# Patient Record
Sex: FEMALE | Race: WHITE | NOT HISPANIC OR LATINO | URBAN - METROPOLITAN AREA
[De-identification: names, ages, dates, MRNs, and addresses within clinical notes are randomized per-mention and may not be internally consistent; named-entity substitution may affect disease eponyms.]

---

## 2017-02-14 ENCOUNTER — EMERGENCY (EMERGENCY)
Facility: HOSPITAL | Age: 77
LOS: 1 days | Discharge: HOME | End: 2017-02-14
Attending: EMERGENCY MEDICINE
Payer: COMMERCIAL

## 2017-02-14 VITALS
HEIGHT: 66 IN | OXYGEN SATURATION: 100 % | RESPIRATION RATE: 20 BRPM | TEMPERATURE: 98 F | WEIGHT: 119.93 LBS | DIASTOLIC BLOOD PRESSURE: 89 MMHG | SYSTOLIC BLOOD PRESSURE: 139 MMHG | HEART RATE: 96 BPM

## 2017-02-14 DIAGNOSIS — F41.9 ANXIETY DISORDER, UNSPECIFIED: ICD-10-CM

## 2017-02-14 DIAGNOSIS — I10 ESSENTIAL (PRIMARY) HYPERTENSION: ICD-10-CM

## 2017-02-14 DIAGNOSIS — R10.84 GENERALIZED ABDOMINAL PAIN: ICD-10-CM

## 2017-02-14 DIAGNOSIS — Z90.49 ACQUIRED ABSENCE OF OTHER SPECIFIED PARTS OF DIGESTIVE TRACT: ICD-10-CM

## 2017-02-14 DIAGNOSIS — N83.9 NONINFLAMMATORY DISORDER OF OVARY, FALLOPIAN TUBE AND BROAD LIGAMENT, UNSPECIFIED: ICD-10-CM

## 2017-02-14 LAB
ALBUMIN SERPL ELPH-MCNC: 4.4 G/DL — SIGNIFICANT CHANGE UP (ref 3.3–5.2)
ALP SERPL-CCNC: 107 U/L — SIGNIFICANT CHANGE UP (ref 40–120)
ALT FLD-CCNC: 19 U/L — SIGNIFICANT CHANGE UP
ANION GAP SERPL CALC-SCNC: 13 MMOL/L — SIGNIFICANT CHANGE UP (ref 5–17)
APPEARANCE UR: CLEAR — SIGNIFICANT CHANGE UP
APTT BLD: 31 SEC — SIGNIFICANT CHANGE UP (ref 27.5–37.4)
AST SERPL-CCNC: 24 U/L — SIGNIFICANT CHANGE UP
BACTERIA # UR AUTO: ABNORMAL
BASOPHILS # BLD AUTO: 0 K/UL — SIGNIFICANT CHANGE UP (ref 0–0.2)
BASOPHILS NFR BLD AUTO: 0.2 % — SIGNIFICANT CHANGE UP (ref 0–2)
BILIRUB SERPL-MCNC: 0.5 MG/DL — SIGNIFICANT CHANGE UP (ref 0.4–2)
BILIRUB UR-MCNC: NEGATIVE — SIGNIFICANT CHANGE UP
BLD GP AB SCN SERPL QL: SIGNIFICANT CHANGE UP
BUN SERPL-MCNC: 18 MG/DL — SIGNIFICANT CHANGE UP (ref 8–20)
CALCIUM SERPL-MCNC: 10 MG/DL — SIGNIFICANT CHANGE UP (ref 8.6–10.2)
CHLORIDE SERPL-SCNC: 86 MMOL/L — LOW (ref 98–107)
CO2 SERPL-SCNC: 29 MMOL/L — SIGNIFICANT CHANGE UP (ref 22–29)
COLOR SPEC: YELLOW — SIGNIFICANT CHANGE UP
CREAT SERPL-MCNC: 0.88 MG/DL — SIGNIFICANT CHANGE UP (ref 0.5–1.3)
DIFF PNL FLD: ABNORMAL
EOSINOPHIL # BLD AUTO: 0.1 K/UL — SIGNIFICANT CHANGE UP (ref 0–0.5)
EOSINOPHIL NFR BLD AUTO: 0.9 % — SIGNIFICANT CHANGE UP (ref 0–6)
GLUCOSE SERPL-MCNC: 109 MG/DL — SIGNIFICANT CHANGE UP (ref 70–115)
GLUCOSE UR QL: NEGATIVE MG/DL — SIGNIFICANT CHANGE UP
HCT VFR BLD CALC: 33.2 % — LOW (ref 37–47)
HGB BLD-MCNC: 11.3 G/DL — LOW (ref 12–16)
INR BLD: 0.99 RATIO — SIGNIFICANT CHANGE UP (ref 0.88–1.16)
KETONES UR-MCNC: NEGATIVE — SIGNIFICANT CHANGE UP
LEUKOCYTE ESTERASE UR-ACNC: NEGATIVE — SIGNIFICANT CHANGE UP
LIDOCAIN IGE QN: 25 U/L — SIGNIFICANT CHANGE UP (ref 22–51)
LYMPHOCYTES # BLD AUTO: 0.9 K/UL — LOW (ref 1–4.8)
LYMPHOCYTES # BLD AUTO: 9.5 % — LOW (ref 20–55)
MAGNESIUM SERPL-MCNC: 2.3 MG/DL — SIGNIFICANT CHANGE UP (ref 1.8–2.5)
MCHC RBC-ENTMCNC: 30.1 PG — SIGNIFICANT CHANGE UP (ref 27–31)
MCHC RBC-ENTMCNC: 34 G/DL — SIGNIFICANT CHANGE UP (ref 32–36)
MCV RBC AUTO: 88.3 FL — SIGNIFICANT CHANGE UP (ref 81–99)
MONOCYTES # BLD AUTO: 0.8 K/UL — SIGNIFICANT CHANGE UP (ref 0–0.8)
MONOCYTES NFR BLD AUTO: 9.2 % — SIGNIFICANT CHANGE UP (ref 3–10)
NEUTROPHILS # BLD AUTO: 7.3 K/UL — SIGNIFICANT CHANGE UP (ref 1.8–8)
NEUTROPHILS NFR BLD AUTO: 80 % — HIGH (ref 37–73)
NITRITE UR-MCNC: NEGATIVE — SIGNIFICANT CHANGE UP
PH UR: 7 — SIGNIFICANT CHANGE UP (ref 4.8–8)
PHOSPHATE SERPL-MCNC: 3.2 MG/DL — SIGNIFICANT CHANGE UP (ref 2.4–4.7)
PLATELET # BLD AUTO: 294 K/UL — SIGNIFICANT CHANGE UP (ref 150–400)
POTASSIUM SERPL-MCNC: 3.3 MMOL/L — LOW (ref 3.5–5.3)
POTASSIUM SERPL-SCNC: 3.3 MMOL/L — LOW (ref 3.5–5.3)
PROT SERPL-MCNC: 7.9 G/DL — SIGNIFICANT CHANGE UP (ref 6.6–8.7)
PROT UR-MCNC: NEGATIVE MG/DL — SIGNIFICANT CHANGE UP
PROTHROM AB SERPL-ACNC: 10.9 SEC — SIGNIFICANT CHANGE UP (ref 10–13.1)
RBC # BLD: 3.76 M/UL — LOW (ref 4.4–5.2)
RBC # FLD: 12.4 % — SIGNIFICANT CHANGE UP (ref 11–15.6)
SODIUM SERPL-SCNC: 128 MMOL/L — LOW (ref 135–145)
SP GR SPEC: 1.01 — SIGNIFICANT CHANGE UP (ref 1.01–1.02)
TYPE + AB SCN PNL BLD: SIGNIFICANT CHANGE UP
UROBILINOGEN FLD QL: NEGATIVE MG/DL — SIGNIFICANT CHANGE UP
WBC # BLD: 9.1 K/UL — SIGNIFICANT CHANGE UP (ref 4.8–10.8)
WBC # FLD AUTO: 9.1 K/UL — SIGNIFICANT CHANGE UP (ref 4.8–10.8)
WBC UR QL: SIGNIFICANT CHANGE UP

## 2017-02-14 PROCEDURE — 85610 PROTHROMBIN TIME: CPT

## 2017-02-14 PROCEDURE — 81001 URINALYSIS AUTO W/SCOPE: CPT

## 2017-02-14 PROCEDURE — 86901 BLOOD TYPING SEROLOGIC RH(D): CPT

## 2017-02-14 PROCEDURE — 99284 EMERGENCY DEPT VISIT MOD MDM: CPT | Mod: 25

## 2017-02-14 PROCEDURE — 86900 BLOOD TYPING SEROLOGIC ABO: CPT

## 2017-02-14 PROCEDURE — 96374 THER/PROPH/DIAG INJ IV PUSH: CPT | Mod: XU

## 2017-02-14 PROCEDURE — 83690 ASSAY OF LIPASE: CPT

## 2017-02-14 PROCEDURE — 93010 ELECTROCARDIOGRAM REPORT: CPT

## 2017-02-14 PROCEDURE — 93005 ELECTROCARDIOGRAM TRACING: CPT

## 2017-02-14 PROCEDURE — 86850 RBC ANTIBODY SCREEN: CPT

## 2017-02-14 PROCEDURE — 85027 COMPLETE CBC AUTOMATED: CPT

## 2017-02-14 PROCEDURE — 74177 CT ABD & PELVIS W/CONTRAST: CPT | Mod: 26

## 2017-02-14 PROCEDURE — 74177 CT ABD & PELVIS W/CONTRAST: CPT

## 2017-02-14 PROCEDURE — 84100 ASSAY OF PHOSPHORUS: CPT

## 2017-02-14 PROCEDURE — 99284 EMERGENCY DEPT VISIT MOD MDM: CPT

## 2017-02-14 PROCEDURE — 83735 ASSAY OF MAGNESIUM: CPT

## 2017-02-14 PROCEDURE — 85730 THROMBOPLASTIN TIME PARTIAL: CPT

## 2017-02-14 PROCEDURE — 80053 COMPREHEN METABOLIC PANEL: CPT

## 2017-02-14 RX ORDER — SODIUM CHLORIDE 9 MG/ML
1000 INJECTION INTRAMUSCULAR; INTRAVENOUS; SUBCUTANEOUS ONCE
Qty: 0 | Refills: 0 | Status: COMPLETED | OUTPATIENT
Start: 2017-02-14 | End: 2017-02-14

## 2017-02-14 RX ORDER — OMEPRAZOLE 10 MG/1
1 CAPSULE, DELAYED RELEASE ORAL
Qty: 15 | Refills: 0 | OUTPATIENT
Start: 2017-02-14 | End: 2017-03-01

## 2017-02-14 RX ORDER — KETOROLAC TROMETHAMINE 30 MG/ML
15 SYRINGE (ML) INJECTION ONCE
Qty: 0 | Refills: 0 | Status: DISCONTINUED | OUTPATIENT
Start: 2017-02-14 | End: 2017-02-14

## 2017-02-14 RX ADMIN — Medication 15 MILLIGRAM(S): at 17:50

## 2017-02-14 RX ADMIN — Medication 15 MILLIGRAM(S): at 18:15

## 2017-02-14 RX ADMIN — SODIUM CHLORIDE 1000 MILLILITER(S): 9 INJECTION INTRAMUSCULAR; INTRAVENOUS; SUBCUTANEOUS at 17:50

## 2017-02-14 NOTE — ED ADULT TRIAGE NOTE - CHIEF COMPLAINT QUOTE
pt reports intermittent pain to her lower abdomen accompanied by nausea x 2 weeks. pt directed to ED by dr Lan

## 2017-02-14 NOTE — ED ADULT NURSE NOTE - OBJECTIVE STATEMENT
77 Y/O a&ox3 F BIBT c/o of lower abdominal pain x2weeks with decreased eating. pt denies n/v/d/ no difficulty urinating. pt. denies cp, sob, palpitations will continue to monitor. pending dispo.

## 2017-02-14 NOTE — ED STATDOCS - NS ED MD SCRIBE ATTENDING SCRIBE SECTIONS
REVIEW OF SYSTEMS/PAST MEDICAL/SURGICAL/SOCIAL HISTORY/HISTORY OF PRESENT ILLNESS/INTAKE ASSESSMENT/SCREENINGS/VITAL SIGNS( Pullset)/PHYSICAL EXAM/HIV/DISPOSITION

## 2017-02-14 NOTE — ED STATDOCS - INTERPRETATION
normal sinus rhythm, Normal axis, Normal AK interval and QRS complex. There are no acute ischemic ST or T-wave changes.

## 2017-02-14 NOTE — ED STATDOCS - CARE PLAN
Principal Discharge DX:	Abdominal pain  Secondary Diagnosis:	Ovarian mass, left  Secondary Diagnosis:	Gastric wall thickening

## 2017-02-14 NOTE — ED STATDOCS - MEDICAL DECISION MAKING DETAILS
gi f.u without fail pt non toxic need egd this week evaluate cancerous lesion she understand urgency

## 2017-02-14 NOTE — ED STATDOCS - ATTENDING CONTRIBUTION TO CARE
I, Cole Giron, performed the initial face to face bedside interview with this patient regarding history of present illness, review of symptoms and relevant past medical, social and family history.  I completed an independent physical examination.  I was the initial provider who evaluated this patient.  The history, relevant review of systems, past medical and surgical history, medical decision making, and physical examination was documented by the scribe in my presence and I attest to the accuracy of the documentation.  I have signed out the follow up of any pending tests (i.e. labs, radiological studies) to the ACP.  I have communicated the patient’s plan of care and disposition with the ACP.

## 2017-02-14 NOTE — ED STATDOCS - OBJECTIVE STATEMENT
77 y/o F pt with hx of diverticulitis, appendectomy presents to ED c/o abdominal pain for 1 week. decrease appetite. no vaginal bleeding or discharge. no blood in stool.  denies fever. denies HA or neck pain. no chest pain or sob. no n/v/d. no urinary f/u/d. no back pain. no motor or sensory deficits. denies drug use. no recent travel. no rash. no other acute issues symptoms or concerns

## 2017-02-14 NOTE — ED STATDOCS - PROGRESS NOTE DETAILS
PA NOTE: Pt seen by intake physician and orders/plan reviewed. PT is a 77yo female with PMHx of diverticulosis, HTN, Anxiety and depression, appendectomy presenting to ED with complaints of abd pain and decreased appetite x 1 week. Pt reports generalized abd pain, cramping, burning. Pt reports decreased PO intake for a while due to tooth surgery and generalized anxiety. Pt reports taking tylenol for symptoms. Pt reports having a colonoscopy a few weeks ago without any acute findings. pt reports her GI wanted to do a endoscopy but has not had it yet. pt denies fever, chills, diarrhea, CP, SOB, vomiting. NKDA   PE: GEN: Awake, alert,  NAD,  EYES:anicteric  CARDIAC: Reg rate and rhythm, S1,S2, RRR  RESP: No distress noted. Lungs CTA bilaterally no wheeze, ronchi, rales. ABD: soft, supple, tenderness diffusely, most distinctly suprapubic. no guarding. + R suprapubic mass felt. no CVAT . NEURO: AOx3, no focal deficits   PLAN: pt is a 77yo female with abd pain and nausea. will do labs, ct and re-evaluate. Pt stable. CT and labs reviewed with attending and pt. pt given copy of results and discussed acute findings with the possibility of Ca. Pt with edema and thickening of stomach and L ovarian lesion. Pt advised to follow up with PMD, OBGYN AND GI for findings. Pt reports GI wanted to do endoscopy. Pt verbalized understanding and agreement with plan and dx. will rx PPI and d.c, PA NOTE: Pt seen by intake physician and orders/plan reviewed. PT is a 75yo female with PMHx of diverticulosis, HTN, Anxiety and depression, appendectomy presenting to ED with complaints of abd pain and decreased appetite x 1 week. Pt reports generalized abd pain, cramping, burning. Pt reports decreased PO intake for a while due to tooth surgery and generalized anxiety. Pt reports taking tylenol for symptoms. Pt reports having a colonoscopy a few weeks ago without any acute findings. pt reports her GI wanted to do a endoscopy but has not had it yet. pt denies fever, chills, diarrhea, CP, SOB, vomiting. NKDA   PE: GEN: Awake, alert,  NAD,  EYES:anicteric  CARDIAC: Reg rate and rhythm, S1,S2, RRR  RESP: No distress noted. Lungs CTA bilaterally no wheeze, ronchi, rales. ABD: soft, supple, tenderness diffusely, most distinctly suprapubic. no guarding. + L suprapubic mass felt. no CVAT . NEURO: AOx3, no focal deficits   PLAN: pt is a 75yo female with abd pain and nausea. will do labs, ct and re-evaluate.

## 2017-05-17 NOTE — ED ADULT TRIAGE NOTE - NSWEIGHTCALCTOOLDRUG_GEN_A_CORE
Medication: Lyrica    Date of last refill: 04/13/17  [place] 45/55/[birth year]    Last office visit: 10/05/16  Due back to clinic per last office note:  6 months  Date next office visit scheduled:  No appointment scheduled at [facility] skin every third day.         TiZANidine HCl 4 MG Oral Tab  270 tablet  0       Sig: Take 1 tablet (4 mg total) by mouth 3 (three) times daily.         lidocaine 5 % External Patch  90 patch  0       Sig: Place 3 patches onto the skin daily.  Wear for 12 ho  used

## 2017-06-24 ENCOUNTER — EMERGENCY (EMERGENCY)
Facility: HOSPITAL | Age: 77
LOS: 1 days | Discharge: HOME | End: 2017-06-24
Attending: EMERGENCY MEDICINE
Payer: COMMERCIAL

## 2017-06-24 VITALS
TEMPERATURE: 98 F | SYSTOLIC BLOOD PRESSURE: 119 MMHG | RESPIRATION RATE: 18 BRPM | WEIGHT: 128.97 LBS | OXYGEN SATURATION: 95 % | HEART RATE: 80 BPM | HEIGHT: 66 IN | DIASTOLIC BLOOD PRESSURE: 76 MMHG

## 2017-06-24 VITALS
SYSTOLIC BLOOD PRESSURE: 112 MMHG | RESPIRATION RATE: 20 BRPM | DIASTOLIC BLOOD PRESSURE: 68 MMHG | HEART RATE: 72 BPM | OXYGEN SATURATION: 97 %

## 2017-06-24 LAB
ALBUMIN SERPL ELPH-MCNC: 4.2 G/DL — SIGNIFICANT CHANGE UP (ref 3.3–5.2)
ALP SERPL-CCNC: 102 U/L — SIGNIFICANT CHANGE UP (ref 40–120)
ALT FLD-CCNC: 19 U/L — SIGNIFICANT CHANGE UP
ANION GAP SERPL CALC-SCNC: 13 MMOL/L — SIGNIFICANT CHANGE UP (ref 5–17)
AST SERPL-CCNC: 19 U/L — SIGNIFICANT CHANGE UP
BASOPHILS # BLD AUTO: 0 K/UL — SIGNIFICANT CHANGE UP (ref 0–0.2)
BASOPHILS NFR BLD AUTO: 0.5 % — SIGNIFICANT CHANGE UP (ref 0–2)
BILIRUB SERPL-MCNC: 0.3 MG/DL — LOW (ref 0.4–2)
BUN SERPL-MCNC: 17 MG/DL — SIGNIFICANT CHANGE UP (ref 8–20)
CALCIUM SERPL-MCNC: 9.7 MG/DL — SIGNIFICANT CHANGE UP (ref 8.6–10.2)
CHLORIDE SERPL-SCNC: 97 MMOL/L — LOW (ref 98–107)
CK SERPL-CCNC: 37 U/L — SIGNIFICANT CHANGE UP (ref 25–170)
CO2 SERPL-SCNC: 29 MMOL/L — SIGNIFICANT CHANGE UP (ref 22–29)
CREAT SERPL-MCNC: 0.9 MG/DL — SIGNIFICANT CHANGE UP (ref 0.5–1.3)
D DIMER BLD IA.RAPID-MCNC: 171 NG/ML DDU — SIGNIFICANT CHANGE UP
EOSINOPHIL # BLD AUTO: 0 K/UL — SIGNIFICANT CHANGE UP (ref 0–0.5)
EOSINOPHIL NFR BLD AUTO: 0.5 % — SIGNIFICANT CHANGE UP (ref 0–6)
GLUCOSE SERPL-MCNC: 108 MG/DL — SIGNIFICANT CHANGE UP (ref 70–115)
HCT VFR BLD CALC: 37.4 % — SIGNIFICANT CHANGE UP (ref 37–47)
HGB BLD-MCNC: 12.3 G/DL — SIGNIFICANT CHANGE UP (ref 12–16)
LIDOCAIN IGE QN: 56 U/L — HIGH (ref 22–51)
LYMPHOCYTES # BLD AUTO: 0.9 K/UL — LOW (ref 1–4.8)
LYMPHOCYTES # BLD AUTO: 13.9 % — LOW (ref 20–55)
MCHC RBC-ENTMCNC: 30.1 PG — SIGNIFICANT CHANGE UP (ref 27–31)
MCHC RBC-ENTMCNC: 32.9 G/DL — SIGNIFICANT CHANGE UP (ref 32–36)
MCV RBC AUTO: 91.4 FL — SIGNIFICANT CHANGE UP (ref 81–99)
MONOCYTES # BLD AUTO: 0.5 K/UL — SIGNIFICANT CHANGE UP (ref 0–0.8)
MONOCYTES NFR BLD AUTO: 8.5 % — SIGNIFICANT CHANGE UP (ref 3–10)
NEUTROPHILS # BLD AUTO: 4.8 K/UL — SIGNIFICANT CHANGE UP (ref 1.8–8)
NEUTROPHILS NFR BLD AUTO: 76.4 % — HIGH (ref 37–73)
PLATELET # BLD AUTO: 217 K/UL — SIGNIFICANT CHANGE UP (ref 150–400)
POTASSIUM SERPL-MCNC: 4.8 MMOL/L — SIGNIFICANT CHANGE UP (ref 3.5–5.3)
POTASSIUM SERPL-SCNC: 4.8 MMOL/L — SIGNIFICANT CHANGE UP (ref 3.5–5.3)
PROT SERPL-MCNC: 7.3 G/DL — SIGNIFICANT CHANGE UP (ref 6.6–8.7)
RBC # BLD: 4.09 M/UL — LOW (ref 4.4–5.2)
RBC # FLD: 12.5 % — SIGNIFICANT CHANGE UP (ref 11–15.6)
SODIUM SERPL-SCNC: 139 MMOL/L — SIGNIFICANT CHANGE UP (ref 135–145)
WBC # BLD: 6.3 K/UL — SIGNIFICANT CHANGE UP (ref 4.8–10.8)
WBC # FLD AUTO: 6.3 K/UL — SIGNIFICANT CHANGE UP (ref 4.8–10.8)

## 2017-06-24 PROCEDURE — 99285 EMERGENCY DEPT VISIT HI MDM: CPT

## 2017-06-24 PROCEDURE — 93005 ELECTROCARDIOGRAM TRACING: CPT

## 2017-06-24 PROCEDURE — 36415 COLL VENOUS BLD VENIPUNCTURE: CPT

## 2017-06-24 PROCEDURE — 96375 TX/PRO/DX INJ NEW DRUG ADDON: CPT

## 2017-06-24 PROCEDURE — 74177 CT ABD & PELVIS W/CONTRAST: CPT

## 2017-06-24 PROCEDURE — 96374 THER/PROPH/DIAG INJ IV PUSH: CPT | Mod: XU

## 2017-06-24 PROCEDURE — 93010 ELECTROCARDIOGRAM REPORT: CPT

## 2017-06-24 PROCEDURE — 85379 FIBRIN DEGRADATION QUANT: CPT

## 2017-06-24 PROCEDURE — 85027 COMPLETE CBC AUTOMATED: CPT

## 2017-06-24 PROCEDURE — 83690 ASSAY OF LIPASE: CPT

## 2017-06-24 PROCEDURE — 80053 COMPREHEN METABOLIC PANEL: CPT

## 2017-06-24 PROCEDURE — 82550 ASSAY OF CK (CPK): CPT

## 2017-06-24 PROCEDURE — 99284 EMERGENCY DEPT VISIT MOD MDM: CPT | Mod: 25

## 2017-06-24 PROCEDURE — 74177 CT ABD & PELVIS W/CONTRAST: CPT | Mod: 26

## 2017-06-24 RX ORDER — LOSARTAN/HYDROCHLOROTHIAZIDE 100MG-25MG
0 TABLET ORAL
Qty: 0 | Refills: 0 | COMMUNITY

## 2017-06-24 RX ORDER — METOCLOPRAMIDE HCL 10 MG
10 TABLET ORAL ONCE
Qty: 0 | Refills: 0 | Status: COMPLETED | OUTPATIENT
Start: 2017-06-24 | End: 2017-06-24

## 2017-06-24 RX ORDER — OLANZAPINE 15 MG/1
0 TABLET, FILM COATED ORAL
Qty: 0 | Refills: 0 | COMMUNITY

## 2017-06-24 RX ORDER — SODIUM CHLORIDE 9 MG/ML
3 INJECTION INTRAMUSCULAR; INTRAVENOUS; SUBCUTANEOUS ONCE
Qty: 0 | Refills: 0 | Status: COMPLETED | OUTPATIENT
Start: 2017-06-24 | End: 2017-06-24

## 2017-06-24 RX ORDER — PANTOPRAZOLE SODIUM 20 MG/1
40 TABLET, DELAYED RELEASE ORAL ONCE
Qty: 0 | Refills: 0 | Status: COMPLETED | OUTPATIENT
Start: 2017-06-24 | End: 2017-06-24

## 2017-06-24 RX ORDER — METOPROLOL TARTRATE 50 MG
0 TABLET ORAL
Qty: 0 | Refills: 0 | COMMUNITY

## 2017-06-24 RX ORDER — PANTOPRAZOLE SODIUM 20 MG/1
1 TABLET, DELAYED RELEASE ORAL
Qty: 14 | Refills: 0 | OUTPATIENT
Start: 2017-06-24 | End: 2017-07-08

## 2017-06-24 RX ADMIN — SODIUM CHLORIDE 3 MILLILITER(S): 9 INJECTION INTRAMUSCULAR; INTRAVENOUS; SUBCUTANEOUS at 13:41

## 2017-06-24 RX ADMIN — Medication 10 MILLIGRAM(S): at 13:40

## 2017-06-24 RX ADMIN — PANTOPRAZOLE SODIUM 40 MILLIGRAM(S): 20 TABLET, DELAYED RELEASE ORAL at 13:41

## 2017-06-24 NOTE — ED PROVIDER NOTE - CARE PLAN
Principal Discharge DX:	Nausea  Secondary Diagnosis:	Gastroesophageal reflux disease, esophagitis presence not specified

## 2017-06-24 NOTE — ED STATDOCS - PROGRESS NOTE DETAILS
76 y/o F pt with PMHx of HTN and PSHx of ovarian mass (2 months ago, found to be benign) presents to ED c/o epigastric abdominal pain with radiation into her chest, decreased PO intake, and nausea x4 days. She was seen by her PMD yesterday, who gave her Pepcid and Zofran with no relief. She was advised to go to the ED if sx worsened. Pt denies fever, chills, CP, SOB, vomiting, diarrhea, back pain, dysuria, and hematuria. No further complaints at this time. NKDA. Pt to be moved to main ED for complete evaluation by another provider.   PMD: Dr. Lindsey

## 2017-06-24 NOTE — ED ADULT NURSE NOTE - OBJECTIVE STATEMENT
PT awake alert oriented x4 respirations even and unlabored unguarded in stretcher pt c/o of epigastric pain with nausea x 2 days visited PMD yesterday prescribed Zofran and pepcid without relief, abdomen soft non tender non distended +BSx4

## 2017-06-24 NOTE — ED PROVIDER NOTE - OBJECTIVE STATEMENT
76 yo female pmh gerd comes to ed with nausea , epigastric pain with radiation to chest; pt txed with zofran without relief of symptom

## 2017-06-25 RX ORDER — PANTOPRAZOLE SODIUM 20 MG/1
1 TABLET, DELAYED RELEASE ORAL
Qty: 14 | Refills: 0 | OUTPATIENT
Start: 2017-06-25 | End: 2017-07-09

## 2017-06-25 RX ORDER — ONDANSETRON 8 MG/1
1 TABLET, FILM COATED ORAL
Qty: 10 | Refills: 0 | OUTPATIENT
Start: 2017-06-25

## 2018-11-17 ENCOUNTER — INPATIENT (INPATIENT)
Facility: HOSPITAL | Age: 78
LOS: 2 days | Discharge: HOME | DRG: 885 | End: 2018-11-20
Attending: HOSPITALIST | Admitting: STUDENT IN AN ORGANIZED HEALTH CARE EDUCATION/TRAINING PROGRAM
Payer: MEDICARE

## 2018-11-17 VITALS
WEIGHT: 130.07 LBS | HEART RATE: 100 BPM | TEMPERATURE: 98 F | OXYGEN SATURATION: 98 % | SYSTOLIC BLOOD PRESSURE: 130 MMHG | RESPIRATION RATE: 18 BRPM | DIASTOLIC BLOOD PRESSURE: 84 MMHG | HEIGHT: 66 IN

## 2018-11-17 PROBLEM — F32.9 MAJOR DEPRESSIVE DISORDER, SINGLE EPISODE, UNSPECIFIED: Chronic | Status: ACTIVE | Noted: 2017-02-14

## 2018-11-17 LAB
ALBUMIN SERPL ELPH-MCNC: 4.2 G/DL — SIGNIFICANT CHANGE UP (ref 3.3–5.2)
ALP SERPL-CCNC: 85 U/L — SIGNIFICANT CHANGE UP (ref 40–120)
ALT FLD-CCNC: 9 U/L — SIGNIFICANT CHANGE UP
ANION GAP SERPL CALC-SCNC: 12 MMOL/L — SIGNIFICANT CHANGE UP (ref 5–17)
APPEARANCE UR: CLEAR — SIGNIFICANT CHANGE UP
AST SERPL-CCNC: 13 U/L — SIGNIFICANT CHANGE UP
BASOPHILS # BLD AUTO: 0 K/UL — SIGNIFICANT CHANGE UP (ref 0–0.2)
BASOPHILS NFR BLD AUTO: 0.4 % — SIGNIFICANT CHANGE UP (ref 0–2)
BILIRUB SERPL-MCNC: 0.3 MG/DL — LOW (ref 0.4–2)
BILIRUB UR-MCNC: NEGATIVE — SIGNIFICANT CHANGE UP
BUN SERPL-MCNC: 10 MG/DL — SIGNIFICANT CHANGE UP (ref 8–20)
CALCIUM SERPL-MCNC: 9.8 MG/DL — SIGNIFICANT CHANGE UP (ref 8.6–10.2)
CHLORIDE SERPL-SCNC: 97 MMOL/L — LOW (ref 98–107)
CO2 SERPL-SCNC: 27 MMOL/L — SIGNIFICANT CHANGE UP (ref 22–29)
COLOR SPEC: YELLOW — SIGNIFICANT CHANGE UP
CREAT SERPL-MCNC: 1.02 MG/DL — SIGNIFICANT CHANGE UP (ref 0.5–1.3)
DIFF PNL FLD: ABNORMAL
EOSINOPHIL # BLD AUTO: 0 K/UL — SIGNIFICANT CHANGE UP (ref 0–0.5)
EOSINOPHIL NFR BLD AUTO: 0.4 % — SIGNIFICANT CHANGE UP (ref 0–6)
GLUCOSE SERPL-MCNC: 102 MG/DL — SIGNIFICANT CHANGE UP (ref 70–115)
GLUCOSE UR QL: NEGATIVE MG/DL — SIGNIFICANT CHANGE UP
HCT VFR BLD CALC: 38.1 % — SIGNIFICANT CHANGE UP (ref 37–47)
HGB BLD-MCNC: 12.3 G/DL — SIGNIFICANT CHANGE UP (ref 12–16)
KETONES UR-MCNC: ABNORMAL
LEUKOCYTE ESTERASE UR-ACNC: NEGATIVE — SIGNIFICANT CHANGE UP
LIDOCAIN IGE QN: 40 U/L — SIGNIFICANT CHANGE UP (ref 22–51)
LYMPHOCYTES # BLD AUTO: 0.9 K/UL — LOW (ref 1–4.8)
LYMPHOCYTES # BLD AUTO: 19.5 % — LOW (ref 20–55)
MCHC RBC-ENTMCNC: 29.9 PG — SIGNIFICANT CHANGE UP (ref 27–31)
MCHC RBC-ENTMCNC: 32.3 G/DL — SIGNIFICANT CHANGE UP (ref 32–36)
MCV RBC AUTO: 92.5 FL — SIGNIFICANT CHANGE UP (ref 81–99)
MONOCYTES # BLD AUTO: 0.6 K/UL — SIGNIFICANT CHANGE UP (ref 0–0.8)
MONOCYTES NFR BLD AUTO: 11.4 % — HIGH (ref 3–10)
NEUTROPHILS # BLD AUTO: 3.3 K/UL — SIGNIFICANT CHANGE UP (ref 1.8–8)
NEUTROPHILS NFR BLD AUTO: 68.3 % — SIGNIFICANT CHANGE UP (ref 37–73)
NITRITE UR-MCNC: NEGATIVE — SIGNIFICANT CHANGE UP
PH UR: 7 — SIGNIFICANT CHANGE UP (ref 5–8)
PLATELET # BLD AUTO: 245 K/UL — SIGNIFICANT CHANGE UP (ref 150–400)
POTASSIUM SERPL-MCNC: 4 MMOL/L — SIGNIFICANT CHANGE UP (ref 3.5–5.3)
POTASSIUM SERPL-SCNC: 4 MMOL/L — SIGNIFICANT CHANGE UP (ref 3.5–5.3)
PROT SERPL-MCNC: 6.8 G/DL — SIGNIFICANT CHANGE UP (ref 6.6–8.7)
PROT UR-MCNC: NEGATIVE MG/DL — SIGNIFICANT CHANGE UP
RBC # BLD: 4.12 M/UL — LOW (ref 4.4–5.2)
RBC # FLD: 12.6 % — SIGNIFICANT CHANGE UP (ref 11–15.6)
SODIUM SERPL-SCNC: 136 MMOL/L — SIGNIFICANT CHANGE UP (ref 135–145)
SP GR SPEC: 1 — LOW (ref 1.01–1.02)
TROPONIN T SERPL-MCNC: <0.01 NG/ML — SIGNIFICANT CHANGE UP (ref 0–0.06)
UROBILINOGEN FLD QL: NEGATIVE MG/DL — SIGNIFICANT CHANGE UP
WBC # BLD: 4.8 K/UL — SIGNIFICANT CHANGE UP (ref 4.8–10.8)
WBC # FLD AUTO: 4.8 K/UL — SIGNIFICANT CHANGE UP (ref 4.8–10.8)

## 2018-11-17 PROCEDURE — 76705 ECHO EXAM OF ABDOMEN: CPT | Mod: 26

## 2018-11-17 PROCEDURE — 93010 ELECTROCARDIOGRAM REPORT: CPT

## 2018-11-17 PROCEDURE — 99285 EMERGENCY DEPT VISIT HI MDM: CPT

## 2018-11-17 PROCEDURE — 71045 X-RAY EXAM CHEST 1 VIEW: CPT | Mod: 26

## 2018-11-17 RX ORDER — SODIUM CHLORIDE 9 MG/ML
1000 INJECTION INTRAMUSCULAR; INTRAVENOUS; SUBCUTANEOUS ONCE
Qty: 0 | Refills: 0 | Status: COMPLETED | OUTPATIENT
Start: 2018-11-17 | End: 2018-11-17

## 2018-11-17 RX ORDER — METOCLOPRAMIDE HCL 10 MG
10 TABLET ORAL ONCE
Qty: 0 | Refills: 0 | Status: COMPLETED | OUTPATIENT
Start: 2018-11-17 | End: 2018-11-17

## 2018-11-17 RX ORDER — FAMOTIDINE 10 MG/ML
20 INJECTION INTRAVENOUS ONCE
Qty: 0 | Refills: 0 | Status: COMPLETED | OUTPATIENT
Start: 2018-11-17 | End: 2018-11-17

## 2018-11-17 RX ORDER — ONDANSETRON 8 MG/1
4 TABLET, FILM COATED ORAL ONCE
Qty: 0 | Refills: 0 | Status: COMPLETED | OUTPATIENT
Start: 2018-11-17 | End: 2018-11-17

## 2018-11-17 RX ADMIN — Medication 10 MILLIGRAM(S): at 22:41

## 2018-11-17 RX ADMIN — ONDANSETRON 4 MILLIGRAM(S): 8 TABLET, FILM COATED ORAL at 19:31

## 2018-11-17 RX ADMIN — FAMOTIDINE 20 MILLIGRAM(S): 10 INJECTION INTRAVENOUS at 19:31

## 2018-11-17 RX ADMIN — SODIUM CHLORIDE 1000 MILLILITER(S): 9 INJECTION INTRAMUSCULAR; INTRAVENOUS; SUBCUTANEOUS at 20:30

## 2018-11-17 RX ADMIN — Medication 104 MILLIGRAM(S): at 22:11

## 2018-11-17 RX ADMIN — SODIUM CHLORIDE 1000 MILLILITER(S): 9 INJECTION INTRAMUSCULAR; INTRAVENOUS; SUBCUTANEOUS at 19:31

## 2018-11-17 NOTE — ED PROVIDER NOTE - OBJECTIVE STATEMENT
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 As per family at bedside, the patient has had a weight loss of about 6 pounds over the course of the past few weeks. Prior to 3-4 days ago, has been feeling generally weak and tremulous. She has never had nausea this severe in the past. Had an endoscopy performed earlier this year and has had a colonoscopy, which came back normal. Denies recent travel or abx use, fevers or chills, abdominal pain, vomiting, diarrhea, constipation, chest pain, difficulty breathing, dizziness, headaches, numbness or tingling in extremities, throat pain, difficulty swallowing, hematuria or dysuria. She recently started Lamictal about 4 weeks ago, due to preoccupation and anxiety mostly surrounding new basal cell carcinoma diagnosis. Was also evaluated by a neurologist, who recommend CT of the head which she did not have performed yet.     PCP: Dr. Rosen  Gastro: Dr. 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 As per family at bedside, the patient has had a weight loss of about 6 pounds over the course of the past few weeks. Prior to 3-4 days ago, has been feeling generally weak and tremulous. She has never had nausea this severe in the past. Had an endoscopy performed earlier this year and has had a colonoscopy, which came back normal. Denies recent travel or abx use, fevers or chills, abdominal pain, vomiting, diarrhea, constipation, chest pain, difficulty breathing, dizziness, headaches, numbness or tingling in extremities, throat pain, difficulty swallowing, hematuria or dysuria.     As per , the patient recently started Lamictal about 4 weeks ago, due to preoccupation and anxiety mostly surrounding new basal cell carcinoma diagnosis. Was also evaluated by a neurologist, who recommend CT of the head which she did not have performed yet.     PCP: Dr. Rosen  Gastro: Dr. Marsh

## 2018-11-17 NOTE — ED ADULT NURSE NOTE - NSIMPLEMENTINTERV_GEN_ALL_ED
Implemented All Universal Safety Interventions:  [place] to call system. Call bell, personal items and telephone within reach. Instruct patient to call for assistance. Room bathroom lighting operational. Non-slip footwear when patient is off stretcher. Physically safe environment: no spills, clutter or unnecessary equipment. Stretcher in lowest position, wheels locked, appropriate side rails in place.

## 2018-11-17 NOTE — ED PROVIDER NOTE - CHPI ED SYMPTOMS NEG
no fever/no chills/no vomiting/abdominal pain, constipation, chest pain, difficulty breathing, dizziness, headaches or numbness and tingling in extremities, throat pain, difficulty swallowing, hematuria or dysuria

## 2018-11-17 NOTE — ED PROVIDER NOTE - NS ED ROS FT
no weight change, no fever or chills  no recent travel, no recent abox, no sick contacts  no recent change in medications  no rash, no bruises  no visual changes no eye discharge  no cough cold or congestion,   no sob, no chest pain  no orthopnea, no pnd  no abd pain, no vomiting or diarrhea, + nausea and decreased PO intake   no hematuria, no change in urinary habits  no joint pain, no deformity  no headache, no paresthesia +weight change (6 pound weight loss), no fever or chills  no recent travel, no recent abox, no sick contacts  no recent change in medications  no rash, no bruises  no visual changes no eye discharge  no cough cold or congestion,   no sob, no chest pain  no orthopnea, no pnd  no abd pain, no vomiting or diarrhea, + nausea and decreased PO intake   no hematuria, no change in urinary habits  no joint pain, no deformity  no headache, no paresthesia

## 2018-11-17 NOTE — ED STATDOCS - OBJECTIVE STATEMENT
Telemedicine assessment was conducted (using real time 2 way audio-video technology) by Dr. Donavon Gar located at [address] [place] [id number]  ++++++++++++++++++++++++  Pertinent patient history and initial plan: 79 y/o F pt with PMHx of HTN, anxiety, and basal cell carcinoma (on her chin) presents to the ED c/o constant nausea onset 3-4 days ago. Describes it as the worst nausea she has ever had. Reports upper abdominal pain, and decreased PO intake. Took Zofran with minimal to no relief. Denies vomiting, constipation, and any urinary changes. SHx hysterectomy. No further complaints at this time.  PCP- Dr. Lindsey Telemedicine assessment was conducted (using real time 2 way audio-video technology) by Dr. Donavon Gar located at [address] [place] [id number]  ++++++++++++++++++++++++  Pertinent patient history and initial plan: 79 y/o F pt with PMHx of HTN, anxiety, and basal cell carcinoma (on her chin) presents to the ED c/o constant nausea onset 3-4 days ago. Describes it as the worst nausea she has ever had. Reports upper abdominal pain, and decreased PO intake. Took Zofran with minimal to no relief. Denies vomiting, constipation, and any urinary changes. SHx hysterectomy. No further complaints at this time. denies any chest pain or shortness of breath  PCP- Dr. Lindsey    labs, ekg, ultrasound, zofran/pepcid    Patient seen by me in intake for initial assessment and ordering. Physician on site to follow results and further evaluate and treat patient.

## 2018-11-17 NOTE — ED PROVIDER NOTE - PHYSICAL EXAMINATION
Constitutional : Appears comfortably, talking in full sentences  Head :NC AT , no swelling  Eyes :eomi, no swelling  Mouth :mm moist,  Neck : supple, trachea in midline  Chest :Ha air entry, symm chest expansion, no distress  Heart :S1 S2 distant  Abdomen :abd soft, non tender  Musc/Skel :ext no swelling, no deformity, no spine tenderness, distal pulses present  Neuro  :AAO 3 no focal deficits Constitutional : Appears comfortably, speaking in 1-2 words, has blank stare and flat affect  Head :NC AT , no swelling  Eyes :eomi, no swelling, anicteric   Mouth :mm moist,  Neck : supple, trachea in midline  Chest :Ha air entry, symm chest expansion, no distress  Heart :S1 S2 distant  Abdomen :abd soft, scaphoid, non tender   Musc/Skel :ext no swelling, no deformity, no spine tenderness, distal pulses present, has a resting tremor   AAO*3, follows commands  motor ha upper and lower ext 5/5  sensory symm and intact  CN 2-12 grossly intact  patient needs assistance getting out of bed and sitting, takes a few seconds to initiate gait and has shuffling gait, unable to maintain gait, no nystagmus  finger to nose, symm ha, no pronator drift

## 2018-11-17 NOTE — ED PROVIDER NOTE - MEDICAL DECISION MAKING DETAILS
77 y/o F 79 y/o F presenting with persistent nausea, will check CT of head and abdomen, labs and re-evaluate.

## 2018-11-17 NOTE — ED ADULT NURSE NOTE - OBJECTIVE STATEMENT
Patient came back from US. Received lying in bed, a&ox3, able to make needs known. Patient reports of episode of chest pain but denies any pain at this time. Patient reports her nausea comes back, no active vomiting noted at this time. Patient reports she took zofran prior to coming to hospital. Patient not in distress. PMH: Basal cell carcinoma (chin), anxiety, HTN.

## 2018-11-18 DIAGNOSIS — R11.0 NAUSEA: ICD-10-CM

## 2018-11-18 PROCEDURE — 74177 CT ABD & PELVIS W/CONTRAST: CPT | Mod: 26

## 2018-11-18 PROCEDURE — 70450 CT HEAD/BRAIN W/O DYE: CPT | Mod: 26

## 2018-11-18 PROCEDURE — 99233 SBSQ HOSP IP/OBS HIGH 50: CPT

## 2018-11-18 PROCEDURE — 99223 1ST HOSP IP/OBS HIGH 75: CPT

## 2018-11-18 RX ORDER — LAMOTRIGINE 25 MG/1
25 TABLET, ORALLY DISINTEGRATING ORAL
Qty: 0 | Refills: 0 | Status: DISCONTINUED | OUTPATIENT
Start: 2018-11-18 | End: 2018-11-19

## 2018-11-18 RX ORDER — PANTOPRAZOLE SODIUM 20 MG/1
40 TABLET, DELAYED RELEASE ORAL EVERY 12 HOURS
Qty: 0 | Refills: 0 | Status: DISCONTINUED | OUTPATIENT
Start: 2018-11-18 | End: 2018-11-20

## 2018-11-18 RX ORDER — ONDANSETRON 8 MG/1
4 TABLET, FILM COATED ORAL EVERY 6 HOURS
Qty: 0 | Refills: 0 | Status: DISCONTINUED | OUTPATIENT
Start: 2018-11-18 | End: 2018-11-19

## 2018-11-18 RX ORDER — ACETAMINOPHEN 500 MG
650 TABLET ORAL EVERY 6 HOURS
Qty: 0 | Refills: 0 | Status: DISCONTINUED | OUTPATIENT
Start: 2018-11-18 | End: 2018-11-20

## 2018-11-18 RX ORDER — ENOXAPARIN SODIUM 100 MG/ML
40 INJECTION SUBCUTANEOUS DAILY
Qty: 0 | Refills: 0 | Status: DISCONTINUED | OUTPATIENT
Start: 2018-11-18 | End: 2018-11-20

## 2018-11-18 RX ORDER — FAMOTIDINE 10 MG/ML
20 INJECTION INTRAVENOUS
Qty: 0 | Refills: 0 | Status: DISCONTINUED | OUTPATIENT
Start: 2018-11-18 | End: 2018-11-20

## 2018-11-18 RX ORDER — ONDANSETRON 8 MG/1
4 TABLET, FILM COATED ORAL ONCE
Qty: 0 | Refills: 0 | Status: COMPLETED | OUTPATIENT
Start: 2018-11-18 | End: 2018-11-18

## 2018-11-18 RX ORDER — LOSARTAN POTASSIUM 100 MG/1
50 TABLET, FILM COATED ORAL DAILY
Qty: 0 | Refills: 0 | Status: DISCONTINUED | OUTPATIENT
Start: 2018-11-18 | End: 2018-11-20

## 2018-11-18 RX ORDER — SERTRALINE 25 MG/1
1 TABLET, FILM COATED ORAL
Qty: 0 | Refills: 0 | COMMUNITY

## 2018-11-18 RX ORDER — ESCITALOPRAM OXALATE 10 MG/1
10 TABLET, FILM COATED ORAL DAILY
Qty: 0 | Refills: 0 | Status: DISCONTINUED | OUTPATIENT
Start: 2018-11-18 | End: 2018-11-20

## 2018-11-18 RX ORDER — GABAPENTIN 400 MG/1
1 CAPSULE ORAL
Qty: 0 | Refills: 0 | COMMUNITY

## 2018-11-18 RX ORDER — ALPRAZOLAM 0.25 MG
0.5 TABLET ORAL THREE TIMES A DAY
Qty: 0 | Refills: 0 | Status: DISCONTINUED | OUTPATIENT
Start: 2018-11-18 | End: 2018-11-19

## 2018-11-18 RX ADMIN — Medication 650 MILLIGRAM(S): at 17:25

## 2018-11-18 RX ADMIN — ESCITALOPRAM OXALATE 10 MILLIGRAM(S): 10 TABLET, FILM COATED ORAL at 17:25

## 2018-11-18 RX ADMIN — Medication 650 MILLIGRAM(S): at 23:00

## 2018-11-18 RX ADMIN — ONDANSETRON 4 MILLIGRAM(S): 8 TABLET, FILM COATED ORAL at 23:00

## 2018-11-18 RX ADMIN — FAMOTIDINE 20 MILLIGRAM(S): 10 INJECTION INTRAVENOUS at 17:26

## 2018-11-18 RX ADMIN — Medication 650 MILLIGRAM(S): at 14:03

## 2018-11-18 RX ADMIN — ENOXAPARIN SODIUM 40 MILLIGRAM(S): 100 INJECTION SUBCUTANEOUS at 10:50

## 2018-11-18 RX ADMIN — LAMOTRIGINE 25 MILLIGRAM(S): 25 TABLET, ORALLY DISINTEGRATING ORAL at 17:26

## 2018-11-18 RX ADMIN — Medication 0.5 MILLIGRAM(S): at 21:19

## 2018-11-18 RX ADMIN — ONDANSETRON 4 MILLIGRAM(S): 8 TABLET, FILM COATED ORAL at 10:50

## 2018-11-18 RX ADMIN — ONDANSETRON 4 MILLIGRAM(S): 8 TABLET, FILM COATED ORAL at 17:26

## 2018-11-18 RX ADMIN — Medication 650 MILLIGRAM(S): at 10:50

## 2018-11-18 RX ADMIN — PANTOPRAZOLE SODIUM 40 MILLIGRAM(S): 20 TABLET, DELAYED RELEASE ORAL at 17:26

## 2018-11-18 RX ADMIN — ONDANSETRON 4 MILLIGRAM(S): 8 TABLET, FILM COATED ORAL at 02:06

## 2018-11-18 RX ADMIN — LOSARTAN POTASSIUM 50 MILLIGRAM(S): 100 TABLET, FILM COATED ORAL at 17:26

## 2018-11-18 NOTE — CONSULT NOTE ADULT - PROBLEM SELECTOR RECOMMENDATION 9
These symptoms are chronic. Please continue PPI and zofran  - please get the egd and colon done by Dr Ledesma - no need to repeat them as they were done recently.   - mildly dilated left hepatic duct. MRCP pending, but given the chronicity and normal LFT, this is most likely non specific These symptoms are chronic. Please continue PPI and zofran  - please get the egd and colon done by Dr Ledesma - no need to repeat them as they were done recently.   - pt following with neurology for MRI ?punctate hyperdensity in the  right posterior cerbral artery- possible thrombus  - mildly dilated left hepatic duct. MRCP pending, but given the chronicity and normal LFT, this is most likely non specific

## 2018-11-18 NOTE — H&P ADULT - HISTORY OF PRESENT ILLNESS
77 y/o F with PMHx of depression, recently diagnosed basal cell carcinoma not yet treated, HTN and PSHx of hysterectomy s/p ovarian benign neoplasm presents to ED c/o persistent nausea and decreased PO intake that started about 1 month ago. As per patient she has had a weight loss of about 13 pounds over the course of 1 month. She has never had nausea this severe in the past. Had an endoscopy performed earlier this year and has had a colonoscopy by Dr. Escobar, which came back normal. Her last BM was 2 days ago and was non bloody & formed as per pt. She does suffer from constipation on & off for which she takes miralax daily. Denies any chest pain, palpitations, sob, light headedness/dizziness, difficulty breathing/cough, fevers/chills, vomiting, diarrhea, dysuria or increased urinary frequency, headaches, diplopia, numbness or tingling in extremities, throat pain, difficulty swallowing, hematuria.  Denies recent travel or abx use/ recent travel.   As per , the patient recently started Lamictal about 4 weeks ago, due to preoccupation and anxiety mostly surrounding new basal cell carcinoma diagnosis. Was also evaluated by a neurologist, who recommend CT of the head which she did not have performed yet. Ct head performed in the ED: Stable chronic microvessel change. No acute intracranial hemorrhage or mass effect from vasogenic edema. Punctate hyperdensity P1 segment of right posterior cerebral artery for which acute thrombus cannot be excluded. Consider further evaluation with CTA of the head.  CT A/P: No bowel obstruction. Diffuse sigmoid colon wall thickening without inflammatory change, reflecting muscular hypertrophy related to chronic diverticulosis. Colon diverticulosis.  Mild left intrahepatic biliary dilatation, which has mildly increased since 6/24/2017. Underlying biliary etiology/neoplasm cannot be excluded. Recommend clinical correlation and follow-up contrast-enhanced MRI/MRCP for further evaluation.

## 2018-11-18 NOTE — CONSULT NOTE ADULT - SUBJECTIVE AND OBJECTIVE BOX
CHIEF COMPLAINT: dizzy    HPI: 78yFemale  77 y/o F with PMHx of depression, recently diagnosed basal cell carcinoma not yet treated, HTN and PSHx of hysterectomy s/p ovarian benign neoplasm presents to ED c/o persistent nausea and decreased PO intake that started about 1 month ago. As per patient she has had a weight loss of about 13 pounds over the course of 1 month. She has never had nausea this severe in the past. Had an endoscopy performed earlier this year and has had a colonoscopy by Dr. Escobar, which came back normal. Her last BM was 2 days ago and was non bloody & formed as per pt. She does suffer from constipation on & off for which she takes miralax daily. Denies any chest pain, palpitations, sob, light headedness/dizziness, difficulty breathing/cough, fevers/chills, vomiting, diarrhea, dysuria or increased urinary frequency, headaches, diplopia, numbness or tingling in extremities, throat pain, difficulty swallowing, hematuria.  Denies recent travel or abx use/ recent travel.   As per , the patient recently started Lamictal about 4 weeks ago, due to preoccupation and anxiety mostly surrounding new basal cell carcinoma diagnosis. Was also evaluated by a neurologist, who recommend CT of the head which she did not have performed yet. Ct head performed in the ED: Stable chronic microvessel change. No acute intracranial hemorrhage or mass effect from vasogenic edema. Punctate hyperdensity P1 segment of right posterior cerebral artery for which acute thrombus cannot be excluded. Consider further evaluation with CTA of the head.    PAST MEDICAL & SURGICAL HISTORY:  Ovarian benign neoplasm: with S/p Total  Hysterectomy  Depression  HTN (hypertension)  No significant past surgical history    MEDICATIONS  (STANDING):  acetaminophen   Tablet .. 650 milliGRAM(s) Oral every 6 hours  enoxaparin Injectable 40 milliGRAM(s) SubCutaneous daily  ondansetron Injectable 4 milliGRAM(s) IV Push every 6 hours  pantoprazole  Injectable 40 milliGRAM(s) IV Push every 12 hours    MEDICATIONS  (PRN):    Allergies    No Known Allergies    Intolerances        FAMILY HISTORY:  Family history of cancer in father (Father)          SOCIAL HISTORY:    Tobacco:    Alcohol:    Drugs:          REVIEW OF SYSTEMS:    Relevant systems are negative except as noted in the chart, HPI, and PMH      VITAL SIGNS:  Vital Signs Last 24 Hrs  T(C): 36.8 (18 Nov 2018 07:12), Max: 36.8 (18 Nov 2018 04:11)  T(F): 98.3 (18 Nov 2018 07:12), Max: 98.3 (18 Nov 2018 07:12)  HR: 73 (18 Nov 2018 07:12) (73 - 100)  BP: 141/76 (18 Nov 2018 07:12) (130/84 - 148/82)  BP(mean): --  RR: 18 (18 Nov 2018 07:12) (18 - 19)  SpO2: 100% (18 Nov 2018 07:12) (96% - 100%)    PHYSICAL EXAMINATION:    General: Well-developed, well nourished, in no acute distress.  Cardiac:  Regular rate and rhythm. No carotid bruits appreciated.  Eyes: Fundoscopic examination was deferred.  Neurologic:  - Mental Status:  Alert, awake, oriented to person, place, and time; Speech is fluent. Language is normal. Follows commands well.  Insight and knowledge appear appropriate.  Cranial Nerves II-XII:    II:  Visual acuity is normal for age ; Visual fields are full to confrontation; Pupils are equal, round, and reactive to light.  III, IV, VI:  Extraocular movements are intact without nystagmus.  V:  Facial sensation is intact in the V1-V3 distribution bilaterally.  VII:  Face is symmetric with normal eye closure and smile  VIII:  Hearing is grossly intact  IX, X, XII:  speech is clear  XI:  Head turning and shoulder shrug are intact.  - Motor:  Strength is 5/5 x 4.   There is no pronator drift. .  - Reflexes:  2+ and symmetric at the knees.  Plantar responses flexor.  - Sensory:  Symmetric to light touch  - Coordination:  Finger-nose-finger is normal. Rapid alternating hand and foot  movements are intact. Dexterity appears normal      LABS:                          12.3   4.8   )-----------( 245      ( 17 Nov 2018 19:41 )             38.1     17 Nov 2018 19:41    136    |  97     |  10.0   ----------------------------<  102    4.0     |  27.0   |  1.02     Ca    9.8        17 Nov 2018 19:41    TPro  6.8    /  Alb  4.2    /  TBili  0.3    /  DBili  x      /  AST  13     /  ALT  9      /  AlkPhos  85     17 Nov 2018 19:41    LIVER FUNCTIONS - ( 17 Nov 2018 19:41 )  Alb: 4.2 g/dL / Pro: 6.8 g/dL / ALK PHOS: 85 U/L / ALT: 9 U/L / AST: 13 U/L / GGT: x                 RADIOLOGY & ADDITIONAL STUDIES:      IMPRESSION:      PLAN:  1.   2.   3.   4.  5. CHIEF COMPLAINT: dizzy    HPI: 78yFemale  77 y/o F with PMHx of depression, recently diagnosed basal cell carcinoma not yet treated, HTN and PSHx of hysterectomy s/p ovarian benign neoplasm presents to ED c/o persistent nausea and decreased PO intake that started about 1 month ago. As per patient she has had a weight loss of about 13 pounds over the course of 1 month. She has never had nausea this severe in the past. Had an endoscopy performed earlier this year and has had a colonoscopy by Dr. Escobar, which came back normal. Her last BM was 2 days ago and was non bloody & formed as per pt. She does suffer from constipation on & off for which she takes miralax daily. Denies any chest pain, palpitations, sob, light headedness/dizziness, difficulty breathing/cough, fevers/chills, vomiting, diarrhea, dysuria or increased urinary frequency, headaches, diplopia, numbness or tingling in extremities, throat pain, difficulty swallowing, hematuria.  Denies recent travel or abx use/ recent travel.   As per , the patient recently started Lamictal about 4 weeks ago, due to preoccupation and anxiety mostly surrounding new basal cell carcinoma diagnosis. Was also evaluated by a neurologist, who recommend CT of the head which she did not have performed yet. Ct head performed in the ED: Stable chronic microvessel change. No acute intracranial hemorrhage or mass effect from vasogenic edema. Punctate hyperdensity P1 segment of right posterior cerebral artery for which acute thrombus cannot be excluded. Consider further evaluation with CTA of the head.    Patient is followed by Dr. Carvalho in our office.  He is working her up for balance and tremor and possible Parkinsons  The insurance company has refused RUBY scan.  Has not had MR yet  She denies tremor or loss of power. Rather she fees asthenic with lack of energy.  She admits to depression and has had significant weight loss    PAST MEDICAL & SURGICAL HISTORY:  Ovarian benign neoplasm: with S/p Total  Hysterectomy  Depression  HTN (hypertension)  No significant past surgical history    MEDICATIONS  (STANDING):  acetaminophen   Tablet .. 650 milliGRAM(s) Oral every 6 hours  enoxaparin Injectable 40 milliGRAM(s) SubCutaneous daily  ondansetron Injectable 4 milliGRAM(s) IV Push every 6 hours  pantoprazole  Injectable 40 milliGRAM(s) IV Push every 12 hours    MEDICATIONS  (PRN):    Allergies    No Known Allergies    Intolerances        FAMILY HISTORY:  Family history of cancer in father (Father)          SOCIAL HISTORY:    Tobacco:  no  Alcohol:  no  Drugs:  no        REVIEW OF SYSTEMS:    Relevant systems are negative except as noted in the chart, HPI, and PMH      VITAL SIGNS:  Vital Signs Last 24 Hrs  T(C): 36.8 (18 Nov 2018 07:12), Max: 36.8 (18 Nov 2018 04:11)  T(F): 98.3 (18 Nov 2018 07:12), Max: 98.3 (18 Nov 2018 07:12)  HR: 73 (18 Nov 2018 07:12) (73 - 100)  BP: 141/76 (18 Nov 2018 07:12) (130/84 - 148/82)  BP(mean): --  RR: 18 (18 Nov 2018 07:12) (18 - 19)  SpO2: 100% (18 Nov 2018 07:12) (96% - 100%)    PHYSICAL EXAMINATION:    General: Well-developed, well nourished, in no acute distress.  Cardiac:  Regular rate and rhythm. No carotid bruits appreciated.  Eyes: Fundoscopic examination was deferred.  Neurologic:  - Mental Status:  Alert, awake, oriented to person, place, and time; Speech is fluent. Language is normal. Follows commands well.  Insight and knowledge appear appropriate.  Affect flat.    Cranial Nerves II-XII:    II:  Visual acuity is normal for age ; Visual fields are full to confrontation; Pupils are equal, round, and reactive to light.  III, IV, VI:  Extraocular movements are intact without nystagmus.  V:  Facial sensation is intact in the V1-V3 distribution bilaterally.  VII:  Face is symmetric with normal eye closure and smile  VIII:  Hearing is grossly intact  IX, X, XII:  speech is clear  XI:  Head turning and shoulder shrug are intact.  - Motor:  Strength is 5/5 x 4.   There is no pronator drift. . Minimal tremor. No cogwheel. No bradykinesia  - Reflexes:  2+ and symmetric at the knees.  Plantar responses flexor.  - Sensory:  Symmetric to light touch  - Coordination:  Finger-nose-finger is normal. Rapid alternating hand and foot  movements are intact. Dexterity appears normal      LABS:                          12.3   4.8   )-----------( 245      ( 17 Nov 2018 19:41 )             38.1     17 Nov 2018 19:41    136    |  97     |  10.0   ----------------------------<  102    4.0     |  27.0   |  1.02     Ca    9.8        17 Nov 2018 19:41    TPro  6.8    /  Alb  4.2    /  TBili  0.3    /  DBili  x      /  AST  13     /  ALT  9      /  AlkPhos  85     17 Nov 2018 19:41    LIVER FUNCTIONS - ( 17 Nov 2018 19:41 )  Alb: 4.2 g/dL / Pro: 6.8 g/dL / ALK PHOS: 85 U/L / ALT: 9 U/L / AST: 13 U/L / GGT: x                 RADIOLOGY & ADDITIONAL STUDIES:       < from: CT Head No Cont (11.18.18 @ 01:32) >  IMPRESSION:  Stable chronic microvessel change. No acute intracranial hemorrhage or   mass effect from vasogenic edema. Punctate hyperdensity P1 segment of   right posterior cerebral artery for which acute thrombus cannot be   excluded. Consider further evaluation with CTA of the head.      < end of copied text >        Impression:  Normal neurologic exam in this woman with depression and significant weight loss  Complaints of weakness more likely represent asthenia.  Low suspicion for stroke      PLAN:  1. MRI, MRA   2. Suggest psych eval for dpepression  3. Nutrition eval  4. Medical eval for weight loss of unknown etiology  5. Follow up with Dr. Carvalho as planned

## 2018-11-18 NOTE — H&P ADULT - ASSESSMENT
77 y/o F with PMHx of depression, recently diagnosed basal cell carcinoma not yet treated, HTN and PSHx of hysterectomy s/p ovarian benign neoplasm presents to ED c/o persistent nausea and decreased PO intake that started about 1 month ago. As per patient she has had a weight loss of about 13 pounds over the course of 1 month. She has never had nausea this severe in the past. Had an endoscopy performed earlier this year and has had a colonoscopy by Dr. Escobar, which came back normal. Her last BM was 2 days ago and was non bloody & formed as per pt. She does suffer from constipation on & off for which she takes miralax daily. Denies any chest pain, palpitations, sob, light headedness/dizziness, difficulty breathing/cough, fevers/chills, vomiting, diarrhea, dysuria or increased urinary frequency, headaches, diplopia, numbness or tingling in extremities, throat pain, difficulty swallowing, hematuria.  Denies recent travel or abx use/ recent travel.   As per , the patient recently started Lamictal about 4 weeks ago, due to preoccupation and anxiety mostly surrounding new basal cell carcinoma diagnosis. Was also evaluated by a neurologist, who recommend CT of the head which she did not have performed yet. Ct head performed in the ED: Stable chronic microvessel change. No acute intracranial hemorrhage or mass effect from vasogenic edema. Punctate hyperdensity P1 segment of right posterior cerebral artery for which acute thrombus cannot be excluded. Consider further evaluation with CTA of the head.  CT A/P: No bowel obstruction. Diffuse sigmoid colon wall thickening without inflammatory change, reflecting muscular hypertrophy related to chronic diverticulosis. Colon diverticulosis.  Mild left intrahepatic biliary dilatation, which has mildly increased since 6/24/2017. Underlying biliary etiology/neoplasm cannot be excluded. Recommend clinical correlation and follow-up contrast-enhanced MRI/MRCP for further evaluation.      >Intractable nausea, 13 lb weight loss x 1 month, Ataxia-  Admit  Zofran for nausea  CL diet, advance diet as tolerated  MRA of head  Neuro consult called  LFTs wnl, Gallbladder polyps noted on US,  left intrahepatic biliary dilatation, which has mildly increased since 6/24/2017; f/u MRCP to r/o etio of biliary dilation  GI consult called    >Depression- unsure of home meds, await  to bring in list    >Basal cell carcinoma not yet treated- Recently diagnosed, f/u with dermatology, pt has an appointment with derm in 10 days  >HTN- unsure of home meds, await  to bring in list        DVT ppx 79 y/o F with PMHx of depression, recently diagnosed basal cell carcinoma not yet treated, HTN and PSHx of hysterectomy s/p ovarian benign neoplasm presents to ED c/o persistent nausea and decreased PO intake that started about 1 month ago. As per patient she has had a weight loss of about 13 pounds over the course of 1 month. She has never had nausea this severe in the past. Had an endoscopy performed earlier this year and has had a colonoscopy by Dr. Escobar, which came back normal. Her last BM was 2 days ago and was non bloody & formed as per pt. She does suffer from constipation on & off for which she takes miralax daily. Denies any chest pain, palpitations, sob, light headedness/dizziness, difficulty breathing/cough, fevers/chills, vomiting, diarrhea, dysuria or increased urinary frequency, headaches, diplopia, numbness or tingling in extremities, throat pain, difficulty swallowing, hematuria.  Denies recent travel or abx use/ recent travel.   As per , the patient recently started Lamictal about 4 weeks ago, due to preoccupation and anxiety mostly surrounding new basal cell carcinoma diagnosis. Was also evaluated by a neurologist, who recommend CT of the head which she did not have performed yet. Ct head performed in the ED: Stable chronic microvessel change. No acute intracranial hemorrhage or mass effect from vasogenic edema. Punctate hyperdensity P1 segment of right posterior cerebral artery for which acute thrombus cannot be excluded. Consider further evaluation with CTA of the head.  CT A/P: No bowel obstruction. Diffuse sigmoid colon wall thickening without inflammatory change, reflecting muscular hypertrophy related to chronic diverticulosis. Colon diverticulosis.  Mild left intrahepatic biliary dilatation, which has mildly increased since 6/24/2017. Underlying biliary etiology/neoplasm cannot be excluded. Recommend clinical correlation and follow-up contrast-enhanced MRI/MRCP for further evaluation.      >Intractable nausea, 13 lb weight loss x 1 month, Ataxia, hx of BCC-  Admit  Zofran for nausea  PPI bid  Tylenol for pain  CL diet, advance diet as tolerated  MRA of head  Neuro consult called  LFTs wnl, Gallbladder polyps noted on US,  left intrahepatic biliary dilatation, which has mildly increased since 6/24/2017; f/u MRCP to r/o etio of biliary dilation  GI consult called    >Depression- unsure of home meds, await  to bring in list    >Basal cell carcinoma not yet treated- Recently diagnosed, f/u with dermatology, pt has an appointment with derm in 10 days  >HTN- unsure of home meds, await  to bring in list        DVT ppx

## 2018-11-18 NOTE — CONSULT NOTE ADULT - SUBJECTIVE AND OBJECTIVE BOX
Patient is a 78y old  Female who presents with a chief complaint of x (18 Nov 2018 10:18)      HPI:  77 y/o F depression, HTN  recent diagnosis of basal cell cancer - untreated as of yet. and PSHx of hysterectomy s/p ovarian benign neoplasm presents to ED c/o persistent nausea and decreased PO intake that started about 1 month ago as per hospitalist note .  Family is at bedside. It turns out that nausea has been going on for months and she has lost 13 lbs over about 6 months. Lost about 4 lbs in 1 month. Nausea is moderate to severe. Constant.  Decreased po intake. No abdominal  pain. No heartburn no regurigation, no dysphagia.   Has  mild constipation. No rectal bleeding. She follows with Dr Ledesma for these symptoms and is on zofran, protonix and pepcid.  . She admits to depression and is very anxious regarding her basal cell diagnosis. She , herself  admits that the nausea may be due to stress and anxiety. She follows with psych and is on lamictal , xanax and lexapro.  Had EGD and colon this year for these symptoms from Dr Martins. CT showed diverticulosis. Mildly dialated left heaptic duct, slightly more dilated than 6/2017. LFT normal         REVIEW OF SYSTEMS:  Constitutional: No fever, weight loss or fatigue  ENMT:  No difficulty hearing, tinnitus, vertigo; No sinus or throat pain  Respiratory: No cough, wheezing, chills or hemoptysis  Cardiovascular: No chest pain, palpitations, dizziness or leg swelling  Gastrointestinal: No abdominal or epigastric pain. + nausea, ;+constipation. No melena or hematochezia.  Skin: No itching, burning, rashes or lesions   Musculoskeletal: No joint pain or swelling; No muscle, back or extremity pain    PAST MEDICAL & SURGICAL HISTORY:  Ovarian benign neoplasm: with S/p Total  Hysterectomy  Depression  HTN (hypertension)  No significant past surgical history      FAMILY HISTORY:  Family history of cancer in father (Father)      SOCIAL HISTORY:  Smoking Status: [ ] Current, [ ] Former, [ ] Never  Pack Years:  [  ] EtOH-no  [  ] IVDA-no    MEDICATIONS:  MEDICATIONS  (STANDING):  acetaminophen   Tablet .. 650 milliGRAM(s) Oral every 6 hours  enoxaparin Injectable 40 milliGRAM(s) SubCutaneous daily  escitalopram 10 milliGRAM(s) Oral daily  famotidine    Tablet 20 milliGRAM(s) Oral two times a day  lamoTRIgine 25 milliGRAM(s) Oral two times a day  losartan 50 milliGRAM(s) Oral daily  ondansetron Injectable 4 milliGRAM(s) IV Push every 6 hours  pantoprazole  Injectable 40 milliGRAM(s) IV Push every 12 hours    MEDICATIONS  (PRN):  ALPRAZolam 0.5 milliGRAM(s) Oral three times a day PRN anxiety      Allergies    No Known Allergies    Intolerances        Vital Signs Last 24 Hrs  T(C): 36.8 (18 Nov 2018 15:28), Max: 36.8 (18 Nov 2018 04:11)  T(F): 98.2 (18 Nov 2018 15:28), Max: 98.3 (18 Nov 2018 07:12)  HR: 67 (18 Nov 2018 12:25) (67 - 100)  BP: 157/77 (18 Nov 2018 15:28) (130/76 - 157/77)  BP(mean): --  RR: 18 (18 Nov 2018 15:28) (18 - 19)  SpO2: 96% (18 Nov 2018 15:28) (96% - 100%)        PHYSICAL EXAM:    General: Well developed; well nourished; in no acute distress- flat affect  HEENT: MMM, conjunctiva and sclera clear  H- RRR  Gastrointestinal: Soft, non-tender non-distended; Normal bowel sounds; No rebound or guarding  Extremities: Normal range of motion, No clubbing, cyanosis or edema  Neurological: Alert and oriented x3  Skin: Warm and dry. No obvious rash      LABS:                        12.3   4.8   )-----------( 245      ( 17 Nov 2018 19:41 )             38.1     17 Nov 2018 19:41    136    |  97     |  10.0   ----------------------------<  102    4.0     |  27.0   |  1.02     Ca    9.8        17 Nov 2018 19:41    TPro  6.8    /  Alb  4.2    /  TBili  0.3    /  DBili  x      /  AST  13     /  ALT  9      /  AlkPhos  85     / Amylase x      /Lipase 40     17 Nov 2018 19:41              RADIOLOGY & ADDITIONAL STUDIES:     < from: CT Abdomen and Pelvis w/ Oral Cont and w/ IV Cont (11.18.18 @ 01:40) >  IMPRESSION:     No bowel obstruction.    Mild left intrahepatic biliary dilatation, which has mildly increased   since 6/24/2017. Underlying biliary etiology/neoplasm cannot be excluded.   Recommend clinical correlation and follow-up contrast-enhanced MRI/MRCP   for further evaluation.      < end of copied text >

## 2018-11-18 NOTE — H&P ADULT - NSHPPHYSICALEXAM_GEN_ALL_CORE
GENERAL: NAD, frail appearing  HEAD:  Atraumatic, Normocephalic  EYES: EOMI, PERRLA, conjunctiva and sclera clear  NECK: Supple, No JVD  NERVOUS SYSTEM:  Alert & Oriented X3, Motor Strength 4/5 B/L upper and lower extremities; sensation intact, finger to nose intact, no pronator drift, no aphasia, no facial asymmetry, patient needs assistance getting out of bed and sitting, takes a few seconds to initiate gait and has shuffling gait, unable to maintain gait  CHEST/LUNG: Clear to percussion bilaterally; No rales, rhonchi, wheezing, or rubs  HEART: Regular rate and rhythm; No murmurs, rubs, or gallops  ABDOMEN: Soft, RUQ & epigastric tenderness to palp, no rebound/ guarding, Nondistended; Bowel sounds present  EXTREMITIES:  2+ Peripheral Pulses, No clubbing, cyanosis, or edema

## 2018-11-19 DIAGNOSIS — F31.89 OTHER BIPOLAR DISORDER: ICD-10-CM

## 2018-11-19 LAB
ALBUMIN SERPL ELPH-MCNC: 4.3 G/DL — SIGNIFICANT CHANGE UP (ref 3.3–5.2)
ALP SERPL-CCNC: 87 U/L — SIGNIFICANT CHANGE UP (ref 40–120)
ALT FLD-CCNC: 8 U/L — SIGNIFICANT CHANGE UP
ANION GAP SERPL CALC-SCNC: 13 MMOL/L — SIGNIFICANT CHANGE UP (ref 5–17)
AST SERPL-CCNC: 16 U/L — SIGNIFICANT CHANGE UP
BASOPHILS # BLD AUTO: 0 K/UL — SIGNIFICANT CHANGE UP (ref 0–0.2)
BASOPHILS NFR BLD AUTO: 0.3 % — SIGNIFICANT CHANGE UP (ref 0–2)
BILIRUB DIRECT SERPL-MCNC: 0.2 MG/DL — SIGNIFICANT CHANGE UP (ref 0–0.3)
BILIRUB INDIRECT FLD-MCNC: 0.4 MG/DL — SIGNIFICANT CHANGE UP (ref 0.2–1)
BILIRUB SERPL-MCNC: 0.6 MG/DL — SIGNIFICANT CHANGE UP (ref 0.4–2)
BUN SERPL-MCNC: 8 MG/DL — SIGNIFICANT CHANGE UP (ref 8–20)
CALCIUM SERPL-MCNC: 9.7 MG/DL — SIGNIFICANT CHANGE UP (ref 8.6–10.2)
CHLORIDE SERPL-SCNC: 95 MMOL/L — LOW (ref 98–107)
CO2 SERPL-SCNC: 25 MMOL/L — SIGNIFICANT CHANGE UP (ref 22–29)
CREAT SERPL-MCNC: 0.79 MG/DL — SIGNIFICANT CHANGE UP (ref 0.5–1.3)
EOSINOPHIL # BLD AUTO: 0 K/UL — SIGNIFICANT CHANGE UP (ref 0–0.5)
EOSINOPHIL NFR BLD AUTO: 0.3 % — SIGNIFICANT CHANGE UP (ref 0–6)
GLUCOSE SERPL-MCNC: 104 MG/DL — SIGNIFICANT CHANGE UP (ref 70–115)
HCT VFR BLD CALC: 39.4 % — SIGNIFICANT CHANGE UP (ref 37–47)
HGB BLD-MCNC: 12.9 G/DL — SIGNIFICANT CHANGE UP (ref 12–16)
LYMPHOCYTES # BLD AUTO: 0.6 K/UL — LOW (ref 1–4.8)
LYMPHOCYTES # BLD AUTO: 10.7 % — LOW (ref 20–55)
MAGNESIUM SERPL-MCNC: 2 MG/DL — SIGNIFICANT CHANGE UP (ref 1.6–2.6)
MCHC RBC-ENTMCNC: 29.6 PG — SIGNIFICANT CHANGE UP (ref 27–31)
MCHC RBC-ENTMCNC: 32.7 G/DL — SIGNIFICANT CHANGE UP (ref 32–36)
MCV RBC AUTO: 90.4 FL — SIGNIFICANT CHANGE UP (ref 81–99)
MONOCYTES # BLD AUTO: 0.6 K/UL — SIGNIFICANT CHANGE UP (ref 0–0.8)
MONOCYTES NFR BLD AUTO: 10 % — SIGNIFICANT CHANGE UP (ref 3–10)
NEUTROPHILS # BLD AUTO: 4.7 K/UL — SIGNIFICANT CHANGE UP (ref 1.8–8)
NEUTROPHILS NFR BLD AUTO: 78.5 % — HIGH (ref 37–73)
PHOSPHATE SERPL-MCNC: 3.2 MG/DL — SIGNIFICANT CHANGE UP (ref 2.4–4.7)
PLATELET # BLD AUTO: 235 K/UL — SIGNIFICANT CHANGE UP (ref 150–400)
POTASSIUM SERPL-MCNC: 4.2 MMOL/L — SIGNIFICANT CHANGE UP (ref 3.5–5.3)
POTASSIUM SERPL-SCNC: 4.2 MMOL/L — SIGNIFICANT CHANGE UP (ref 3.5–5.3)
PROT SERPL-MCNC: 7 G/DL — SIGNIFICANT CHANGE UP (ref 6.6–8.7)
RBC # BLD: 4.36 M/UL — LOW (ref 4.4–5.2)
RBC # FLD: 12.3 % — SIGNIFICANT CHANGE UP (ref 11–15.6)
SODIUM SERPL-SCNC: 133 MMOL/L — LOW (ref 135–145)
WBC # BLD: 6 K/UL — SIGNIFICANT CHANGE UP (ref 4.8–10.8)
WBC # FLD AUTO: 6 K/UL — SIGNIFICANT CHANGE UP (ref 4.8–10.8)

## 2018-11-19 PROCEDURE — 70544 MR ANGIOGRAPHY HEAD W/O DYE: CPT | Mod: 26,59

## 2018-11-19 PROCEDURE — 90792 PSYCH DIAG EVAL W/MED SRVCS: CPT

## 2018-11-19 PROCEDURE — 93880 EXTRACRANIAL BILAT STUDY: CPT | Mod: 26

## 2018-11-19 PROCEDURE — 99233 SBSQ HOSP IP/OBS HIGH 50: CPT

## 2018-11-19 PROCEDURE — 70551 MRI BRAIN STEM W/O DYE: CPT | Mod: 26

## 2018-11-19 PROCEDURE — 74182 MRI ABDOMEN W/CONTRAST: CPT | Mod: 26

## 2018-11-19 PROCEDURE — 99232 SBSQ HOSP IP/OBS MODERATE 35: CPT | Mod: GC

## 2018-11-19 RX ORDER — LAMOTRIGINE 25 MG/1
50 TABLET, ORALLY DISINTEGRATING ORAL AT BEDTIME
Qty: 0 | Refills: 0 | Status: COMPLETED | OUTPATIENT
Start: 2018-11-19 | End: 2018-11-19

## 2018-11-19 RX ORDER — ACETAMINOPHEN 500 MG
650 TABLET ORAL ONCE
Qty: 0 | Refills: 0 | Status: COMPLETED | OUTPATIENT
Start: 2018-11-19 | End: 2018-11-19

## 2018-11-19 RX ORDER — METOCLOPRAMIDE HCL 10 MG
10 TABLET ORAL EVERY 8 HOURS
Qty: 0 | Refills: 0 | Status: DISCONTINUED | OUTPATIENT
Start: 2018-11-19 | End: 2018-11-19

## 2018-11-19 RX ORDER — METOCLOPRAMIDE HCL 10 MG
10 TABLET ORAL EVERY 6 HOURS
Qty: 0 | Refills: 0 | Status: DISCONTINUED | OUTPATIENT
Start: 2018-11-19 | End: 2018-11-20

## 2018-11-19 RX ORDER — LAMOTRIGINE 25 MG/1
50 TABLET, ORALLY DISINTEGRATING ORAL
Qty: 0 | Refills: 0 | Status: DISCONTINUED | OUTPATIENT
Start: 2018-11-20 | End: 2018-11-20

## 2018-11-19 RX ORDER — ONDANSETRON 8 MG/1
8 TABLET, FILM COATED ORAL EVERY 6 HOURS
Qty: 0 | Refills: 0 | Status: DISCONTINUED | OUTPATIENT
Start: 2018-11-19 | End: 2018-11-20

## 2018-11-19 RX ORDER — ALPRAZOLAM 0.25 MG
1 TABLET ORAL THREE TIMES A DAY
Qty: 0 | Refills: 0 | Status: DISCONTINUED | OUTPATIENT
Start: 2018-11-19 | End: 2018-11-20

## 2018-11-19 RX ORDER — LAMOTRIGINE 25 MG/1
25 TABLET, ORALLY DISINTEGRATING ORAL ONCE
Qty: 0 | Refills: 0 | Status: COMPLETED | OUTPATIENT
Start: 2018-11-19 | End: 2018-11-19

## 2018-11-19 RX ORDER — ZOLPIDEM TARTRATE 10 MG/1
5 TABLET ORAL AT BEDTIME
Qty: 0 | Refills: 0 | Status: DISCONTINUED | OUTPATIENT
Start: 2018-11-19 | End: 2018-11-20

## 2018-11-19 RX ORDER — METOCLOPRAMIDE HCL 10 MG
10 TABLET ORAL EVERY 6 HOURS
Qty: 0 | Refills: 0 | Status: DISCONTINUED | OUTPATIENT
Start: 2018-11-19 | End: 2018-11-19

## 2018-11-19 RX ADMIN — PANTOPRAZOLE SODIUM 40 MILLIGRAM(S): 20 TABLET, DELAYED RELEASE ORAL at 05:26

## 2018-11-19 RX ADMIN — Medication 650 MILLIGRAM(S): at 17:18

## 2018-11-19 RX ADMIN — Medication 650 MILLIGRAM(S): at 00:00

## 2018-11-19 RX ADMIN — Medication 650 MILLIGRAM(S): at 14:21

## 2018-11-19 RX ADMIN — ONDANSETRON 8 MILLIGRAM(S): 8 TABLET, FILM COATED ORAL at 17:18

## 2018-11-19 RX ADMIN — Medication 650 MILLIGRAM(S): at 04:10

## 2018-11-19 RX ADMIN — PANTOPRAZOLE SODIUM 40 MILLIGRAM(S): 20 TABLET, DELAYED RELEASE ORAL at 17:18

## 2018-11-19 RX ADMIN — ZOLPIDEM TARTRATE 5 MILLIGRAM(S): 10 TABLET ORAL at 23:12

## 2018-11-19 RX ADMIN — Medication 650 MILLIGRAM(S): at 17:48

## 2018-11-19 RX ADMIN — Medication 10 MILLIGRAM(S): at 08:56

## 2018-11-19 RX ADMIN — LAMOTRIGINE 25 MILLIGRAM(S): 25 TABLET, ORALLY DISINTEGRATING ORAL at 05:26

## 2018-11-19 RX ADMIN — ONDANSETRON 8 MILLIGRAM(S): 8 TABLET, FILM COATED ORAL at 12:50

## 2018-11-19 RX ADMIN — LAMOTRIGINE 50 MILLIGRAM(S): 25 TABLET, ORALLY DISINTEGRATING ORAL at 23:12

## 2018-11-19 RX ADMIN — Medication 650 MILLIGRAM(S): at 04:54

## 2018-11-19 RX ADMIN — ESCITALOPRAM OXALATE 10 MILLIGRAM(S): 10 TABLET, FILM COATED ORAL at 17:18

## 2018-11-19 RX ADMIN — Medication 650 MILLIGRAM(S): at 12:50

## 2018-11-19 RX ADMIN — FAMOTIDINE 20 MILLIGRAM(S): 10 INJECTION INTRAVENOUS at 05:26

## 2018-11-19 RX ADMIN — Medication 650 MILLIGRAM(S): at 07:19

## 2018-11-19 RX ADMIN — ONDANSETRON 4 MILLIGRAM(S): 8 TABLET, FILM COATED ORAL at 05:26

## 2018-11-19 RX ADMIN — LOSARTAN POTASSIUM 50 MILLIGRAM(S): 100 TABLET, FILM COATED ORAL at 05:26

## 2018-11-19 RX ADMIN — LAMOTRIGINE 25 MILLIGRAM(S): 25 TABLET, ORALLY DISINTEGRATING ORAL at 17:18

## 2018-11-19 RX ADMIN — ONDANSETRON 8 MILLIGRAM(S): 8 TABLET, FILM COATED ORAL at 23:12

## 2018-11-19 RX ADMIN — ENOXAPARIN SODIUM 40 MILLIGRAM(S): 100 INJECTION SUBCUTANEOUS at 12:49

## 2018-11-19 RX ADMIN — FAMOTIDINE 20 MILLIGRAM(S): 10 INJECTION INTRAVENOUS at 17:18

## 2018-11-19 NOTE — BEHAVIORAL HEALTH ASSESSMENT NOTE - RISK ASSESSMENT
Chronic risk due to hx of bipolar   acute risk due to recent medical problems  however denies current or past suicidal ideation or attempts, has supportive  positive therapeutic alliance not abusing substances no guns in home, not hopeless, assessed to not be at imminent risk of harm to self or others. Patient denies homicidal ideation.

## 2018-11-19 NOTE — BEHAVIORAL HEALTH ASSESSMENT NOTE - NSBHCHARTREVIEWVS_PSY_A_CORE FT
Vital Signs Last 24 Hrs  T(C): 36.8 (19 Nov 2018 08:03), Max: 36.9 (18 Nov 2018 23:28)  T(F): 98.3 (19 Nov 2018 08:03), Max: 98.5 (18 Nov 2018 23:28)  HR: 78 (19 Nov 2018 08:03) (70 - 78)  BP: 136/80 (19 Nov 2018 08:03) (136/80 - 157/77)  BP(mean): --  RR: 18 (19 Nov 2018 08:03) (18 - 18)  SpO2: 97% (19 Nov 2018 08:03) (96% - 99%)

## 2018-11-19 NOTE — PHYSICAL THERAPY INITIAL EVALUATION ADULT - IMPAIRMENTS CONTRIBUTING TO GAIT DEVIATIONS, PT EVAL
impaired balance/decreased activity tolerance, b/l UE and LE tremors (pt. reports hx of parkinsons and this is baseline)

## 2018-11-19 NOTE — PROGRESS NOTE ADULT - SUBJECTIVE AND OBJECTIVE BOX
CATHLEEN CRUZ    [id number]    78y      Female    CC: Nausea    INTERVAL HPI/OVERNIGHT EVENTS:  was seen and examined at bedside. was having nausea with clear liquid diet. Reglan added. discussed with pharmacy will change zoffran standing and     HPI:  79 y/o F with PMHx of depression, recently diagnosed basal cell carcinoma not yet treated, HTN and PSHx of hysterectomy s/p ovarian benign neoplasm presents to ED c/o persistent nausea and decreased PO intake that started about 1 month ago. As per patient she has had a weight loss of about 13 pounds over the course of 1 month. She has never had nausea this severe in the past. Had an endoscopy performed earlier this year and has had a colonoscopy by Dr. Escobar, which came back normal. Her last BM was 2 days ago and was non bloody & formed as per pt. She does suffer from constipation on & off for which she takes miralax daily. Denies any chest pain, palpitations, sob, light headedness/dizziness, difficulty breathing/cough, fevers/chills, vomiting, diarrhea, dysuria or increased urinary frequency, headaches, diplopia, numbness or tingling in extremities, throat pain, difficulty swallowing, hematuria.  Denies recent travel or abx use/ recent travel.   As per , the patient recently started Lamictal about 4 weeks ago, due to preoccupation and anxiety mostly surrounding new basal cell carcinoma diagnosis. Was also evaluated by a neurologist, who recommend CT of the head which she did not have performed yet. Ct head performed in the ED: Stable chronic microvessel change. No acute intracranial hemorrhage or mass effect from vasogenic edema. Punctate hyperdensity P1 segment of right posterior cerebral artery for which acute thrombus cannot be excluded. Consider further evaluation with CTA of the head.  CT A/P: No bowel obstruction. Diffuse sigmoid colon wall thickening without inflammatory change, reflecting muscular hypertrophy related to chronic diverticulosis. Colon diverticulosis.  Mild left intrahepatic biliary dilatation, which has mildly increased since 2017. Underlying biliary etiology/neoplasm cannot be excluded. Recommend clinical correlation and follow-up contrast-enhanced MRI/MRCP for further evaluation.    	      REVIEW OF SYSTEMS:    CONSTITUTIONAL: No fever, weight loss, or fatigue  RESPIRATORY: No cough, wheezing, hemoptysis; No shortness of breath  CARDIOVASCULAR: No chest pain, palpitations  GASTROINTESTINAL: No abdominal or epigastric pain. No nausea, vomiting  NEUROLOGICAL: No headaches, memory loss, loss of strength.  MISCELLANEOUS:      Vital Signs Last 24 Hrs  T(C): 36.8 (2018 08:03), Max: 36.9 (2018 23:28)  T(F): 98.3 (2018 08:03), Max: 98.5 (2018 23:28)  HR: 78 (2018 08:03) (67 - 78)  BP: 136/80 (2018 08:03) (130/76 - 157/77)  BP(mean): --  RR: 18 (2018 08:03) (18 - 18)  SpO2: 97% (2018 08:03) (96% - 99%)    PHYSICAL EXAM:    GENERAL: NAD, well-groomed  HEENT: PERRL, +EOMI  NECK: soft, Supple, No JVD,   CHEST/LUNG: Clear to auscultation bilaterally; No wheezing  HEART: S1S2+, Regular rate and rhythm; No murmurs, rubs, or gallops  ABDOMEN: Soft, Nontender, Nondistended; Bowel sounds present  EXTREMITIES:  2+ Peripheral Pulses, No clubbing, cyanosis, or edema  SKIN: No rashes or lesions  NEURO: AAOX3, no focal deficits, no motor r sensory loss  PSYCH: normal mood      LABS:                        12.9   6.0   )-----------( 235      ( 2018 10:19 )             39.4         133<L>  |  95<L>  |  8.0  ----------------------------<  104  4.2   |  25.0  |  0.79    Ca    9.7      2018 10:19  Phos  3.2       Mg     2.0         TPro  7.0  /  Alb  4.3  /  TBili  0.6  /  DBili  0.2  /  AST  16  /  ALT  8   /  AlkPhos  87        Urinalysis Basic - ( 2018 23:06 )    Color: Yellow / Appearance: Clear / S.005 / pH: x  Gluc: x / Ketone: Trace  / Bili: Negative / Urobili: Negative mg/dL   Blood: x / Protein: Negative mg/dL / Nitrite: Negative   Leuk Esterase: Negative / RBC: 0-2 /HPF / WBC 0-2   Sq Epi: x / Non Sq Epi: Occasional / Bacteria: Occasional          MEDICATIONS  (STANDING):  acetaminophen   Tablet .. 650 milliGRAM(s) Oral every 6 hours  enoxaparin Injectable 40 milliGRAM(s) SubCutaneous daily  escitalopram 10 milliGRAM(s) Oral daily  famotidine    Tablet 20 milliGRAM(s) Oral two times a day  lamoTRIgine 25 milliGRAM(s) Oral two times a day  losartan 50 milliGRAM(s) Oral daily  ondansetron Injectable 8 milliGRAM(s) IV Push every 6 hours  pantoprazole  Injectable 40 milliGRAM(s) IV Push every 12 hours    MEDICATIONS  (PRN):  ALPRAZolam 0.5 milliGRAM(s) Oral three times a day PRN anxiety  metoclopramide Injectable 10 milliGRAM(s) IV Push every 6 hours PRN nausea  zolpidem 5 milliGRAM(s) Oral at bedtime PRN Insomnia      RADIOLOGY & ADDITIONAL TESTS: CATHLEEN CRUZ    [id number]    78y      Female    CC: Nausea    INTERVAL HPI/OVERNIGHT EVENTS:  was seen and examined at bedside. was having nausea with clear liquid diet. Reglan added. discussed with pharmacy will change zoffran standing and reglan prn. denied any abd pain and on and off constipation. takes Miralax at home but does'nt want to try today. discussed with patient and her  about the results of MRI abdomen and MRI head and updated about the plan. She also believed that anxiety is the part of her nausea. She follows up with Dr Blackmon [phone], attempted calling but couldn't reach him.      HPI:  77 y/o F with PMHx of depression, recently diagnosed basal cell carcinoma not yet treated, HTN and PSHx of hysterectomy s/p ovarian benign neoplasm presents to ED c/o persistent nausea and decreased PO intake that started about 1 month ago. As per patient she has had a weight loss of about 13 pounds over the course of 1 month. She has never had nausea this severe in the past. Had an endoscopy performed earlier this year and has had a colonoscopy by Dr. Escobar, which came back normal. Her last BM was 2 days ago and was non bloody & formed as per pt. She does suffer from constipation on & off for which she takes miralax daily. Denies any chest pain, palpitations, sob, light headedness/dizziness, difficulty breathing/cough, fevers/chills, vomiting, diarrhea, dysuria or increased urinary frequency, headaches, diplopia, numbness or tingling in extremities, throat pain, difficulty swallowing, hematuria.  Denies recent travel or abx use/ recent travel.   As per , the patient recently started Lamictal about 4 weeks ago, due to preoccupation and anxiety mostly surrounding new basal cell carcinoma diagnosis. Was also evaluated by a neurologist, who recommend CT of the head which she did not have performed yet. Ct head performed in the ED: Stable chronic microvessel change. No acute intracranial hemorrhage or mass effect from vasogenic edema. Punctate hyperdensity P1 segment of right posterior cerebral artery for which acute thrombus cannot be excluded. Consider further evaluation with CTA of the head.  CT A/P: No bowel obstruction. Diffuse sigmoid colon wall thickening without inflammatory change, reflecting muscular hypertrophy related to chronic diverticulosis. Colon diverticulosis.  Mild left intrahepatic biliary dilatation, which has mildly increased since 2017. Underlying biliary etiology/neoplasm cannot be excluded. Recommend clinical correlation and follow-up contrast-enhanced MRI/MRCP for further evaluation. ()    	      REVIEW OF SYSTEMS:    CONSTITUTIONAL: No fever but weight loss, or fatigue  RESPIRATORY: No cough, wheezing, hemoptysis; No shortness of breath  CARDIOVASCULAR: No chest pain, palpitations  GASTROINTESTINAL: No abdominal or epigastric pain. +ve nausea, vomiting  NEUROLOGICAL: No headaches, memory loss, loss of strength.  MISCELLANEOUS:      Vital Signs Last 24 Hrs  T(C): 36.8 (2018 08:03), Max: 36.9 (2018 23:28)  T(F): 98.3 (2018 08:03), Max: 98.5 (2018 23:28)  HR: 78 (2018 08:03) (67 - 78)  BP: 136/80 (2018 08:03) (130/76 - 157/77)  BP(mean): --  RR: 18 (2018 08:03) (18 - 18)  SpO2: 97% (2018 08:03) (96% - 99%)    PHYSICAL EXAM:    GENERAL: NAD, thin fragile  HEENT: PERRL, +EOMI  NECK: soft, Supple, No JVD,   CHEST/LUNG: Clear to auscultation bilaterally; No wheezing  HEART: S1S2+, Regular rate and rhythm; No murmurs, rubs, or gallops  ABDOMEN: Soft, Nontender, Nondistended; Bowel sounds present  EXTREMITIES:  2+ Peripheral Pulses, No clubbing, cyanosis, or edema  SKIN: No rashes or lesions  NEURO: AAOX3, no focal deficits, no motor r sensory loss  PSYCH: normal mood      LABS:                        12.9   6.0   )-----------( 235      ( 2018 10:19 )             39.4         133<L>  |  95<L>  |  8.0  ----------------------------<  104  4.2   |  25.0  |  0.79    Ca    9.7      2018 10:19  Phos  3.2       Mg     2.0         TPro  7.0  /  Alb  4.3  /  TBili  0.6  /  DBili  0.2  /  AST  16  /  ALT  8   /  AlkPhos  87  11-19      Urinalysis Basic - ( 2018 23:06 )    Color: Yellow / Appearance: Clear / S.005 / pH: x  Gluc: x / Ketone: Trace  / Bili: Negative / Urobili: Negative mg/dL   Blood: x / Protein: Negative mg/dL / Nitrite: Negative   Leuk Esterase: Negative / RBC: 0-2 /HPF / WBC 0-2   Sq Epi: x / Non Sq Epi: Occasional / Bacteria: Occasional          MEDICATIONS  (STANDING):  acetaminophen   Tablet .. 650 milliGRAM(s) Oral every 6 hours  enoxaparin Injectable 40 milliGRAM(s) SubCutaneous daily  escitalopram 10 milliGRAM(s) Oral daily  famotidine    Tablet 20 milliGRAM(s) Oral two times a day  lamoTRIgine 25 milliGRAM(s) Oral two times a day  losartan 50 milliGRAM(s) Oral daily  ondansetron Injectable 8 milliGRAM(s) IV Push every 6 hours  pantoprazole  Injectable 40 milliGRAM(s) IV Push every 12 hours    MEDICATIONS  (PRN):  ALPRAZolam 0.5 milliGRAM(s) Oral three times a day PRN anxiety  metoclopramide Injectable 10 milliGRAM(s) IV Push every 6 hours PRN nausea  zolpidem 5 milliGRAM(s) Oral at bedtime PRN Insomnia      RADIOLOGY & ADDITIONAL TESTS: CATHLEEN CRUZ    [id number]    78y      Female    CC: Nausea    INTERVAL HPI/OVERNIGHT EVENTS:  was seen and examined at bedside. was having nausea with clear liquid diet. Reglan added. discussed with pharmacy will change zoffran standing and reglan prn. denied any abd pain and on and off constipation. takes Miralax at home but does'nt want to try today. discussed with patient and her  about the results of MRI abdomen and MRI head and updated about the plan. She also believed that anxiety is the part of her nausea. She follows up with Dr Blackmon [phone], attempted calling but couldn't reach him.      HPI:  79 y/o F with PMHx of depression, recently diagnosed basal cell carcinoma not yet treated, HTN and PSHx of hysterectomy s/p ovarian benign neoplasm presents to ED c/o persistent nausea and decreased PO intake that started about 1 month ago. As per patient she has had a weight loss of about 13 pounds over the course of 1 month. She has never had nausea this severe in the past. Had an endoscopy performed earlier this year and has had a colonoscopy by Dr. Escobar, which came back normal. Her last BM was 2 days ago and was non bloody & formed as per pt. She does suffer from constipation on & off for which she takes miralax daily. Denies any chest pain, palpitations, sob, light headedness/dizziness, difficulty breathing/cough, fevers/chills, vomiting, diarrhea, dysuria or increased urinary frequency, headaches, diplopia, numbness or tingling in extremities, throat pain, difficulty swallowing, hematuria.  Denies recent travel or abx use/ recent travel.   As per , the patient recently started Lamictal about 4 weeks ago, due to preoccupation and anxiety mostly surrounding new basal cell carcinoma diagnosis. Was also evaluated by a neurologist, who recommend CT of the head which she did not have performed yet. Ct head performed in the ED: Stable chronic microvessel change. No acute intracranial hemorrhage or mass effect from vasogenic edema. Punctate hyperdensity P1 segment of right posterior cerebral artery for which acute thrombus cannot be excluded. Consider further evaluation with CTA of the head.  CT A/P: No bowel obstruction. Diffuse sigmoid colon wall thickening without inflammatory change, reflecting muscular hypertrophy related to chronic diverticulosis. Colon diverticulosis.  Mild left intrahepatic biliary dilatation, which has mildly increased since 2017. Underlying biliary etiology/neoplasm cannot be excluded. Recommend clinical correlation and follow-up contrast-enhanced MRI/MRCP for further evaluation. ()    	      REVIEW OF SYSTEMS:    CONSTITUTIONAL: No fever but weight loss, or fatigue  RESPIRATORY: No cough, wheezing, hemoptysis; No shortness of breath  CARDIOVASCULAR: No chest pain, palpitations  GASTROINTESTINAL: No abdominal or epigastric pain. +ve nausea, vomiting  NEUROLOGICAL: No headaches, memory loss, loss of strength.  MISCELLANEOUS:      Vital Signs Last 24 Hrs  T(C): 36.8 (2018 08:03), Max: 36.9 (2018 23:28)  T(F): 98.3 (2018 08:03), Max: 98.5 (2018 23:28)  HR: 78 (2018 08:03) (67 - 78)  BP: 136/80 (2018 08:03) (130/76 - 157/77)  BP(mean): --  RR: 18 (2018 08:03) (18 - 18)  SpO2: 97% (2018 08:03) (96% - 99%)    PHYSICAL EXAM:    GENERAL: NAD, thin fragile, baseline tremors  HEENT: PERRL, +EOMI  NECK: soft, Supple, No JVD,   CHEST/LUNG: Clear to auscultation bilaterally; No wheezing  HEART: S1S2+, Regular rate and rhythm; No murmurs, rubs, or gallops  ABDOMEN: Soft, Nontender, Nondistended; Bowel sounds present  EXTREMITIES:  2+ Peripheral Pulses, No clubbing, cyanosis, or edema  SKIN: No rashes or lesions  NEURO: AAOX3, no focal deficits, no motor r sensory loss  PSYCH: normal mood      LABS:                        12.9   6.0   )-----------( 235      ( 2018 10:19 )             39.4         133<L>  |  95<L>  |  8.0  ----------------------------<  104  4.2   |  25.0  |  0.79    Ca    9.7      2018 10:19  Phos  3.2       Mg     2.0     11-19    TPro  7.0  /  Alb  4.3  /  TBili  0.6  /  DBili  0.2  /  AST  16  /  ALT  8   /  AlkPhos  87  11-19      Urinalysis Basic - ( 2018 23:06 )    Color: Yellow / Appearance: Clear / S.005 / pH: x  Gluc: x / Ketone: Trace  / Bili: Negative / Urobili: Negative mg/dL   Blood: x / Protein: Negative mg/dL / Nitrite: Negative   Leuk Esterase: Negative / RBC: 0-2 /HPF / WBC 0-2   Sq Epi: x / Non Sq Epi: Occasional / Bacteria: Occasional          MEDICATIONS  (STANDING):  acetaminophen   Tablet .. 650 milliGRAM(s) Oral every 6 hours  enoxaparin Injectable 40 milliGRAM(s) SubCutaneous daily  escitalopram 10 milliGRAM(s) Oral daily  famotidine    Tablet 20 milliGRAM(s) Oral two times a day  lamoTRIgine 25 milliGRAM(s) Oral two times a day  losartan 50 milliGRAM(s) Oral daily  ondansetron Injectable 8 milliGRAM(s) IV Push every 6 hours  pantoprazole  Injectable 40 milliGRAM(s) IV Push every 12 hours    MEDICATIONS  (PRN):  ALPRAZolam 0.5 milliGRAM(s) Oral three times a day PRN anxiety  metoclopramide Injectable 10 milliGRAM(s) IV Push every 6 hours PRN nausea  zolpidem 5 milliGRAM(s) Oral at bedtime PRN Insomnia      RADIOLOGY & ADDITIONAL TESTS:

## 2018-11-19 NOTE — PROGRESS NOTE ADULT - ASSESSMENT
Assessment and Plan:    Assessment:  · Assessment		  79 y/o F with PMHx of depression, recently diagnosed basal cell carcinoma not yet treated, HTN and PSHx of hysterectomy s/p ovarian benign neoplasm presents to ED c/o persistent nausea and decreased PO intake that started about 1 month ago. As per patient she has had a weight loss of about 13 pounds over the course of 1 month. She has never had nausea this severe in the past. Had an endoscopy performed earlier this year and has had a colonoscopy by Dr. Escobar, which came back normal. Her last BM was 2 days ago and was non bloody & formed as per pt. She does suffer from constipation on & off for which she takes miralax daily. Denies any chest pain, palpitations, sob, light headedness/dizziness, difficulty breathing/cough, fevers/chills, vomiting, diarrhea, dysuria or increased urinary frequency, headaches, diplopia, numbness or tingling in extremities, throat pain, difficulty swallowing, hematuria.  Denies recent travel or abx use/ recent travel.   As per , the patient recently started Lamictal about 4 weeks ago, due to preoccupation and anxiety mostly surrounding new basal cell carcinoma diagnosis. Was also evaluated by a neurologist, who recommend CT of the head which she did not have performed yet. Ct head performed in the ED: Stable chronic microvessel change. No acute intracranial hemorrhage or mass effect from vasogenic edema. Punctate hyperdensity P1 segment of right posterior cerebral artery for which acute thrombus cannot be excluded. Consider further evaluation with CTA of the head.  CT A/P: No bowel obstruction. Diffuse sigmoid colon wall thickening without inflammatory change, reflecting muscular hypertrophy related to chronic diverticulosis. Colon diverticulosis.  Mild left intrahepatic biliary dilatation, which has mildly increased since 6/24/2017. Underlying biliary etiology/neoplasm cannot be excluded. Recommend clinical correlation and follow-up contrast-enhanced MRI/MRCP for further evaluation.      >Intractable nausea, 13 lb weight loss x 1 month, Ataxia, hx of BCC-  Admit  Zofran for nausea  PPI bid  Tylenol for pain  CL diet, advance diet as tolerated  MRA of head  Neuro consult called  LFTs wnl, Gallbladder polyps noted on US,  left intrahepatic biliary dilatation, which has mildly increased since 6/24/2017; f/u MRCP to r/o etio of biliary dilation  GI consult called    >Depression- unsure of home meds, await  to bring in list    >Basal cell carcinoma not yet treated- Recently diagnosed, f/u with dermatology, pt has an appointment with derm in 10 days  >HTN- unsure of home meds, await  to bring in list        DVT ppx Assessment and Plan:    77 y/o F with PMHx of depression, recently diagnosed basal cell carcinoma not yet treated, HTN and PSHx of hysterectomy s/p ovarian benign neoplasm presents to ED c/o persistent nausea and decreased PO intake that started about 1 month ago.  Was found to have mild dilated left hepatic duct with normal LFT, MRCP showed stable liver cyst from 2017. GI and neuro on board. CT head showed Punctate hyperdensity P1 segment of right posterior cerebral artery for which acute thrombus cannot be excluded. MRA head was neg. Needs carotid doppler, PT and Psychiatry evaluation. Dc planing tomorrow once able to tolerate regular diet.      Nausea and weight loss:  likely a component of depression and anxiety contributing to weight loss  Attempted calling patient psychiatrist dr Blackmon but could nt reach.  Psychiatry consulted  MRI abdomen results noted and discussed with patient,  and dr sharp  once able to upgrade the diet and nausea better controlled, will dc plan  No intervention from GI perspective  Has had GI work up with Dr Ledesma within the year. egd showed gastritis and colonoscopy showed Haemorrhoids.  Nutritionist evaluation  PT  Zoffran 8 mg q 6 h standing and reglan 10 mg q 6 h prn  Changed to regular diet for dinner  CT head, MRI and MRa head results noticed  carotid doppler today. Neuro note appreciated      >Depression-   continue home meds including Escitalopram 10 mg , lamotrigine and xana x prn  Psych evaluation today  Zolpidem for sleep tonight    >Basal cell carcinoma not yet treated- Recently diagnosed, f/u with dermatology, pt has an appointment with derm in 10 days    >HTN-   controlled  continue losartan 50 mg      Dispo: tomorrow oncle able to tolerate regular diet.    DVT ppx

## 2018-11-19 NOTE — PROGRESS NOTE ADULT - PROBLEM SELECTOR PLAN 1
- pt with chronic nausea, weight loss X 6 months. Has had GI work up with Dr Ledesma within the year. Please get egd and colon results. MRI abdomen pending. LFT normal. If MRI shows no masses, then pt may be D/Dane from GI standpoint and follow up with Dr Ledesma for these chronic symptoms. I suspect nausea and weight loss are due to depression and anxiety. - pt with chronic nausea, weight loss X 6 months. Has had GI work up with Dr Ledesma within the year. Please get egd and colon results. MRI abdomen pending. LFT normal. If MRI shows no masses, then pt may be D/Dane from GI standpoint and follow up with Dr Ledesma for these chronic symptoms. I suspect nausea and weight loss are due to depression and anxiety.     Addendum- I spoke with attending Dr damon. Pt with liver cyst on MRI causing extrinsic compression. NO further workup needed.     EGD reviewed from 5/2018 which showed gastritis and esophagitis.  Colonoscopy showed poor prep. Dr Ledesma suggested repeat colonoscopy.      Pt maybe D/C ed home and F/U with her GI as outpatient  to these chronic issues

## 2018-11-19 NOTE — BEHAVIORAL HEALTH ASSESSMENT NOTE - ORIENTATION OTHER
[facility] town is [place], able to name hospital county, state and floor. does not know year guesses date is the 20th, knows month.

## 2018-11-19 NOTE — PHYSICAL THERAPY INITIAL EVALUATION ADULT - PERTINENT HX OF CURRENT PROBLEM, REHAB EVAL
77 y/o F with PMHx of depression, recently diagnosed basal cell carcinoma not yet treated, HTN and PSHx of hysterectomy s/p ovarian benign neoplasm presents to ED c/o persistent nausea and decreased PO intake that started about 1 month ago.  Was found to have mild dilated left hepatic duct with normal LFT, MRCP showed stable liver cyst from 2017. GI and neuro on board. CT head showed Punctate hyperdensity P1 segment of right posterior cerebral artery for which acute thrombus cannot be excluded.

## 2018-11-19 NOTE — PROGRESS NOTE ADULT - SUBJECTIVE AND OBJECTIVE BOX
Patient is a 78y old  Female who presents with a chief complaint of nausea, weight loss (18 Nov 2018 16:17)      HPI:  77 y/o F with PMHx of depression, recently diagnosed basal cell carcinoma not yet treated, HTN and PSHx of hysterectomy s/p ovarian benign neoplasm presents to ED c/o persistent nausea and decreased PO intake . Symptoms  are chronic . No abdominal pain. Ate yesterday. No vomiting. MRI of head and abdomen done this am. No results yet            REVIEW OF SYSTEMS:  Constitutional: No fever, weight loss or fatigue  ENMT:  No difficulty hearing, tinnitus, vertigo; No sinus or throat pain  Respiratory: No cough, wheezing, chills or hemoptysis  Cardiovascular: No chest pain, palpitations, dizziness or leg swelling  Gastrointestinal: No abdominal or epigastric pain. + nausea, vomiting or hematemesis; No diarrhea or constipation. No melena or hematochezia.  Skin: No itching, burning, rashes or lesions   Musculoskeletal: No joint pain or swelling; No muscle, back or extremity pain    PAST MEDICAL & SURGICAL HISTORY:  Ovarian benign neoplasm: with S/p Total  Hysterectomy  Depression  HTN (hypertension)  No significant past surgical history      FAMILY HISTORY:  Family history of cancer in father (Father)      SOCIAL HISTORY:  Smoking Status: [ ] Current, [ ] Former, [ ] Never  Pack Years:  [  ] EtOH  [  ] IVDA    MEDICATIONS:  MEDICATIONS  (STANDING):  acetaminophen   Tablet .. 650 milliGRAM(s) Oral every 6 hours  enoxaparin Injectable 40 milliGRAM(s) SubCutaneous daily  escitalopram 10 milliGRAM(s) Oral daily  famotidine    Tablet 20 milliGRAM(s) Oral two times a day  lamoTRIgine 25 milliGRAM(s) Oral two times a day  losartan 50 milliGRAM(s) Oral daily  ondansetron Injectable 4 milliGRAM(s) IV Push every 6 hours  pantoprazole  Injectable 40 milliGRAM(s) IV Push every 12 hours    MEDICATIONS  (PRN):  ALPRAZolam 0.5 milliGRAM(s) Oral three times a day PRN anxiety      Allergies    No Known Allergies    Intolerances        Vital Signs Last 24 Hrs  T(C): 36.8 (19 Nov 2018 08:03), Max: 36.9 (18 Nov 2018 23:28)  T(F): 98.3 (19 Nov 2018 08:03), Max: 98.5 (18 Nov 2018 23:28)  HR: 78 (19 Nov 2018 08:03) (67 - 78)  BP: 136/80 (19 Nov 2018 08:03) (130/76 - 157/77)  BP(mean): --  RR: 18 (19 Nov 2018 08:03) (18 - 18)  SpO2: 97% (19 Nov 2018 08:03) (96% - 99%)        PHYSICAL EXAM:    General: Well developed; well nourished; in no acute distress  HEENT: MMM, conjunctiva and sclera clear  H- RRR  L- CTA  Gastrointestinal: Soft, non-tender non-distended; Normal bowel sounds; No rebound or guarding  Extremities: Normal range of motion, No clubbing, cyanosis or edema  Neurological: Alert and oriented x3  Skin: Warm and dry. No obvious rash      LABS:                        12.3   4.8   )-----------( 245      ( 17 Nov 2018 19:41 )             38.1     17 Nov 2018 19:41    136    |  97     |  10.0   ----------------------------<  102    4.0     |  27.0   |  1.02     Ca    9.8        17 Nov 2018 19:41    TPro  6.8    /  Alb  4.2    /  TBili  0.3    /  DBili  x      /  AST  13     /  ALT  9      /  AlkPhos  85     / Amylase x      /Lipase 40     17 Nov 2018 19:41              RADIOLOGY & ADDITIONAL STUDIES:     < from: CT Abdomen and Pelvis w/ Oral Cont and w/ IV Cont (11.18.18 @ 01:40) >  IMPRESSION:     No bowel obstruction.    Mild left intrahepatic biliary dilatation, which has mildly increased   since 6/24/2017. Underlying biliary etiology/neoplasm cannot be excluded.   Recommend clinical correlation and follow-up contrast-enhanced MRI/MRCP   for further evaluation.    < end of copied text >

## 2018-11-19 NOTE — BEHAVIORAL HEALTH ASSESSMENT NOTE - HPI (INCLUDE ILLNESS QUALITY, SEVERITY, DURATION, TIMING, CONTEXT, MODIFYING FACTORS, ASSOCIATED SIGNS AND SYMPTOMS)
78 year old woman, diagnosis of bipolar disorder by psychiatrist no prior psychiatric hospitalizations suicide attempts or substance abuse, admitted with intractible nasuea, has recent weight loss.    Patient endorsing long lifetime hx of depressive episodes with anhedonia guilt worthlessness, sleep difficulty, endorses currently being in depressive episode for the past month. She reports diagnosis by psychiatrist of bipolar disorder , endorsing periods lasting up to several months of elevated mood increased self esteem decreased need for sleep. Inquired  if he has noticed such episodes, he has not. pateint reports psychiatrist also believes bipolar disorder because of patient's family hx of such.  Elvira denies hx of paranoia AH, VH and homicidal ideatiion.    she endorses nausea had resolved for the past day until she was injected with contrast today. she endorses still having appetite however afraid to eat as it has precipitated her nausea.    prior trials of symbyax,(not helpful) seroquel,(not helpful), latuda (per  did not respond well) . Lamictal started recently and titrated to 50 mg bID per .   patient/ endorse patient has been on xanax xr 3 mg qhs for months.     reports patient has memory issues which he describes as starting to forget her own phone number which started months ago.   has always managed the bills and bank accounts, cannot say if patient able to shop for herself because "we are always together". patient continues her ADLS, is still able to cook.

## 2018-11-19 NOTE — PROGRESS NOTE ADULT - SUBJECTIVE AND OBJECTIVE BOX
INTERVAL HISTORY:  no interval changes      VITAL SIGNS:  Vital Signs Last 24 Hrs  T(C): 36.8 (19 Nov 2018 08:03), Max: 36.9 (18 Nov 2018 23:28)  T(F): 98.3 (19 Nov 2018 08:03), Max: 98.5 (18 Nov 2018 23:28)  HR: 78 (19 Nov 2018 08:03) (67 - 78)  BP: 136/80 (19 Nov 2018 08:03) (130/76 - 157/77)  BP(mean): --  RR: 18 (19 Nov 2018 08:03) (18 - 18)  SpO2: 97% (19 Nov 2018 08:03) (96% - 99%)    PHYSICAL EXAMINATION:    Mentation:  nl  Language/Speech: nl  CN: nl  Visual Fields: full   Motor:nl  Sensory:  DTR:  Babinski:      MEDS:  MEDICATIONS  (STANDING):  acetaminophen   Tablet .. 650 milliGRAM(s) Oral every 6 hours  enoxaparin Injectable 40 milliGRAM(s) SubCutaneous daily  escitalopram 10 milliGRAM(s) Oral daily  famotidine    Tablet 20 milliGRAM(s) Oral two times a day  lamoTRIgine 25 milliGRAM(s) Oral two times a day  losartan 50 milliGRAM(s) Oral daily  ondansetron Injectable 4 milliGRAM(s) IV Push every 6 hours  pantoprazole  Injectable 40 milliGRAM(s) IV Push every 12 hours    MEDICATIONS  (PRN):  ALPRAZolam 0.5 milliGRAM(s) Oral three times a day PRN anxiety  metoclopramide Injectable 10 milliGRAM(s) IV Push every 8 hours PRN nausea      LABS:                          12.3   4.8   )-----------( 245      ( 17 Nov 2018 19:41 )             38.1     11-17    136  |  97<L>  |  10.0  ----------------------------<  102  4.0   |  27.0  |  1.02    Ca    9.8      17 Nov 2018 19:41    TPro  6.8  /  Alb  4.2  /  TBili  0.3<L>  /  DBili  x   /  AST  13  /  ALT  9   /  AlkPhos  85  11-17    LIVER FUNCTIONS - ( 17 Nov 2018 19:41 )  Alb: 4.2 g/dL / Pro: 6.8 g/dL / ALK PHOS: 85 U/L / ALT: 9 U/L / AST: 13 U/L / GGT: x               RADIOLOGY & ADDITIONAL STUDIES:      MRI-neg  MRA head - neg  MRA neck -not done    IMPRESSION & PLAN:    No evidence of stroke  Depression  Asthenia, weight loss    REC;  Medical ,GI and Psych management  Carotid duplex ordered  Otherwise cleared for discharge from neuro perspective

## 2018-11-19 NOTE — PHYSICAL THERAPY INITIAL EVALUATION ADULT - ADDITIONAL COMMENTS
Pt. lives in a house with her  with 4 RICARDO and 12 inside; both with one rail. pt. was independent PTA without device. pt. does not own DME. pt. reports her  is at home during the day and is able to assist her with all needs if necessary.

## 2018-11-19 NOTE — BEHAVIORAL HEALTH ASSESSMENT NOTE - NSBHCHARTREVIEWLAB_PSY_A_CORE FT
12.9   6.0   )-----------( 235      ( 2018 10:19 )             39.4         133<L>  |  95<L>  |  8.0  ----------------------------<  104  4.2   |  25.0  |  0.79    Ca    9.7      2018 10:19  Phos  3.2       Mg     2.0         TPro  7.0  /  Alb  4.3  /  TBili  0.6  /  DBili  0.2  /  AST  16  /  ALT  8   /  AlkPhos  87      LIVER FUNCTIONS - ( 2018 10:19 )  Alb: 4.3 g/dL / Pro: 7.0 g/dL / ALK PHOS: 87 U/L / ALT: 8 U/L / AST: 16 U/L / GGT: x             Urinalysis Basic - ( 2018 23:06 )    Color: Yellow / Appearance: Clear / S.005 / pH: x  Gluc: x / Ketone: Trace  / Bili: Negative / Urobili: Negative mg/dL   Blood: x / Protein: Negative mg/dL / Nitrite: Negative   Leuk Esterase: Negative / RBC: 0-2 /HPF / WBC 0-2   Sq Epi: x / Non Sq Epi: Occasional / Bacteria: Occasional

## 2018-11-19 NOTE — BEHAVIORAL HEALTH ASSESSMENT NOTE - ADDITIONAL DETAILS / COMMENTS
able to spell WORLD forwards, states she cannot spell it backwards.  lost 2 points for short term recall   lost total of 10 points on partial MMSE items completed so far

## 2018-11-19 NOTE — BEHAVIORAL HEALTH ASSESSMENT NOTE - SUMMARY
78 year old woman, diagnosis of bipolar disorder by psychiatrist no prior psychiatric hospitalizations suicide attempts or substance abuse, admitted with intractable nasuea, has recent weight loss.    partial MMSE completed suggestive of some cognitive issues vs pseudodementia.  patient suspected to have parkinson's disease    patient recently started on lamictal by psychiatrist ;per hsuband dose is 50 mg BID- confirmed this with pharmacy,   patient currently being given 25 mg BID; as patient still within acceptable time frame of half lives and has not completely discontinued ; safe to increase immediately back to 50 mg BID  -would give additional dose of 25 mg lamictal stat, start 50 mg for tonights dose and continue 50 mg BID thereafter  -given appearance of bipolarity, would continue with lamictal,  which can continue to be titrated as outpatient patient has failed other bipolar depression treatments of symbyax, latuda, seroquel    -patient receives xanax XR 3 mg daily at home, would correct in hospital dosing to 1 mg TID ; nasuea may be further precipitated by benzodiazepine withdrawal as her current dose is half of her home dose, xanax may also help treat nausea  advised patient/ of habit forming potential of benzodiazepines, risk for cognitive dulling in elderly, and risk of falls (pt denies hx of falls)  - if nausea remains intractable to other antiemetics olanzapine 5 mg BID prn   may help with nausea however it did not help patient's mood in the past

## 2018-11-20 ENCOUNTER — TRANSCRIPTION ENCOUNTER (OUTPATIENT)
Age: 78
End: 2018-11-20

## 2018-11-20 VITALS
SYSTOLIC BLOOD PRESSURE: 124 MMHG | HEART RATE: 84 BPM | TEMPERATURE: 98 F | RESPIRATION RATE: 18 BRPM | OXYGEN SATURATION: 97 % | DIASTOLIC BLOOD PRESSURE: 74 MMHG

## 2018-11-20 LAB
ANION GAP SERPL CALC-SCNC: 14 MMOL/L — SIGNIFICANT CHANGE UP (ref 5–17)
BUN SERPL-MCNC: 18 MG/DL — SIGNIFICANT CHANGE UP (ref 8–20)
CALCIUM SERPL-MCNC: 9.9 MG/DL — SIGNIFICANT CHANGE UP (ref 8.6–10.2)
CHLORIDE SERPL-SCNC: 92 MMOL/L — LOW (ref 98–107)
CO2 SERPL-SCNC: 25 MMOL/L — SIGNIFICANT CHANGE UP (ref 22–29)
CREAT SERPL-MCNC: 0.76 MG/DL — SIGNIFICANT CHANGE UP (ref 0.5–1.3)
GLUCOSE SERPL-MCNC: 106 MG/DL — SIGNIFICANT CHANGE UP (ref 70–115)
MAGNESIUM SERPL-MCNC: 1.9 MG/DL — SIGNIFICANT CHANGE UP (ref 1.6–2.6)
PHOSPHATE SERPL-MCNC: 3.3 MG/DL — SIGNIFICANT CHANGE UP (ref 2.4–4.7)
POTASSIUM SERPL-MCNC: 4 MMOL/L — SIGNIFICANT CHANGE UP (ref 3.5–5.3)
POTASSIUM SERPL-SCNC: 4 MMOL/L — SIGNIFICANT CHANGE UP (ref 3.5–5.3)
SODIUM SERPL-SCNC: 131 MMOL/L — LOW (ref 135–145)
TSH SERPL-MCNC: 2.4 UIU/ML — SIGNIFICANT CHANGE UP (ref 0.27–4.2)

## 2018-11-20 PROCEDURE — 83690 ASSAY OF LIPASE: CPT

## 2018-11-20 PROCEDURE — 76705 ECHO EXAM OF ABDOMEN: CPT

## 2018-11-20 PROCEDURE — 81001 URINALYSIS AUTO W/SCOPE: CPT

## 2018-11-20 PROCEDURE — 74182 MRI ABDOMEN W/CONTRAST: CPT

## 2018-11-20 PROCEDURE — 36415 COLL VENOUS BLD VENIPUNCTURE: CPT

## 2018-11-20 PROCEDURE — 80076 HEPATIC FUNCTION PANEL: CPT

## 2018-11-20 PROCEDURE — 93880 EXTRACRANIAL BILAT STUDY: CPT

## 2018-11-20 PROCEDURE — 83735 ASSAY OF MAGNESIUM: CPT

## 2018-11-20 PROCEDURE — 96376 TX/PRO/DX INJ SAME DRUG ADON: CPT

## 2018-11-20 PROCEDURE — 84439 ASSAY OF FREE THYROXINE: CPT

## 2018-11-20 PROCEDURE — 84484 ASSAY OF TROPONIN QUANT: CPT

## 2018-11-20 PROCEDURE — 93005 ELECTROCARDIOGRAM TRACING: CPT

## 2018-11-20 PROCEDURE — 96375 TX/PRO/DX INJ NEW DRUG ADDON: CPT

## 2018-11-20 PROCEDURE — G0378: CPT

## 2018-11-20 PROCEDURE — 70450 CT HEAD/BRAIN W/O DYE: CPT

## 2018-11-20 PROCEDURE — 80048 BASIC METABOLIC PNL TOTAL CA: CPT

## 2018-11-20 PROCEDURE — 84100 ASSAY OF PHOSPHORUS: CPT

## 2018-11-20 PROCEDURE — 97116 GAIT TRAINING THERAPY: CPT

## 2018-11-20 PROCEDURE — 70544 MR ANGIOGRAPHY HEAD W/O DYE: CPT

## 2018-11-20 PROCEDURE — 97530 THERAPEUTIC ACTIVITIES: CPT

## 2018-11-20 PROCEDURE — 85027 COMPLETE CBC AUTOMATED: CPT

## 2018-11-20 PROCEDURE — 99239 HOSP IP/OBS DSCHRG MGMT >30: CPT

## 2018-11-20 PROCEDURE — 84443 ASSAY THYROID STIM HORMONE: CPT

## 2018-11-20 PROCEDURE — 74177 CT ABD & PELVIS W/CONTRAST: CPT

## 2018-11-20 PROCEDURE — 70551 MRI BRAIN STEM W/O DYE: CPT

## 2018-11-20 PROCEDURE — 71045 X-RAY EXAM CHEST 1 VIEW: CPT

## 2018-11-20 PROCEDURE — 96365 THER/PROPH/DIAG IV INF INIT: CPT | Mod: XU

## 2018-11-20 PROCEDURE — 97163 PT EVAL HIGH COMPLEX 45 MIN: CPT

## 2018-11-20 PROCEDURE — 99285 EMERGENCY DEPT VISIT HI MDM: CPT | Mod: 25

## 2018-11-20 PROCEDURE — 80053 COMPREHEN METABOLIC PANEL: CPT

## 2018-11-20 RX ORDER — LOSARTAN POTASSIUM 100 MG/1
1 TABLET, FILM COATED ORAL
Qty: 30 | Refills: 0
Start: 2018-11-20 | End: 2018-12-19

## 2018-11-20 RX ORDER — OLANZAPINE 15 MG/1
1 TABLET, FILM COATED ORAL
Qty: 7 | Refills: 0
Start: 2018-11-20 | End: 2018-11-26

## 2018-11-20 RX ORDER — LAMOTRIGINE 25 MG/1
1 TABLET, ORALLY DISINTEGRATING ORAL
Qty: 60 | Refills: 0
Start: 2018-11-20 | End: 2018-12-19

## 2018-11-20 RX ORDER — ESCITALOPRAM OXALATE 10 MG/1
1 TABLET, FILM COATED ORAL
Qty: 30 | Refills: 0
Start: 2018-11-20 | End: 2018-12-19

## 2018-11-20 RX ORDER — ALPRAZOLAM 0.25 MG
3 TABLET ORAL
Qty: 0 | Refills: 0 | COMMUNITY

## 2018-11-20 RX ORDER — FAMOTIDINE 10 MG/ML
1 INJECTION INTRAVENOUS
Qty: 0 | Refills: 0 | COMMUNITY

## 2018-11-20 RX ORDER — PANTOPRAZOLE SODIUM 20 MG/1
1 TABLET, DELAYED RELEASE ORAL
Qty: 14 | Refills: 0
Start: 2018-11-20 | End: 2018-12-03

## 2018-11-20 RX ORDER — ALPRAZOLAM 0.25 MG
1 TABLET ORAL
Qty: 6 | Refills: 0
Start: 2018-11-20 | End: 2018-11-21

## 2018-11-20 RX ORDER — ONDANSETRON 8 MG/1
2 TABLET, FILM COATED ORAL
Qty: 24 | Refills: 0
Start: 2018-11-20 | End: 2018-11-23

## 2018-11-20 RX ORDER — LAMOTRIGINE 25 MG/1
1 TABLET, ORALLY DISINTEGRATING ORAL
Qty: 0 | Refills: 0 | COMMUNITY

## 2018-11-20 RX ORDER — ALPRAZOLAM 0.25 MG
1 TABLET ORAL THREE TIMES A DAY
Qty: 0 | Refills: 0 | Status: CANCELLED | OUTPATIENT
Start: 2018-11-27 | End: 2018-11-20

## 2018-11-20 RX ORDER — LOSARTAN POTASSIUM 100 MG/1
1 TABLET, FILM COATED ORAL
Qty: 0 | Refills: 0 | COMMUNITY

## 2018-11-20 RX ORDER — ALPRAZOLAM 0.25 MG
1 TABLET ORAL
Qty: 9 | Refills: 0 | OUTPATIENT
Start: 2018-11-20 | End: 2018-11-22

## 2018-11-20 RX ORDER — ESCITALOPRAM OXALATE 10 MG/1
1 TABLET, FILM COATED ORAL
Qty: 0 | Refills: 0 | COMMUNITY

## 2018-11-20 RX ADMIN — FAMOTIDINE 20 MILLIGRAM(S): 10 INJECTION INTRAVENOUS at 05:30

## 2018-11-20 RX ADMIN — Medication 650 MILLIGRAM(S): at 13:40

## 2018-11-20 RX ADMIN — ONDANSETRON 8 MILLIGRAM(S): 8 TABLET, FILM COATED ORAL at 05:31

## 2018-11-20 RX ADMIN — Medication 1 MILLIGRAM(S): at 09:04

## 2018-11-20 RX ADMIN — LOSARTAN POTASSIUM 50 MILLIGRAM(S): 100 TABLET, FILM COATED ORAL at 05:31

## 2018-11-20 RX ADMIN — PANTOPRAZOLE SODIUM 40 MILLIGRAM(S): 20 TABLET, DELAYED RELEASE ORAL at 05:31

## 2018-11-20 RX ADMIN — Medication 10 MILLIGRAM(S): at 08:26

## 2018-11-20 RX ADMIN — ENOXAPARIN SODIUM 40 MILLIGRAM(S): 100 INJECTION SUBCUTANEOUS at 12:02

## 2018-11-20 RX ADMIN — ESCITALOPRAM OXALATE 10 MILLIGRAM(S): 10 TABLET, FILM COATED ORAL at 12:02

## 2018-11-20 RX ADMIN — LAMOTRIGINE 50 MILLIGRAM(S): 25 TABLET, ORALLY DISINTEGRATING ORAL at 05:30

## 2018-11-20 RX ADMIN — ONDANSETRON 8 MILLIGRAM(S): 8 TABLET, FILM COATED ORAL at 12:02

## 2018-11-20 RX ADMIN — Medication 650 MILLIGRAM(S): at 12:02

## 2018-11-20 NOTE — DISCHARGE NOTE ADULT - PROVIDER TOKENS
FREE:[LAST:[desanti],PHONE:[(   )    -],FAX:[(   )    -],ADDRESS:[GI physician]],FREE:[LAST:[naqui],PHONE:[(   )    -],FAX:[(   )    -],ADDRESS:[psychiatry]]

## 2018-11-20 NOTE — DISCHARGE NOTE ADULT - CARE PROVIDER_API CALL
ROSIO mitchell physician  Phone: (   )    -  Fax: (   )    -    ry   psychiatry  Phone: (   )    -  Fax: (   )    -

## 2018-11-20 NOTE — DISCHARGE NOTE ADULT - MEDICATION SUMMARY - MEDICATIONS TO TAKE
I will START or STAY ON the medications listed below when I get home from the hospital:    losartan 50 mg oral tablet  -- 1 tab(s) by mouth once a day  -- Indication: For htn    LaMICtal ODT 50 mg oral tablet, disintegrating  -- 1 tab(s) by mouth 2 times a day   -- Check with your doctor before becoming pregnant.  Do not chew, break, or crush.  May cause drowsiness or dizziness.  Obtain medical advice before taking any non-prescription drugs as some may affect the action of this medication.  Swallow whole.  Do not crush.  This drug may impair the ability to drive or operate machinery.  Use care until you become familiar with its effects.    -- Indication: For bpd    Lexapro 10 mg oral tablet  -- 1 tab(s) by mouth once a day  -- Indication: For bpd    ondansetron 4 mg oral tablet  -- 2 tab(s) by mouth every 8 hours  as needed for vomiting   -- Indication: For Nausea    ondansetron 4 mg oral tablet  -- 1 tab(s) by mouth every 8 hours  -- Indication: For Nausea    OLANZapine 5 mg oral tablet  -- 1 tab(s) by mouth BID prn for nausea vomiting  -- It is very important that you take or use this exactly as directed.  Do not skip doses or discontinue unless directed by your doctor.  May cause drowsiness.  Alcohol may intensify this effect.  Use care when operating dangerous machinery.  Obtain medical advice before taking any non-prescription drugs as some may affect the action of this medication.    -- Indication: For Nausea    Xanax 1 mg oral tablet  -- 1 milligram(s) by mouth 3 times a day, As Needed MDD:3 mg  -- Indication: For Anxiety    pantoprazole 40 mg oral granule, enteric coated  -- 1 each by mouth once a day  -- It is very important that you take or use this exactly as directed.  Do not skip doses or discontinue unless directed by your doctor.  Obtain medical advice before taking any non-prescription drugs as some may affect the action of this medication.    -- Indication: For gerd

## 2018-11-20 NOTE — DISCHARGE NOTE ADULT - CARE PLAN
Principal Discharge DX:	Nausea  Goal:	back to baseline  Assessment and plan of treatment:	Psych was consulted and recommended Zyprexa and Ativan Xanax.  f/u with DR Zamudio AS AN OUTPATIENT  WAS cleared by GI and neurology  for discarged  Secondary Diagnosis:	Ataxia  Assessment and plan of treatment:	was evaluated by PT and neurology. Underwent MRI, MRA HEAD AND us DOPPLER Which was neg for acute findings  PT recommended home with home pT  Secondary Diagnosis:	Other bipolar disorder  Assessment and plan of treatment:	continue Lamictal 50 mg bid, xanax 1 mg tid orn and Lexapro 10 mg

## 2018-11-20 NOTE — PROGRESS NOTE ADULT - SUBJECTIVE AND OBJECTIVE BOX
CATHLEEN CRUZ    [id number]    78y      Female    CC: Nausea    INTERVAL HPI/OVERNIGHT EVENTS:  was seen and examined at bedside. was having emesis in am as per RN. Patient was given Xanax for anxiety and it improved her nausea.  she will get another tray and is aware of discharge planning.    HPI:  77 y/o F with PMHx of depression, recently diagnosed basal cell carcinoma not yet treated, HTN and PSHx of hysterectomy s/p ovarian benign neoplasm presents to ED c/o persistent nausea and decreased PO intake that started about 1 month ago. As per patient she has had a weight loss of about 13 pounds over the course of 1 month. She has never had nausea this severe in the past. Had an endoscopy performed earlier this year and has had a colonoscopy by Dr. Escobar, which came back normal. Her last BM was 2 days ago and was non bloody & formed as per pt. She does suffer from constipation on & off for which she takes miralax daily. Denies any chest pain, palpitations, sob, light headedness/dizziness, difficulty breathing/cough, fevers/chills, vomiting, diarrhea, dysuria or increased urinary frequency, headaches, diplopia, numbness or tingling in extremities, throat pain, difficulty swallowing, hematuria.  Denies recent travel or abx use/ recent travel.   As per , the patient recently started Lamictal about 4 weeks ago, due to preoccupation and anxiety mostly surrounding new basal cell carcinoma diagnosis. Was also evaluated by a neurologist, who recommend CT of the head which she did not have performed yet. Ct head performed in the ED: Stable chronic microvessel change. No acute intracranial hemorrhage or mass effect from vasogenic edema. Punctate hyperdensity P1 segment of right posterior cerebral artery for which acute thrombus cannot be excluded. Consider further evaluation with CTA of the head.  CT A/P: No bowel obstruction. Diffuse sigmoid colon wall thickening without inflammatory change, reflecting muscular hypertrophy related to chronic diverticulosis. Colon diverticulosis.  Mild left intrahepatic biliary dilatation, which has mildly increased since 6/24/2017. Underlying biliary etiology/neoplasm cannot be excluded. Recommend clinical correlation and follow-up contrast-enhanced MRI/MRCP for further evaluation. (11/18)    	      REVIEW OF SYSTEMS:    CONSTITUTIONAL: No fever but weight loss, or fatigue  RESPIRATORY: No cough, wheezing, hemoptysis; No shortness of breath  CARDIOVASCULAR: No chest pain, palpitations  GASTROINTESTINAL: No abdominal or epigastric pain. +ve nausea, vomiting  NEUROLOGICAL: No headaches, memory loss, loss of strength.  MISCELLANEOUS:      Vital Signs Last 24 Hrs  T(C): 37 (20 Nov 2018 09:06), Max: 37 (20 Nov 2018 09:06)  T(F): 98.6 (20 Nov 2018 09:06), Max: 98.6 (20 Nov 2018 09:06)  HR: 74 (20 Nov 2018 09:06) (70 - 96)  BP: 124/77 (20 Nov 2018 09:06) (124/77 - 136/78)  BP(mean): --  RR: 16 (20 Nov 2018 09:06) (14 - 18)  SpO2: 96% (20 Nov 2018 09:06) (96% - 98%)    PHYSICAL EXAM:    GENERAL: NAD, thin fragile, baseline tremors  HEENT: PERRL, +EOMI  NECK: soft, Supple, No JVD,   CHEST/LUNG: Clear to auscultation bilaterally; No wheezing  HEART: S1S2+, Regular rate and rhythm; No murmurs, rubs, or gallops  ABDOMEN: Soft, Nontender, Nondistended; Bowel sounds present  EXTREMITIES:  2+ Peripheral Pulses, No clubbing, cyanosis, or edema  SKIN: No rashes or lesions  NEURO: AAOX3, no focal deficits, no motor r sensory loss  PSYCH: normal mood        LABS:                        12.9   6.0   )-----------( 235      ( 19 Nov 2018 10:19 )             39.4     11-20    131<L>  |  92<L>  |  18.0  ----------------------------<  106  4.0   |  25.0  |  0.76    Ca    9.9      20 Nov 2018 07:58  Phos  3.3     11-20  Mg     1.9     11-20    TPro  7.0  /  Alb  4.3  /  TBili  0.6  /  DBili  0.2  /  AST  16  /  ALT  8   /  AlkPhos  87  11-19            MEDICATIONS  (STANDING):  acetaminophen   Tablet .. 650 milliGRAM(s) Oral every 6 hours  enoxaparin Injectable 40 milliGRAM(s) SubCutaneous daily  escitalopram 10 milliGRAM(s) Oral daily  famotidine    Tablet 20 milliGRAM(s) Oral two times a day  lamoTRIgine 50 milliGRAM(s) Oral two times a day  losartan 50 milliGRAM(s) Oral daily  ondansetron Injectable 8 milliGRAM(s) IV Push every 6 hours  pantoprazole  Injectable 40 milliGRAM(s) IV Push every 12 hours    MEDICATIONS  (PRN):  ALPRAZolam 1 milliGRAM(s) Oral three times a day PRN anxiety  metoclopramide Injectable 10 milliGRAM(s) IV Push every 6 hours PRN nausea  zolpidem 5 milliGRAM(s) Oral at bedtime PRN Insomnia      RADIOLOGY & ADDITIONAL TESTS:

## 2018-11-20 NOTE — DISCHARGE NOTE ADULT - PLAN OF CARE
back to baseline Psych was consulted and recommended Zyprexa and Ativan Xanax.  f/u with DR Zamudio AS AN OUTPATIENT  WAS cleared by GI and neurology  for discarged was evaluated by PT and neurology. Underwent MRI, MRA HEAD AND us DOPPLER Which was neg for acute findings  PT recommended home with home pT continue Lamictal 50 mg bid, xanax 1 mg tid orn and Lexapro 10 mg

## 2018-11-20 NOTE — DISCHARGE NOTE ADULT - PATIENT PORTAL LINK FT
You can access the [facility] Patient Portal, offered by [facility], by registering with the following website: [email or web address]

## 2018-11-20 NOTE — DISCHARGE NOTE ADULT - MEDICATION SUMMARY - MEDICATIONS TO CHANGE
I will SWITCH the dose or number of times a day I take the medications listed below when I get home from the hospital:    ondansetron 4 mg oral tablet  -- 1 tab(s) by mouth every 8 hours

## 2018-11-20 NOTE — DISCHARGE NOTE ADULT - HOSPITAL COURSE
77 y/o F with PMHx of depression, recently diagnosed basal cell carcinoma not yet treated, HTN and PSHx of hysterectomy s/p ovarian benign neoplasm presents to ED c/o persistent nausea and decreased PO intake that started about 1 month ago.  Was found to have mild dilated left hepatic duct with normal LFT, MRCP showed stable liver cyst from 2017. GI and neuro on board. CT head showed Punctate hyperdensity P1 segment of right posterior cerebral artery for which acute thrombus cannot be excluded. MRA head was neg. Carotid doppler was neg for acute findings. PT recommended home with home PT. Psych consulted and Lamictal increased to 50 mg bid, Ativan 1 mg tid prn, and lexapro 10 mg . Zyprexa 5 mg prn ordered for nausea. Needs to follow up with her own Psychiatrist, GI and PCP as an outpatient.        Nausea and weight loss:  likely a component of depression and anxiety contributing to weight loss  Attempted calling patient psychiatrist dr Blackmon but could' nt reach.  MRI abdomen results noted and discussed with patient,  and dr sharp  No intervention from GI perspective  Has had GI work up with Dr Ledesma within the year. egd showed gastritis and colonoscopy showed Haemorrhoids.  Changed to regular diet for dinner  CT head, MRI and MRa head results noticed        >Depression-   continue home meds including Escitalopram 10 mg , lamotrigine and xana x prn  Zolpidem for sleep tonight    >Basal cell carcinoma not yet treated- Recently diagnosed, f/u with dermatology, pt has an appointment with derm in 10 days    >HTN-   controlled  continue losartan 50 mg        Vital Signs Last 24 Hrs  T(C): 36.8 (19 Nov 2018 08:03), Max: 36.9 (18 Nov 2018 23:28)  T(F): 98.3 (19 Nov 2018 08:03), Max: 98.5 (18 Nov 2018 23:28)  HR: 78 (19 Nov 2018 08:03) (67 - 78)  BP: 136/80 (19 Nov 2018 08:03) (130/76 - 157/77)  BP(mean): --  RR: 18 (19 Nov 2018 08:03) (18 - 18)  SpO2: 97% (19 Nov 2018 08:03) (96% - 99%)    PHYSICAL EXAM:    GENERAL: NAD, thin fragile, baseline tremors  HEENT: PERRL, +EOMI  NECK: soft, Supple, No JVD,   CHEST/LUNG: Clear to auscultation bilaterally; No wheezing  HEART: S1S2+, Regular rate and rhythm; No murmurs, rubs, or gallops  ABDOMEN: Soft, Nontender, Nondistended; Bowel sounds present  EXTREMITIES:  2+ Peripheral Pulses, No clubbing, cyanosis, or edema  SKIN: No rashes or lesions  NEURO: AAOX3, no focal deficits, no motor r sensory loss  PSYCH: normal mood      I spent 45 min in discharging the patient and coordinating care. 77 y/o F with PMHx of depression, recently diagnosed basal cell carcinoma not yet treated, HTN and PSHx of hysterectomy s/p ovarian benign neoplasm presents to ED c/o persistent nausea and decreased PO intake that started about 1 month ago.  Was found to have mild dilated left hepatic duct with normal LFT, MRCP showed stable liver cyst from 2017. GI and neuro on board. CT head showed Punctate hyperdensity P1 segment of right posterior cerebral artery for which acute thrombus cannot be excluded. MRA head was neg. Carotid doppler was neg for acute findings. PT recommended home with home PT. Psych consulted and Lamictal increased to 50 mg bid, Ativan 1 mg tid prn, and lexapro 10 mg . Zyprexa 5 mg prn ordered for nausea. Needs to follow up with her own Psychiatrist, GI and PCP as an outpatient.        Nausea and weight loss:  likely a component of depression and anxiety contributing to weight loss  Attempted calling patient psychiatrist dr Blackmon but could' nt reach.  MRI abdomen results noted and discussed with patient,  and dr sharp  No intervention from GI perspective  Has had GI work up with Dr Ledesma within the year. egd showed gastritis and colonoscopy showed Haemorrhoids.  Changed to regular diet for dinner  CT head, MRI and MRa head results noticed        >Depression-   continue home meds including Escitalopram 10 mg , lamotrigine and xana x prn  Zolpidem for sleep tonight    >Basal cell carcinoma not yet treated- Recently diagnosed, f/u with dermatology, pt has an appointment with derm in 10 days    >HTN-   controlled  continue losartan 50 mg        Vital Signs Last 24 Hrs  T(C): 36.8 (19 Nov 2018 08:03), Max: 36.9 (18 Nov 2018 23:28)  T(F): 98.3 (19 Nov 2018 08:03), Max: 98.5 (18 Nov 2018 23:28)  HR: 78 (19 Nov 2018 08:03) (67 - 78)  BP: 136/80 (19 Nov 2018 08:03) (130/76 - 157/77)  BP(mean): --  RR: 18 (19 Nov 2018 08:03) (18 - 18)  SpO2: 97% (19 Nov 2018 08:03) (96% - 99%)    PHYSICAL EXAM:    GENERAL: NAD, thin fragile, baseline tremors  HEENT: PERRL, +EOMI  NECK: soft, Supple, No JVD,   CHEST/LUNG: Clear to auscultation bilaterally; No wheezing  HEART: S1S2+, Regular rate and rhythm; No murmurs, rubs, or gallops  ABDOMEN: Soft, Nontender, Nondistended; Bowel sounds present  EXTREMITIES:  2+ Peripheral Pulses, No clubbing, cyanosis, or edema  SKIN: No rashes or lesions  NEURO: AAOX3, no focal deficits, no motor r sensory loss  PSYCH: normal mood        mi< from: MR Abdomen w/ IV Cont (11.19.18 @ 07:45) >  IMPRESSION:    Stable 3.6 cm lobulated cyst in the left hepatic lobe compared to   6/24/2017 with mild upstream dilatation of the left-sided intrahepatic   ducts. This is likely secondary to mass effect from the adjacent cyst.   Communication between the cyst and the dilated ducts cannot be   definitively established. There is no mural irregularity, nodularity, or   discrete filling defect. Continued imaging surveillance is advised to   exclude the possibility of biliary IPMN.    Small pancreatic cysts on the order of 5 mm without main duct dilatation.   Continued attention on follow-up.        < from: MR Abdomen w/ IV Cont (11.19.18 @ 07:45) >  IMPRESSION:    Stable 3.6 cm lobulated cyst in the left hepatic lobe compared to   6/24/2017 with mild upstream dilatation of the left-sided intrahepatic   ducts. This is likely secondary to mass effect from the adjacent cyst.   Communication between the cyst and the dilated ducts cannot be   definitively established. There is no mural irregularity, nodularity, or   discrete filling defect. Continued imaging surveillance is advised to   exclude the possibility of biliary IPMN.    Small pancreatic cysts on the order of 5 mm without main duct dilatation.   Continued attention on follow-up.      < end of copied text >  I spent 45 min in discharging the patient and coordinating care.

## 2018-11-20 NOTE — PROGRESS NOTE ADULT - ASSESSMENT
79 y/o F with PMHx of depression, recently diagnosed basal cell carcinoma not yet treated, HTN and PSHx of hysterectomy s/p ovarian benign neoplasm presents to ED c/o persistent nausea and decreased PO intake that started about 1 month ago.  Was found to have mild dilated left hepatic duct with normal LFT, MRCP showed stable liver cyst from 2017. GI and neuro on board. CT head showed Punctate hyperdensity P1 segment of right posterior cerebral artery for which acute thrombus cannot be excluded. MRA head was neg. Psych , neuro and GI on board. patient is medically ready for discharge    Nausea and weight loss:  Nausea and weight loss:  likely a component of depression and anxiety contributing to weight loss  Attempted calling patient psychiatrist dr Blackmon but could' nt reach.  MRI abdomen results noted and discussed with patient,  and dr sharp  No intervention from GI perspective  Has had GI work up with Dr Ledesma within the year. egd showed gastritis and colonoscopy showed Haemorrhoids.  Changed to regular diet for dinner  CT head, MRI and MRa head results noticed        >Depression-   continue home meds including Escitalopram 10 mg , lamotrigine and xana x prn  Zolpidem for sleep tonight    >Basal cell carcinoma not yet treated- Recently diagnosed, f/u with dermatology, pt has an appointment with derm in 10 days    >HTN-   controlled  continue losartan 50 mg      Dispo: today.

## 2018-11-21 LAB — T4 FREE SERPL-MCNC: 1.4 NG/DL — SIGNIFICANT CHANGE UP (ref 0.9–1.8)

## 2018-12-14 NOTE — ED ADULT NURSE NOTE - NS ED NOTE ABUSE RESPONSE YN
From: Maria A Frost  To: IRENE Do  Sent: 12/14/2018 9:35 AM CST  Subject: Other    Good morning,  I wanted you to know I started the hydroxichlorquine yesterday and had no side effects yet. Will take one tablet for 10 days then increase to 2 a day unless I see Dr Rene and he recommends something else.   I did start the prednisone on Monday.   Thanks for your support.  Maria A   no

## 2018-12-29 ENCOUNTER — EMERGENCY (EMERGENCY)
Facility: HOSPITAL | Age: 78
LOS: 1 days | Discharge: HOME | End: 2018-12-29
Attending: EMERGENCY MEDICINE
Payer: COMMERCIAL

## 2018-12-29 VITALS
DIASTOLIC BLOOD PRESSURE: 102 MMHG | HEIGHT: 66 IN | SYSTOLIC BLOOD PRESSURE: 161 MMHG | WEIGHT: 128.97 LBS | TEMPERATURE: 98 F | OXYGEN SATURATION: 99 % | RESPIRATION RATE: 20 BRPM | HEART RATE: 105 BPM

## 2018-12-29 VITALS
SYSTOLIC BLOOD PRESSURE: 188 MMHG | DIASTOLIC BLOOD PRESSURE: 88 MMHG | OXYGEN SATURATION: 98 % | RESPIRATION RATE: 20 BRPM | TEMPERATURE: 98 F | HEART RATE: 90 BPM

## 2018-12-29 LAB
ALBUMIN SERPL ELPH-MCNC: 4.2 G/DL — SIGNIFICANT CHANGE UP (ref 3.3–5.2)
ALP SERPL-CCNC: 99 U/L — SIGNIFICANT CHANGE UP (ref 40–120)
ALT FLD-CCNC: <5 U/L — SIGNIFICANT CHANGE UP
ANION GAP SERPL CALC-SCNC: 15 MMOL/L — SIGNIFICANT CHANGE UP (ref 5–17)
AST SERPL-CCNC: 24 U/L — SIGNIFICANT CHANGE UP
BASOPHILS # BLD AUTO: 0 K/UL — SIGNIFICANT CHANGE UP (ref 0–0.2)
BASOPHILS NFR BLD AUTO: 0.2 % — SIGNIFICANT CHANGE UP (ref 0–2)
BILIRUB SERPL-MCNC: 0.3 MG/DL — LOW (ref 0.4–2)
BUN SERPL-MCNC: 11 MG/DL — SIGNIFICANT CHANGE UP (ref 8–20)
CALCIUM SERPL-MCNC: 9.6 MG/DL — SIGNIFICANT CHANGE UP (ref 8.6–10.2)
CHLORIDE SERPL-SCNC: 96 MMOL/L — LOW (ref 98–107)
CO2 SERPL-SCNC: 25 MMOL/L — SIGNIFICANT CHANGE UP (ref 22–29)
CREAT SERPL-MCNC: 0.77 MG/DL — SIGNIFICANT CHANGE UP (ref 0.5–1.3)
EOSINOPHIL # BLD AUTO: 0.1 K/UL — SIGNIFICANT CHANGE UP (ref 0–0.5)
EOSINOPHIL NFR BLD AUTO: 0.7 % — SIGNIFICANT CHANGE UP (ref 0–6)
GLUCOSE SERPL-MCNC: 102 MG/DL — SIGNIFICANT CHANGE UP (ref 70–115)
HCT VFR BLD CALC: 39.2 % — SIGNIFICANT CHANGE UP (ref 37–47)
HGB BLD-MCNC: 13.1 G/DL — SIGNIFICANT CHANGE UP (ref 12–16)
LIDOCAIN IGE QN: 36 U/L — SIGNIFICANT CHANGE UP (ref 22–51)
LYMPHOCYTES # BLD AUTO: 0.7 K/UL — LOW (ref 1–4.8)
LYMPHOCYTES # BLD AUTO: 8.8 % — LOW (ref 20–55)
MCHC RBC-ENTMCNC: 30.6 PG — SIGNIFICANT CHANGE UP (ref 27–31)
MCHC RBC-ENTMCNC: 33.4 G/DL — SIGNIFICANT CHANGE UP (ref 32–36)
MCV RBC AUTO: 91.6 FL — SIGNIFICANT CHANGE UP (ref 81–99)
MONOCYTES # BLD AUTO: 1 K/UL — HIGH (ref 0–0.8)
MONOCYTES NFR BLD AUTO: 12.8 % — HIGH (ref 3–10)
NEUTROPHILS # BLD AUTO: 6.3 K/UL — SIGNIFICANT CHANGE UP (ref 1.8–8)
NEUTROPHILS NFR BLD AUTO: 77.4 % — HIGH (ref 37–73)
PLATELET # BLD AUTO: 245 K/UL — SIGNIFICANT CHANGE UP (ref 150–400)
POTASSIUM SERPL-MCNC: 3.5 MMOL/L — SIGNIFICANT CHANGE UP (ref 3.5–5.3)
POTASSIUM SERPL-SCNC: 3.5 MMOL/L — SIGNIFICANT CHANGE UP (ref 3.5–5.3)
PROT SERPL-MCNC: 7.3 G/DL — SIGNIFICANT CHANGE UP (ref 6.6–8.7)
RBC # BLD: 4.28 M/UL — LOW (ref 4.4–5.2)
RBC # FLD: 12.9 % — SIGNIFICANT CHANGE UP (ref 11–15.6)
SODIUM SERPL-SCNC: 136 MMOL/L — SIGNIFICANT CHANGE UP (ref 135–145)
WBC # BLD: 8.2 K/UL — SIGNIFICANT CHANGE UP (ref 4.8–10.8)
WBC # FLD AUTO: 8.2 K/UL — SIGNIFICANT CHANGE UP (ref 4.8–10.8)

## 2018-12-29 PROCEDURE — 96374 THER/PROPH/DIAG INJ IV PUSH: CPT | Mod: XU

## 2018-12-29 PROCEDURE — 83690 ASSAY OF LIPASE: CPT

## 2018-12-29 PROCEDURE — 36415 COLL VENOUS BLD VENIPUNCTURE: CPT

## 2018-12-29 PROCEDURE — 99284 EMERGENCY DEPT VISIT MOD MDM: CPT

## 2018-12-29 PROCEDURE — 74177 CT ABD & PELVIS W/CONTRAST: CPT | Mod: 26

## 2018-12-29 PROCEDURE — 74177 CT ABD & PELVIS W/CONTRAST: CPT

## 2018-12-29 PROCEDURE — 80053 COMPREHEN METABOLIC PANEL: CPT

## 2018-12-29 PROCEDURE — 85027 COMPLETE CBC AUTOMATED: CPT

## 2018-12-29 PROCEDURE — 99284 EMERGENCY DEPT VISIT MOD MDM: CPT | Mod: 25

## 2018-12-29 RX ORDER — SODIUM CHLORIDE 9 MG/ML
1000 INJECTION INTRAMUSCULAR; INTRAVENOUS; SUBCUTANEOUS ONCE
Qty: 0 | Refills: 0 | Status: COMPLETED | OUTPATIENT
Start: 2018-12-29 | End: 2018-12-29

## 2018-12-29 RX ORDER — METRONIDAZOLE 500 MG
500 TABLET ORAL ONCE
Qty: 0 | Refills: 0 | Status: COMPLETED | OUTPATIENT
Start: 2018-12-29 | End: 2018-12-29

## 2018-12-29 RX ORDER — CIPROFLOXACIN LACTATE 400MG/40ML
500 VIAL (ML) INTRAVENOUS ONCE
Qty: 0 | Refills: 0 | Status: COMPLETED | OUTPATIENT
Start: 2018-12-29 | End: 2018-12-29

## 2018-12-29 RX ORDER — SODIUM CHLORIDE 9 MG/ML
3 INJECTION INTRAMUSCULAR; INTRAVENOUS; SUBCUTANEOUS ONCE
Qty: 0 | Refills: 0 | Status: COMPLETED | OUTPATIENT
Start: 2018-12-29 | End: 2018-12-29

## 2018-12-29 RX ORDER — KETOROLAC TROMETHAMINE 30 MG/ML
15 SYRINGE (ML) INJECTION ONCE
Qty: 0 | Refills: 0 | Status: DISCONTINUED | OUTPATIENT
Start: 2018-12-29 | End: 2018-12-29

## 2018-12-29 RX ADMIN — SODIUM CHLORIDE 3 MILLILITER(S): 9 INJECTION INTRAMUSCULAR; INTRAVENOUS; SUBCUTANEOUS at 14:50

## 2018-12-29 RX ADMIN — SODIUM CHLORIDE 4000 MILLILITER(S): 9 INJECTION INTRAMUSCULAR; INTRAVENOUS; SUBCUTANEOUS at 16:48

## 2018-12-29 RX ADMIN — Medication 500 MILLIGRAM(S): at 17:50

## 2018-12-29 RX ADMIN — Medication 15 MILLIGRAM(S): at 14:50

## 2018-12-29 NOTE — ED ADULT NURSE NOTE - NSIMPLEMENTINTERV_GEN_ALL_ED
Implemented All Universal Safety Interventions:  [place] to call system. Call bell, personal items and telephone within reach. Instruct patient to call for assistance. Room bathroom lighting operational. Non-slip footwear when patient is off stretcher. Physically safe environment: no spills, clutter or unnecessary equipment. Stretcher in lowest position, wheels locked, appropriate side rails in place.

## 2018-12-29 NOTE — ED PROVIDER NOTE - MEDICAL DECISION MAKING DETAILS
patient with parkinsons and diarrhea x3 weeks with abd pain, will r/o colitis. nontoxic appearing, abd not surgical. will check labs. CT. reasses

## 2018-12-29 NOTE — ED STATDOCS - PROGRESS NOTE DETAILS
79 y/o F pt presents to ED c/o lower ABD pain x 1 week. She reports lower ABD pain x 1 week and has not had a BM in last 3 weeks. She presented to her PMD today and was told to present to ED.  Denies fever, chills, nausea, vomiting. Reports decreased appetite. On exam, swelling noted to left inguinal area and tenderness to RLQ. Rectal Exam shows no stool impaction. Will be transferred to [facility] for further evaluation by another.

## 2018-12-29 NOTE — ED PROVIDER NOTE - PROGRESS NOTE DETAILS
+colitis, will give cipro/flagyl will need GI Follow up for chronic thickening and chronic nature -Dar PIMENTEL

## 2018-12-29 NOTE — ED ADULT TRIAGE NOTE - CHIEF COMPLAINT QUOTE
Patient arrived to ED today with c/o abdominal pains.  Patient states she has not had a BM in the past three weeks.

## 2018-12-29 NOTE — ED ADULT NURSE NOTE - OBJECTIVE STATEMENT
pt awake, alert and oriented x3 c/o "bump" to left groin and constipation.  pt states she has not had a bowel movement in "weeks".  No obvious bump to left groin.  Pt states she has generalized abdominal pain associated with constipation.  Went to pcp today and was referred to ED.  Abdomen soft, nontender, nondistended.  Respirations even and unlabored.  Moving all extremities well and with purpose.   Skin warm and dry.

## 2018-12-29 NOTE — ED PROVIDER NOTE - PHYSICAL EXAMINATION
Gen: NAD, AOx3  Head: NCAT  HEENT: EOMI, oral mucosa moist, normal conjunctiva, neck supple  Lung: CTAB, no respiratory distress  CV: rrr, no murmur, Normal perfusion  Abd: soft, +LLQ and suprapubic ttp, ND  MSK: No edema, no visible deformities  Neuro: No focal neurologic deficits  Skin: No rash   Psych: flat affect

## 2018-12-29 NOTE — ED PROVIDER NOTE - PLAN OF CARE
1. Return to ED for worsening, progressive or any other concerning symptoms   2. Follow up with your primary care doctor in 2-3days   3. Take 500mg of Ciprofloxacin twice a day for 10days. Do not participate in any high impact activities while on this medication   4. Take 500mg of Metronidazole three times a day for 10days. Do not drink alcohol while on this medication   5. Follow up with Dr. Khan from gastroenterology

## 2018-12-29 NOTE — ED PROVIDER NOTE - NS ED ROS FT
ROS: no CP/SOB. no cough. no fever. no n/v/c. +abd pain. no rash. no bleeding. no urinary complaints. no weakness. no vision changes. no HA. no neck/back pain. no extremity swelling/deformity. No change in mental status.

## 2018-12-29 NOTE — ED PROVIDER NOTE - CARE PLAN
Principal Discharge DX:	Colitis  Assessment and plan of treatment:	1. Return to ED for worsening, progressive or any other concerning symptoms   2. Follow up with your primary care doctor in 2-3days   3. Take 500mg of Ciprofloxacin twice a day for 10days. Do not participate in any high impact activities while on this medication   4. Take 500mg of Metronidazole three times a day for 10days. Do not drink alcohol while on this medication   5. Follow up with Dr. Khan from gastroenterology

## 2018-12-29 NOTE — ED PROVIDER NOTE - OBJECTIVE STATEMENT
77yo F with lower abd pain and loose stools for last 3 weeks, nonbloody. no dizziness/syncope. no CP/SOB. pain intermittent but worsening. no fever/chills. saw PCP who sent to ED for CT. no recent travel. was on amox but after diarrhea started. no urinary sx. no change in medications or mental status

## 2018-12-30 RX ORDER — METRONIDAZOLE 500 MG
1 TABLET ORAL
Qty: 30 | Refills: 0
Start: 2018-12-30 | End: 2019-01-08

## 2018-12-30 RX ORDER — MOXIFLOXACIN HYDROCHLORIDE TABLETS, 400 MG 400 MG/1
1 TABLET, FILM COATED ORAL
Qty: 20 | Refills: 0
Start: 2018-12-30 | End: 2019-01-08

## 2019-05-15 ENCOUNTER — OUTPATIENT (OUTPATIENT)
Dept: OUTPATIENT SERVICES | Facility: HOSPITAL | Age: 79
LOS: 1 days | End: 2019-05-15
Payer: MEDICARE

## 2019-05-15 ENCOUNTER — APPOINTMENT (OUTPATIENT)
Dept: MRI IMAGING | Facility: CLINIC | Age: 79
End: 2019-05-15
Payer: MEDICARE

## 2019-05-15 DIAGNOSIS — Z00.8 ENCOUNTER FOR OTHER GENERAL EXAMINATION: ICD-10-CM

## 2019-05-15 PROCEDURE — 70551 MRI BRAIN STEM W/O DYE: CPT | Mod: 26

## 2019-05-15 PROCEDURE — 70551 MRI BRAIN STEM W/O DYE: CPT

## 2019-05-27 NOTE — ED STATDOCS - NSCAREINITIATED _GEN_ER
History     Chief Complaint   Patient presents with     Fall     fell down one stair today outside at [employer]. Landed on right side. Complaining of lower rib pain     HPI  Zack Pack is a 38 year old male who presents with complaints of right-sided back and rib as well as right flank pain since he fell just prior to arrival.  Patient states he slipped on a step on the deck and fell down approximately 2 steps, landing against the stairs with his right side.  Patient denies any head trauma or LOC.  He states the wind was knocked out of him.  He has had progressively worsening right posterior lower rib and right flank pain since the fall.  He has been breathing shallower.  Patient states his pain is radiating into his right lower abdomen.  Denies nausea, vomiting, chest pain, shortness of breath, or urinary symptoms.  He has not urinated since the fall to assess for hematuria.  Patient does not take blood thinners.      Allergies:  No Known Allergies    Problem List:    There are no active problems to display for this patient.       Past Medical History:    No past medical history on file.    Past Surgical History:    No past surgical history on file.    Family History:    No family history on file.    Social History:  Marital Status:   [2]  Social History     Tobacco Use     Smoking status: Not on file   Substance Use Topics     Alcohol use: Not on file     Drug use: Not on file        Medications:      oxyCODONE-acetaminophen (PERCOCET) 5-325 MG tablet         Review of Systems   Constitutional: Negative.    Respiratory: Negative.    Cardiovascular:        Right-sided rib pain   Gastrointestinal: Positive for abdominal pain.   Genitourinary: Positive for flank pain.   Musculoskeletal: Positive for back pain.   Skin: Negative.    All other systems reviewed and are negative.      Physical Exam   BP: (!) 157/105  Pulse: 80  Temp: 98  F (36.7  C)  Resp: 18  Weight: 90.7 kg (200 lb)  SpO2: 98  %      Physical Exam   Constitutional: He is oriented to person, place, and time. He appears well-developed and well-nourished. No distress.   HENT:   Head: Normocephalic and atraumatic.   Eyes: Pupils are equal, round, and reactive to light. Conjunctivae and EOM are normal.   Neck: Normal range of motion. Neck supple.   Cardiovascular: Normal rate, regular rhythm and normal heart sounds.   Pulmonary/Chest: Effort normal and breath sounds normal. No stridor. No respiratory distress. He has no decreased breath sounds. He has no wheezes. He has no rhonchi. He has no rales. He exhibits tenderness and bony tenderness. He exhibits no edema, no deformity, no swelling and no retraction.   Tenderness along right posterior inferior ribs.  No overlying skin changes or ecchymosis.   Abdominal: Soft. He exhibits no distension. There is tenderness. There is no rigidity, no rebound and no guarding.   Musculoskeletal: Normal range of motion.        Cervical back: Normal. He exhibits normal range of motion, no tenderness and no bony tenderness.        Thoracic back: Normal. He exhibits normal range of motion, no tenderness and no bony tenderness.        Lumbar back: He exhibits tenderness. He exhibits normal range of motion and no bony tenderness.   Tenderness overlying right paraspinal muscles in the lumbar spine region.  No midline vertebral tenderness.   Neurological: He is alert and oriented to person, place, and time.   Skin: Skin is warm and dry.       ED Course        Procedures    Results for orders placed or performed during the hospital encounter of 05/27/19 (from the past 24 hour(s))   CT Abdomen Pelvis w Contrast    Narrative    CT ABDOMEN PELVIS W CONTRAST 5/27/2019 5:03 PM     HISTORY: fall, blunt trauma to right back and side, pain wraps around  to right flank, tenderness to right posterior inferior ribs and right  flank    CONTRAST DOSE:  97 mL Isovue-370    Radiation dose for this scan was reduced using automated  Donavon Gar(Attending) exposure  control, adjustment of the mA and/or kV according to patient size, or  iterative reconstruction technique.    FINDINGS:  The liver, spleen, adrenal glands, kidneys, and pancreas  appear within normal limits. The gallbladder appears to be contracted  but otherwise unremarkable. There is no evidence of bowel obstruction  or pericolonic inflammatory stranding. No free peritoneal fluid or  air. Pelvic contents appear within normal limits.      Impression    IMPRESSION: No evidence of abdominal internal organ injury. Lower  right ribs appear intact.    LINSEY ESPITIA MD       Medications   iopamidol (ISOVUE-370) solution 97 mL (97 mLs Intravenous Given 5/27/19 1653)   sodium chloride 0.9 % bag 500mL for CT scan flush use (75 mLs As instructed Given 5/27/19 1653)       Assessments & Plan (with Medical Decision Making)     Pt is a 38 year old male who presents with complaints of right-sided back and rib as well as right flank pain since he fell just prior to arrival.  Patient states he slipped on a step on the deck and fell down approximately 2 steps, landing against the stairs with his right side.  Patient denies any head trauma or LOC.  He states the wind was knocked out of him.  He has had progressively worsening right posterior lower rib and right flank pain since the fall.  He has been breathing shallower.  Patient states his pain is radiating into his right lower abdomen.  Pt is afebrile on arrival.  Exam as above.  CT of the abdomen and pelvis was obtained given patient's right posterior inferior rib tenderness along with tenderness across his right flank and back region.  Patient is also describing pain radiating into his right abdomen.  The CT scan was negative for intra-abdominal injury.  Lower right ribs appear intact.  Discussed results with patient.  Encouraged symptomatic treatments at home.  Return precautions were reviewed.  Hand-outs were provided.    Patient was sent with Percocet and was  instructed to follow-up with PCP if no improvement in 5-7 days for continued care and management or sooner if new or worsening symptoms.  He is to return to the ED for persistent and/or worsening symptoms.  Patient expressed understanding of the diagnosis and plan and was discharged home in good condition.    I have reviewed the nursing notes.    I have reviewed the findings, diagnosis, plan and need for follow up with the patient.       Medication List      Started    oxyCODONE-acetaminophen 5-325 MG tablet  Commonly known as:  PERCOCET  1-2 tablets, Oral, EVERY 4 HOURS PRN            Final diagnoses:   Fall, initial encounter   Rib pain on right side       5/27/2019   [facility] EMERGENCY DEPARTMENT      Disclaimer:  This note consists of symbols derived from keyboarding, dictation and/or voice recognition software.  As a result, there may be errors in the script that have gone undetected.  Please consider this when interpreting information found in this chart.     Renita Mills PA-C  05/27/19 1942

## 2019-06-03 ENCOUNTER — INPATIENT (INPATIENT)
Facility: HOSPITAL | Age: 79
LOS: 1 days | Discharge: HOME | DRG: 287 | End: 2019-06-05
Attending: INTERNAL MEDICINE | Admitting: INTERNAL MEDICINE
Payer: MEDICARE

## 2019-06-03 VITALS
WEIGHT: 130.07 LBS | HEART RATE: 58 BPM | HEIGHT: 66 IN | RESPIRATION RATE: 22 BRPM | SYSTOLIC BLOOD PRESSURE: 111 MMHG | DIASTOLIC BLOOD PRESSURE: 66 MMHG | OXYGEN SATURATION: 100 % | TEMPERATURE: 97 F

## 2019-06-03 DIAGNOSIS — R55 SYNCOPE AND COLLAPSE: ICD-10-CM

## 2019-06-03 DIAGNOSIS — R07.9 CHEST PAIN, UNSPECIFIED: ICD-10-CM

## 2019-06-03 LAB
ALBUMIN SERPL ELPH-MCNC: 3.8 G/DL — SIGNIFICANT CHANGE UP (ref 3.3–5.2)
ALP SERPL-CCNC: 122 U/L — HIGH (ref 40–120)
ALT FLD-CCNC: 44 U/L — HIGH
ANION GAP SERPL CALC-SCNC: 9 MMOL/L — SIGNIFICANT CHANGE UP (ref 5–17)
APPEARANCE UR: CLEAR — SIGNIFICANT CHANGE UP
AST SERPL-CCNC: 35 U/L — HIGH
BASOPHILS # BLD AUTO: 0 K/UL — SIGNIFICANT CHANGE UP (ref 0–0.2)
BASOPHILS NFR BLD AUTO: 0.8 % — SIGNIFICANT CHANGE UP (ref 0–2)
BILIRUB SERPL-MCNC: 0.2 MG/DL — LOW (ref 0.4–2)
BILIRUB UR-MCNC: NEGATIVE — SIGNIFICANT CHANGE UP
BUN SERPL-MCNC: 27 MG/DL — HIGH (ref 8–20)
CALCIUM SERPL-MCNC: 9.4 MG/DL — SIGNIFICANT CHANGE UP (ref 8.6–10.2)
CHLORIDE SERPL-SCNC: 107 MMOL/L — SIGNIFICANT CHANGE UP (ref 98–107)
CO2 SERPL-SCNC: 25 MMOL/L — SIGNIFICANT CHANGE UP (ref 22–29)
COLOR SPEC: YELLOW — SIGNIFICANT CHANGE UP
CREAT SERPL-MCNC: 0.92 MG/DL — SIGNIFICANT CHANGE UP (ref 0.5–1.3)
DIFF PNL FLD: ABNORMAL
EOSINOPHIL # BLD AUTO: 0 K/UL — SIGNIFICANT CHANGE UP (ref 0–0.5)
EOSINOPHIL NFR BLD AUTO: 0 % — SIGNIFICANT CHANGE UP (ref 0–6)
EPI CELLS # UR: SIGNIFICANT CHANGE UP
GLUCOSE SERPL-MCNC: 92 MG/DL — SIGNIFICANT CHANGE UP (ref 70–115)
GLUCOSE UR QL: NEGATIVE MG/DL — SIGNIFICANT CHANGE UP
HCT VFR BLD CALC: 36.2 % — LOW (ref 37–47)
HGB BLD-MCNC: 11.5 G/DL — LOW (ref 12–16)
KETONES UR-MCNC: NEGATIVE — SIGNIFICANT CHANGE UP
LEUKOCYTE ESTERASE UR-ACNC: ABNORMAL
LYMPHOCYTES # BLD AUTO: 1 K/UL — SIGNIFICANT CHANGE UP (ref 1–4.8)
LYMPHOCYTES # BLD AUTO: 28 % — SIGNIFICANT CHANGE UP (ref 20–55)
MCHC RBC-ENTMCNC: 30.3 PG — SIGNIFICANT CHANGE UP (ref 27–31)
MCHC RBC-ENTMCNC: 31.8 G/DL — LOW (ref 32–36)
MCV RBC AUTO: 95.5 FL — SIGNIFICANT CHANGE UP (ref 81–99)
MONOCYTES # BLD AUTO: 0.4 K/UL — SIGNIFICANT CHANGE UP (ref 0–0.8)
MONOCYTES NFR BLD AUTO: 9.4 % — SIGNIFICANT CHANGE UP (ref 3–10)
NEUTROPHILS # BLD AUTO: 2.3 K/UL — SIGNIFICANT CHANGE UP (ref 1.8–8)
NEUTROPHILS NFR BLD AUTO: 61.5 % — SIGNIFICANT CHANGE UP (ref 37–73)
NITRITE UR-MCNC: NEGATIVE — SIGNIFICANT CHANGE UP
PH UR: 5 — SIGNIFICANT CHANGE UP (ref 5–8)
PLATELET # BLD AUTO: 178 K/UL — SIGNIFICANT CHANGE UP (ref 150–400)
POTASSIUM SERPL-MCNC: 4.3 MMOL/L — SIGNIFICANT CHANGE UP (ref 3.5–5.3)
POTASSIUM SERPL-SCNC: 4.3 MMOL/L — SIGNIFICANT CHANGE UP (ref 3.5–5.3)
PROT SERPL-MCNC: 6.6 G/DL — SIGNIFICANT CHANGE UP (ref 6.6–8.7)
PROT UR-MCNC: 30 MG/DL
RBC # BLD: 3.79 M/UL — LOW (ref 4.4–5.2)
RBC # FLD: 12.9 % — SIGNIFICANT CHANGE UP (ref 11–15.6)
RBC CASTS # UR COMP ASSIST: SIGNIFICANT CHANGE UP /HPF (ref 0–4)
SODIUM SERPL-SCNC: 141 MMOL/L — SIGNIFICANT CHANGE UP (ref 135–145)
SP GR SPEC: 1.02 — SIGNIFICANT CHANGE UP (ref 1.01–1.02)
TROPONIN T SERPL-MCNC: <0.01 NG/ML — SIGNIFICANT CHANGE UP (ref 0–0.06)
TSH SERPL-MCNC: 1.83 UIU/ML — SIGNIFICANT CHANGE UP (ref 0.27–4.2)
UROBILINOGEN FLD QL: NEGATIVE MG/DL — SIGNIFICANT CHANGE UP
WBC # BLD: 3.7 K/UL — LOW (ref 4.8–10.8)
WBC # FLD AUTO: 3.7 K/UL — LOW (ref 4.8–10.8)
WBC UR QL: SIGNIFICANT CHANGE UP

## 2019-06-03 PROCEDURE — 71045 X-RAY EXAM CHEST 1 VIEW: CPT | Mod: 26

## 2019-06-03 PROCEDURE — 99285 EMERGENCY DEPT VISIT HI MDM: CPT

## 2019-06-03 PROCEDURE — 99223 1ST HOSP IP/OBS HIGH 75: CPT

## 2019-06-03 RX ORDER — LOSARTAN POTASSIUM 100 MG/1
50 TABLET, FILM COATED ORAL DAILY
Refills: 0 | Status: DISCONTINUED | OUTPATIENT
Start: 2019-06-03 | End: 2019-06-05

## 2019-06-03 RX ORDER — ENOXAPARIN SODIUM 100 MG/ML
40 INJECTION SUBCUTANEOUS EVERY 24 HOURS
Refills: 0 | Status: DISCONTINUED | OUTPATIENT
Start: 2019-06-03 | End: 2019-06-04

## 2019-06-03 RX ORDER — LOSARTAN POTASSIUM 100 MG/1
1 TABLET, FILM COATED ORAL
Qty: 0 | Refills: 0 | DISCHARGE

## 2019-06-03 RX ORDER — LAMOTRIGINE 25 MG/1
50 TABLET, ORALLY DISINTEGRATING ORAL
Refills: 0 | Status: DISCONTINUED | OUTPATIENT
Start: 2019-06-03 | End: 2019-06-05

## 2019-06-03 RX ORDER — ESCITALOPRAM OXALATE 10 MG/1
10 TABLET, FILM COATED ORAL DAILY
Refills: 0 | Status: DISCONTINUED | OUTPATIENT
Start: 2019-06-03 | End: 2019-06-05

## 2019-06-03 RX ORDER — LAMOTRIGINE 25 MG/1
50 TABLET, ORALLY DISINTEGRATING ORAL
Refills: 0 | Status: DISCONTINUED | OUTPATIENT
Start: 2019-06-03 | End: 2019-06-03

## 2019-06-03 RX ORDER — NITROGLYCERIN 6.5 MG
1 CAPSULE, EXTENDED RELEASE ORAL ONCE
Refills: 0 | Status: COMPLETED | OUTPATIENT
Start: 2019-06-03 | End: 2019-06-03

## 2019-06-03 RX ORDER — FOLIC ACID 0.8 MG
1 TABLET ORAL DAILY
Refills: 0 | Status: DISCONTINUED | OUTPATIENT
Start: 2019-06-03 | End: 2019-06-05

## 2019-06-03 RX ORDER — PANTOPRAZOLE SODIUM 20 MG/1
40 TABLET, DELAYED RELEASE ORAL
Refills: 0 | Status: DISCONTINUED | OUTPATIENT
Start: 2019-06-03 | End: 2019-06-05

## 2019-06-03 RX ORDER — ASPIRIN/CALCIUM CARB/MAGNESIUM 324 MG
325 TABLET ORAL ONCE
Refills: 0 | Status: COMPLETED | OUTPATIENT
Start: 2019-06-03 | End: 2019-06-03

## 2019-06-03 RX ORDER — GABAPENTIN 400 MG/1
300 CAPSULE ORAL THREE TIMES A DAY
Refills: 0 | Status: DISCONTINUED | OUTPATIENT
Start: 2019-06-03 | End: 2019-06-05

## 2019-06-03 RX ADMIN — ENOXAPARIN SODIUM 40 MILLIGRAM(S): 100 INJECTION SUBCUTANEOUS at 21:31

## 2019-06-03 RX ADMIN — GABAPENTIN 300 MILLIGRAM(S): 400 CAPSULE ORAL at 21:31

## 2019-06-03 RX ADMIN — Medication 325 MILLIGRAM(S): at 16:21

## 2019-06-03 NOTE — ED PROVIDER NOTE - ENMT, MLM
Eladia Killian  1974      [facility]  Dr Tony Ponce/Kasi Escalante PA-C  Bjrfs-[phone]    Surgery: RIGHT KNEE ARTHROSCOPY, PARTIAL LATERAL MENISCECTOMY  Date of Surgery: 1-29-18    The time of your surgery is determined by the hospital surgery team. It is based on need. The hospital will call you prior to surgery and ask you questions about your medical health.  Be prepared to receive this call. Please call the surgery pre admission department at [phone] between 10 AM- 5 PM the day before your scheduled surgery to confirm your surgery and check times.  Please be aware the times are subject to change. Be available to arrive earlier or later. Call Friday if you surgery is on a  Monday.  Arrive at the Hospital Main Entrance ([place]). Please bring with you a photo ID and your insurance card.       SURGERY INSTRUCTIONS:    1.  A preoperative exam is required (within 30 days of your surgery) from your family doctor for all surgery. He/She determines if you are healthy for surgery. While we send your doctor information about the surgery, it is your responsibility to call and schedule your preoperative exam.    2.   Preoperative Skin Preparation-  The night before surgery AND the day of surgery, please shower using the Hibiclens/Chlorhexidine Soap given to you.  If you plan to wash your hair DO THIS FIRST and then proceed to use the Hibiclens Soap. This is to be used from the neck down to your feet.  Use a clean wash cloth and fresh towel with each shower.  Rinse Well!  Do NOT use any powders, lotions, perfumes or deodorant once you take the first shower.  Wear clean clothing after each application.      3.  Physical Therapy or Occupational Therapy, if needed, will be determined by your surgeon after surgery.    As a reminder, DO NOT DRIVE IF YOU ARE TAKING NARCOTIC PAIN MEDICATIONS.  Please make arrangements for family/friends/other supports to provide transportation to therapy, follow  up medical appointments.     4.  Stop taking aspirin or anti-inflammatory drugs such as Advil, Aleve, Ibuprofen, Naproxen, Motrin, Meloxicam, Voltaran, Celebrex one week before surgery. Tylenol is acceptable.     5. If you take other blood thinning medications, such as Plavix, Effient, Warfarin, Coumadin, Pradaxa, Xarelto, Eliquis, etc you will need to get specific directions from your provider on when to stop these.     6.  If you have diabetes and use insulin or take heart or blood pressure medicines, please get directions from your primary care provider or specialist to know how to take these medicines before surgery and on the day of surgery.     7.  DO NOT EAT OR DRINK ANYTHING AS OF MIDNIGHT THE NIGHT BEFORE YOUR SURGERY! We do not make exceptions to this rule due to the possibility of last-minute schedule changes.  If you are instructed to take medicine after midnight do so with only a small sip of water.    8.  No smoking the day before surgery. Remove nail polish, jewelry, contact lenses and leave valuables at home. Wear clothing to accommodate postoperative swelling and bandages.    9.  Bring crutches/walker/cane to the hospital if instructed to do so.    10.  Make a 2 week postoperative appointment with your provider following surgery. This can be done at our front reception desk or by calling [phone]. My appointment should be made with Dr Ponce or Kasi Escalante PA-C.  Also please schedule a postoperative visit with your primary care physician for a postoperative visit about 7-10 days after your surgery.    11.  Your surgery is outpatient.  Due to anesthesia, after surgery you may not drive home. Plan to have someone accompany you on your surgery day so that he/she can take you home.  Most outpatient surgeries (from preoperative to discharge) are approximately 4 hours.    12. We have an outpatient pharmacy on-site and can arrange to have your discharge prescription filled before you leave, to save you  a trip. You may want to bring enough money to cover the cost of this prescription.     ----------------------------------------------------------------------------------------------------------------------    [facility] Managed Care department will seek insurance approval from your insurance company for your surgery.    [facility] DOES NOT seek approval for Worker's Comp, Accident or Motor Vehicle claims or Third Party Insurance. This is YOUR RESPONSIBILITY to seek approval and obtain written authorization prior to surgery.    If you or a caregiver will be using FMLA/Short Term Disability and require form completion, please complete your portion of the paperwork and complete a Release of Information (available from the reception staff if your employer does not provide one).  Please allow adequate time for processing of this paperwork.    Should you become ill prior to surgery (fever, cold, flu, urinary tract infection, tooth infection or another other active illness) please call your surgeon's office ([phone]) and ask to speak with a nurse.  Your surgery may need to be cancelled for your safety and best outcome.    If you have a special need, please discuss this with the surgery department at [phone].           Surgery Cancellation Instructions    In the event that you must cancel your surgery, please follow the instructions below:    Monday through Friday 8 AM to 4 PM: Please contact your surgeon's office at   Dr PoncePydwm-[phone]  Dr Mcdaniels- [phone]   to inform them of your need to cancel your surgery.  Please make sure you speak directly with someone with the surgeon's office. Do Not Leave a Message.    If unable to connect with an individual, please call the [facility] Surgery Scheduler at [phone].    Monday through Friday after 4 PM: Please call the [facility] OR Supervisor at [phone].    Saturdays and Sundays: Please call the [facility] OR Supervisor at  [phone].             Airway patent, Nasal mucosa clear. Mouth with normal mucosa. Throat has no vesicles, no oropharyngeal exudates and uvula is midline.

## 2019-06-03 NOTE — H&P ADULT - HISTORY OF PRESENT ILLNESS
Pt presents with c/o 1 episode of chest pain, central, non radiating, sudden onset, no exacerbating or relieving factors, spontaneously resolved in < 5 mins. no diaphoresis/ n/v/sob. but she felt very weak generally and felt she might pass out during the episode. but did not pass out. no LOC. today she went to PMD and was advised to come to ED.     SH- denies habits  FH- father- nto alive- cancer

## 2019-06-03 NOTE — H&P ADULT - NSHPPHYSICALEXAM_GEN_ALL_CORE
Vital Signs Last 24 Hrs  T(C): 36.3 (06-03-19 @ 13:10), Max: 36.3 (06-03-19 @ 13:10)  T(F): 97.4 (06-03-19 @ 13:10), Max: 97.4 (06-03-19 @ 13:10)  HR: 48 (06-03-19 @ 15:00) (48 - 58)  BP: 135/64 (06-03-19 @ 15:00) (111/66 - 135/64)  BP(mean): --  RR: 20 (06-03-19 @ 15:00) (20 - 22)  SpO2: 98% (06-03-19 @ 15:00) (98% - 100%)  GENERAL: NAD, thin fragile, baseline tremors  HEENT: PERRL, +EOMI  NECK: soft, Supple, No JVD,   CHEST/LUNG: Clear to auscultation bilaterally; No wheezing  HEART: S1S2+, Regular rate and rhythm; No murmurs, rubs, or gallops  ABDOMEN: Soft, Nontender, Nondistended; Bowel sounds present  EXTREMITIES:  2+ Peripheral Pulses, No clubbing, cyanosis, or edema  SKIN: No rashes or lesions  NEURO: AAOX3, no focal deficits, no motor r sensory loss  PSYCH: normal mood

## 2019-06-03 NOTE — ED ADULT NURSE REASSESSMENT NOTE - NS ED NURSE REASSESS COMMENT FT1
received pt at this time from ongoing shift. pt a&ox3, denies any pain/discomfort. resp spontaneous even and unlabored, lungs clear. skin warm and dry, color appropraite for race. no BLE edema. sinus sulma on cardiac monitor. pending admission, UA and repeat trop at 2300. updated on plan of care, verbalize understanding. call bell in reach

## 2019-06-03 NOTE — ED STATDOCS - PROGRESS NOTE DETAILS
79 y/o F pt with PMHx of HLD and HTN presents to ED c/o syncopal episode yesterday. Pt reports yesterday while at a store, she had sudden chest pain, began to sweat, and had a syncopal episode. Witnessed by . Notes the chest pain lasted about 5 minutes before the episode, and when she awoke from passing out, the chest pain subsided. Described as "pressure." Pt was unaware she synopsized. Denies hx of syncopal episode. no exertional sx. She went to PMD this morning, and was referred to ED for further eval. Denies current pain today.   PMD: Dr. Mitchell  Cardio: Dr. Grajeda  On exam: heart regular, lungs regular. 77 y/o F pt with PMHx of HLD and HTN presents to ED c/o syncopal episode yesterday. Pt reports yesterday while at a store, she had sudden chest pain, began to sweat, and had a syncopal episode. Witnessed by . Notes the chest pain lasted about 5 minutes before the episode, and when she awoke from passing out, the chest pain subsided. Described as "pressure." Pt was unaware she synopsized. Denies hx of syncopal episode. no exertional sx. She went to PMD this morning, and was referred to ED for further eval. Denies current pain today.   PMD: Dr. Blake  Cardio: Dr. Moreno  On exam: heart regular, lungs regular.

## 2019-06-03 NOTE — ED PROVIDER NOTE - CLINICAL SUMMARY MEDICAL DECISION MAKING FREE TEXT BOX
The patient presents with chest pain described as pressure concerning for ACS and will admit for further evaluation Patient presenting with Creatinine 1.25, GFR 44 on admission (CKD stage 3); unknown baseline.   -Creatinine 1.10 today; continue to trend.

## 2019-06-03 NOTE — ED ADULT NURSE NOTE - OBJECTIVE STATEMENT
LAte entry : Pt states she went yesterday to a family gathering and she passes out, she was able to lower herself to the ground. Before that she did experienced sharp pain in  her chest and then she collapsed, pt did not come to the hospital but went to the primary MD today who send her here today for further eval. Pt denies any chest pain at present

## 2019-06-03 NOTE — CONSULT NOTE ADULT - PROBLEM SELECTOR RECOMMENDATION 2
preceded by chest pain, suggesting high risk event (not vasovagal)  TTE ordered. Continuous cardiac monitoring.   Sinus bradycardia appears mild and is likely unrelated. TSH is normal.

## 2019-06-03 NOTE — H&P ADULT - ASSESSMENT
1. Chest pain  - HEART score of 0-3. D/W with ED attending to keep pt in CDU until evaluated by cardiology and further interventional plans made. But ED attending insisting pt be admitted.   - EKG nonacute; Trop x 1 neg.  - trend troponin. cardiac monitor. cardiology consult. ECHO    2. HTN, Bipolar disorder  - stable  - cont home meds    Lovenox

## 2019-06-03 NOTE — ED ADULT NURSE NOTE - NSIMPLEMENTINTERV_GEN_ALL_ED
Implemented All Universal Safety Interventions:  [place] to call system. Call bell, personal items and telephone within reach. Instruct patient to call for assistance. Room bathroom lighting operational. Non-slip footwear when patient is off stretcher. Physically safe environment: no spills, clutter or unnecessary equipment. Stretcher in lowest position, wheels locked, appropriate side rails in place.

## 2019-06-03 NOTE — H&P ADULT - NSICDXFAMILYHX_GEN_ALL_CORE_FT
FAMILY HISTORY:  Father  Still living? No  Family history of cancer in father, Age at diagnosis: Age Unknown

## 2019-06-03 NOTE — ED PROVIDER NOTE - CARE PLAN
Principal Discharge DX:	Chest pain with high risk of acute coronary syndrome  Secondary Diagnosis:	Syncope and collapse

## 2019-06-03 NOTE — ED ADULT TRIAGE NOTE - CHIEF COMPLAINT QUOTE
Chest pain episode last night, reports weakness and mild SOB, seen by PMD this AM and advised to come to ER.

## 2019-06-03 NOTE — H&P ADULT - NSICDXPASTMEDICALHX_GEN_ALL_CORE_FT
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

## 2019-06-03 NOTE — ED PROVIDER NOTE - OBJECTIVE STATEMENT
The patient presents with chest pain described as pressure lasting for 5 minutes and also with SOB and diaphoresis.  The patient also had syncopal episode.

## 2019-06-04 ENCOUNTER — TRANSCRIPTION ENCOUNTER (OUTPATIENT)
Age: 79
End: 2019-06-04

## 2019-06-04 LAB — TROPONIN T SERPL-MCNC: <0.01 NG/ML — SIGNIFICANT CHANGE UP (ref 0–0.06)

## 2019-06-04 PROCEDURE — 99232 SBSQ HOSP IP/OBS MODERATE 35: CPT

## 2019-06-04 PROCEDURE — 93010 ELECTROCARDIOGRAM REPORT: CPT

## 2019-06-04 RX ORDER — ACETAMINOPHEN 500 MG
650 TABLET ORAL ONCE
Refills: 0 | Status: COMPLETED | OUTPATIENT
Start: 2019-06-04 | End: 2019-06-04

## 2019-06-04 RX ORDER — SODIUM CHLORIDE 9 MG/ML
3 INJECTION INTRAMUSCULAR; INTRAVENOUS; SUBCUTANEOUS EVERY 8 HOURS
Refills: 0 | Status: DISCONTINUED | OUTPATIENT
Start: 2019-06-04 | End: 2019-06-05

## 2019-06-04 RX ORDER — OXYCODONE AND ACETAMINOPHEN 5; 325 MG/1; MG/1
1 TABLET ORAL ONCE
Refills: 0 | Status: DISCONTINUED | OUTPATIENT
Start: 2019-06-04 | End: 2019-06-04

## 2019-06-04 RX ORDER — METOCLOPRAMIDE HCL 10 MG
10 TABLET ORAL ONCE
Refills: 0 | Status: COMPLETED | OUTPATIENT
Start: 2019-06-04 | End: 2019-06-04

## 2019-06-04 RX ORDER — ASPIRIN/CALCIUM CARB/MAGNESIUM 324 MG
81 TABLET ORAL ONCE
Refills: 0 | Status: COMPLETED | OUTPATIENT
Start: 2019-06-04 | End: 2019-06-04

## 2019-06-04 RX ORDER — ASPIRIN/CALCIUM CARB/MAGNESIUM 324 MG
81 TABLET ORAL DAILY
Refills: 0 | Status: DISCONTINUED | OUTPATIENT
Start: 2019-06-05 | End: 2019-06-05

## 2019-06-04 RX ORDER — ALPRAZOLAM 0.25 MG
0.5 TABLET ORAL ONCE
Refills: 0 | Status: DISCONTINUED | OUTPATIENT
Start: 2019-06-04 | End: 2019-06-04

## 2019-06-04 RX ORDER — SODIUM CHLORIDE 9 MG/ML
1000 INJECTION INTRAMUSCULAR; INTRAVENOUS; SUBCUTANEOUS
Refills: 0 | Status: DISCONTINUED | OUTPATIENT
Start: 2019-06-04 | End: 2019-06-04

## 2019-06-04 RX ADMIN — ESCITALOPRAM OXALATE 10 MILLIGRAM(S): 10 TABLET, FILM COATED ORAL at 10:38

## 2019-06-04 RX ADMIN — LAMOTRIGINE 50 MILLIGRAM(S): 25 TABLET, ORALLY DISINTEGRATING ORAL at 04:16

## 2019-06-04 RX ADMIN — OXYCODONE AND ACETAMINOPHEN 1 TABLET(S): 5; 325 TABLET ORAL at 20:27

## 2019-06-04 RX ADMIN — Medication 650 MILLIGRAM(S): at 04:15

## 2019-06-04 RX ADMIN — OXYCODONE AND ACETAMINOPHEN 1 TABLET(S): 5; 325 TABLET ORAL at 20:57

## 2019-06-04 RX ADMIN — Medication 1 MILLIGRAM(S): at 10:37

## 2019-06-04 RX ADMIN — Medication 10 MILLIGRAM(S): at 22:02

## 2019-06-04 RX ADMIN — LOSARTAN POTASSIUM 50 MILLIGRAM(S): 100 TABLET, FILM COATED ORAL at 04:16

## 2019-06-04 RX ADMIN — OXYCODONE AND ACETAMINOPHEN 1 TABLET(S): 5; 325 TABLET ORAL at 11:15

## 2019-06-04 RX ADMIN — PANTOPRAZOLE SODIUM 40 MILLIGRAM(S): 20 TABLET, DELAYED RELEASE ORAL at 09:10

## 2019-06-04 RX ADMIN — GABAPENTIN 300 MILLIGRAM(S): 400 CAPSULE ORAL at 23:51

## 2019-06-04 RX ADMIN — SODIUM CHLORIDE 3 MILLILITER(S): 9 INJECTION INTRAMUSCULAR; INTRAVENOUS; SUBCUTANEOUS at 21:59

## 2019-06-04 RX ADMIN — SODIUM CHLORIDE 50 MILLILITER(S): 9 INJECTION INTRAMUSCULAR; INTRAVENOUS; SUBCUTANEOUS at 14:46

## 2019-06-04 RX ADMIN — Medication 0.5 MILLIGRAM(S): at 23:50

## 2019-06-04 RX ADMIN — Medication 81 MILLIGRAM(S): at 13:27

## 2019-06-04 RX ADMIN — OXYCODONE AND ACETAMINOPHEN 1 TABLET(S): 5; 325 TABLET ORAL at 10:37

## 2019-06-04 RX ADMIN — GABAPENTIN 300 MILLIGRAM(S): 400 CAPSULE ORAL at 04:16

## 2019-06-04 RX ADMIN — SODIUM CHLORIDE 50 MILLILITER(S): 9 INJECTION INTRAMUSCULAR; INTRAVENOUS; SUBCUTANEOUS at 18:26

## 2019-06-04 NOTE — DISCHARGE NOTE PROVIDER - NSDCFUADDINST_GEN_ALL_CORE_FT
No heavy lifting, driving, sex, tub baths, swimming, or any activity that submerges the lower half of the body in water for 48 hours.  Limited walking and stairs for 48 hours.    Change the bandaid after 24 hours and every 24 hours after that.  Keep the puncture site dry and covered with a bandaid until a scab forms.    Observe the site frequently.  If bleeding or a large lump (the size of a golf ball or bigger) occurs lie flat, apply continuous direct pressure just above the puncture site for at least 10 minutes, and notify your physician immediately.  If the bleeding cannot be controlled, call 911 immediately for assistance.  Notify your physician of pain, swelling or any drainage.    Notify your physician immediately if coldness, numbness, discoloration or pain in your foot occurs.  	REMOVE RIGHT GROIN DRESSING WITHIN 24 HOURS OF DISCHARGE

## 2019-06-04 NOTE — PATIENT PROFILE ADULT - RESOURCE/ENVIRONMENTAL CONCERNS
none Z Plasty Text: The lesion was extirpated to the level of the fat with a #15 scalpel blade.  Given the location of the defect, shape of the defect and the proximity to free margins a Z-plasty was deemed most appropriate for repair.  Using a sterile surgical marker, the appropriate transposition arms of the Z-plasty were drawn incorporating the defect and placing the expected incisions within the relaxed skin tension lines where possible.    The area thus outlined was incised deep to adipose tissue with a #15 scalpel blade.  The skin margins were undermined to an appropriate distance in all directions utilizing iris scissors.  The opposing transposition arms were then transposed into place in opposite direction and anchored with interrupted buried subcutaneous sutures.

## 2019-06-04 NOTE — CONSULT NOTE ADULT - SUBJECTIVE AND OBJECTIVE BOX
Patient is a 79y old  Female who presents with a chief complaint of chest pain      HPI:  Pt presents with c/o 1 episode of chest pain, central, non radiating, sudden onset, no exacerbating or relieving factors, spontaneously resolved in < 5 mins. no diaphoresis/ n/v/sob. but she felt very weak generally and felt she might pass out during the episode. but did not pass out. no LOC. today she went to PMD and was advised to come to ED.     SH- denies habits  FH- father- nto alive- cancer (2019 17:33)      PAST MEDICAL & SURGICAL HISTORY:  Ovarian benign neoplasm: with S/p Total  Hysterectomy  Depression  HTN (hypertension)  No significant past surgical history      Allergies    No Known Allergies    Intolerances        MEDICATIONS  (STANDING):  aspirin  chewable 81 milliGRAM(s) Oral daily  escitalopram 10 milliGRAM(s) Oral daily  folic acid 1 milliGRAM(s) Oral daily  gabapentin 300 milliGRAM(s) Oral three times a day  lamoTRIgine 50 milliGRAM(s) Oral two times a day  losartan 50 milliGRAM(s) Oral daily  pantoprazole    Tablet 40 milliGRAM(s) Oral before breakfast  sodium chloride 0.9% lock flush 3 milliLiter(s) IV Push every 8 hours    MEDICATIONS  (PRN):    aspirin  chewable 81 milliGRAM(s) Oral daily  escitalopram 10 milliGRAM(s) Oral daily  folic acid 1 milliGRAM(s) Oral daily  gabapentin 300 milliGRAM(s) Oral three times a day  lamoTRIgine 50 milliGRAM(s) Oral two times a day  losartan 50 milliGRAM(s) Oral daily  pantoprazole    Tablet 40 milliGRAM(s) Oral before breakfast  sodium chloride 0.9% lock flush 3 milliLiter(s) IV Push every 8 hours      FAMILY HISTORY:  Family history of cancer in father      SOCIAL HISTORY:    CIGARETTES:  None    ALCOHOL:  Rare    REVIEW OF SYSTEMS:  CONSTITUTIONAL: No fever, weight loss, or fatigue  EYES: No eye pain, visual disturbances, or discharge  ENMT:  No difficulty hearing, tinnitus, vertigo; No sinus or throat pain  NECK: No pain or stiffness  RESPIRATORY: No cough, wheezing, chills or hemoptysis; No Shortness of Breath  CARDIOVASCULAR: No chest pain, palpitations, passing out, dizziness, or leg swelling  GASTROINTESTINAL: No abdominal or epigastric pain. No nausea, vomiting, or hematemesis; No diarrhea or constipation. No melena or hematochezia.  GENITOURINARY: No dysuria, frequency, hematuria, or incontinence  NEUROLOGICAL: No headaches, memory loss, loss of strength, numbness, or tremors  SKIN: No itching, burning, rashes, or lesions   LYMPH Nodes: No enlarged glands  ENDOCRINE: No heat or cold intolerance; No hair loss  MUSCULOSKELETAL: No joint pain or swelling; No muscle, back, or extremity pain  PSYCHIATRIC: No depression, anxiety, mood swings, or difficulty sleeping  HEME/LYMPH: No easy bruising, or bleeding gums  ALLERY AND IMMUNOLOGIC: No hives or eczema	    Vital Signs Last 24 Hrs  T(C): 36.8 (2019 22:08), Max: 36.8 (2019 04:15)  T(F): 98.2 (2019 22:08), Max: 98.3 (2019 04:15)  HR: 72 (2019 22:08) (59 - 77)  BP: 158/86 (2019 22:08) (130/63 - 158/86)  BP(mean): --  RR: 18 (2019 22:08) (18 - 18)  SpO2: 98% (2019 22:08) (96% - 99%)    Daily     Daily     I&O's Detail    2019 07:01  -  2019 01:01  --------------------------------------------------------  IN:    Oral Fluid: 120 mL  Total IN: 120 mL    OUT:  Total OUT: 0 mL    Total NET: 120 mL          PHYSICAL EXAM:  Appearance: Normal, well nourished	  HEENT:   Normal oral mucosa, PERRL, EOMI, sclera non-icteric	  Lymphatic: No cervical lymphadenopathy  Cardiovascular: Normal S1 S2, No JVD, No cardiac murmurs, No carotid bruits, No peripheral edema  Respiratory: Lungs clear to auscultation	  Psychiatry: A & O x 3, Mood & affect appropriate  Gastrointestinal:  Soft, Non-tender, + BS, no bruits	  Skin: No rashes, No ecchymoses, No cyanosis  Neurologic: Grossly non-focal with full strength in all four extremities  Extremities: Normal range of motion, No clubbing, cyanosis or edema  Vascular: Peripheral pulses palpable 2+ bilaterally    LABS:                        11.5   3.7   )-----------( 178      ( 2019 14:27 )             36.2         141  |  107  |  27.0<H>  ----------------------------<  92  4.3   |  25.0  |  0.92    Ca    9.4      2019 14:27    TPro  6.6  /  Alb  3.8  /  TBili  0.2<L>  /  DBili  x   /  AST  35<H>  /  ALT  44<H>  /  AlkPhos  122<H>      CARDIAC MARKERS ( 2019 07:53 )  x     / <0.01 ng/mL / x     / x     / x      CARDIAC MARKERS ( 2019 14:27 )  x     / <0.01 ng/mL / x     / x     / x            Urinalysis Basic - ( 2019 21:35 )    Color: Yellow / Appearance: Clear / S.020 / pH: x  Gluc: x / Ketone: Negative  / Bili: Negative / Urobili: Negative mg/dL   Blood: x / Protein: 30 mg/dL / Nitrite: Negative   Leuk Esterase: Trace / RBC: 0-2 /HPF / WBC 0-2   Sq Epi: x / Non Sq Epi: Occasional / Bacteria: x      I&O's Summary    2019 07:01  -  2019 01:01  --------------------------------------------------------  IN: 120 mL / OUT: 0 mL / NET: 120 mL      BNP  RADIOLOGY & ADDITIONAL STUDIES:    Assessment:  Pt presents with c/o 1 episode of chest pain, central, non radiating, sudden onset, no exacerbating or relieving factors, spontaneously resolved in < 5 mins. no diaphoresis/ n/v/sob. but she felt very weak generally and felt she might pass out during the episode. but did not pass out. no LOC. today she went to PMD and was advised to come to ED.     Plan:  Cardiac catheterization and possible percutaneous intervention recommended.  Risks, benefits, and alternatives reviewed.  Risks including but not limited to MI, death, stroke, bleeding, infection, vessel injury, hematoma, renal failure, allergic reaction, urgent open heart surgery, restenosis and stent thrombosis were reviewed.  All questions answered.  Patient is agreeable to proceed.
Patient is a 78y old  Female who presents with a chief complaint of chest pain followed by near syncope    HPI:  Ms. Avalos reports 2 days ago was walking at store when felt sudden onset pressure-like chest pain followed by diaphoresis followed by weakness and loss of consciousness and fall to floor. Got up with no more CP and went on through her day. Today say PCP, Dr. Blake, who advised her to seek inpatient care.     SH- denies habits  FH- father- nto alive- cancer (03 Jun 2019 17:33)      PAST MEDICAL & SURGICAL HISTORY:  Ovarian benign neoplasm: with S/p Total  Hysterectomy  Depression  HTN (hypertension)  No significant past surgical history        Allergies    No Known Allergies          MEDICATIONS  (STANDING):  enoxaparin Injectable 40 milliGRAM(s) SubCutaneous every 24 hours  escitalopram 10 milliGRAM(s) Oral daily  folic acid 1 milliGRAM(s) Oral daily  gabapentin 300 milliGRAM(s) Oral three times a day  lamoTRIgine 50 milliGRAM(s) Oral two times a day  losartan 50 milliGRAM(s) Oral daily  pantoprazole    Tablet 40 milliGRAM(s) Oral before breakfast    MEDICATIONS  (PRN):      FAMILY HISTORY:  Family history of cancer in father      SOCIAL HISTORY:     CIGARETTES: denies    ALCOHOL: denies    REVIEW OF SYSTEMS:  CONSTITUTIONAL: No fever, weight loss, or fatigue  EYES: No eye pain, visual disturbances, or discharge  ENMT:  No difficulty hearing, tinnitus, vertigo; No sinus or throat pain  NECK: No pain or stiffness  RESPIRATORY: No cough, wheezing, chills or hemoptysis; No Shortness of Breath  CARDIOVASCULAR: No chest pain, palpitations, passing out, dizziness, or leg swelling  GASTROINTESTINAL: No abdominal or epigastric pain. No nausea, vomiting, or hematemesis; No diarrhea or constipation. No melena or hematochezia.  GENITOURINARY: No dysuria, frequency, hematuria, or incontinence  NEUROLOGICAL: No headaches, memory loss, loss of strength, numbness, or tremors  SKIN: No itching, burning, rashes, or lesions   LYMPH Nodes: No enlarged glands  ENDOCRINE: No heat or cold intolerance; No hair loss  MUSCULOSKELETAL: No joint pain or swelling; No muscle, back, or extremity pain  PSYCHIATRIC: No depression, anxiety, mood swings, or difficulty sleeping  HEME/LYMPH: No easy bruising, or bleeding gums  ALLERY AND IMMUNOLOGIC: No hives or eczema	    Vital Signs Last 24 Hrs  T(C): 36.3 (03 Jun 2019 13:10), Max: 36.3 (03 Jun 2019 13:10)  T(F): 97.4 (03 Jun 2019 13:10), Max: 97.4 (03 Jun 2019 13:10)  HR: 48 (03 Jun 2019 15:00) (48 - 58)  BP: 135/64 (03 Jun 2019 15:00) (111/66 - 135/64)  BP(mean): --  RR: 20 (03 Jun 2019 15:00) (20 - 22)  SpO2: 98% (03 Jun 2019 15:00) (98% - 100%)    Daily Height in cm: 167.64 (03 Jun 2019 13:10)    Daily     I&O's Detail      PHYSICAL EXAM:  Appearance: Normal, well nourished	  HEENT:   Normal oral mucosa, PERRL, EOMI, sclera non-icteric	  Lymphatic: No cervical lymphadenopathy  Cardiovascular: Normal S1 S2, No JVD, No cardiac murmurs, No carotid bruits, No peripheral edema  Respiratory: Lungs clear to auscultation	  Psychiatry: A & O x 3, Mood & affect appropriate  Gastrointestinal:  Soft, Non-tender, + BS, no bruits	  Skin: No rashes, No ecchymoses, No cyanosis  Neurologic: Grossly non-focal with full strength in all four extremities  Extremities: Normal range of motion, No clubbing, cyanosis or edema  Vascular: Peripheral pulses palpable 2+ bilaterally      INTERPRETATION OF TELEMETRY: off monitor    ECG:  sinus bradycardia    LABS:                        11.5   3.7   )-----------( 178      ( 03 Jun 2019 14:27 )             36.2     06-03    141  |  107  |  27.0<H>  ----------------------------<  92  4.3   |  25.0  |  0.92    Ca    9.4      03 Jun 2019 14:27    TPro  6.6  /  Alb  3.8  /  TBili  0.2<L>  /  DBili  x   /  AST  35<H>  /  ALT  44<H>  /  AlkPhos  122<H>  06-03    CARDIAC MARKERS ( 03 Jun 2019 14:27 )  x     / <0.01 ng/mL / x     / x     / x              I&O's Summary    BNP  RADIOLOGY & ADDITIONAL STUDIES:

## 2019-06-04 NOTE — DISCHARGE NOTE PROVIDER - CARE PROVIDER_API CALL
Mary Carmen Moreno)  Cardiovascular Disease  [address] [place] [address]  Phone: [phone]  Fax: [phone]  Follow Up Time: Mary Carmen Moreno)  Cardiovascular Disease  [address] [place] [address]  Phone: [phone]  Fax: [phone]  Follow Up Time:     SHANIA bustos  Phone: (   )    -  Fax: (   )    -  Follow Up Time:

## 2019-06-04 NOTE — DISCHARGE NOTE PROVIDER - PROVIDER TOKENS
PROVIDER:[TOKEN:[[id number]]] PROVIDER:[TOKEN:[[id number]]],FREE:[LAST:[hibby],PHONE:[(   )    -],FAX:[(   )    -],ADDRESS:[PMD]]

## 2019-06-04 NOTE — DISCHARGE NOTE PROVIDER - CARE PROVIDERS DIRECT ADDRESSES
[email or web address]

## 2019-06-04 NOTE — DISCHARGE NOTE PROVIDER - HOSPITAL COURSE
S/P LHC via RFA w/angioseal closure    	VENTRICLES: There were no left ventricular global orregional wall motion    abnormalities. Global left ventricular function was normal. EF estimated    was 60 %.    VALVES: MITRAL VALVE: The mitral valve exhibited trivial regurgitation    (less than 1+).    CORONARY VESSELS: The coronary circulation is right dominant.    LM:   --  LM: Normal.    LAD:   --  LAD: Normal.    CX:   --  Circumflex: Normal.    RI:   --  Ramus intermedius: Normal. The vessel was very small sized.    RCA:   --  Mid RCA: There was a discrete 10 % stenosis.    COMPLICATIONS: No complications occurred during the cath lab visit.    DIAGNOSTIC RECOMMENDATIONS: The patient should continue with the present    medications. admitted with Cp and syncope. tele without arrythmias. cath non obstructive.     pt now states she was recently diagnosed of parkinsons dz early stages. can be possible etio of syncope    she has been on her psych meds and stable for a long time. less likely the cause of syncope    if syncope recurs- need neuro f/u for parkinsons, cards f/u for ILR, and psych f/u for reassesing psych meds        S/P LHC via RFA w/angioseal closure    	VENTRICLES: There were no left ventricular global orregional wall motion    abnormalities. Global left ventricular function was normal. EF estimated    was 60 %.    VALVES: MITRAL VALVE: The mitral valve exhibited trivial regurgitation    (less than 1+).    CORONARY VESSELS: The coronary circulation is right dominant.    LM:   --  LM: Normal.    LAD:   --  LAD: Normal.    CX:   --  Circumflex: Normal.    RI:   --  Ramus intermedius: Normal. The vessel was very small sized.    RCA:   --  Mid RCA: There was a discrete 10 % stenosis.    COMPLICATIONS: No complications occurred during the cath lab visit.    DIAGNOSTIC RECOMMENDATIONS: The patient should continue with the present    medications.    	    Vital Signs Last 24 Hrs    T(C): 36.8 (06-05-19 @ 06:00), Max: 36.8 (06-04-19 @ 22:08)    T(F): 98.2 (06-05-19 @ 06:00), Max: 98.2 (06-04-19 @ 22:08)    HR: 72 (06-05-19 @ 06:00) (60 - 77)    BP: 160/82 (06-05-19 @ 06:00) (130/63 - 160/82)    BP(mean): --    RR: 18 (06-05-19 @ 06:00) (18 - 18)    SpO2: 98% (06-05-19 @ 06:00) (97% - 99%)    GENERAL: NAD, thin fragile, baseline tremors    	HEENT: PERRL, +EOMI    	NECK: soft, Supple, No JVD,     	CHEST/LUNG: Clear to auscultation bilaterally; No wheezing    	HEART: S1S2+, Regular rate and rhythm; No murmurs, rubs, or gallops    	ABDOMEN: Soft, Nontender, Nondistended; Bowel sounds present    	EXTREMITIES:  2+ Peripheral Pulses, No clubbing, cyanosis, or edema    	SKIN: No rashes or lesions    	NEURO: AAOX3, no focal deficits, no motor r sensory loss    PSYCH: normal mood    TIME 41 mins        explained post discharge plan of care to vineet and her spouse in detail.

## 2019-06-04 NOTE — PROGRESS NOTE ADULT - ASSESSMENT
1. Chest pain  - going for cath today    2. HTN, Bipolar disorder  - stable  - cont home meds    Lovenox
80 yo F w/ h/o HTN, depression who p/w chest pain associated with syncope which are high-risk features.     1. Plan for LHC with Dr. Baer today  2. GIve aspirin, statin, beta-blocker  3. Follow-up TTE  4. Monitor on telemetry
A-s/p LHC RFA

## 2019-06-05 ENCOUNTER — TRANSCRIPTION ENCOUNTER (OUTPATIENT)
Age: 79
End: 2019-06-05

## 2019-06-05 VITALS
RESPIRATION RATE: 18 BRPM | DIASTOLIC BLOOD PRESSURE: 78 MMHG | HEART RATE: 74 BPM | OXYGEN SATURATION: 98 % | SYSTOLIC BLOOD PRESSURE: 142 MMHG | TEMPERATURE: 98 F

## 2019-06-05 DIAGNOSIS — G20 PARKINSON'S DISEASE: ICD-10-CM

## 2019-06-05 LAB — TROPONIN T SERPL-MCNC: <0.01 NG/ML — SIGNIFICANT CHANGE UP (ref 0–0.06)

## 2019-06-05 PROCEDURE — C1769: CPT

## 2019-06-05 PROCEDURE — 99152 MOD SED SAME PHYS/QHP 5/>YRS: CPT

## 2019-06-05 PROCEDURE — 84484 ASSAY OF TROPONIN QUANT: CPT

## 2019-06-05 PROCEDURE — 93005 ELECTROCARDIOGRAM TRACING: CPT

## 2019-06-05 PROCEDURE — 99239 HOSP IP/OBS DSCHRG MGMT >30: CPT

## 2019-06-05 PROCEDURE — 80053 COMPREHEN METABOLIC PANEL: CPT

## 2019-06-05 PROCEDURE — 93458 L HRT ARTERY/VENTRICLE ANGIO: CPT

## 2019-06-05 PROCEDURE — 84443 ASSAY THYROID STIM HORMONE: CPT

## 2019-06-05 PROCEDURE — 71045 X-RAY EXAM CHEST 1 VIEW: CPT

## 2019-06-05 PROCEDURE — C1887: CPT

## 2019-06-05 PROCEDURE — C1760: CPT

## 2019-06-05 PROCEDURE — C1894: CPT

## 2019-06-05 PROCEDURE — 99285 EMERGENCY DEPT VISIT HI MDM: CPT | Mod: 25

## 2019-06-05 PROCEDURE — 81001 URINALYSIS AUTO W/SCOPE: CPT

## 2019-06-05 PROCEDURE — 85027 COMPLETE CBC AUTOMATED: CPT

## 2019-06-05 PROCEDURE — 99153 MOD SED SAME PHYS/QHP EA: CPT

## 2019-06-05 PROCEDURE — 36415 COLL VENOUS BLD VENIPUNCTURE: CPT

## 2019-06-05 RX ORDER — HYDROXYZINE HCL 10 MG
0 TABLET ORAL
Qty: 0 | Refills: 0 | DISCHARGE

## 2019-06-05 RX ORDER — ASPIRIN/CALCIUM CARB/MAGNESIUM 324 MG
1 TABLET ORAL
Qty: 30 | Refills: 0
Start: 2019-06-05 | End: 2019-07-04

## 2019-06-05 RX ORDER — ACETAMINOPHEN 500 MG
650 TABLET ORAL EVERY 6 HOURS
Refills: 0 | Status: DISCONTINUED | OUTPATIENT
Start: 2019-06-05 | End: 2019-06-05

## 2019-06-05 RX ORDER — ONDANSETRON 8 MG/1
1 TABLET, FILM COATED ORAL
Qty: 0 | Refills: 0 | DISCHARGE

## 2019-06-05 RX ORDER — SIMVASTATIN 20 MG/1
20 TABLET, FILM COATED ORAL AT BEDTIME
Refills: 0 | Status: DISCONTINUED | OUTPATIENT
Start: 2019-06-05 | End: 2019-06-05

## 2019-06-05 RX ADMIN — GABAPENTIN 300 MILLIGRAM(S): 400 CAPSULE ORAL at 05:57

## 2019-06-05 RX ADMIN — LAMOTRIGINE 50 MILLIGRAM(S): 25 TABLET, ORALLY DISINTEGRATING ORAL at 07:01

## 2019-06-05 RX ADMIN — PANTOPRAZOLE SODIUM 40 MILLIGRAM(S): 20 TABLET, DELAYED RELEASE ORAL at 07:01

## 2019-06-05 RX ADMIN — Medication 650 MILLIGRAM(S): at 06:27

## 2019-06-05 RX ADMIN — SODIUM CHLORIDE 3 MILLILITER(S): 9 INJECTION INTRAMUSCULAR; INTRAVENOUS; SUBCUTANEOUS at 05:56

## 2019-06-05 RX ADMIN — Medication 650 MILLIGRAM(S): at 05:57

## 2019-06-05 RX ADMIN — LOSARTAN POTASSIUM 50 MILLIGRAM(S): 100 TABLET, FILM COATED ORAL at 07:01

## 2019-06-05 NOTE — DISCHARGE NOTE NURSING/CASE MANAGEMENT/SOCIAL WORK - NSDCDPATPORTLINK_GEN_ALL_CORE
You can access the [facility], offered by [facility], by registering with the following website: [email or web address]

## 2019-06-05 NOTE — PROGRESS NOTE ADULT - PROBLEM SELECTOR PROBLEM 1
Chest pain with high risk of acute coronary syndrome
Chest pain with high risk of acute coronary syndrome

## 2019-06-05 NOTE — PROGRESS NOTE ADULT - PROBLEM SELECTOR PLAN 1
Aspirin 81 mg po added  Rt groin care w/instructions to pt  Pt unable to be discharged as spouse cannot drive safely at night and discharge will be after 2100  FU Dr Moreno outpt  ECHO results pending  Discussed above with Dr Horan
LHC showed no significant CAD. Denies recurrence of CP.

## 2019-06-05 NOTE — PROGRESS NOTE ADULT - SUBJECTIVE AND OBJECTIVE BOX
Currently asymptomatic    MEDICATIONS  (STANDING):  aspirin  chewable 81 milliGRAM(s) Oral daily  escitalopram 10 milliGRAM(s) Oral daily  folic acid 1 milliGRAM(s) Oral daily  gabapentin 300 milliGRAM(s) Oral three times a day  lamoTRIgine 50 milliGRAM(s) Oral two times a day  losartan 50 milliGRAM(s) Oral daily  pantoprazole    Tablet 40 milliGRAM(s) Oral before breakfast  simvastatin 20 milliGRAM(s) Oral at bedtime  sodium chloride 0.9% lock flush 3 milliLiter(s) IV Push every 8 hours    MEDICATIONS  (PRN):  acetaminophen   Tablet .. 650 milliGRAM(s) Oral every 6 hours PRN Temp greater or equal to 38C (100.4F), Mild Pain (1 - 3)    Vital Signs Last 24 Hrs  T(C): 36.7 (05 Jun 2019 10:09), Max: 36.8 (04 Jun 2019 22:08)  T(F): 98 (05 Jun 2019 10:09), Max: 98.2 (04 Jun 2019 22:08)  HR: 74 (05 Jun 2019 10:09) (60 - 77)  BP: 142/78 (05 Jun 2019 10:09) (130/63 - 160/82)  BP(mean): --  RR: 18 (05 Jun 2019 10:09) (18 - 18)  SpO2: 98% (05 Jun 2019 10:09) (97% - 99%)      Gen: no distress  CV: regular, normal S1 and S2  Resp: Lungs CTA    06-03    141  |  107  |  27.0<H>  ----------------------------<  92  4.3   |  25.0  |  0.92    Ca    9.4      03 Jun 2019 14:27    TPro  6.6  /  Alb  3.8  /  TBili  0.2<L>  /  DBili  x   /  AST  35<H>  /  ALT  44<H>  /  AlkPhos  122<H>  06-03    CARDIAC MARKERS ( 05 Jun 2019 06:28 )  x     / <0.01 ng/mL / x     / x     / x      CARDIAC MARKERS ( 04 Jun 2019 07:53 )  x     / <0.01 ng/mL / x     / x     / x      CARDIAC MARKERS ( 03 Jun 2019 14:27 )  x     / <0.01 ng/mL / x     / x     / x
HEALTH ISSUES - PROBLEM Dx:    chest pain, ?syncope    INTERVAL HPI/ OVERNIGHT EVENTS:    pt yest stated near syncope to me. started syncope to the cardiologist  today is siitng OOb to C c/o back of head HA  when i said to her today she is going for cath, she said she isnt and she isnt aware of it and nobody told her so,  when asked if she saw the cardiologist, she said no, then her  stated yes they saw the cardiologist and he did mention cath.  but they werent aware that it would be today. then she states that if her  is saying so then it is right      REVIEW OF SYSTEMS:    HA + Cp -     Vital Signs Last 24 Hrs  T(C): 36.6 (2019 12:30), Max: 36.8 (2019 04:15)  T(F): 97.8 (2019 12:30), Max: 98.3 (2019 04:15)  HR: 77 (2019 12:30) (55 - 77)  BP: 157/75 (2019 12:30) (144/67 - 157/76)  BP(mean): --  RR: 18 (2019 12:30) (18 - 18)  SpO2: 99% (2019 12:30) (96% - 100%)    PHYSICAL EXAM-  GENERAL: NAD, thin fragile, baseline tremors  	HEENT: PERRL, +EOMI  	NECK: soft, Supple, No JVD,   	CHEST/LUNG: Clear to auscultation bilaterally; No wheezing  	HEART: S1S2+, Regular rate and rhythm; No murmurs, rubs, or gallops  	ABDOMEN: Soft, Nontender, Nondistended; Bowel sounds present  	EXTREMITIES:  2+ Peripheral Pulses, No clubbing, cyanosis, or edema  	SKIN: No rashes or lesions  	NEURO: AAOX3, no focal deficits, no motor r sensory loss  PSYCH: normal mood  LABS:                        11.5   3.7   )-----------( 178      ( 2019 14:27 )             36.2     06-03    141  |  107  |  27.0<H>  ----------------------------<  92  4.3   |  25.0  |  0.92    Ca    9.4      2019 14:27    TPro  6.6  /  Alb  3.8  /  TBili  0.2<L>  /  DBili  x   /  AST  35<H>  /  ALT  44<H>  /  AlkPhos  122<H>  06-03      Urinalysis Basic - ( 2019 21:35 )    Color: Yellow / Appearance: Clear / S.020 / pH: x  Gluc: x / Ketone: Negative  / Bili: Negative / Urobili: Negative mg/dL   Blood: x / Protein: 30 mg/dL / Nitrite: Negative   Leuk Esterase: Trace / RBC: 0-2 /HPF / WBC 0-2   Sq Epi: x / Non Sq Epi: Occasional / Bacteria: x    Assessment and Plan
NPO > 8 hours  IV ordered  Aspirin ordered  No CP at present  Does not recall syncopizing  Last lovenox Tamara 3  BR 3.9%  ASA 2 Mallampati 2  Labs reviewed  Dr Baer to consent      REVIEW OF SYSTEMS:  Denies SOB, CP, NV, HA, dizziness, palpitations    PHYSICAL EXAM: A&Ox3 NAD Skin warm and dry  NEURO: Speech intact +gag +swallow Tongue midline LESLIE  NECK: No JVD, trachea midline. Eupneic  HEART: RRR S1 S2 SR on tele 12 ECG  PULMONARY:  CTA lauren  ABDOMEN: Soft nontender X4 +BS Vdg/eating  EXTREMITIES: no edema    CONSULT:  Patient is a 78y old  Female who presents with a chief complaint of chest pain followed by near syncope    HPI:  Ms. Avalos reports 2 days ago was walking at store when felt sudden onset pressure-like chest pain followed by diaphoresis followed by weakness and loss of consciousness and fall to floor. Got up with no more CP and went on through her day. Today say PCP, Dr. Blake, who advised her to seek inpatient care.
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  Depression  HTN (hypertension)  No significant past surgical history      MEDICATIONS  (STANDING):  enoxaparin Injectable 40 milliGRAM(s) SubCutaneous every 24 hours  escitalopram 10 milliGRAM(s) Oral daily  folic acid 1 milliGRAM(s) Oral daily  gabapentin 300 milliGRAM(s) Oral three times a day  lamoTRIgine 50 milliGRAM(s) Oral two times a day  losartan 50 milliGRAM(s) Oral daily  pantoprazole    Tablet 40 milliGRAM(s) Oral before breakfast    MEDICATIONS  (PRN):      Vital Signs Last 24 Hrs  T(C): 36.6 (2019 07:49), Max: 36.8 (2019 04:15)  T(F): 97.9 (2019 07:49), Max: 98.3 (2019 04:15)  HR: 60 (2019 07:49) (48 - 60)  BP: 144/75 (2019 07:49) (111/66 - 146/76)  BP(mean): --  RR: 18 (2019 07:49) (18 - 22)  SpO2: 99% (2019 07:49) (96% - 100%)    PHYSICAL EXAM:    HEENT:   Normal oral mucosa, PERRL, EOMI, sclera non-icteric	  Lymphatic: No cervical lymphadenopathy  Cardiovascular: Normal S1 S2, No JVD, No cardiac murmurs, No carotid bruits, No peripheral edema  Respiratory: Lungs clear to auscultation	  Psychiatry: A & O x 3, Mood & affect appropriate  Gastrointestinal:  Soft, Non-tender, + BS, no bruits	  Skin: No rashes, No ecchymoses, No cyanosis  Neurologic: Grossly non-focal with full strength in all four extremities  Extremities: Normal range of motion, No clubbing, cyanosis or edema  Vascular: Peripheral pulses palpable 2+ bilaterally      ECG: sinus bradycardia    I&O's Detail      LABS:                        11.5   3.7   )-----------( 178      ( 2019 14:27 )             36.2     06-03    141  |  107  |  27.0<H>  ----------------------------<  92  4.3   |  25.0  |  0.92    Ca    9.4      2019 14:27    TPro  6.6  /  Alb  3.8  /  TBili  0.2<L>  /  DBili  x   /  AST  35<H>  /  ALT  44<H>  /  AlkPhos  122<H>  06-03    CARDIAC MARKERS ( 2019 07:53 )  x     / <0.01 ng/mL / x     / x     / x      CARDIAC MARKERS ( 2019 14:27 )  x     / <0.01 ng/mL / x     / x     / x            Urinalysis Basic - ( 2019 21:35 )    Color: Yellow / Appearance: Clear / S.020 / pH: x  Gluc: x / Ketone: Negative  / Bili: Negative / Urobili: Negative mg/dL   Blood: x / Protein: 30 mg/dL / Nitrite: Negative   Leuk Esterase: Trace / RBC: 0-2 /HPF / WBC 0-2   Sq Epi: x / Non Sq Epi: Occasional / Bacteria: x      BNP  I&O's Detail    Daily Height in cm: 167.64 (2019 13:10)    Daily     RADIOLOGY & ADDITIONAL STUDIES:
s/p LHC RFA w/angioseal    VENTRICLES: There were no left ventricular global orregional wall motion  abnormalities. Global left ventricular function was normal. EF estimated  was 60 %.  VALVES: MITRAL VALVE: The mitral valve exhibited trivial regurgitation  (less than 1+).  CORONARY VESSELS: The coronary circulation is right dominant.  LM:   --  LM: Normal.  LAD:   --  LAD: Normal.  CX:   --  Circumflex: Normal.  RI:   --  Ramus intermedius: Normal. The vessel was very small sized.  RCA:   --  Mid RCA: There was a discrete 10 % stenosis.  COMPLICATIONS: No complications occurred during the cath lab visit.  DIAGNOSTIC RECOMMENDATIONS: The patient should continue with the present  medications.      REVIEW OF SYSTEMS:  Denies SOB, CP, NV, HA, dizziness, palpitations, site pain    PHYSICAL EXAM: A&Ox3 NAD Skin warm and dry  NEURO: Speech intact +gag +swallow Tongue midline LESLEI  NECK: No JVD, trachea midline. Eupneic  HEART: SR 60s no ectopy noted on tele  PULMONARY:  CTA lauren  ABDOMEN: Soft nontender X4 +BS Vdg  EXTREMITIES: RFA site: No bleed, hematoma, pain, ecchymosis or swelling Rt DP/PT+

## 2019-12-27 NOTE — ED ADULT NURSE NOTE - DRUG PRE-SCREENING (DAST -1)
Left Message with Rajiv @ [phone].  Requesting callback.   
Problem: Pneumonia  Goal: S/S of acute pneumonia are resolved  If acute pneumonia is present, monitor for resolution of fever, cough, secretions and other test values based on presentation.  Outcome: Outcome Met, Continue evaluating goal progress toward completion  Patient still on ABT zosyn q 8 hrs, patient vitals are stable. HOB elevated at all times. Spo2 of >93% on RA. Denies SOB. Will monitor.       
Statement Selected

## 2020-03-05 NOTE — DISCHARGE NOTE PROVIDER - NS AS DC PROVIDER CONTACT Y/N MULTI
Problem: Patient Centered Care  Goal: Patient preferences are identified and integrated in the patient's plan of care  Description  Interventions:  - What would you like us to know as we care for you? \"I am from home. \"  - Provide timely, complete, and assist with strengthening/mobility  - Encourage toileting schedule  Outcome: Adequate for Discharge     Problem: Risk for Infection  Goal: Absence of fever/infection during anticipated neutropenic period  Description  INTERVENTIONS:  - Monitor WBC  - Imple Yes

## 2020-08-03 ENCOUNTER — INPATIENT (INPATIENT)
Facility: HOSPITAL | Age: 80
LOS: 6 days | Discharge: HOME | DRG: 682 | End: 2020-08-10
Attending: HOSPITALIST | Admitting: HOSPITALIST
Payer: MEDICARE

## 2020-08-03 VITALS
WEIGHT: 149.91 LBS | SYSTOLIC BLOOD PRESSURE: 84 MMHG | DIASTOLIC BLOOD PRESSURE: 44 MMHG | RESPIRATION RATE: 20 BRPM | OXYGEN SATURATION: 97 % | HEIGHT: 68 IN | HEART RATE: 54 BPM | TEMPERATURE: 98 F

## 2020-08-03 DIAGNOSIS — E87.1 HYPO-OSMOLALITY AND HYPONATREMIA: ICD-10-CM

## 2020-08-03 DIAGNOSIS — N17.9 ACUTE KIDNEY FAILURE, UNSPECIFIED: ICD-10-CM

## 2020-08-03 DIAGNOSIS — S32.019A UNSPECIFIED FRACTURE OF FIRST LUMBAR VERTEBRA, INITIAL ENCOUNTER FOR CLOSED FRACTURE: ICD-10-CM

## 2020-08-03 DIAGNOSIS — R33.9 RETENTION OF URINE, UNSPECIFIED: ICD-10-CM

## 2020-08-03 DIAGNOSIS — R06.02 SHORTNESS OF BREATH: ICD-10-CM

## 2020-08-03 DIAGNOSIS — G20 PARKINSON'S DISEASE: ICD-10-CM

## 2020-08-03 DIAGNOSIS — R09.89 OTHER SPECIFIED SYMPTOMS AND SIGNS INVOLVING THE CIRCULATORY AND RESPIRATORY SYSTEMS: ICD-10-CM

## 2020-08-03 LAB
ALBUMIN SERPL ELPH-MCNC: 4.4 G/DL — SIGNIFICANT CHANGE UP (ref 3.3–5.2)
ALP SERPL-CCNC: 105 U/L — SIGNIFICANT CHANGE UP (ref 40–120)
ALT FLD-CCNC: 21 U/L — SIGNIFICANT CHANGE UP
ANION GAP SERPL CALC-SCNC: 11 MMOL/L — SIGNIFICANT CHANGE UP (ref 5–17)
ANION GAP SERPL CALC-SCNC: 13 MMOL/L — SIGNIFICANT CHANGE UP (ref 5–17)
APPEARANCE UR: CLEAR — SIGNIFICANT CHANGE UP
APTT BLD: 31.4 SEC — SIGNIFICANT CHANGE UP (ref 27.5–35.5)
AST SERPL-CCNC: 37 U/L — HIGH
BASOPHILS # BLD AUTO: 0.02 K/UL — SIGNIFICANT CHANGE UP (ref 0–0.2)
BASOPHILS NFR BLD AUTO: 0.4 % — SIGNIFICANT CHANGE UP (ref 0–2)
BILIRUB SERPL-MCNC: 0.4 MG/DL — SIGNIFICANT CHANGE UP (ref 0.4–2)
BILIRUB UR-MCNC: NEGATIVE — SIGNIFICANT CHANGE UP
BUN SERPL-MCNC: 30 MG/DL — HIGH (ref 8–20)
BUN SERPL-MCNC: 33 MG/DL — HIGH (ref 8–20)
CALCIUM SERPL-MCNC: 10 MG/DL — SIGNIFICANT CHANGE UP (ref 8.6–10.2)
CALCIUM SERPL-MCNC: 9.2 MG/DL — SIGNIFICANT CHANGE UP (ref 8.6–10.2)
CHLORIDE SERPL-SCNC: 81 MMOL/L — LOW (ref 98–107)
CHLORIDE SERPL-SCNC: 88 MMOL/L — LOW (ref 98–107)
CO2 SERPL-SCNC: 31 MMOL/L — HIGH (ref 22–29)
CO2 SERPL-SCNC: 31 MMOL/L — HIGH (ref 22–29)
COLOR SPEC: YELLOW — SIGNIFICANT CHANGE UP
CREAT SERPL-MCNC: 1.51 MG/DL — HIGH (ref 0.5–1.3)
CREAT SERPL-MCNC: 1.7 MG/DL — HIGH (ref 0.5–1.3)
DIFF PNL FLD: NEGATIVE — SIGNIFICANT CHANGE UP
EOSINOPHIL # BLD AUTO: 0.05 K/UL — SIGNIFICANT CHANGE UP (ref 0–0.5)
EOSINOPHIL NFR BLD AUTO: 0.9 % — SIGNIFICANT CHANGE UP (ref 0–6)
GLUCOSE SERPL-MCNC: 105 MG/DL — HIGH (ref 70–99)
GLUCOSE SERPL-MCNC: 97 MG/DL — SIGNIFICANT CHANGE UP (ref 70–99)
GLUCOSE UR QL: NEGATIVE MG/DL — SIGNIFICANT CHANGE UP
HCT VFR BLD CALC: 31.8 % — LOW (ref 34.5–45)
HCT VFR BLD CALC: 32.7 % — LOW (ref 34.5–45)
HGB BLD-MCNC: 10.6 G/DL — LOW (ref 11.5–15.5)
HGB BLD-MCNC: 11 G/DL — LOW (ref 11.5–15.5)
IMM GRANULOCYTES NFR BLD AUTO: 0.5 % — SIGNIFICANT CHANGE UP (ref 0–1.5)
INR BLD: 0.96 RATIO — SIGNIFICANT CHANGE UP (ref 0.88–1.16)
KETONES UR-MCNC: NEGATIVE — SIGNIFICANT CHANGE UP
LACTATE BLDV-MCNC: 1.2 MMOL/L — SIGNIFICANT CHANGE UP (ref 0.5–2)
LEUKOCYTE ESTERASE UR-ACNC: NEGATIVE — SIGNIFICANT CHANGE UP
LYMPHOCYTES # BLD AUTO: 0.59 K/UL — LOW (ref 1–3.3)
LYMPHOCYTES # BLD AUTO: 10.6 % — LOW (ref 13–44)
MCHC RBC-ENTMCNC: 29.1 PG — SIGNIFICANT CHANGE UP (ref 27–34)
MCHC RBC-ENTMCNC: 29.3 PG — SIGNIFICANT CHANGE UP (ref 27–34)
MCHC RBC-ENTMCNC: 33.3 GM/DL — SIGNIFICANT CHANGE UP (ref 32–36)
MCHC RBC-ENTMCNC: 33.6 GM/DL — SIGNIFICANT CHANGE UP (ref 32–36)
MCV RBC AUTO: 86.5 FL — SIGNIFICANT CHANGE UP (ref 80–100)
MCV RBC AUTO: 87.8 FL — SIGNIFICANT CHANGE UP (ref 80–100)
MONOCYTES # BLD AUTO: 0.44 K/UL — SIGNIFICANT CHANGE UP (ref 0–0.9)
MONOCYTES NFR BLD AUTO: 7.9 % — SIGNIFICANT CHANGE UP (ref 2–14)
NEUTROPHILS # BLD AUTO: 4.45 K/UL — SIGNIFICANT CHANGE UP (ref 1.8–7.4)
NEUTROPHILS NFR BLD AUTO: 79.7 % — HIGH (ref 43–77)
NITRITE UR-MCNC: NEGATIVE — SIGNIFICANT CHANGE UP
PH UR: 7 — SIGNIFICANT CHANGE UP (ref 5–8)
PLATELET # BLD AUTO: 176 K/UL — SIGNIFICANT CHANGE UP (ref 150–400)
PLATELET # BLD AUTO: 185 K/UL — SIGNIFICANT CHANGE UP (ref 150–400)
POTASSIUM SERPL-MCNC: 2.9 MMOL/L — CRITICAL LOW (ref 3.5–5.3)
POTASSIUM SERPL-MCNC: 3.8 MMOL/L — SIGNIFICANT CHANGE UP (ref 3.5–5.3)
POTASSIUM SERPL-SCNC: 2.9 MMOL/L — CRITICAL LOW (ref 3.5–5.3)
POTASSIUM SERPL-SCNC: 3.8 MMOL/L — SIGNIFICANT CHANGE UP (ref 3.5–5.3)
PROT SERPL-MCNC: 7.1 G/DL — SIGNIFICANT CHANGE UP (ref 6.6–8.7)
PROT UR-MCNC: NEGATIVE MG/DL — SIGNIFICANT CHANGE UP
PROTHROM AB SERPL-ACNC: 11.2 SEC — SIGNIFICANT CHANGE UP (ref 10.6–13.6)
RBC # BLD: 3.62 M/UL — LOW (ref 3.8–5.2)
RBC # BLD: 3.78 M/UL — LOW (ref 3.8–5.2)
RBC # FLD: 12.7 % — SIGNIFICANT CHANGE UP (ref 10.3–14.5)
RBC # FLD: 12.9 % — SIGNIFICANT CHANGE UP (ref 10.3–14.5)
SODIUM SERPL-SCNC: 125 MMOL/L — LOW (ref 135–145)
SODIUM SERPL-SCNC: 130 MMOL/L — LOW (ref 135–145)
SP GR SPEC: 1 — LOW (ref 1.01–1.02)
TROPONIN T SERPL-MCNC: <0.01 NG/ML — SIGNIFICANT CHANGE UP (ref 0–0.06)
TSH SERPL-MCNC: 2.71 UIU/ML — SIGNIFICANT CHANGE UP (ref 0.27–4.2)
UROBILINOGEN FLD QL: NEGATIVE MG/DL — SIGNIFICANT CHANGE UP
WBC # BLD: 4.18 K/UL — SIGNIFICANT CHANGE UP (ref 3.8–10.5)
WBC # BLD: 5.58 K/UL — SIGNIFICANT CHANGE UP (ref 3.8–10.5)
WBC # FLD AUTO: 4.18 K/UL — SIGNIFICANT CHANGE UP (ref 3.8–10.5)
WBC # FLD AUTO: 5.58 K/UL — SIGNIFICANT CHANGE UP (ref 3.8–10.5)

## 2020-08-03 PROCEDURE — 99223 1ST HOSP IP/OBS HIGH 75: CPT

## 2020-08-03 PROCEDURE — 72131 CT LUMBAR SPINE W/O DYE: CPT | Mod: 26

## 2020-08-03 PROCEDURE — 99285 EMERGENCY DEPT VISIT HI MDM: CPT

## 2020-08-03 PROCEDURE — 74176 CT ABD & PELVIS W/O CONTRAST: CPT | Mod: 26

## 2020-08-03 PROCEDURE — 93970 EXTREMITY STUDY: CPT | Mod: 26

## 2020-08-03 PROCEDURE — 22310 CLOSED TX VERT FX W/O MANJ: CPT

## 2020-08-03 PROCEDURE — 99221 1ST HOSP IP/OBS SF/LOW 40: CPT | Mod: 57

## 2020-08-03 PROCEDURE — 71045 X-RAY EXAM CHEST 1 VIEW: CPT | Mod: 26

## 2020-08-03 PROCEDURE — 70450 CT HEAD/BRAIN W/O DYE: CPT | Mod: 26

## 2020-08-03 RX ORDER — GABAPENTIN 400 MG/1
1 CAPSULE ORAL
Qty: 0 | Refills: 0 | DISCHARGE

## 2020-08-03 RX ORDER — LIDOCAINE 4 G/100G
1 CREAM TOPICAL DAILY
Refills: 0 | Status: DISCONTINUED | OUTPATIENT
Start: 2020-08-03 | End: 2020-08-08

## 2020-08-03 RX ORDER — HYDRALAZINE HCL 50 MG
25 TABLET ORAL
Refills: 0 | Status: DISCONTINUED | OUTPATIENT
Start: 2020-08-03 | End: 2020-08-10

## 2020-08-03 RX ORDER — DIAZEPAM 5 MG
5 TABLET ORAL ONCE
Refills: 0 | Status: DISCONTINUED | OUTPATIENT
Start: 2020-08-03 | End: 2020-08-03

## 2020-08-03 RX ORDER — GABAPENTIN 400 MG/1
200 CAPSULE ORAL THREE TIMES A DAY
Refills: 0 | Status: DISCONTINUED | OUTPATIENT
Start: 2020-08-03 | End: 2020-08-10

## 2020-08-03 RX ORDER — HEPARIN SODIUM 5000 [USP'U]/ML
5000 INJECTION INTRAVENOUS; SUBCUTANEOUS EVERY 8 HOURS
Refills: 0 | Status: DISCONTINUED | OUTPATIENT
Start: 2020-08-03 | End: 2020-08-10

## 2020-08-03 RX ORDER — OXYBUTYNIN CHLORIDE 5 MG
5 TABLET ORAL
Refills: 0 | Status: DISCONTINUED | OUTPATIENT
Start: 2020-08-03 | End: 2020-08-07

## 2020-08-03 RX ORDER — SIMVASTATIN 20 MG/1
20 TABLET, FILM COATED ORAL AT BEDTIME
Refills: 0 | Status: DISCONTINUED | OUTPATIENT
Start: 2020-08-03 | End: 2020-08-10

## 2020-08-03 RX ORDER — ERGOCALCIFEROL 1.25 MG/1
50000 CAPSULE ORAL
Refills: 0 | Status: DISCONTINUED | OUTPATIENT
Start: 2020-08-03 | End: 2020-08-08

## 2020-08-03 RX ORDER — POTASSIUM CHLORIDE 20 MEQ
10 PACKET (EA) ORAL ONCE
Refills: 0 | Status: COMPLETED | OUTPATIENT
Start: 2020-08-03 | End: 2020-08-03

## 2020-08-03 RX ORDER — TAMSULOSIN HYDROCHLORIDE 0.4 MG/1
0.4 CAPSULE ORAL AT BEDTIME
Refills: 0 | Status: DISCONTINUED | OUTPATIENT
Start: 2020-08-03 | End: 2020-08-10

## 2020-08-03 RX ORDER — SODIUM CHLORIDE 9 MG/ML
1000 INJECTION INTRAMUSCULAR; INTRAVENOUS; SUBCUTANEOUS ONCE
Refills: 0 | Status: COMPLETED | OUTPATIENT
Start: 2020-08-03 | End: 2020-08-03

## 2020-08-03 RX ORDER — POTASSIUM CHLORIDE 20 MEQ
40 PACKET (EA) ORAL ONCE
Refills: 0 | Status: COMPLETED | OUTPATIENT
Start: 2020-08-03 | End: 2020-08-03

## 2020-08-03 RX ORDER — MORPHINE SULFATE 50 MG/1
2 CAPSULE, EXTENDED RELEASE ORAL ONCE
Refills: 0 | Status: DISCONTINUED | OUTPATIENT
Start: 2020-08-03 | End: 2020-08-03

## 2020-08-03 RX ORDER — LAMOTRIGINE 25 MG/1
50 TABLET, ORALLY DISINTEGRATING ORAL
Refills: 0 | Status: DISCONTINUED | OUTPATIENT
Start: 2020-08-03 | End: 2020-08-10

## 2020-08-03 RX ORDER — SODIUM CHLORIDE 9 MG/ML
1000 INJECTION INTRAMUSCULAR; INTRAVENOUS; SUBCUTANEOUS
Refills: 0 | Status: COMPLETED | OUTPATIENT
Start: 2020-08-03 | End: 2020-08-04

## 2020-08-03 RX ORDER — CEFTRIAXONE 500 MG/1
1000 INJECTION, POWDER, FOR SOLUTION INTRAMUSCULAR; INTRAVENOUS ONCE
Refills: 0 | Status: COMPLETED | OUTPATIENT
Start: 2020-08-03 | End: 2020-08-03

## 2020-08-03 RX ORDER — FOLIC ACID 0.8 MG
1 TABLET ORAL
Qty: 0 | Refills: 0 | DISCHARGE

## 2020-08-03 RX ORDER — SIMVASTATIN 20 MG/1
1 TABLET, FILM COATED ORAL
Qty: 0 | Refills: 0 | DISCHARGE

## 2020-08-03 RX ORDER — ASPIRIN/CALCIUM CARB/MAGNESIUM 324 MG
81 TABLET ORAL DAILY
Refills: 0 | Status: DISCONTINUED | OUTPATIENT
Start: 2020-08-03 | End: 2020-08-10

## 2020-08-03 RX ORDER — TRAMADOL HYDROCHLORIDE 50 MG/1
25 TABLET ORAL EVERY 6 HOURS
Refills: 0 | Status: DISCONTINUED | OUTPATIENT
Start: 2020-08-03 | End: 2020-08-06

## 2020-08-03 RX ORDER — ACETAMINOPHEN 500 MG
975 TABLET ORAL EVERY 8 HOURS
Refills: 0 | Status: COMPLETED | OUTPATIENT
Start: 2020-08-03 | End: 2020-08-05

## 2020-08-03 RX ORDER — IRON BG/IRON PS CPLX/C/CA-THR 150MG-50MG
1 CAPSULE ORAL
Qty: 0 | Refills: 0 | DISCHARGE

## 2020-08-03 RX ADMIN — SODIUM CHLORIDE 1000 MILLILITER(S): 9 INJECTION INTRAMUSCULAR; INTRAVENOUS; SUBCUTANEOUS at 11:30

## 2020-08-03 RX ADMIN — MORPHINE SULFATE 2 MILLIGRAM(S): 50 CAPSULE, EXTENDED RELEASE ORAL at 12:55

## 2020-08-03 RX ADMIN — HEPARIN SODIUM 5000 UNIT(S): 5000 INJECTION INTRAVENOUS; SUBCUTANEOUS at 22:14

## 2020-08-03 RX ADMIN — MORPHINE SULFATE 2 MILLIGRAM(S): 50 CAPSULE, EXTENDED RELEASE ORAL at 13:15

## 2020-08-03 RX ADMIN — Medication 975 MILLIGRAM(S): at 23:07

## 2020-08-03 RX ADMIN — Medication 40 MILLIEQUIVALENT(S): at 11:40

## 2020-08-03 RX ADMIN — LIDOCAINE 1 PATCH: 4 CREAM TOPICAL at 18:23

## 2020-08-03 RX ADMIN — Medication 975 MILLIGRAM(S): at 22:15

## 2020-08-03 RX ADMIN — GABAPENTIN 200 MILLIGRAM(S): 400 CAPSULE ORAL at 22:15

## 2020-08-03 RX ADMIN — CEFTRIAXONE 100 MILLIGRAM(S): 500 INJECTION, POWDER, FOR SOLUTION INTRAMUSCULAR; INTRAVENOUS at 11:35

## 2020-08-03 RX ADMIN — CEFTRIAXONE 1000 MILLIGRAM(S): 500 INJECTION, POWDER, FOR SOLUTION INTRAMUSCULAR; INTRAVENOUS at 11:45

## 2020-08-03 RX ADMIN — MORPHINE SULFATE 2 MILLIGRAM(S): 50 CAPSULE, EXTENDED RELEASE ORAL at 11:43

## 2020-08-03 RX ADMIN — SODIUM CHLORIDE 1000 MILLILITER(S): 9 INJECTION INTRAMUSCULAR; INTRAVENOUS; SUBCUTANEOUS at 13:00

## 2020-08-03 RX ADMIN — SODIUM CHLORIDE 1000 MILLILITER(S): 9 INJECTION INTRAMUSCULAR; INTRAVENOUS; SUBCUTANEOUS at 11:35

## 2020-08-03 RX ADMIN — SODIUM CHLORIDE 1000 MILLILITER(S): 9 INJECTION INTRAMUSCULAR; INTRAVENOUS; SUBCUTANEOUS at 10:24

## 2020-08-03 RX ADMIN — Medication 5 MILLIGRAM(S): at 18:24

## 2020-08-03 RX ADMIN — Medication 100 MILLIEQUIVALENT(S): at 11:40

## 2020-08-03 RX ADMIN — Medication 10 MILLIEQUIVALENT(S): at 13:03

## 2020-08-03 RX ADMIN — SIMVASTATIN 20 MILLIGRAM(S): 20 TABLET, FILM COATED ORAL at 22:14

## 2020-08-03 RX ADMIN — LAMOTRIGINE 50 MILLIGRAM(S): 25 TABLET, ORALLY DISINTEGRATING ORAL at 18:23

## 2020-08-03 RX ADMIN — Medication 5 MILLIGRAM(S): at 18:01

## 2020-08-03 RX ADMIN — TAMSULOSIN HYDROCHLORIDE 0.4 MILLIGRAM(S): 0.4 CAPSULE ORAL at 22:14

## 2020-08-03 RX ADMIN — MORPHINE SULFATE 2 MILLIGRAM(S): 50 CAPSULE, EXTENDED RELEASE ORAL at 12:00

## 2020-08-03 RX ADMIN — Medication 25 MILLIGRAM(S): at 18:23

## 2020-08-03 NOTE — CONSULT NOTE ADULT - ATTENDING COMMENTS
Attending statement:  I have personally seen this patient, and formed a face to face diagnostic evaluation on this patient on this date.  I have reviewed the PA, NP and or Medical/PA student and/or Resident documentation and agree with the history, physical exam and plan of care except if noted otherwise.     CAT scans have been reviewed it shows acute L1 compression fracture. LSO bracing will be recommended. With regard to weakness and not convinced that this is myelopathic-type presentation CT of the abdomen shows no acute mid to lower thoracic pathology on clinical exam patient is no pathologic reflexes. Patient be followed throughout her stay here at [facility] leg weakness is still persistent and she fails to progress with physical therapy cervical imaging will be obtained

## 2020-08-03 NOTE — ED ADULT NURSE NOTE - INTERVENTIONS DEFINITIONS
Non-slip footwear when patient is off stretcher/Amanda to call system/Call bell, personal items and telephone within reach/Stretcher in lowest position, wheels locked, appropriate side rails in place

## 2020-08-03 NOTE — ED PROVIDER NOTE - CARE PLAN
Principal Discharge DX:	Acute renal failure  Secondary Diagnosis:	Urinary retention  Secondary Diagnosis:	Fracture of lumbar spine

## 2020-08-03 NOTE — ED PROVIDER NOTE - ATTENDING CONTRIBUTION TO CARE
I personally saw the patient with the resident, and completed the key components of the history and physical exam. I then discussed the management plan with the resident.      81 yo female pmh htn, cad, depression brought to ed with fall x 2 today; pt noted with generalized weakness and slower in mental status x 3 weeks;  During vist patient complained of abdominal pain with pain in legs;  pe awake alert  in nad; heent ncat neck supple cor s1 s2 lungs clear abd soft mild suprapubic tenderness;  neuro lower extremites 4/5 strength;  back nontender; ; dx fall ;  eval head, abd pelvis ; urinalysis , labs , ivf and reeval; ct noted age indeterminent fx L1 ; spine consult;

## 2020-08-03 NOTE — H&P ADULT - NSHPPHYSICALEXAM_GEN_ALL_CORE
Vital Signs Last 24 Hrs  T(C): 35.6 (03 Aug 2020 10:22), Max: 36.5 (03 Aug 2020 09:45)  T(F): 96 (03 Aug 2020 10:22), Max: 97.7 (03 Aug 2020 09:45)  HR: 71 (03 Aug 2020 11:44) (54 - 71)  BP: 155/74 (03 Aug 2020 11:44) (84/44 - 155/74)  BP(mean): --  RR: 18 (03 Aug 2020 11:44) (18 - 20)  SpO2: 96% (03 Aug 2020 11:44) (95% - 98%)

## 2020-08-03 NOTE — ED PROVIDER NOTE - CLINICAL SUMMARY MEDICAL DECISION MAKING FREE TEXT BOX
weakness, recent falls, shortness of breath chf pe covid uti     follows with dr adkins (oxybutynin, flomax) Ms. Avalos is an 80 year old female with a history of parkinson's disease (followed by Dr. Moore), hypertension, CAD (s/p stent), urinary retention (followed by Dr. Begum) and depression who presents with 3 weeks of altered mental status. Complained of chest pain and shortness of breath.  Differential included shortness of breath v PE v COVID.  stated patient has been more weak, with recent falls (2x today). Later in ED stay patient complained of abdominal and leg pain. Labs remarkable for hct of 32.7->31.8, Na of 125->130, and K of 2.9->3.8, and creat 1.5->1.7. Trop and lactate negative. CT A/P significant for L1 compression fracture and diverticulosis. Patient will be admitted for further care of her ARF, urinary retention, MRI of L-spine, r/o GIB.

## 2020-08-03 NOTE — ED CLERICAL - NS ED CLERK UNITS
ANY PUI MON PUI MON/NEEDS COVID SWAB ORDER MON/PUI MON ANY [facility] [id number] [address] vs [facility] [id number]

## 2020-08-03 NOTE — ED PROVIDER NOTE - PHYSICAL EXAMINATION
General: elderly female lying in bed with eyes closed, easily arousable, NAD   Head:  NC, AT  Eyes: EOMI, no scleral icterus  Ears: no erythema/drainage  Nose: midline, no bleeding/drainage  Throat: MMM  Cardiac: RRR, no m/r/g, 1+ lower extremity edema  Respiratory: CTABL, no wheezes/rales/rhonchi, equal chest wall expansions  Abdomen: soft, slightly distended, NT, no rebound tenderness, no guarding, nonperitonitic  MSK/Vascular: full ROM, distal pulses intact, soft compartments, warm extremities  Neuro: AAOx3, strength 5/5, sensation to light touch intact  Psych: calm, cooperative, normal affect

## 2020-08-03 NOTE — H&P ADULT - RS GEN PE MLT RESP DETAILS PC
no wheezes/airway patent/no rales/breath sounds equal/respirations non-labored/clear to auscultation bilaterally

## 2020-08-03 NOTE — ED PROVIDER NOTE - OBJECTIVE STATEMENT
Ms. Avalos is an 80 year old female with a history of hypertension, CAD (s/p stent), and depresssion who presents with 3 weeks of altered mental status and generalized weakness. In trauma bay patient complains of chest pain and shortness of breath. Denies fever and abdominal pain. History limited. Ms. Avalos is an 80 year old female with a history of hypertension, CAD (s/p stent), and depression who presents with 3 weeks of altered mental status and generalized weakness. In trauma bay patient complains of chest pain and shortness of breath. Denies fever and abdominal pain.     Later during ED visit patient complained of abdominal pain and leg pain. Per , patient fell 2x today - once on the deck, her speech has been slower, and she has had worse memory. Patient is a retired RN.

## 2020-08-03 NOTE — ED ADULT NURSE REASSESSMENT NOTE - NS ED NURSE REASSESS COMMENT FT1
report given to SANCHEZ De La Cruz in Main ED. pt transported to [facility] in stable condition by RN.

## 2020-08-03 NOTE — ED ADULT NURSE REASSESSMENT NOTE - NS ED NURSE REASSESS COMMENT FT1
Assumed pt care at this time. Pt resting comfortably in stretcher, A&Ox3, NAD noted, respirations even and nonlabored, NSR on monitor. Pt c/o back pain, will make MD aware.

## 2020-08-03 NOTE — CONSULT NOTE ADULT - SUBJECTIVE AND OBJECTIVE BOX
Pt Name: CATHLEEN CRUZ    MRN: [id number]      Patient is a 80y Female presenting to the emergency department with a chief complaint of SOB. The patient states that while laying in bed in the ED shes had increased lower back pain. Denies any radiation of pain into her lower extremities.  The patient has baseline b/l LE weakness secondary to Parkinson's dz. Denies any other acute injuries.     HEALTH ISSUES - PROBLEM Dx:    REVIEW OF SYSTEMS      General: Alert, responsive, in NAD    Skin/Breast: No rashes, no pruritis   	  Ophthalmologic: No visual changes. No redness.   	  ENMT:	No discharge. No swelling.    Respiratory and Thorax: No difficulty breathing. No cough.  	   Cardiovascular:	No chest pain. No palpitations.    Gastrointestinal:	 No abdominal pain. No diarrhea.     Genitourinary: No dysuria. No bleeding.    Musculoskeletal: SEE HPI.    Neurological: No sensory or motor changes.     Psychiatric: No anxiety or depression.    Hematology/Lymphatics: No swelling.    Endocrine: No Hx of diabetes.    ROS is otherwise negative.    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  Depression  HTN (hypertension)  No significant past surgical history      Allergies: No Known Allergies      Medications: aspirin enteric coated 81 milliGRAM(s) Oral daily  ergocalciferol 35194 Unit(s) Oral every week  gabapentin 200 milliGRAM(s) Oral three times a day  heparin   Injectable 5000 Unit(s) SubCutaneous every 8 hours  hydrALAZINE 25 milliGRAM(s) Oral two times a day  lamoTRIgine 50 milliGRAM(s) Oral two times a day  lidocaine   Patch 1 Patch Transdermal daily  oxybutynin 5 milliGRAM(s) Oral two times a day  simvastatin 20 milliGRAM(s) Oral at bedtime  tamsulosin 0.4 milliGRAM(s) Oral at bedtime      FAMILY HISTORY:  Family history of cancer in father: he  at age 80&#x27;s  : non-contributory    Social History:     Ambulation: Walking independently with walker at baseline                        10.6   5.58  )-----------( 176      ( 03 Aug 2020 14:45 )             31.8     08-03    130<L>  |  88<L>  |  30.0<H>  ----------------------------<  105<H>  3.8   |  31.0<H>  |  1.51<H>    Ca    9.2      03 Aug 2020 14:45    TPro  7.1  /  Alb  4.4  /  TBili  0.4  /  DBili  x   /  AST  37<H>  /  ALT  21  /  AlkPhos  105        PHYSICAL EXAM:    Vital Signs Last 24 Hrs  T(C): 35.6 (03 Aug 2020 10:22), Max: 36.5 (03 Aug 2020 09:45)  T(F): 96 (03 Aug 2020 10:22), Max: 97.7 (03 Aug 2020 09:45)  HR: 64 (03 Aug 2020 18:12) (54 - 71)  BP: 127/68 (03 Aug 2020 18:12) (84/44 - 155/74)  BP(mean): --  RR: 18 (03 Aug 2020 11:44) (18 - 20)  SpO2: 96% (03 Aug 2020 11:44) (95% - 98%)  Daily Height in cm: 172.72 (03 Aug 2020 09:45)    Daily     Appearance: Alert, responsive, in no acute distress.    Skin: no rash on visible skin. Skin is clean, dry and intact. No bleeding. No abrasions. No ulcerations.    Vascular: 2+ distal pulses. Cap refill < 2 sec. No signs of venous insuffiency or stasis. No extremity ulcerations. No cyanosis.    Musculoskeletal:         Left Upper Extremity: Atraumatic with normal alignment NROM. No crepitus. No bony tenderness.        Right Upper Extremity: Atraumatic with normal alignment NROM. No crepitus. No bony tenderness.        Left Lower Extremity: Atraumatic with normal alignment NROM. No crepitus. No bony tenderness.        Right Lower Extremity: Atraumatic with normal alignment NROM. No crepitus. No bony tenderness.     Neurological: Sensation is grossly intact to light touch. No focal deficits or weaknesses found.                [ ] Lower extremeity          HF(l2)   KE(l3)    TA(l4)   EHL(l5)  GS(s1)                                       R        5/5        5/5        4/5       4/5         5/5                                    L         5/5        5/5       4/5       4/5          5/5            Imaging Studies: Imaging Studies: < from: CT Lumbar Spine Reform No Cont (20 @ 17:21) >  EXAM:  CT REFORM SPINE L                          PROCEDURE DATE:  2020          INTERPRETATION:  CLINICAL INFORMATION: REFORMAT ct a/p. patient noted to have L1 fracture on ct a/p. Please REFORMATfor L-spine.. .    TECHNIQUE: Noncontrast CTscan of the lumbar spine was was reformatted from same-day CT abdomen and pelvis. Thin section axial images were obtained with sagittal and coronal reformations.    COMPARISON: CT chest abdomen and pelvis 8/3/2020.  CT abdomen and pelvis 2018.    FINDINGS:    Age-indeterminate fracture of the superior L1 vertebral body with bony retropulsion. Remaining lumbar vertebral body heights are within normal limits. Fracture of the right L1 transverse process with corticated margins suggesting chronicity. Lumbar scoliosis. Intervertebral disc height loss with degenerative endplate changes at L4-5.    There is no prevertebral soft tissue swelling. The paraspinal soft tissues are unremarkable. Colonic diverticulosis.      IMPRESSION:  1.  Age-indeterminate fracture of the superior L1 vertebral body with bony retropulsion.  2.  Fracture of the right L1 transverse process with corticated margins suggesting chronicity.      HERBERTH DOMÍNGUEZ M.D., ATTENDING RADIOLOGIST  This document has been electronically signed. Aug  3 2020  5:51PM    A/P:  Pt is a 80y Female with L1 compression fx and L1 transverse process fx, chronic    PLAN:  * Pain control  * f/u Dr. Lizarraga plan Pt Name: CATHLEEN CRUZ    MRN: [id number]      Patient is a 80y Female presenting to the emergency department with a chief complaint of SOB. The patient states that while laying in bed in the ED shes had increased lower back pain. Denies any radiation of pain into her lower extremities.  The patient has baseline b/l LE weakness secondary to Parkinson's dz. Denies any other acute injuries.     HEALTH ISSUES - PROBLEM Dx:    REVIEW OF SYSTEMS      General: Alert, responsive, in NAD    Skin/Breast: No rashes, no pruritis   	  Ophthalmologic: No visual changes. No redness.   	  ENMT:	No discharge. No swelling.    Respiratory and Thorax: No difficulty breathing. No cough.  	   Cardiovascular:	No chest pain. No palpitations.    Gastrointestinal:	 No abdominal pain. No diarrhea.     Genitourinary: No dysuria. No bleeding.    Musculoskeletal: SEE HPI.    Neurological: No sensory or motor changes.     Psychiatric: No anxiety or depression.    Hematology/Lymphatics: No swelling.    Endocrine: No Hx of diabetes.    ROS is otherwise negative.    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  Depression  HTN (hypertension)  No significant past surgical history      Allergies: No Known Allergies      Medications: aspirin enteric coated 81 milliGRAM(s) Oral daily  ergocalciferol 30895 Unit(s) Oral every week  gabapentin 200 milliGRAM(s) Oral three times a day  heparin   Injectable 5000 Unit(s) SubCutaneous every 8 hours  hydrALAZINE 25 milliGRAM(s) Oral two times a day  lamoTRIgine 50 milliGRAM(s) Oral two times a day  lidocaine   Patch 1 Patch Transdermal daily  oxybutynin 5 milliGRAM(s) Oral two times a day  simvastatin 20 milliGRAM(s) Oral at bedtime  tamsulosin 0.4 milliGRAM(s) Oral at bedtime      FAMILY HISTORY:  Family history of cancer in father: he  at age 80&#x27;s  : non-contributory    Social History:     Ambulation: Walking independently with walker at baseline                        10.6   5.58  )-----------( 176      ( 03 Aug 2020 14:45 )             31.8     08-03    130<L>  |  88<L>  |  30.0<H>  ----------------------------<  105<H>  3.8   |  31.0<H>  |  1.51<H>    Ca    9.2      03 Aug 2020 14:45    TPro  7.1  /  Alb  4.4  /  TBili  0.4  /  DBili  x   /  AST  37<H>  /  ALT  21  /  AlkPhos  105        PHYSICAL EXAM:    Vital Signs Last 24 Hrs  T(C): 35.6 (03 Aug 2020 10:22), Max: 36.5 (03 Aug 2020 09:45)  T(F): 96 (03 Aug 2020 10:22), Max: 97.7 (03 Aug 2020 09:45)  HR: 64 (03 Aug 2020 18:12) (54 - 71)  BP: 127/68 (03 Aug 2020 18:12) (84/44 - 155/74)  BP(mean): --  RR: 18 (03 Aug 2020 11:44) (18 - 20)  SpO2: 96% (03 Aug 2020 11:44) (95% - 98%)  Daily Height in cm: 172.72 (03 Aug 2020 09:45)    Daily     Appearance: Alert, responsive, in no acute distress.    Skin: no rash on visible skin. Skin is clean, dry and intact. No bleeding. No abrasions. No ulcerations.    Vascular: 2+ distal pulses. Cap refill < 2 sec. No signs of venous insuffiency or stasis. No extremity ulcerations. No cyanosis.    Musculoskeletal:         Left Upper Extremity: Atraumatic with normal alignment NROM. No crepitus. No bony tenderness.        Right Upper Extremity: Atraumatic with normal alignment NROM. No crepitus. No bony tenderness.        Left Lower Extremity: Atraumatic with normal alignment NROM. No crepitus. No bony tenderness.        Right Lower Extremity: Atraumatic with normal alignment NROM. No crepitus. No bony tenderness.     Neurological: Sensation is grossly intact to light touch. No focal deficits or weaknesses found.                [ ] Lower extremeity          HF(l2)   KE(l3)    TA(l4)   EHL(l5)  GS(s1)                                       R        5/5        5/5        4/5       4/5         5/5                                    L         5/5        5/5       4/5       4/5          5/5            Imaging Studies: Imaging Studies: < from: CT Lumbar Spine Reform No Cont (20 @ 17:21) >  EXAM:  CT REFORM SPINE L                          PROCEDURE DATE:  2020          INTERPRETATION:  CLINICAL INFORMATION: REFORMAT ct a/p. patient noted to have L1 fracture on ct a/p. Please REFORMATfor L-spine.. .    TECHNIQUE: Noncontrast CTscan of the lumbar spine was was reformatted from same-day CT abdomen and pelvis. Thin section axial images were obtained with sagittal and coronal reformations.    COMPARISON: CT chest abdomen and pelvis 8/3/2020.  CT abdomen and pelvis 2018.    FINDINGS:    Age-indeterminate fracture of the superior L1 vertebral body with bony retropulsion. Remaining lumbar vertebral body heights are within normal limits. Fracture of the right L1 transverse process with corticated margins suggesting chronicity. Lumbar scoliosis. Intervertebral disc height loss with degenerative endplate changes at L4-5.    There is no prevertebral soft tissue swelling. The paraspinal soft tissues are unremarkable. Colonic diverticulosis.      IMPRESSION:  1.  Age-indeterminate fracture of the superior L1 vertebral body with bony retropulsion.  2.  Fracture of the right L1 transverse process with corticated margins suggesting chronicity.      HERBERTH DOMÍNGUEZ M.D., ATTENDING RADIOLOGIST  This document has been electronically signed. Aug  3 2020  5:51PM    A/P:  Pt is a 80y Female with L1 compression fx and L1 transverse process fx, chronic    PLAN:  * Pain control  * f/u Dr. Lizarraga plan- will evaluate pt in a.m.

## 2020-08-03 NOTE — H&P ADULT - NSHPLABSRESULTS_GEN_ALL_CORE
10.6   5.58  )-----------( 176      ( 03 Aug 2020 14:45 )             31.8   08-03    130<L>  |  88<L>  |  30.0<H>  ----------------------------<  105<H>  3.8   |  31.0<H>  |  1.51<H>    Ca    9.2      03 Aug 2020 14:45    TPro  7.1  /  Alb  4.4  /  TBili  0.4  /  DBili  x   /  AST  37<H>  /  ALT  21  /  AlkPhos  105  08-03      < from: CT Lumbar Spine Reform No Cont (08.03.20 @ 17:21) >      IMPRESSION:  1.  Age-indeterminate fracture of the superior L1 vertebral body with bony retropulsion.  2.  Fracture of the right L1 transverse process with corticated margins suggesting chronicity.        < end of copied text >

## 2020-08-03 NOTE — ED ADULT NURSE REASSESSMENT NOTE - NS ED NURSE REASSESS COMMENT FT1
pt. received from CC. pt. a&ox2, confused to date. pt. states she came in because she could not breathe. pt. states she feels sob. pt. sating well 96%RA, 2LNC applied. MD. Harp advised. pt. in no apparent distress at this time, breathing even and unlabored.

## 2020-08-03 NOTE — H&P ADULT - ASSESSMENT
81 yo F with parkinson dx , h/o CAD , bladder disaese admitted for frequent falls, weakness , SOB found to have hyponatremia, L1 vertebra fracture , hypotensive on admission with high cr , dehydration

## 2020-08-03 NOTE — ED ADULT NURSE REASSESSMENT NOTE - NS ED NURSE REASSESS COMMENT FT1
MD. Harp advised of pain. awaiting med order. MD. Harp advised of pain. pt. c/o umbilical pain and leg pain, abdomen appears firm. awaiting med order.

## 2020-08-03 NOTE — ED ADULT NURSE NOTE - OBJECTIVE STATEMENT
assumed care of pt in CC room as ED CC RN @ 1030. received pt a&ox3, no acute distress, breaths even and unlabored, lethargic at this time. pt presents c/o generalized weakness, lethargy, sob and chest tightness. reports was recently tested for COVID and resulted negative about 1 week ago. denies fevers or chills at home. denies cough. denies recent sick contacts.

## 2020-08-03 NOTE — H&P ADULT - NSICDXPASTMEDICALHX_GEN_ALL_CORE_FT
PAST MEDICAL HISTORY:  Depression     HTN (hypertension)     Ovarian benign neoplasm with S/p Total  Hysterectomy    Parkinson's disease

## 2020-08-03 NOTE — ED PROVIDER NOTE - NS ED ROS FT
Constitutional: no fever, sweats, and no chills.  Eyes: no pain, no redness, and no visual changes.  ENMT: no ear pain and no hearing problems, no nasal congestion/drainage, no dysphagia, and no throat pain, no neck pain, no stiffness  CV: +chest pain, no edema.  Resp: no cough, +dyspnea  GI: no abdominal pain, no bloating, no constipation, no diarrhea, no nausea and no vomiting.  : no dysuria, no hematuria  MSK: no msk pain, no weakness  Skin: no lesions, and no rashes.  Neuro: no LOC, no headache, no sensory deficits, and no weakness.

## 2020-08-03 NOTE — H&P ADULT - HISTORY OF PRESENT ILLNESS
81 yo F with h/o Parkinson dx , HTN and CAD was brought to the Ed with c/o falls at home and confusion, generalized weakness , per ED and husbnad reports pt had fallen at home today x 2 , she was c/o abd pain nad SOB , at present denies any chest pain , or SOB , has lower back pain . ON arrival to the ED BP is low , good pulse oximetry , lab showed hyponatremia and elevated cr  , given iv fluid in the ED  BP improves , repeat labs na level up to 130 , images showed L! compression  fracture . Pt is poor historian  , she is with urinary issues followed by Dr Begum , in the ED field inserted 500 cc urine obtained .

## 2020-08-04 DIAGNOSIS — S32.009A UNSPECIFIED FRACTURE OF UNSPECIFIED LUMBAR VERTEBRA, INITIAL ENCOUNTER FOR CLOSED FRACTURE: ICD-10-CM

## 2020-08-04 LAB
ANION GAP SERPL CALC-SCNC: 14 MMOL/L — SIGNIFICANT CHANGE UP (ref 5–17)
BUN SERPL-MCNC: 26 MG/DL — HIGH (ref 8–20)
CALCIUM SERPL-MCNC: 9.3 MG/DL — SIGNIFICANT CHANGE UP (ref 8.6–10.2)
CHLORIDE SERPL-SCNC: 93 MMOL/L — LOW (ref 98–107)
CO2 SERPL-SCNC: 27 MMOL/L — SIGNIFICANT CHANGE UP (ref 22–29)
CREAT ?TM UR-MCNC: 77 MG/DL — SIGNIFICANT CHANGE UP
CREAT SERPL-MCNC: 1.3 MG/DL — SIGNIFICANT CHANGE UP (ref 0.5–1.3)
CULTURE RESULTS: NO GROWTH — SIGNIFICANT CHANGE UP
GLUCOSE SERPL-MCNC: 90 MG/DL — SIGNIFICANT CHANGE UP (ref 70–99)
MAGNESIUM SERPL-MCNC: 2.2 MG/DL — SIGNIFICANT CHANGE UP (ref 1.6–2.6)
OSMOLALITY SERPL: 292 MOSMOL/KG — SIGNIFICANT CHANGE UP (ref 280–301)
OSMOLALITY UR: 288 MOSM/KG — LOW (ref 300–1000)
POTASSIUM SERPL-MCNC: 3.6 MMOL/L — SIGNIFICANT CHANGE UP (ref 3.5–5.3)
POTASSIUM SERPL-SCNC: 3.6 MMOL/L — SIGNIFICANT CHANGE UP (ref 3.5–5.3)
SARS-COV-2 IGG SERPL QL IA: NEGATIVE — SIGNIFICANT CHANGE UP
SARS-COV-2 IGM SERPL IA-ACNC: <0.1 INDEX — SIGNIFICANT CHANGE UP
SODIUM SERPL-SCNC: 133 MMOL/L — LOW (ref 135–145)
SODIUM UR-SCNC: 33 MMOL/L — SIGNIFICANT CHANGE UP
SPECIMEN SOURCE: SIGNIFICANT CHANGE UP

## 2020-08-04 PROCEDURE — 99232 SBSQ HOSP IP/OBS MODERATE 35: CPT

## 2020-08-04 RX ORDER — KETOROLAC TROMETHAMINE 30 MG/ML
15 SYRINGE (ML) INJECTION EVERY 8 HOURS
Refills: 0 | Status: DISCONTINUED | OUTPATIENT
Start: 2020-08-04 | End: 2020-08-04

## 2020-08-04 RX ORDER — DIAZEPAM 5 MG
2 TABLET ORAL THREE TIMES A DAY
Refills: 0 | Status: DISCONTINUED | OUTPATIENT
Start: 2020-08-04 | End: 2020-08-06

## 2020-08-04 RX ADMIN — Medication 5 MILLIGRAM(S): at 05:03

## 2020-08-04 RX ADMIN — ERGOCALCIFEROL 50000 UNIT(S): 1.25 CAPSULE ORAL at 08:34

## 2020-08-04 RX ADMIN — TRAMADOL HYDROCHLORIDE 25 MILLIGRAM(S): 50 TABLET ORAL at 09:52

## 2020-08-04 RX ADMIN — LIDOCAINE 1 PATCH: 4 CREAM TOPICAL at 08:34

## 2020-08-04 RX ADMIN — Medication 975 MILLIGRAM(S): at 21:25

## 2020-08-04 RX ADMIN — HEPARIN SODIUM 5000 UNIT(S): 5000 INJECTION INTRAVENOUS; SUBCUTANEOUS at 13:00

## 2020-08-04 RX ADMIN — Medication 975 MILLIGRAM(S): at 05:32

## 2020-08-04 RX ADMIN — LAMOTRIGINE 50 MILLIGRAM(S): 25 TABLET, ORALLY DISINTEGRATING ORAL at 16:47

## 2020-08-04 RX ADMIN — LIDOCAINE 1 PATCH: 4 CREAM TOPICAL at 20:49

## 2020-08-04 RX ADMIN — SIMVASTATIN 20 MILLIGRAM(S): 20 TABLET, FILM COATED ORAL at 21:25

## 2020-08-04 RX ADMIN — Medication 15 MILLIGRAM(S): at 14:30

## 2020-08-04 RX ADMIN — TRAMADOL HYDROCHLORIDE 25 MILLIGRAM(S): 50 TABLET ORAL at 20:48

## 2020-08-04 RX ADMIN — HEPARIN SODIUM 5000 UNIT(S): 5000 INJECTION INTRAVENOUS; SUBCUTANEOUS at 21:26

## 2020-08-04 RX ADMIN — Medication 81 MILLIGRAM(S): at 08:34

## 2020-08-04 RX ADMIN — Medication 975 MILLIGRAM(S): at 13:00

## 2020-08-04 RX ADMIN — TRAMADOL HYDROCHLORIDE 25 MILLIGRAM(S): 50 TABLET ORAL at 12:32

## 2020-08-04 RX ADMIN — Medication 975 MILLIGRAM(S): at 05:02

## 2020-08-04 RX ADMIN — GABAPENTIN 200 MILLIGRAM(S): 400 CAPSULE ORAL at 05:02

## 2020-08-04 RX ADMIN — Medication 2 MILLIGRAM(S): at 20:48

## 2020-08-04 RX ADMIN — GABAPENTIN 200 MILLIGRAM(S): 400 CAPSULE ORAL at 13:00

## 2020-08-04 RX ADMIN — TAMSULOSIN HYDROCHLORIDE 0.4 MILLIGRAM(S): 0.4 CAPSULE ORAL at 21:25

## 2020-08-04 RX ADMIN — TRAMADOL HYDROCHLORIDE 25 MILLIGRAM(S): 50 TABLET ORAL at 04:05

## 2020-08-04 RX ADMIN — Medication 25 MILLIGRAM(S): at 16:47

## 2020-08-04 RX ADMIN — Medication 975 MILLIGRAM(S): at 13:30

## 2020-08-04 RX ADMIN — HEPARIN SODIUM 5000 UNIT(S): 5000 INJECTION INTRAVENOUS; SUBCUTANEOUS at 05:03

## 2020-08-04 RX ADMIN — TRAMADOL HYDROCHLORIDE 25 MILLIGRAM(S): 50 TABLET ORAL at 03:35

## 2020-08-04 RX ADMIN — Medication 15 MILLIGRAM(S): at 14:00

## 2020-08-04 RX ADMIN — LAMOTRIGINE 50 MILLIGRAM(S): 25 TABLET, ORALLY DISINTEGRATING ORAL at 05:03

## 2020-08-04 RX ADMIN — Medication 25 MILLIGRAM(S): at 05:02

## 2020-08-04 RX ADMIN — Medication 15 MILLIGRAM(S): at 21:25

## 2020-08-04 RX ADMIN — Medication 15 MILLIGRAM(S): at 22:25

## 2020-08-04 RX ADMIN — LIDOCAINE 1 PATCH: 4 CREAM TOPICAL at 05:04

## 2020-08-04 RX ADMIN — Medication 5 MILLIGRAM(S): at 16:47

## 2020-08-04 RX ADMIN — GABAPENTIN 200 MILLIGRAM(S): 400 CAPSULE ORAL at 21:25

## 2020-08-04 RX ADMIN — Medication 2 MILLIGRAM(S): at 16:46

## 2020-08-04 RX ADMIN — Medication 975 MILLIGRAM(S): at 22:25

## 2020-08-04 RX ADMIN — TRAMADOL HYDROCHLORIDE 25 MILLIGRAM(S): 50 TABLET ORAL at 21:25

## 2020-08-04 RX ADMIN — SODIUM CHLORIDE 85 MILLILITER(S): 9 INJECTION INTRAMUSCULAR; INTRAVENOUS; SUBCUTANEOUS at 03:49

## 2020-08-04 NOTE — PROGRESS NOTE ADULT - PROBLEM SELECTOR PLAN 1
Fit/dispensed LSO, interface socks.  Demonstrated/discussed use.  Pt. to use as directed by .  Please call [facility] with any questions, [phone].

## 2020-08-04 NOTE — CONSULT NOTE ADULT - ASSESSMENT
JESSICA   HypoNatremia JESSICA   HypoNatremia improved  Has h/o CAD HTN  Fall at home now has L1 fracture  Check Urine Na, Urine osm  Urinary retention now has field  Awaiting labs  will repeat BMP this evening    Will follow

## 2020-08-04 NOTE — CONSULT NOTE ADULT - SUBJECTIVE AND OBJECTIVE BOX
HPI:  81 yo F with h/o Parkinson dx , HTN and CAD was brought to the Ed with c/o falls at home and confusion, generalized weakness , per ED and husbnad reports pt had fallen at home today x 2 , she was c/o abd pain nad SOB , at present denies any chest pain , or SOB , has lower back pain . ON arrival to the ED BP is low , good pulse oximetry , lab showed hyponatremia and elevated cr  , given iv fluid in the ED  BP improves , repeat labs na level up to 130 , images showed L! compression  fracture . Pt is poor historian  , she is with urinary issues followed by Dr Begum , in the ED field inserted 500 cc urine obtained .      PAST MEDICAL & SURGICAL HISTORY:  Parkinson's disease  Ovarian benign neoplasm: with S/p Total  Hysterectomy  Depression  HTN (hypertension)  No significant past surgical history      FAMILY HISTORY:  Family history of cancer in father: he  at age 80&#x27;s  [place]    Social History:Non smoker    MEDICATIONS  (STANDING):  acetaminophen   Tablet .. 975 milliGRAM(s) Oral every 8 hours  aspirin enteric coated 81 milliGRAM(s) Oral daily  ergocalciferol 36443 Unit(s) Oral every week  gabapentin 200 milliGRAM(s) Oral three times a day  heparin   Injectable 5000 Unit(s) SubCutaneous every 8 hours  hydrALAZINE 25 milliGRAM(s) Oral two times a day  lamoTRIgine 50 milliGRAM(s) Oral two times a day  lidocaine   Patch 1 Patch Transdermal daily  oxybutynin 5 milliGRAM(s) Oral two times a day  simvastatin 20 milliGRAM(s) Oral at bedtime  tamsulosin 0.4 milliGRAM(s) Oral at bedtime    MEDICATIONS  (PRN):  traMADol 25 milliGRAM(s) Oral every 6 hours PRN Severe Pain (7 - 10)   Meds reviewed    Vital Signs Last 24 Hrs  T(C): 36.6 (04 Aug 2020 08:02), Max: 36.6 (04 Aug 2020 08:02)  T(F): 97.8 (04 Aug 2020 08:02), Max: 97.8 (04 Aug 2020 08:02)  HR: 75 (04 Aug 2020 08:02) (54 - 75)  BP: 129/70 (04 Aug 2020 08:02) (84/44 - 155/74)  BP(mean): --  RR: 17 (04 Aug 2020 08:02) (17 - 20)  SpO2: 100% (04 Aug 2020 08:02) (95% - 100%)  Daily Height in cm: 172.72 (03 Aug 2020 09:45)    Daily     PHYSICAL EXAM:    GENERAL: appears chronically ill  HEAD:  Atraumatic, Normocephalic  EYES: EOMI  NECK: Supple, neck  veins full  NERVOUS SYSTEM:  Alert & Oriented X3  CHEST/LUNG: Clear to percussion bilaterally  HEART: Regular rate and rhythm  ABDOMEN: Soft, Nontender, Nondistended; Bowel sounds present  EXTREMITIES:   edema      LABS:                        10.6   5.58  )-----------( 176      ( 03 Aug 2020 14:45 )             31.8     08-04    133<L>  |  93<L>  |  26.0<H>  ----------------------------<  90  3.6   |  27.0  |  1.30    Ca    9.3      04 Aug 2020 08:37  Mg     2.2     08-04    TPro  7.1  /  Alb  4.4  /  TBili  0.4  /  DBili  x   /  AST  37<H>  /  ALT  21  /  AlkPhos  105  08-03    PT/INR - ( 03 Aug 2020 10:31 )   PT: 11.2 sec;   INR: 0.96 ratio         PTT - ( 03 Aug 2020 10:31 )  PTT:31.4 sec  Urinalysis Basic - ( 03 Aug 2020 18:01 )    Color: Yellow / Appearance: Clear / S.005 / pH: x  Gluc: x / Ketone: Negative  / Bili: Negative / Urobili: Negative mg/dL   Blood: x / Protein: Negative mg/dL / Nitrite: Negative   Leuk Esterase: Negative / RBC: x / WBC x   Sq Epi: x / Non Sq Epi: x / Bacteria: x      Magnesium, Serum: 2.2 mg/dL (08-04 @ 08:37)          RADIOLOGY & ADDITIONAL TESTS: HPI:  81 yo F with h/o Parkinson dx , HTN and CAD was brought to the Ed with c/o falls at home and confusion, generalized weakness , per ED and husbnad reports pt had fallen at home today x 2 , she was c/o abd pain nad SOB , at present denies any chest pain , or SOB , has lower back pain . ON arrival to the ED BP is low , good pulse oximetry , lab showed hyponatremia and elevated cr  , given iv fluid in the ED  BP improves , repeat labs na level up to 130 , images showed L! compression  fracture . Pt is poor historian  , she is with urinary issues followed by Dr Begum , in the ED field inserted 500 cc urine obtained .  Denies HA CP N/V/D +HIGGINS      PAST MEDICAL & SURGICAL HISTORY:  Parkinson's disease  Ovarian benign neoplasm: with S/p Total  Hysterectomy  Depression  HTN (hypertension)  No significant past surgical history      FAMILY HISTORY:  Family history of cancer in father: he  at age 80&#x27;s  [place]    Social History:Non smoker    MEDICATIONS  (STANDING):  acetaminophen   Tablet .. 975 milliGRAM(s) Oral every 8 hours  aspirin enteric coated 81 milliGRAM(s) Oral daily  ergocalciferol 26825 Unit(s) Oral every week  gabapentin 200 milliGRAM(s) Oral three times a day  heparin   Injectable 5000 Unit(s) SubCutaneous every 8 hours  hydrALAZINE 25 milliGRAM(s) Oral two times a day  lamoTRIgine 50 milliGRAM(s) Oral two times a day  lidocaine   Patch 1 Patch Transdermal daily  oxybutynin 5 milliGRAM(s) Oral two times a day  simvastatin 20 milliGRAM(s) Oral at bedtime  tamsulosin 0.4 milliGRAM(s) Oral at bedtime    MEDICATIONS  (PRN):  traMADol 25 milliGRAM(s) Oral every 6 hours PRN Severe Pain (7 - 10)   Meds reviewed    Vital Signs Last 24 Hrs  T(C): 36.6 (04 Aug 2020 08:02), Max: 36.6 (04 Aug 2020 08:02)  T(F): 97.8 (04 Aug 2020 08:02), Max: 97.8 (04 Aug 2020 08:02)  HR: 75 (04 Aug 2020 08:02) (54 - 75)  BP: 129/70 (04 Aug 2020 08:02) (84/44 - 155/74)  BP(mean): --  RR: 17 (04 Aug 2020 08:02) (17 - 20)  SpO2: 100% (04 Aug 2020 08:02) (95% - 100%)  Daily Height in cm: 172.72 (03 Aug 2020 09:45)    Daily     PHYSICAL EXAM:    GENERAL: appears chronically ill  HEAD:  Atraumatic, Normocephalic  EYES: EOMI  NECK: Supple, neck  veins full  NERVOUS SYSTEM:  Alert & Oriented X3  CHEST/LUNG: Clear to percussion bilaterally  HEART: Regular rate and rhythm  ABDOMEN: Soft, Nontender, Nondistended; Bowel sounds present  EXTREMITIES:   + LE edema      LABS:                        10.6   5.58  )-----------( 176      ( 03 Aug 2020 14:45 )             31.8     08-04    133<L>  |  93<L>  |  26.0<H>  ----------------------------<  90  3.6   |  27.0  |  1.30    Ca    9.3      04 Aug 2020 08:37  Mg     2.2     08-04    TPro  7.1  /  Alb  4.4  /  TBili  0.4  /  DBili  x   /  AST  37<H>  /  ALT  21  /  AlkPhos  105  08-03    PT/INR - ( 03 Aug 2020 10:31 )   PT: 11.2 sec;   INR: 0.96 ratio         PTT - ( 03 Aug 2020 10:31 )  PTT:31.4 sec  Urinalysis Basic - ( 03 Aug 2020 18:01 )    Color: Yellow / Appearance: Clear / S.005 / pH: x  Gluc: x / Ketone: Negative  / Bili: Negative / Urobili: Negative mg/dL   Blood: x / Protein: Negative mg/dL / Nitrite: Negative   Leuk Esterase: Negative / RBC: x / WBC x   Sq Epi: x / Non Sq Epi: x / Bacteria: x      Magnesium, Serum: 2.2 mg/dL ( @ 08:37)          RADIOLOGY & ADDITIONAL TESTS:

## 2020-08-04 NOTE — PROGRESS NOTE ADULT - SUBJECTIVE AND OBJECTIVE BOX
Internal Medicine Hospitalist Progress Note    follow up for hyponatremia , weakness frequent falls   pt is seen in am , she is c/o lower back pain   no SOB , denies CP   no overnight events reported           ROS: as above, all remaining ROS are negative.       BACKGROUND:  MEDICATIONS  (STANDING):  acetaminophen   Tablet .. 975 milliGRAM(s) Oral every 8 hours  aspirin enteric coated 81 milliGRAM(s) Oral daily  ergocalciferol 44546 Unit(s) Oral every week  gabapentin 200 milliGRAM(s) Oral three times a day  heparin   Injectable 5000 Unit(s) SubCutaneous every 8 hours  hydrALAZINE 25 milliGRAM(s) Oral two times a day  lamoTRIgine 50 milliGRAM(s) Oral two times a day  lidocaine   Patch 1 Patch Transdermal daily  oxybutynin 5 milliGRAM(s) Oral two times a day  simvastatin 20 milliGRAM(s) Oral at bedtime  tamsulosin 0.4 milliGRAM(s) Oral at bedtime    MEDICATIONS  (PRN):  traMADol 25 milliGRAM(s) Oral every 6 hours PRN Severe Pain (7 - 10)    Allergies    No Known Allergies    Intolerances            VITALS:  Vital Signs Last 24 Hrs  T(C): 36.4 (04 Aug 2020 11:04), Max: 36.6 (04 Aug 2020 08:02)  T(F): 97.5 (04 Aug 2020 11:04), Max: 97.8 (04 Aug 2020 08:02)  HR: 64 (04 Aug 2020 11:04) (64 - 75)  BP: 118/67 (04 Aug 2020 11:04) (115/69 - 129/70)  BP(mean): --  RR: 17 (04 Aug 2020 11:04) (17 - 18)  SpO2: 100% (04 Aug 2020 11:04) (99% - 100%) Daily     Daily   CAPILLARY BLOOD GLUCOSE        I&O's Summary    03 Aug 2020 07:01  -  04 Aug 2020 07:00  --------------------------------------------------------  IN: 0 mL / OUT: 1200 mL / NET: -1200 mL        PHYSICAL EXAM:      Constitutional: awake, alert , no distress     Neck: supple , no JVD     Respiratory: CTA bilateral     Cardiovascular: regular s1/s2     Gastrointestinal: soft no tenderness , BS positive     Extremities: no pretibial edema      : field in place , urine yellow color                             10.6   5.58  )-----------( 176      ( 03 Aug 2020 14:45 )             31.8   08-04    133<L>  |  93<L>  |  26.0<H>  ----------------------------<  90  3.6   |  27.0  |  1.30    Ca    9.3      04 Aug 2020 08:37  Mg     2.2     08-04    TPro  7.1  /  Alb  4.4  /  TBili  0.4  /  DBili  x   /  AST  37<H>  /  ALT  21  /  AlkPhos  105  08-03      < from: CT Head No Cont (08.03.20 @ 12:39) >  IMPRESSION:  Mild  chronic microvascular changes without evidence of an acute transcortical infarction or hemorrhage. MR is a more sensitive imaging modality for the evaluation of an acute infarction.    < end of copied text >

## 2020-08-04 NOTE — PROGRESS NOTE ADULT - ASSESSMENT
Pt. requires LSO with rigid anterior, posterior and lateral panels, interface socks.   Device to limit ROM, support spine, support Fx, reduce discomfort, promote healing.  Socks to prevent skin breakdown.

## 2020-08-04 NOTE — PROGRESS NOTE ADULT - ASSESSMENT
81 yo F with Parkinson disease , h/o CAD and HTN admitted for frequents falls, hyponatremia ., ARF given iv fluid , improving       1- Hyponatremia   urine lytes osmolality ordered pending   improving with iv fluid   cont to hold lasix    nephrology consulted     2- ARF   improving , cont to hold losartan and lasix   with retention urinary field in place   cont gentle hydration     3- HTN - controlled   cont home meds except losartan     4- CAD with h/o stent   no acute sign of ischemia troponin x2 neg   TTE is pending     5- Hypokalemia   replaced   improved     6- lower back pain due to L1 farcture   spine orthopedics surgery input noted   pain meds , add muscle relaxant   Pt as tolerate     7- Urinary retention   field inserted   cont flomax and Oxybutrin   consulted

## 2020-08-05 LAB
ANION GAP SERPL CALC-SCNC: 14 MMOL/L — SIGNIFICANT CHANGE UP (ref 5–17)
BUN SERPL-MCNC: 22 MG/DL — HIGH (ref 8–20)
CALCIUM SERPL-MCNC: 9.6 MG/DL — SIGNIFICANT CHANGE UP (ref 8.6–10.2)
CHLORIDE SERPL-SCNC: 93 MMOL/L — LOW (ref 98–107)
CO2 SERPL-SCNC: 27 MMOL/L — SIGNIFICANT CHANGE UP (ref 22–29)
CREAT SERPL-MCNC: 1.14 MG/DL — SIGNIFICANT CHANGE UP (ref 0.5–1.3)
GLUCOSE BLDC GLUCOMTR-MCNC: 121 MG/DL — HIGH (ref 70–99)
GLUCOSE SERPL-MCNC: 114 MG/DL — HIGH (ref 70–99)
MAGNESIUM SERPL-MCNC: 1.9 MG/DL — SIGNIFICANT CHANGE UP (ref 1.6–2.6)
POTASSIUM SERPL-MCNC: 3.1 MMOL/L — LOW (ref 3.5–5.3)
POTASSIUM SERPL-SCNC: 3.1 MMOL/L — LOW (ref 3.5–5.3)
SARS-COV-2 RNA SPEC QL NAA+PROBE: SIGNIFICANT CHANGE UP
SODIUM SERPL-SCNC: 134 MMOL/L — LOW (ref 135–145)
TROPONIN T SERPL-MCNC: 0.02 NG/ML — SIGNIFICANT CHANGE UP (ref 0–0.06)

## 2020-08-05 PROCEDURE — 99232 SBSQ HOSP IP/OBS MODERATE 35: CPT

## 2020-08-05 PROCEDURE — 71045 X-RAY EXAM CHEST 1 VIEW: CPT | Mod: 26

## 2020-08-05 PROCEDURE — 93010 ELECTROCARDIOGRAM REPORT: CPT

## 2020-08-05 PROCEDURE — 99223 1ST HOSP IP/OBS HIGH 75: CPT

## 2020-08-05 PROCEDURE — 70470 CT HEAD/BRAIN W/O & W/DYE: CPT | Mod: 26

## 2020-08-05 PROCEDURE — 70551 MRI BRAIN STEM W/O DYE: CPT | Mod: 26

## 2020-08-05 RX ORDER — SODIUM CHLORIDE 9 MG/ML
1000 INJECTION, SOLUTION INTRAVENOUS
Refills: 0 | Status: DISCONTINUED | OUTPATIENT
Start: 2020-08-05 | End: 2020-08-06

## 2020-08-05 RX ORDER — OFLOXACIN 0.3 %
1 DROPS OPHTHALMIC (EYE)
Refills: 0 | Status: COMPLETED | OUTPATIENT
Start: 2020-08-05 | End: 2020-08-08

## 2020-08-05 RX ORDER — SENNA PLUS 8.6 MG/1
2 TABLET ORAL AT BEDTIME
Refills: 0 | Status: DISCONTINUED | OUTPATIENT
Start: 2020-08-05 | End: 2020-08-10

## 2020-08-05 RX ORDER — POTASSIUM CHLORIDE 20 MEQ
40 PACKET (EA) ORAL EVERY 4 HOURS
Refills: 0 | Status: COMPLETED | OUTPATIENT
Start: 2020-08-05 | End: 2020-08-06

## 2020-08-05 RX ORDER — MORPHINE SULFATE 50 MG/1
2 CAPSULE, EXTENDED RELEASE ORAL ONCE
Refills: 0 | Status: DISCONTINUED | OUTPATIENT
Start: 2020-08-05 | End: 2020-08-05

## 2020-08-05 RX ADMIN — HEPARIN SODIUM 5000 UNIT(S): 5000 INJECTION INTRAVENOUS; SUBCUTANEOUS at 06:12

## 2020-08-05 RX ADMIN — Medication 1 DROP(S): at 21:02

## 2020-08-05 RX ADMIN — MORPHINE SULFATE 2 MILLIGRAM(S): 50 CAPSULE, EXTENDED RELEASE ORAL at 21:34

## 2020-08-05 RX ADMIN — Medication 975 MILLIGRAM(S): at 06:42

## 2020-08-05 RX ADMIN — MORPHINE SULFATE 2 MILLIGRAM(S): 50 CAPSULE, EXTENDED RELEASE ORAL at 21:49

## 2020-08-05 RX ADMIN — Medication 25 MILLIGRAM(S): at 21:01

## 2020-08-05 RX ADMIN — GABAPENTIN 200 MILLIGRAM(S): 400 CAPSULE ORAL at 06:12

## 2020-08-05 RX ADMIN — Medication 5 MILLIGRAM(S): at 06:12

## 2020-08-05 RX ADMIN — SENNA PLUS 2 TABLET(S): 8.6 TABLET ORAL at 21:02

## 2020-08-05 RX ADMIN — Medication 40 MILLIEQUIVALENT(S): at 23:58

## 2020-08-05 RX ADMIN — GABAPENTIN 200 MILLIGRAM(S): 400 CAPSULE ORAL at 21:02

## 2020-08-05 RX ADMIN — LIDOCAINE 1 PATCH: 4 CREAM TOPICAL at 13:07

## 2020-08-05 RX ADMIN — HEPARIN SODIUM 5000 UNIT(S): 5000 INJECTION INTRAVENOUS; SUBCUTANEOUS at 21:02

## 2020-08-05 RX ADMIN — Medication 25 MILLIGRAM(S): at 06:12

## 2020-08-05 RX ADMIN — TAMSULOSIN HYDROCHLORIDE 0.4 MILLIGRAM(S): 0.4 CAPSULE ORAL at 21:02

## 2020-08-05 RX ADMIN — SIMVASTATIN 20 MILLIGRAM(S): 20 TABLET, FILM COATED ORAL at 21:01

## 2020-08-05 RX ADMIN — Medication 2 MILLIGRAM(S): at 06:12

## 2020-08-05 RX ADMIN — MORPHINE SULFATE 2 MILLIGRAM(S): 50 CAPSULE, EXTENDED RELEASE ORAL at 01:57

## 2020-08-05 RX ADMIN — LAMOTRIGINE 50 MILLIGRAM(S): 25 TABLET, ORALLY DISINTEGRATING ORAL at 21:01

## 2020-08-05 RX ADMIN — LIDOCAINE 1 PATCH: 4 CREAM TOPICAL at 20:07

## 2020-08-05 RX ADMIN — MORPHINE SULFATE 2 MILLIGRAM(S): 50 CAPSULE, EXTENDED RELEASE ORAL at 01:42

## 2020-08-05 RX ADMIN — Medication 5 MILLIGRAM(S): at 21:02

## 2020-08-05 RX ADMIN — HEPARIN SODIUM 5000 UNIT(S): 5000 INJECTION INTRAVENOUS; SUBCUTANEOUS at 13:07

## 2020-08-05 RX ADMIN — LAMOTRIGINE 50 MILLIGRAM(S): 25 TABLET, ORALLY DISINTEGRATING ORAL at 06:12

## 2020-08-05 RX ADMIN — Medication 975 MILLIGRAM(S): at 06:12

## 2020-08-05 NOTE — PHYSICAL THERAPY INITIAL EVALUATION ADULT - PHYSICAL ASSIST/NONPHYSICAL ASSIST: STAND/SIT, REHAB EVAL
verbal cues/1 person assist/pt required increased physical assistance to help perform transfer/to safely lower to a sitting position; verbal cues re proper sequence + proper hand placement in prep to perform transfer

## 2020-08-05 NOTE — PHYSICAL THERAPY INITIAL EVALUATION ADULT - PHYSICAL ASSIST/NONPHYSICAL ASSIST: SIT/SUPINE, REHAB EVAL
None pt required supervision for safety + falls prevention. verbal cues for proper sequence + proper use of bed rails/supervision/verbal cues

## 2020-08-05 NOTE — CHART NOTE - NSCHARTNOTEFT_GEN_A_CORE
Called by RN to assess pt c/o chest pain.  Pt with hx of Parkinson, CAD with stent admitted post fall, found to have ARF, lytes improving with fluids and holding lasix.  As per RN, pt c/o feeling anxious as well, sating 90-91% on RA, ordered 2L via nc, improved to 95%.  Upon my arrival, pt c/o non radiating substernal CP, vague about quality of pain, claims this is new, also c/o back pain.  States breathing improved with oxygen.  Denies N, V, fever, chills. Doppler of LE negative 8/3/20    VS  98.3  149/81 103  20  91% on RA improved to 95% on 2L O2 via nc      General  Pt is alert & Ox3 NAD  Lungs     Decreased bs in lower fields, no rales/rhonchi/wheezing  Heart     s1/s2 regular, tachy  Abdomen soft NT +BS  Ext         no edema    CP  Back pain    Morphine 2mg IVPx1 stat  O2 2L via nc; RN may titrate down to RA when able  EKG - sinus tach , artifact findings, no obvious st/t wave abnormalities  CXR stat - compared to previous 8/3, no obvious acute changes noted, official read to follow in am  BMP, Mg, troponin x2 stat  Will order accuchecks q 6 hrs due to pt NPO  RN to monitor pt and escalate prn. Called by RN to assess pt c/o chest pain.  Pt with hx of Parkinson, CAD with stent admitted post fall, found to have ARF, lytes improving with fluids and holding lasix.  As per RN, pt c/o feeling anxious as well, sating 90-91% on RA, with 2L via nc, improved to 95%.  Upon my arrival, pt c/o non radiating substernal CP, vague about quality of pain, claims this is new, also c/o back pain.  States breathing improved with oxygen.  Denies N, V, fever, chills. Doppler of LE negative 8/3/20    VS  98.3  149/81 103  20  91% on RA improved to 95% on 2L O2 via nc      General  Pt is alert & Ox3 NAD  Lungs     Decreased bs in lower fields, no rales/rhonchi/wheezing  Heart     s1/s2 regular, tachy  Abdomen soft NT +BS  Ext         no edema    CP  Back pain    Morphine 2mg IVPx1 stat  O2 2L via nc; RN may titrate down to RA when able  EKG - sinus tach , artifact findings, no obvious st/t wave abnormalities  CXR stat - compared to previous 8/3, no obvious acute changes noted, official read to follow in am  BMP, Mg, troponin x2 stat  Will order accuchecks q 6 hrs due to pt NPO  RN to monitor pt and escalate prn. Called by RN to assess pt c/o chest pain.  Pt with hx of Parkinson, CAD with stent admitted post fall, found to have ARF, lytes improving with fluids and holding lasix.  As per RN, pt c/o feeling anxious as well, sating 90-91% on RA, with 2L via nc, improved to 95%.  Upon my arrival, pt c/o non radiating substernal CP, vague about quality of pain, claims this is new, also c/o back pain.  States breathing improved with oxygen.  Denies N, V, fever, chills. Doppler of LE negative 8/3/20    VS  98.3  149/81 103  20  91% on RA improved to 95% on 2L O2 via nc      General  Pt is alert & Ox3 NAD  Lungs     Decreased bs in lower fields, no rales/rhonchi/wheezing  Heart     s1/s2 regular, tachy  Abdomen soft NT +BS  Ext         no edema    CP  Back pain    Morphine 2mg IVPx1 stat  O2 2L via nc; RN may titrate down to RA when able  EKG - sinus tach , artifact findings, no obvious st/t wave abnormalities, will repeat ekg in am  CXR stat - compared to previous 8/3, no obvious acute changes noted, official read to follow in am  BMP, Mg, troponin x2 stat  Will order accuchecks q 6 hrs due to pt NPO  RN to monitor pt and escalate prn.    22:30 Followed up with RN, pt is resting comfortably without any complaints.  Repeat vitals requested.  VS /76  P97  R18 Pulse Ox 97% on 2L O2  RN may titrate down to RA when tolerated  08-05    134<L>  |  93<L>  |  22.0<H>  ----------------------------<  114<H>  3.1<L>   |  27.0  |  1.14    Ca    9.6      05 Aug 2020 21:42  Mg     1.9     08-05  Troponin 0.02    KCL powder 40meq po q 4hrs x 2 doses  Repeat BMP in am  RN to observe and escalate prn Called by RN to assess pt c/o chest pain.  Pt with hx of Parkinson, CAD with stent admitted post fall, found to have ARF, lytes improving with fluids and holding lasix.  As per RN, pt c/o feeling anxious as well, sating 90-91% on RA, with 2L via nc, improved to 95%.  Upon my arrival, pt c/o non radiating substernal CP, vague about quality of pain, claims this is new, also c/o baseline lower back pain.  States breathing improved with oxygen.  Denies N, V, cough, fever, chills. Doppler of LE negative 8/3/20    VS  98.3  149/81 103  20  91% on RA improved to 95% on 2L O2 via nc      General  Pt is alert & Ox3 NAD  Lungs     Decreased bs in lower fields, no rales/rhonchi/wheezing  Heart     s1/s2 regular, tachy  Abdomen soft NT +BS  Ext         no edema    CP  Back pain    Morphine 2mg IVPx1 stat  O2 2L via nc; RN may titrate down to RA when able  EKG - sinus tach , artifact findings, no obvious st/t wave abnormalities, will repeat ekg in am  CXR stat - compared to previous 8/3, no obvious acute changes noted, official read to follow in am  BMP, Mg, troponin x2 stat  Will order accuchecks q 6 hrs due to pt NPO  RN to monitor pt and escalate prn.    22:30 Followed up with RN, pt is resting comfortably without any complaints.  Repeat vitals requested.  VS /76  P97  R18 Pulse Ox 97% on 2L O2  RN may titrate down to RA when tolerated  08-05    134<L>  |  93<L>  |  22.0<H>  ----------------------------<  114<H>  3.1<L>   |  27.0  |  1.14    Ca    9.6      05 Aug 2020 21:42  Mg     1.9     08-05  Troponin 0.02    KCL powder 40meq po q 4hrs x 2 doses  Repeat BMP in am  RN to observe and escalate prn

## 2020-08-05 NOTE — PHYSICAL THERAPY INITIAL EVALUATION ADULT - PLANNED THERAPY INTERVENTIONS, PT EVAL
stair training + endurance training/balance training/gait training/strengthening/transfer training/ROM/bed mobility training

## 2020-08-05 NOTE — CONSULT NOTE ADULT - ASSESSMENT
Parkinsons Disease with recent falls and hyponatremia and JESSICA.  Improving.  Failed Speech eval.    Poor historian:  Alleges esophageal dysphagia and odynophagia.  Also choking with swallowing.    Recent EGD and Colonoscopy with [facility]: unknown results.    Suggest:  MBS.  If negative then Esophagram.  Obtain recent results of the EGD/ Colonoscopy

## 2020-08-05 NOTE — CONSULT NOTE ADULT - SUBJECTIVE AND OBJECTIVE BOX
HPI:  81 yo F with h/o Parkinson dx , HTN and CAD was brought to the Ed with c/o falls at home and confusion, generalized weakness , per ED and husbnad reports pt had fallen at home today x 2 , she was c/o abd pain nad SOB , at present denies any chest pain , or SOB , has lower back pain . ON arrival to the ED BP is low , good pulse oximetry , lab showed hyponatremia and elevated cr  , given iv fluid in the ED  BP improves , repeat labs na level up to 130 , images showed L! compression  fracture . Pt is poor historian  , she is with urinary issues followed by Dr Begum , in the ED field inserted 500 cc urine obtained.  GI hx:  Alleges recent weight gain.  Notes dysphagia and low chest odynophagia, poorly timed re duration.  Alleges that EGD and colonoscopy were recently done by [facility], ? few weeks ago for same complaints and constipation.  No results.  Was told that both tests were normal.  Admitted for recent falls and hyponatremia, diuretic induced.  Better with hydration.  Still with very dry/parched mouth.  No CVA.  MRI: ? [facility].          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  No dyspepsia  No abdominal pain.    No Nausea, vomiting.  No Bleeding.  No hematemesis.   No diarrhea.    No hematochesia.  No weight loss, anorexia.  No edema.      MEDICATIONS  (STANDING):  aspirin enteric coated 81 milliGRAM(s) Oral daily  dextrose 5% + sodium chloride 0.9%. 1000 milliLiter(s) (80 mL/Hr) IV Continuous <Continuous>  ergocalciferol 38016 Unit(s) Oral every week  gabapentin 200 milliGRAM(s) Oral three times a day  heparin   Injectable 5000 Unit(s) SubCutaneous every 8 hours  hydrALAZINE 25 milliGRAM(s) Oral two times a day  lamoTRIgine 50 milliGRAM(s) Oral two times a day  lidocaine   Patch 1 Patch Transdermal daily  ofloxacin 0.3% Solution 1 Drop(s) Both EYES two times a day  oxybutynin 5 milliGRAM(s) Oral two times a day  senna 2 Tablet(s) Oral at bedtime  simvastatin 20 milliGRAM(s) Oral at bedtime  tamsulosin 0.4 milliGRAM(s) Oral at bedtime    MEDICATIONS  (PRN):  diazepam    Tablet 2 milliGRAM(s) Oral three times a day PRN back spasm  traMADol 25 milliGRAM(s) Oral every 6 hours PRN Severe Pain (7 - 10)      Allergies    No Known Allergies    Intolerances        SOCIAL HISTORY:    FAMILY HISTORY:  Family history of cancer in father: he  at age 80&#x27;s      Vital Signs Last 24 Hrs  T(C): 36.7 (05 Aug 2020 15:55), Max: 36.7 (05 Aug 2020 15:55)  T(F): 98.1 (05 Aug 2020 15:55), Max: 98.1 (05 Aug 2020 15:55)  HR: 80 (05 Aug 2020 15:55) (75 - 104)  BP: 141/71 (05 Aug 2020 15:55) (94/60 - 146/74)  BP(mean): --  RR: 18 (05 Aug 2020 15:55) (18 - 20)  SpO2: 97% (05 Aug 2020 15:55) (94% - 99%)    PHYSICAL EXAM:    GENERAL: NAD, well-groomed, well-developed  HEAD:  Atraumatic, Normocephalic  EYES: EOMI, PERRLA, conjunctiva and sclera clear  ENMT: No tonsillar erythema, exudates, or enlargement; Moist mucous membranes, Good dentition, No lesions  NECK: Supple, No JVD, Normal thyroid  CHEST/LUNG: Clear to percussion bilaterally; No rales, rhonchi, wheezing, or rubs  HEART: Regular rate and rhythm; No murmurs, rubs, or gallops  ABDOMEN: Soft, Nontender, Nondistended; Bowel sounds present.  No HSM.  No MTR.  No distention.   EXTREMITIES:  2+ Peripheral Pulses, No clubbing, cyanosis, or edema  LYMPH: No lymphadenopathy noted  SKIN: No rashes or lesions      LABS:        133<L>  |  93<L>  |  26.0<H>  ----------------------------<  90  3.6   |  27.0  |  1.30    Ca    9.3      04 Aug 2020 08:37  Mg     2.2              Urinalysis Basic - ( 03 Aug 2020 18:01 )    Color: Yellow / Appearance: Clear / S.005 / pH: x  Gluc: x / Ketone: Negative  / Bili: Negative / Urobili: Negative mg/dL   Blood: x / Protein: Negative mg/dL / Nitrite: Negative   Leuk Esterase: Negative / RBC: x / WBC x   Sq Epi: x / Non Sq Epi: x / Bacteria: x              RADIOLOGY & ADDITIONAL STUDIES:  CXR:  Atelectasis:  rt base.    CT A/P:  Diverticulosis;  Air in bladder.  L1 compression fracture.

## 2020-08-05 NOTE — PHYSICAL THERAPY INITIAL EVALUATION ADULT - IMPAIRMENTS FOUND, PT EVAL
gait, locomotion, and balance/muscle strength/poor safety awareness/gross motor/aerobic capacity/endurance

## 2020-08-05 NOTE — PHYSICAL THERAPY INITIAL EVALUATION ADULT - GAIT DEVIATIONS NOTED, PT EVAL
decreased weight-shifting ability/decreased step length/shuffling like gait pattern/decreased carlos/decreased stride length

## 2020-08-05 NOTE — PROGRESS NOTE ADULT - SUBJECTIVE AND OBJECTIVE BOX
NEPHROLOGY INTERVAL HPI/OVERNIGHT EVENTS:    Seen in the ER   Feels better       MEDICATIONS  (STANDING):  aspirin enteric coated 81 milliGRAM(s) Oral daily  ergocalciferol 88849 Unit(s) Oral every week  gabapentin 200 milliGRAM(s) Oral three times a day  heparin   Injectable 5000 Unit(s) SubCutaneous every 8 hours  hydrALAZINE 25 milliGRAM(s) Oral two times a day  lamoTRIgine 50 milliGRAM(s) Oral two times a day  lidocaine   Patch 1 Patch Transdermal daily  ofloxacin 0.3% Solution 1 Drop(s) Both EYES two times a day  oxybutynin 5 milliGRAM(s) Oral two times a day  senna 2 Tablet(s) Oral at bedtime  simvastatin 20 milliGRAM(s) Oral at bedtime  tamsulosin 0.4 milliGRAM(s) Oral at bedtime    MEDICATIONS  (PRN):  diazepam    Tablet 2 milliGRAM(s) Oral three times a day PRN back spasm  traMADol 25 milliGRAM(s) Oral every 6 hours PRN Severe Pain (7 - 10)      Allergies    No Known Allergies    Intolerances          REVIEW OF SYSTEMS:    CONSTITUTIONAL: No fever, weight loss, or fatigue  EYES: No eye pain, visual disturbances, or discharge  ENMT:  No difficulty hearing, tinnitus, vertigo; No sinus or throat pain  NECK: No pain or stiffness  BREASTS: No pain, masses, or nipple discharge  RESPIRATORY: No cough, wheezing, chills or hemoptysis; No shortness of breath  CARDIOVASCULAR: No chest pain, palpitations, dizziness, or leg swelling  GASTROINTESTINAL: No abdominal or epigastric pain. No nausea, vomiting, or hematemesis; No diarrhea or constipation. No melena or hematochezia.  GENITOURINARY: No dysuria, frequency, hematuria, or incontinence  NEUROLOGICAL: No headaches, memory loss, loss of strength, numbness, or tremors  SKIN: No itching, burning, rashes, or lesions   LYMPH NODES: No enlarged glands  ENDOCRINE: No heat or cold intolerance; No hair loss  MUSCULOSKELETAL: No joint pain or swelling; No muscle, back, or extremity pain  PSYCHIATRIC: No depression, anxiety, mood swings, or difficulty sleeping  HEME/LYMPH: No easy bruising, or bleeding gums  ALLERY AND IMMUNOLOGIC: No hives or eczema      Vital Signs Last 24 Hrs  T(C): 36.4 (05 Aug 2020 11:22), Max: 36.6 (04 Aug 2020 20:46)  T(F): 97.5 (05 Aug 2020 11:22), Max: 97.9 (04 Aug 2020 20:46)  HR: 75 (05 Aug 2020 11:22) (75 - 104)  BP: 94/60 (05 Aug 2020 11:22) (94/60 - 160/81)  BP(mean): --  RR: 18 (05 Aug 2020 11:22) (18 - 20)  SpO2: 94% (05 Aug 2020 11:22) (94% - 100%)  Daily     Daily   I&O's Detail    I&O's Summary      PHYSICAL EXAM:    GENERAL: NAD, well-groomed, well-developed  HEAD:  Atraumatic, Normocephalic  EYES: EOMI, PERRLA, conjunctiva and sclera clear  ENMT: No tonsillar erythema, exudates, or enlargement; Moist mucous membranes, Good dentition, No lesions  NECK: Supple, No JVD, Normal thyroid  NERVOUS SYSTEM:  Alert & Oriented X3, Good concentration; Motor Strength 5/5 B/L upper and lower extremities; DTRs 2+ intact and symmetric  CHEST/LUNG: Clear to percussion bilaterally; No rales, rhonchi, wheezing, or rubs  HEART: Regular rate and rhythm; No murmurs, rubs, or gallops  ABDOMEN: Soft, Nontender, Nondistended; Bowel sounds present  EXTREMITIES:  2+ Peripheral Pulses, No clubbing, cyanosis, or edema  LYMPH: No lymphadenopathy noted  SKIN: No rashes or lesions    LABS:        133<L>  |  93<L>  |  26.0<H>  ----------------------------<  90  3.6   |  27.0  |  1.30    Ca    9.3      04 Aug 2020 08:37  Mg     2.2     08-04        Urinalysis Basic - ( 03 Aug 2020 18:01 )    Color: Yellow / Appearance: Clear / S.005 / pH: x  Gluc: x / Ketone: Negative  / Bili: Negative / Urobili: Negative mg/dL   Blood: x / Protein: Negative mg/dL / Nitrite: Negative   Leuk Esterase: Negative / RBC: x / WBC x   Sq Epi: x / Non Sq Epi: x / Bacteria: x              RADIOLOGY & ADDITIONAL TESTS: NEPHROLOGY INTERVAL HPI/OVERNIGHT EVENTS:    Seen in the ER   Feels better   Being seen by speech and swallow   + field   Off IVF       MEDICATIONS  (STANDING):  aspirin enteric coated 81 milliGRAM(s) Oral daily  ergocalciferol 06679 Unit(s) Oral every week  gabapentin 200 milliGRAM(s) Oral three times a day  heparin   Injectable 5000 Unit(s) SubCutaneous every 8 hours  hydrALAZINE 25 milliGRAM(s) Oral two times a day  lamoTRIgine 50 milliGRAM(s) Oral two times a day  lidocaine   Patch 1 Patch Transdermal daily  ofloxacin 0.3% Solution 1 Drop(s) Both EYES two times a day  oxybutynin 5 milliGRAM(s) Oral two times a day  senna 2 Tablet(s) Oral at bedtime  simvastatin 20 milliGRAM(s) Oral at bedtime  tamsulosin 0.4 milliGRAM(s) Oral at bedtime    MEDICATIONS  (PRN):  diazepam    Tablet 2 milliGRAM(s) Oral three times a day PRN back spasm  traMADol 25 milliGRAM(s) Oral every 6 hours PRN Severe Pain (7 - 10)      Allergies    No Known Allergies    Intolerances      Vital Signs Last 24 Hrs  T(C): 36.4 (05 Aug 2020 11:22), Max: 36.6 (04 Aug 2020 20:46)  T(F): 97.5 (05 Aug 2020 11:22), Max: 97.9 (04 Aug 2020 20:46)  HR: 75 (05 Aug 2020 11:22) (75 - 104)  BP: 94/60 (05 Aug 2020 11:22) (94/60 - 160/81)  BP(mean): --  RR: 18 (05 Aug 2020 11:22) (18 - 20)  SpO2: 94% (05 Aug 2020 11:22) (94% - 100%)  Daily     Daily   I&O's Detail    I&O's Summary      PHYSICAL EXAM:  GENERAL: appears chronically ill  HEAD:  Atraumatic, Normocephalic  EYES: EOMI  NECK: Supple, neck  veins full  NERVOUS SYSTEM:  Alert & Oriented X3  CHEST/LUNG: Clear to percussion bilaterally  HEART: Regular rate and rhythm  ABDOMEN: Soft, Nontender, Nondistended; Bowel sounds present  EXTREMITIES:   + LE edema , + field ---> clear     LABS:    08    133<L>  |  93<L>  |  26.0<H>  ----------------------------<  90  3.6   |  27.0  |  1.30    Ca    9.3      04 Aug 2020 08:37  Mg     2.2     08-04        Urinalysis Basic - ( 03 Aug 2020 18:01 )    Color: Yellow / Appearance: Clear / S.005 / pH: x  Gluc: x / Ketone: Negative  / Bili: Negative / Urobili: Negative mg/dL   Blood: x / Protein: Negative mg/dL / Nitrite: Negative   Leuk Esterase: Negative / RBC: x / WBC x   Sq Epi: x / Non Sq Epi: x / Bacteria: x             EXAM:  MR BRAIN                          PROCEDURE DATE:  2020          INTERPRETATION:  CLINICAL INDICATION: Leg weakness, fall. Evaluate for CVA.    TECHNIQUE: Multi-planar multi-sequential MR imaging of the brain was performed without intravenous contrast.    COMPARISON: CT head 8/3/2020. MRI brain 5/15/2019.    FINDINGS:    Please note, several sequences are limited due to patient motion artifact.    There are scattered T2/FLAIR hyperintense foci in the periventricular and subcortical white matter which are nonspecific finding, but most likely represent sequelae of mild to moderate chronic microvascular ischemic disease. There is mild diffuse cortical sulcal prominence related to underlying brain parenchymal volume loss.    Thereis suggestion of FLAIR hyperintense extra-axial fluid collection along the left posterior parietal convexity measuring 3 mm in maximum thickness (series 6, image 24), which may represent a small acute subdural hematoma.      No acute infarction, intraparenchymal hemorrhage or mass.    The ventricles are normal without evidence of hydrocephalus. The visualized skull base flow voids appear patent.    The patient is status post bilateral lens replacement. Mild mucosal palmitate changes of the ethmoid air cells and maxillary sinuses. The mastoid air cells are grossly clear. The visualized soft tissues and osseous structures appear unremarkable.    IMPRESSION:    Suggestion of small acute subdural hematoma along the left posterior parietal convexity measuring 3 mm in thickness.  Noncontrast head CT is recommended for further assessment of this finding.              JESSIE OROSCO M.D., ATTENDING RADIOLOGIST  This document has been electronically signed. Aug  5 2020 10:51AM         EXAM:  CT ABDOMEN AND PELVIS                          PROCEDURE DATE:  2020          INTERPRETATION:  CLINICAL INFORMATION: Low hematocrit. Question GI bleeding, mass.    COMPARISON: CT abdomen/pelvis 2018 and MRI abdomen 2018    PROCEDURE:  CT of the Abdomen and Pelvis was performed without intravenous contrast.  Intravenous contrast: None.  Oral contrast: None.  Sagittal and coronal reformats were performed.    FINDINGS:  LOWER CHEST: Trace bilateral pleural effusions. Interlobular septal thickening at the lung bases, possibly pulmonary edema. Scattered foci of linear atelectasis or scarring at the lung bases. Small hiatal hernia.    LIVER: 4.3 cm cyst in the left hepatic lobe.  BILE DUCTS: Mild biliary ductal dilatation and a lateral segment of the left hepatic lobe, unchanged.  GALLBLADDER: Within normal limits.  SPLEEN: Within normal limits.  PANCREAS: Unremarkable; small cysts on the prior MRI are not well seen on the current noncontrast CT.  ADRENALS: Within normal limits.  KIDNEYS/URETERS: Within normal limits.    BLADDER: Distended with a normal caliber wall. Small droplet of air in urinary bladder.  REPRODUCTIVE ORGANS: Hysterectomy.    BOWEL: Colonic diverticulosis without evidence of acute diverticulitis. Moderate to large amount of retained fecal material in the colon. Small bowel loops normal in caliber. Appendix not seen, however no evidence of appendicitis.  PERITONEUM: No ascites.  VESSELS: Abdominal aorta normal in caliber.  RETROPERITONEUM/LYMPH NODES: No lymphadenopathy.  ABDOMINAL WALL: Small fat-containing umbilical and right inguinal hernias.  BONES: Compression fracture of the superior endplate of L1, new since 2018, however of indeterminate age.    IMPRESSION:    Evaluation for gastrointestinal bleeding limited in the absence of intravenous contrast.    Colonic diverticulosis without evidence of acute diverticulitis.    Compression fracture of the superior endplate of L1, new since 2018, however of indeterminate age.    Small droplet of air in urinary bladder; differential includes sequela of prior instrumentation/Field catheter or infection; clinical correlation is recommended.              PARMJIT BOLTON M.D., ATTENDING RADIOLOGIST  This document has been electronically signed. Aug  3 2020  4:04PM                        RADIOLOGY & ADDITIONAL TESTS:

## 2020-08-05 NOTE — PHYSICAL THERAPY INITIAL EVALUATION ADULT - PHYSICAL ASSIST/NONPHYSICAL ASSIST: GAIT, REHAB EVAL
pt required increased physical assistance to help maintain proper upright walking posture during ambulation, assistance re proper use of AD and assistance for safety & falls prevention; verbal cues re proper gait sequence + proper use of RW; pt required verbal cues re proper breathing techniques and proper energy conservation techniques during ambulation/activity/1 person assist/verbal cues

## 2020-08-05 NOTE — PHYSICAL THERAPY INITIAL EVALUATION ADULT - TRANSFER SAFETY CONCERNS NOTED: SIT/STAND, REHAB EVAL
decreased balance during turns/decreased step length/stepping too close to front of assistive device/decreased weight-shifting ability/decreased safety awareness/stepping too far from Assistive Device

## 2020-08-05 NOTE — PROGRESS NOTE ADULT - ASSESSMENT
Improved JESSICA   Volume contracted on Losartan with VENEGAS  No hydro on [place] CT abdomen  [place] [facility]  Lasix ans Losartan held   completed IVF   + field , + Flomax     Hyponatremia - Better with volume   Trend labs

## 2020-08-05 NOTE — PROGRESS NOTE ADULT - SUBJECTIVE AND OBJECTIVE BOX
Internal Medicine Hospitalist Progress Note    follow up for hyponatremia ,L1 compression fracture ,  weakness   pt is seen around noon , she is resting in the bed , no complaints  , back pain is better complaints     MR result reviewed , d/w neurologist           Vital Signs Last 24 Hrs                 T(C): 36.4 (05 Aug 2020 11:22), Max: 36.6 (04 Aug 2020 20:46)  T(F): 97.5 (05 Aug 2020 11:22), Max: 97.9 (04 Aug 2020 20:46)  HR: 75 (05 Aug 2020 11:22) (75 - 104)  BP: 94/60 (05 Aug 2020 11:22) (94/60 - 160/81)  BP(mean): --  RR: 18 (05 Aug 2020 11:22) (18 - 20)  SpO2: 94% (05 Aug 2020 11:22) (94% - 100%)    I&O's Summary    03 Aug 2020 07:01  -  04 Aug 2020 07:00  --------------------------------------------------------  IN: 0 mL / OUT: 1200 mL / NET: -1200 mL        Constitutional: awake, alert , no distress     Respiratory: CTA bilateral     Cardiovascular: regular s1/s2     Gastrointestinal: soft no tenderness , BS positive     Extremities: no pretibial edema      : field in place , urine yellow color                 < from: CT Head No Cont (08.03.20 @ 12:39) >  IMPRESSION:  Mild  chronic microvascular changes without evidence of an acute transcortical infarction or hemorrhage. MR is a more sensitive imaging modality for the evaluation of an acute infarction.    < end of copied text >                          10.6   5.58  )-----------( 176      ( 03 Aug 2020 14:45 )             31.8   08-04    133<L>  |  93<L>  |  26.0<H>  ----------------------------<  90  3.6   |  27.0  |  1.30    Ca    9.3      04 Aug 2020 08:37  Mg     2.2     08-04    < from: MR Head No Cont (08.05.20 @ 09:22) >  MPRESSION:    Suggestion of small acute subdural hematoma along the left posterior parietal convexity measuring 3 mm in thickness.  Noncontrast head CT is recommended for further assessment of this finding.    < end of copied text >

## 2020-08-05 NOTE — PHYSICAL THERAPY INITIAL EVALUATION ADULT - CRITERIA FOR SKILLED THERAPEUTIC INTERVENTIONS
rehab potential/risk reduction/prevention/anticipated equipment needs at discharge/functional limitations in following categories/anticipated discharge recommendation/predicted duration of therapy intervention/impairments found/therapy frequency

## 2020-08-05 NOTE — SWALLOW BEDSIDE ASSESSMENT ADULT - COMMENTS
Infectious Disease As per MD note" "81 yo F with Parkinson disease , h/o CAD and HTN admitted for frequents falls, hyponatremia ., ARF given iv fluid , improving" No other PO given, due to pt discomfort

## 2020-08-05 NOTE — SWALLOW BEDSIDE ASSESSMENT ADULT - SWALLOW EVAL: DIAGNOSIS
Oral & pharyngeal stage of swallow clinically unremarkable for puree &  thin fluids: no overt s/s aspiration.  Pt with c/o food & liquids "sticking & burning" in mid to lower esophageal area

## 2020-08-05 NOTE — PHYSICAL THERAPY INITIAL EVALUATION ADULT - ADDITIONAL COMMENTS
As per pt, pt lives with spouse in a private home (son and daughter live close by and are able to assist as needed) with 3 RICARDO 1 handrail + 8 steps 1 handrail to bed&bath, no additional stairs inside. Pt's PLOF was independent in all ADL's + ambulation without an Assistive Device. Pt has no DME at home. At this time, RW, shower chair and raised toilet seat recommended upon d/c.

## 2020-08-05 NOTE — PHYSICAL THERAPY INITIAL EVALUATION ADULT - IMPAIRMENTS CONTRIBUTING TO GAIT DEVIATIONS, PT EVAL
impaired coordination/decreased ROM/impaired postural control/impaired motor control/decreased endurance/decreased strength/impaired balance

## 2020-08-05 NOTE — PHYSICAL THERAPY INITIAL EVALUATION ADULT - GENERAL OBSERVATIONS, REHAB EVAL
Pt received in CDU, SANCHEZ hernandez'ed pt for PT. LSO present at bedside. pt observed semi-bernal in bed with 3 L O2 NC (SANCHEZ hernandez'ec to d/c O2), telemonitor, field, pleasant, cooperative, A&O and c/o 0/10 pain

## 2020-08-05 NOTE — SWALLOW BEDSIDE ASSESSMENT ADULT - ESOPHAGEAL PHASE
Pt with c/o puree sticking in mid to lower esophageal area, with reported discomfort & subsequent "burning" sensation Pt with c/o thin fluids sticking in mid to lower esophageal area, with reported discomfort & subsequent "burning" sensation

## 2020-08-05 NOTE — PHYSICAL THERAPY INITIAL EVALUATION ADULT - DISCHARGE DISPOSITION, PT EVAL
home w/ assist/rehabilitation facility/home with 24/7 assist, RW and home PT vs [facility]/home w/ home PT

## 2020-08-05 NOTE — PHYSICAL THERAPY INITIAL EVALUATION ADULT - PHYSICAL ASSIST/NONPHYSICAL ASSIST: STAIR NEGOTIATION, REHAB EVAL
1 person assist/pt required increased physical assistance to help maintain proper upright walking posture and for safety + falls prevention during stair negotiation; verbal cues for proper stair sequence/verbal cues

## 2020-08-05 NOTE — PROGRESS NOTE ADULT - ASSESSMENT
79 yo F with Parkinson disease , h/o CAD and HTN admitted for frequents falls, hyponatremia ., ARF given iv fluid , improving       1- Hyponatremia   urine lytes osmolality ordered pending   improving with iv fluid   cont to hold lasix    nephrology consult appreciated     2-Weakness / frequent falls   MR of brain read as suspected small SDH ?  d/w radiologist   will get CT of brain with and without  contrast   to evaluate if artifact or real      3- ARF   improving , cont to hold losartan and lasix   repeat lytes in am      4- retention of urine field inserted on admission   sees DR Begum urologist   cont flomax      5- HTN - controlled   cont home meds except losartan     6- CAD with h/o stent   no acute sign of ischemia troponin x2 neg   TTE result reviewed   normal EF     7- lower back pain due to L1 farcture   spine orthopedics surgery input noted   pain meds , add muscle relaxant   Pt as tolerate       discharge to [facility] likely soon

## 2020-08-05 NOTE — SWALLOW BEDSIDE ASSESSMENT ADULT - SLP PERTINENT HISTORY OF CURRENT PROBLEM
Pt denies h/o dysphagia until this date at lunch, while eating a sweet potato: reportedly felt it get "stuck:" in mid to lower esophageal region, with subsequent "burning"

## 2020-08-06 ENCOUNTER — TRANSCRIPTION ENCOUNTER (OUTPATIENT)
Age: 80
End: 2020-08-06

## 2020-08-06 DIAGNOSIS — R25.1 TREMOR, UNSPECIFIED: ICD-10-CM

## 2020-08-06 DIAGNOSIS — G93.40 ENCEPHALOPATHY, UNSPECIFIED: ICD-10-CM

## 2020-08-06 LAB
24R-OH-CALCIDIOL SERPL-MCNC: 47.6 NG/ML — SIGNIFICANT CHANGE UP (ref 30–80)
ANION GAP SERPL CALC-SCNC: 12 MMOL/L — SIGNIFICANT CHANGE UP (ref 5–17)
BUN SERPL-MCNC: 20 MG/DL — SIGNIFICANT CHANGE UP (ref 8–20)
CALCIUM SERPL-MCNC: 9.5 MG/DL — SIGNIFICANT CHANGE UP (ref 8.6–10.2)
CHLORIDE SERPL-SCNC: 99 MMOL/L — SIGNIFICANT CHANGE UP (ref 98–107)
CO2 SERPL-SCNC: 28 MMOL/L — SIGNIFICANT CHANGE UP (ref 22–29)
CREAT SERPL-MCNC: 1.15 MG/DL — SIGNIFICANT CHANGE UP (ref 0.5–1.3)
FERRITIN SERPL-MCNC: 112 NG/ML — SIGNIFICANT CHANGE UP (ref 15–150)
GLUCOSE BLDC GLUCOMTR-MCNC: 102 MG/DL — HIGH (ref 70–99)
GLUCOSE BLDC GLUCOMTR-MCNC: 120 MG/DL — HIGH (ref 70–99)
GLUCOSE SERPL-MCNC: 114 MG/DL — HIGH (ref 70–99)
HCT VFR BLD CALC: 32 % — LOW (ref 34.5–45)
HGB BLD-MCNC: 10.5 G/DL — LOW (ref 11.5–15.5)
IRON SATN MFR SERPL: 27 % — SIGNIFICANT CHANGE UP (ref 14–50)
IRON SATN MFR SERPL: 76 UG/DL — SIGNIFICANT CHANGE UP (ref 37–145)
MAGNESIUM SERPL-MCNC: 2.2 MG/DL — SIGNIFICANT CHANGE UP (ref 1.6–2.6)
MCHC RBC-ENTMCNC: 29.2 PG — SIGNIFICANT CHANGE UP (ref 27–34)
MCHC RBC-ENTMCNC: 32.8 GM/DL — SIGNIFICANT CHANGE UP (ref 32–36)
MCV RBC AUTO: 88.9 FL — SIGNIFICANT CHANGE UP (ref 80–100)
PLATELET # BLD AUTO: 204 K/UL — SIGNIFICANT CHANGE UP (ref 150–400)
POTASSIUM SERPL-MCNC: 4.4 MMOL/L — SIGNIFICANT CHANGE UP (ref 3.5–5.3)
POTASSIUM SERPL-SCNC: 4.4 MMOL/L — SIGNIFICANT CHANGE UP (ref 3.5–5.3)
RBC # BLD: 3.6 M/UL — LOW (ref 3.8–5.2)
RBC # FLD: 13.2 % — SIGNIFICANT CHANGE UP (ref 10.3–14.5)
SODIUM SERPL-SCNC: 139 MMOL/L — SIGNIFICANT CHANGE UP (ref 135–145)
TIBC SERPL-MCNC: 279 UG/DL — SIGNIFICANT CHANGE UP (ref 220–430)
TRANSFERRIN SERPL-MCNC: 195 MG/DL — SIGNIFICANT CHANGE UP (ref 192–382)
TROPONIN T SERPL-MCNC: 0.01 NG/ML — SIGNIFICANT CHANGE UP (ref 0–0.06)
TSH SERPL-MCNC: 6.01 UIU/ML — HIGH (ref 0.27–4.2)
VIT B12 SERPL-MCNC: 776 PG/ML — SIGNIFICANT CHANGE UP (ref 232–1245)
WBC # BLD: 6.3 K/UL — SIGNIFICANT CHANGE UP (ref 3.8–10.5)
WBC # FLD AUTO: 6.3 K/UL — SIGNIFICANT CHANGE UP (ref 3.8–10.5)

## 2020-08-06 PROCEDURE — 93010 ELECTROCARDIOGRAM REPORT: CPT

## 2020-08-06 PROCEDURE — 76770 US EXAM ABDO BACK WALL COMP: CPT | Mod: 26

## 2020-08-06 PROCEDURE — 99232 SBSQ HOSP IP/OBS MODERATE 35: CPT

## 2020-08-06 RX ORDER — LEVOTHYROXINE SODIUM 125 MCG
25 TABLET ORAL DAILY
Refills: 0 | Status: DISCONTINUED | OUTPATIENT
Start: 2020-08-06 | End: 2020-08-08

## 2020-08-06 RX ORDER — SUCRALFATE 1 G
1 TABLET ORAL
Refills: 0 | Status: DISCONTINUED | OUTPATIENT
Start: 2020-08-06 | End: 2020-08-08

## 2020-08-06 RX ORDER — IRON SUCROSE 20 MG/ML
100 INJECTION, SOLUTION INTRAVENOUS EVERY 24 HOURS
Refills: 0 | Status: DISCONTINUED | OUTPATIENT
Start: 2020-08-06 | End: 2020-08-08

## 2020-08-06 RX ORDER — PANTOPRAZOLE SODIUM 20 MG/1
40 TABLET, DELAYED RELEASE ORAL DAILY
Refills: 0 | Status: DISCONTINUED | OUTPATIENT
Start: 2020-08-06 | End: 2020-08-08

## 2020-08-06 RX ORDER — ALPRAZOLAM 0.25 MG
0.5 TABLET ORAL THREE TIMES A DAY
Refills: 0 | Status: DISCONTINUED | OUTPATIENT
Start: 2020-08-06 | End: 2020-08-08

## 2020-08-06 RX ADMIN — LAMOTRIGINE 50 MILLIGRAM(S): 25 TABLET, ORALLY DISINTEGRATING ORAL at 17:07

## 2020-08-06 RX ADMIN — Medication 25 MILLIGRAM(S): at 05:32

## 2020-08-06 RX ADMIN — LIDOCAINE 1 PATCH: 4 CREAM TOPICAL at 01:08

## 2020-08-06 RX ADMIN — Medication 5 MILLIGRAM(S): at 11:05

## 2020-08-06 RX ADMIN — LIDOCAINE 1 PATCH: 4 CREAM TOPICAL at 11:33

## 2020-08-06 RX ADMIN — HEPARIN SODIUM 5000 UNIT(S): 5000 INJECTION INTRAVENOUS; SUBCUTANEOUS at 16:04

## 2020-08-06 RX ADMIN — GABAPENTIN 200 MILLIGRAM(S): 400 CAPSULE ORAL at 05:32

## 2020-08-06 RX ADMIN — Medication 25 MICROGRAM(S): at 11:04

## 2020-08-06 RX ADMIN — SODIUM CHLORIDE 80 MILLILITER(S): 9 INJECTION, SOLUTION INTRAVENOUS at 06:06

## 2020-08-06 RX ADMIN — Medication 40 MILLIEQUIVALENT(S): at 02:08

## 2020-08-06 RX ADMIN — LIDOCAINE 1 PATCH: 4 CREAM TOPICAL at 19:37

## 2020-08-06 RX ADMIN — HEPARIN SODIUM 5000 UNIT(S): 5000 INJECTION INTRAVENOUS; SUBCUTANEOUS at 05:32

## 2020-08-06 RX ADMIN — Medication 0.5 MILLIGRAM(S): at 17:17

## 2020-08-06 RX ADMIN — Medication 5 MILLIGRAM(S): at 17:08

## 2020-08-06 RX ADMIN — Medication 25 MILLIGRAM(S): at 17:17

## 2020-08-06 RX ADMIN — GABAPENTIN 200 MILLIGRAM(S): 400 CAPSULE ORAL at 16:04

## 2020-08-06 RX ADMIN — LIDOCAINE 1 PATCH: 4 CREAM TOPICAL at 23:05

## 2020-08-06 RX ADMIN — IRON SUCROSE 210 MILLIGRAM(S): 20 INJECTION, SOLUTION INTRAVENOUS at 11:05

## 2020-08-06 RX ADMIN — LAMOTRIGINE 50 MILLIGRAM(S): 25 TABLET, ORALLY DISINTEGRATING ORAL at 11:05

## 2020-08-06 RX ADMIN — Medication 1 GRAM(S): at 17:18

## 2020-08-06 RX ADMIN — Medication 81 MILLIGRAM(S): at 11:04

## 2020-08-06 RX ADMIN — Medication 1 DROP(S): at 05:32

## 2020-08-06 RX ADMIN — PANTOPRAZOLE SODIUM 40 MILLIGRAM(S): 20 TABLET, DELAYED RELEASE ORAL at 17:17

## 2020-08-06 RX ADMIN — Medication 1 DROP(S): at 17:08

## 2020-08-06 NOTE — CONSULT NOTE ADULT - SUBJECTIVE AND OBJECTIVE BOX
HPI: 80yFemale with h/o tremors , HTN and CAD was brought to the Ed with c/o falls at home and confusion, generalized weakness.  As per ED documentation  reports pt had fallen at home twice on day of admission , she was c/o abd pain and SOB , at present denies any chest pain , or SOB , has lower back pain . ON arrival to the ED BP is low , good pulse oximetry , lab showed hyponatremia and elevated cr  , given iv fluid in the ED  BP improves , repeat labs na level up to 130 , images showed L1 transverse fracture.  She has been undergoing treatment per Ortho spine for fracture.  No reported LOC or seizure activity.  Pt does follow with Dr Carvalho and Richard Kline NP at [facility].        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 1000 milliLiter(s) (80 mL/Hr) IV Continuous <Continuous>  ergocalciferol 51865 Unit(s) Oral every week  gabapentin 200 milliGRAM(s) Oral three times a day  heparin   Injectable 5000 Unit(s) SubCutaneous every 8 hours  hydrALAZINE 25 milliGRAM(s) Oral two times a day  iron sucrose IVPB 100 milliGRAM(s) IV Intermittent every 24 hours  lamoTRIgine 50 milliGRAM(s) Oral two times a day  levothyroxine 25 MICROGram(s) Oral daily  lidocaine   Patch 1 Patch Transdermal daily  ofloxacin 0.3% Solution 1 Drop(s) Both EYES two times a day  oxybutynin 5 milliGRAM(s) Oral two times a day  pantoprazole  Injectable 40 milliGRAM(s) IV Push daily  senna 2 Tablet(s) Oral at bedtime  simvastatin 20 milliGRAM(s) Oral at bedtime  sucralfate suspension 1 Gram(s) Oral two times a day  tamsulosin 0.4 milliGRAM(s) Oral at bedtime    MEDICATIONS  (PRN):  ALPRAZolam 0.5 milliGRAM(s) Oral three times a day PRN anxiety    Allergies    No Known Allergies    Intolerances        FAMILY HISTORY:  Family history of cancer in father: he  at age 80&#x27;s          SOCIAL HISTORY:  Denies toxic habits;     REVIEW OF SYSTEMS:    As noted in the HPI.    VITAL SIGNS:  Vital Signs Last 24 Hrs  T(C): 37.1 (06 Aug 2020 08:05), Max: 37.3 (05 Aug 2020 20:49)  T(F): 98.8 (06 Aug 2020 08:05), Max: 99.1 (05 Aug 2020 20:49)  HR: 99 (06 Aug 2020 08:05) (80 - 103)  BP: 136/78 (06 Aug 2020 08:05) (133/76 - 149/81)  BP(mean): --  RR: 19 (06 Aug 2020 08:05) (18 - 20)  SpO2: 94% (06 Aug 2020 08:05) (91% - 97%)    PHYSICAL EXAMINATION:  General: Well-developed, well nourished, anxious.  Eyes: Conjunctiva and sclera clear. Fundoscopic examination was deferred.  Neck: Supple.  Cardiac: +S1 & S2; Regular.  Chest: CTA b/l.    Musculoskeletal: No tenderness on palpation of spine.  No Brudzinski/Kernig's sign.    Neurologic:  - Mental Status:  Alert, awake, oriented to person, place, and year; Speech is fluent with intact naming, repetition, and comprehension  Cranial Nerves II-XII:  2-12 intact  - Motor:  Strength is symmetric and spontaneous all 4 ext.  There is no pronator drift.  Normal muscle bulk and tone throughout.  - Reflexes:  2+ and symmetric throughout.  Plantars downwards b/l.  - Sensory:  Intact and symmetric to light touch sense.  - Coordination:  No dysmetria/dysdiadochokinesis.   - Gait: Deferred.  - No cogwheel rigidity; presence of tremor.  No snout or Meyerson reflex.      LABS:                          10.5   6.30  )-----------( 204      ( 06 Aug 2020 05:53 )             32.0     06 Aug 2020 05:53    139    |  99     |  20.0   ----------------------------<  114    4.4     |  28.0   |  1.15     Ca    9.5        06 Aug 2020 05:53  Mg     2.2       06 Aug 2020 05:53              RADIOLOGY & ADDITIONAL STUDIES:      < from: CT Head w/wo IV Cont (20 @ 19:12) >  No acute abnormalities are present. No abnormal enhancement. There is no evidence of intraparenchymal or extra-axial hemorrhage. Previously visualized small focus of abnormal signal that was thought to reflect subdural hemorrhage, likely reflecting artifact. Mild periventricular chronic microvascular ischemic changes.      < from: MR Head No Cont (20 @ 09:22) >  Suggestion of small acute subdural hematoma along the left posterior parietal convexity measuring 3 mm in thickness.  Noncontrast head CT is recommended for further assessment of this finding.      < from: CT Lumbar Spine Reform No Cont (20 @ 17:21) >  1.  Age-indeterminate fracture of the superior L1 vertebral body with bony retropulsion.  2.  Fracture of the right L1 transverse process with corticated margins suggesting chronicity.      < from: CT Head No Cont (20 @ 12:39) >  Mild  chronic microvascular changes without evidence of an acute transcortical infarction or hemorrhage. MR is a more sensitive imaging modality for the evaluation of an acute infarction.

## 2020-08-06 NOTE — CONSULT NOTE ADULT - ASSESSMENT
Impression:  Encephalopathy metabolic/toxic; dehydration; Tremors; s/p fall with L1 transverse fracture    Plan:    Neuro stable and no signs of Parkinsons per exam.    Follow per Ortho for L1 fracture.    PT/OT/Rehab.    Avoid sedatives.    DVT prophylaxis recommended.  Continue per medical managament.  Outpatient follow up at [facility] at [phone] or [phone] Dr Carvalho.  Reconsult PRN.

## 2020-08-06 NOTE — DISCHARGE NOTE PROVIDER - HOSPITAL COURSE
81 yo F with Parkinson disease , h/o CAD and HTN admitted for frequents falls, hyponatremia and ARF , iv fluid given , cr is improving , CT of LS spine showed L1 vertebra fracture , seen by spine surgery no further rec pain control, PT , TLCO brace ordered , pt had nephrology evaluation for hyponatremia lasix on hold given iv fluid , improving , she had urinary retention over 500 cc in the Ed known to have cronic  retention field inserted . Pt had worsening confusion day 3 , neurologist called repeat CT of head MR showed no acute pathology or infract . Called her urologist   Dr Begum oxybutynin is stopped ,  field removed voiding  on flomax now , mental status is improving . I had discussed with her psychiatrist Dr Ko who thinks she may have underlying dementia starting with severe anxiety when he saw her 1 year ago and she was on high dose xanax m he has been tapering it down to 1.5 mg tid , he agrees  with decreasing the dose if tolerates which she has been . Gabapentin was prescribed by neurologist per her son anf family thinks her problems , dizziness falls and weakness probably due to [facility] . Pt had restarted on lasix llow dose , TTE normal EF no sign of fluid overload , UTI is ruled out on admission     Pt had an episode of difficulty swallowing day 3 . speech called , mechanical soft diet with thin recommended after MBS is performed , Gi is consulted , no further testing since pt had EGD done by Dr Gauthier in may 2020 reports obtained showed gastritis     Pt is seen by PT and family declined discharge to [facility] , she will go home stay with daughter and family to provide assistance and care , home care with home PT arranged     pt will follow up with Dr Ko , her PCP and neurologist after discharge         total time spend coordinating care 40 min

## 2020-08-06 NOTE — PROGRESS NOTE ADULT - SUBJECTIVE AND OBJECTIVE BOX
NEPHROLOGY INTERVAL HPI/OVERNIGHT EVENTS:    Neuro follow up noted   No new events   Feels better   + field , clear    MEDICATIONS  (STANDING):  aspirin enteric coated 81 milliGRAM(s) Oral daily  dextrose 5% + sodium chloride 0.9%. 1000 milliLiter(s) (80 mL/Hr) IV Continuous <Continuous>  ergocalciferol 38638 Unit(s) Oral every week  gabapentin 200 milliGRAM(s) Oral three times a day  heparin   Injectable 5000 Unit(s) SubCutaneous every 8 hours  hydrALAZINE 25 milliGRAM(s) Oral two times a day  iron sucrose IVPB 100 milliGRAM(s) IV Intermittent every 24 hours  lamoTRIgine 50 milliGRAM(s) Oral two times a day  levothyroxine 25 MICROGram(s) Oral daily  lidocaine   Patch 1 Patch Transdermal daily  ofloxacin 0.3% Solution 1 Drop(s) Both EYES two times a day  oxybutynin 5 milliGRAM(s) Oral two times a day  pantoprazole  Injectable 40 milliGRAM(s) IV Push daily  senna 2 Tablet(s) Oral at bedtime  simvastatin 20 milliGRAM(s) Oral at bedtime  sucralfate suspension 1 Gram(s) Oral two times a day  tamsulosin 0.4 milliGRAM(s) Oral at bedtime    MEDICATIONS  (PRN):  ALPRAZolam 0.5 milliGRAM(s) Oral three times a day PRN anxiety      Allergies    No Known Allergies    Intolerances      Vital Signs Last 24 Hrs  T(C): 37.1 (06 Aug 2020 08:05), Max: 37.3 (05 Aug 2020 20:49)  T(F): 98.8 (06 Aug 2020 08:05), Max: 99.1 (05 Aug 2020 20:49)  HR: 99 (06 Aug 2020 08:05) (80 - 103)  BP: 136/78 (06 Aug 2020 08:05) (133/76 - 149/81)  BP(mean): --  RR: 19 (06 Aug 2020 08:05) (18 - 20)  SpO2: 94% (06 Aug 2020 08:05) (91% - 97%)  Daily     Daily   I&O's Detail    05 Aug 2020 07:01  -  06 Aug 2020 07:00  --------------------------------------------------------  IN:    dextrose 5% + sodium chloride 0.9%.: 960 mL    Oral Fluid: 320 mL  Total IN: 1280 mL    OUT:    Indwelling Catheter - Urethral: 300 mL  Total OUT: 300 mL    Total NET: 980 mL        I&O's Summary    05 Aug 2020 07:01  -  06 Aug 2020 07:00  --------------------------------------------------------  IN: 1280 mL / OUT: 300 mL / NET: 980 mL        PHYSICAL EXAM:  GENERAL: appears chronically ill  HEAD:  Atraumatic, Normocephalic  EYES: EOMI  NECK: Supple, neck  veins full  NERVOUS SYSTEM:  Alert & Oriented X3  CHEST/LUNG: Clear to percussion bilaterally  HEART: Regular rate and rhythm  ABDOMEN: Soft, Nontender, Nondistended; Bowel sounds present  EXTREMITIES:   min edema , + field ---> clear   LABS:                        10.5   6.30  )-----------( 204      ( 06 Aug 2020 05:53 )             32.0     08-06    139  |  99  |  20.0  ----------------------------<  114<H>  4.4   |  28.0  |  1.15    Ca    9.5      06 Aug 2020 05:53  Mg     2.2     08-06          Magnesium, Serum: 2.2 mg/dL (08-06 @ 05:53)  Magnesium, Serum: 1.9 mg/dL (08-05 @ 21:42)          RADIOLOGY & ADDITIONAL TESTS:

## 2020-08-06 NOTE — PROGRESS NOTE ADULT - ASSESSMENT
81 yo F with Parkinson disease , h/o CAD and HTN admitted for frequents falls, hyponatremia ., ARF given iv fluid , improving       1- Hyponatremia   urine lytes osmolality ordered pending   improving with iv fluid   cont to hold lasix    nephrology consult appreciated     2-Weakness / frequent falls   MR of brain read as suspected small SDH ?  d/w radiologist   will get CT of brain with and without  contrast   to evaluate if artifact or real      3- ARF   improving , cont to hold losartan and lasix   repeat lytes in am      4- retention of urine field inserted on admission   sees DR Begum urologist   cont flomax      5- HTN - controlled   cont home meds except losartan     6- CAD with h/o stent   no acute sign of ischemia troponin x2 neg   TTE result reviewed   normal EF     7- lower back pain due to L1 farcture   spine orthopedics surgery input noted   pain meds , add muscle relaxant   Pt as tolerate       discharge to [facility] likely soon

## 2020-08-06 NOTE — DISCHARGE NOTE PROVIDER - CARE PROVIDERS DIRECT ADDRESSES
,[email or web address],DirectAddress_Unknown,DirectAddress_Unknown,DirectAddress_Unknown

## 2020-08-06 NOTE — DISCHARGE NOTE PROVIDER - PROVIDER TOKENS
PROVIDER:[TOKEN:[[id number]],FOLLOWUP:[2 weeks]] PROVIDER:[TOKEN:[[id number]],FOLLOWUP:[2 weeks]],PROVIDER:[TOKEN:[[id number]],FOLLOWUP:[1 week]],PROVIDER:[TOKEN:[[id number]],FOLLOWUP:[2 weeks]],FREE:[LAST:[DR Ko],PHONE:[(   )    -],FAX:[(   )    -]]

## 2020-08-06 NOTE — PROGRESS NOTE ADULT - ASSESSMENT
Improved JESSICA   Volume contracted on Losartan with VENEGAS  No hydro on NC CT abdomen  Ziebach UA  Lasix and Losartan are held   + field , + Flomax     Hyponatremia - Better with volume   Trend labs     D/C IVF

## 2020-08-06 NOTE — DISCHARGE NOTE PROVIDER - CARE PROVIDER_API CALL
Jose Francisco Lizarraga  ORTHOPAEDIC SURGERY  [address] [place] [address]  Phone: [phone]  Fax: [phone]  Follow Up Time: 2 weeks Jose Francisco Lizarraga  ORTHOPAEDIC SURGERY  [address] [place] [address]  Phone: [phone]  Fax: [phone]  Follow Up Time: 2 weeks    Thalia Blake  [facility] MEDICINE  [address] [place] [address]  Phone: [phone]  Fax: [phone]  Follow Up Time: 1 week    Elliot Carvalho  NEUROLOGY  [address] [place] [address]  Phone: [phone]  Fax: [phone]  Follow Up Time: 2 weeks    DR Ko,   Phone: (   )    -  Fax: (   )    -  Follow Up Time:

## 2020-08-06 NOTE — DISCHARGE NOTE PROVIDER - NSDCCPCAREPLAN_GEN_ALL_CORE_FT
PRINCIPAL DISCHARGE DIAGNOSIS  Diagnosis: Acute renal failure  Assessment and Plan of Treatment:       SECONDARY DISCHARGE DIAGNOSES  Diagnosis: Fracture of lumbar spine  Assessment and Plan of Treatment: Follow all verbal and written instructions. Take medications as prescribed. DO NOT drive, operate machinery, and/or make important decisions while on prescription pain medication. DO NOT hesitate to call Doctor's office with questions or concerns.   follow up 1-2 weeks in office with Dr. Lizarraga.  No lifting greater than 10 - 15 lbs.  light walking with assistance of walker and use of TLSO brace when out of bed and use for comfort.  Brace not needed when in bed.  Activity as tolerated.  Pain control as indicated.      Diagnosis: Urinary retention  Assessment and Plan of Treatment: PRINCIPAL DISCHARGE DIAGNOSIS  Diagnosis: Acute renal failure  Assessment and Plan of Treatment: improved , monitor on lasix   hydrate as needed      SECONDARY DISCHARGE DIAGNOSES  Diagnosis: Fracture of lumbar spine  Assessment and Plan of Treatment: Follow all verbal and written instructions. Take medications as prescribed. DO NOT drive, operate machinery, and/or make important decisions while on prescription pain medication. DO NOT hesitate to call Doctor's office with questions or concerns.   follow up 1-2 weeks in office with Dr. Lizarraga.  No lifting greater than 10 - 15 lbs.  light walking with assistance of walker and use of TLSO brace when out of bed and use for comfort.  Brace not needed when in bed.  Activity as tolerated.  Pain control as indicated.      Diagnosis: Anxiety with depression  Assessment and Plan of Treatment: cont lamictal ,lexapro  follow up with Dr townsend in 1 week    Diagnosis: Senile dementia with delirium  Assessment and Plan of Treatment: follow up with dr Carvalho neurologist in 2 weeks    Diagnosis: Hypertension  Assessment and Plan of Treatment: cont current medications   follow up with cardiologist as needed    Diagnosis: Severe protein-calorie malnutrition  Assessment and Plan of Treatment: cont ensure with meals , multivitamin    Diagnosis: Frequent falls  Assessment and Plan of Treatment: fall precautions , ambualte with assisatnce and continue home pyhsical therapy    Diagnosis: Urinary retention  Assessment and Plan of Treatment: continue flomax , stop oxybutyrin

## 2020-08-06 NOTE — PROGRESS NOTE ADULT - SUBJECTIVE AND OBJECTIVE BOX
CATHLEENSTEVE CRUZ    [id number]    History:  The patient is currently being treated non-operatively for a lumbar compression fracture. Patient is doing well. The patient's pain is controlled using the prescribed pain medications and does not have any active back pain at this time. The patient is participating in physical therapy and was out of bed with brace and PT yesterday. Denies nausea, vomiting, chest pain, shortness of breath, abdominal pain or fever. No new complaints. No acute motor or sensory changes are reported.         Vital Signs Last 24 Hrs  T(C): 36.8 (06 Aug 2020 05:21), Max: 37.3 (05 Aug 2020 20:49)  T(F): 98.2 (06 Aug 2020 05:21), Max: 99.1 (05 Aug 2020 20:49)  HR: 91 (06 Aug 2020 05:21) (75 - 103)  BP: 141/79 (06 Aug 2020 05:21) (94/60 - 149/81)  BP(mean): --  RR: 19 (06 Aug 2020 05:21) (18 - 20)  SpO2: 95% (06 Aug 2020 05:21) (91% - 99%)                          10.5   6.30  )-----------( 204      ( 06 Aug 2020 05:53 )             32.0     08-06    139  |  99  |  20.0  ----------------------------<  114<H>  4.4   |  28.0  |  1.15    Ca    9.5      06 Aug 2020 05:53  Mg     2.2     08-06        MEDICATIONS  (STANDING):  aspirin enteric coated 81 milliGRAM(s) Oral daily  dextrose 5% + sodium chloride 0.9%. 1000 milliLiter(s) (80 mL/Hr) IV Continuous <Continuous>  ergocalciferol 71543 Unit(s) Oral every week  gabapentin 200 milliGRAM(s) Oral three times a day  heparin   Injectable 5000 Unit(s) SubCutaneous every 8 hours  hydrALAZINE 25 milliGRAM(s) Oral two times a day  lamoTRIgine 50 milliGRAM(s) Oral two times a day  lidocaine   Patch 1 Patch Transdermal daily  ofloxacin 0.3% Solution 1 Drop(s) Both EYES two times a day  oxybutynin 5 milliGRAM(s) Oral two times a day  senna 2 Tablet(s) Oral at bedtime  simvastatin 20 milliGRAM(s) Oral at bedtime  tamsulosin 0.4 milliGRAM(s) Oral at bedtime    MEDICATIONS  (PRN):  diazepam    Tablet 2 milliGRAM(s) Oral three times a day PRN back spasm  traMADol 25 milliGRAM(s) Oral every 6 hours PRN Severe Pain (7 - 10)      Physical exam: Sensation to light touch of the lower extremities is grossly intact without focal deficit and is symmetric bilaterally. No tremor or clonus. Babinski test is negative. Motor function is 4+/5 without focal deficit. No calf tenderness. There is good passive range of motion of the hips and knees without noted joint pain provocation. 2+ dorsalis pedis pulse. Capillary refill is less than 2 seconds. No cyanosis. No back tenderness. + good AROM of the back and able to sit up to 90 degrees with minimal assist    Primary Orthopedic Assessment:  • Lumbar Compression Fracture    Secondary  Orthopedic Assessment(s):   •     Secondary  Medical Assessment(s):   Suspected deep vein thrombosis (DVT)  Parkinson's disease: Parkinson&#x27;s disease  Urinary retention: Urinary retention  SOB (shortness of breath): SOB (shortness of breath)  Acute renal failure, unspecified acute renal failure type: Acute renal failure, unspecified acute renal failure type  Hyponatremia: Hyponatremia      Plan:   • DVT prophylaxis as prescribed, including use of compression devices and ankle pumps  • Continue physical therapy  • Weightbearing as tolerated of the lower extremities with assistance of a walker  • Incentive spirometry encouraged  • Pain control as clinically indicated  • LSO brace to be used when out of bed and ambulating and use for comfort  • Discharge planning – anticipated discharge is likely subacute rehabilitation  * Office follow-up in two weeks

## 2020-08-06 NOTE — CONSULT NOTE ADULT - CONSULT REASON
AMS/Tremors
Failed Swallow eval.  Dysphagia and Odynophagia.
L1 compression fx of indeterminant age.
HypoNatremia

## 2020-08-06 NOTE — DISCHARGE NOTE PROVIDER - NSDCMRMEDTOKEN_GEN_ALL_CORE_FT
ALPRAZolam 1 mg oral tablet: 1.5 tab(s) orally 3 times a day  ergocalciferol 50,000 intl units (1.25 mg) oral capsule: 1 cap(s) orally once a week  escitalopram 10 mg oral tablet: 1 tab(s) orally once a day  escitalopram 20 mg oral tablet: 1 tab(s) orally once a day  furosemide 40 mg oral tablet: 1 tab(s) orally once a day  gabapentin 600 mg oral tablet: 1 tab(s) orally 3 times a day  hydrALAZINE 25 mg oral tablet: 1 tab(s) orally 2 times a day  hydrOXYzine hydrochloride 25 mg oral tablet: 1 tab(s) orally 3 times a day, As Needed  lamoTRIgine 25 mg oral tablet: 2 tab(s) orally 2 times a day  losartan 50 mg oral tablet: 1 tab(s) orally once a day  oxybutynin 5 mg oral tablet: 1 tab(s) orally 2 times a day  potassium chloride 20 mEq oral powder for reconstitution: 1 each orally once a day  simvastatin 20 mg oral tablet: 1 tab(s) orally once a day (at bedtime)  tamsulosin 0.4 mg oral capsule: 1 cap(s) orally once a day ALPRAZolam 1 mg oral tablet: 1 tab(s) orally 3 times a day  aspirin 81 mg oral delayed release tablet: 1 tab(s) orally once a day  escitalopram 20 mg oral tablet: 1 tab(s) orally once a day  furosemide 20 mg oral tablet: 1 tab(s) orally once a day  gabapentin 100 mg oral capsule: 2 cap(s) orally 3 times a day  hydrALAZINE 25 mg oral tablet: 1 tab(s) orally 2 times a day  lamoTRIgine 25 mg oral tablet: 2 tab(s) orally 2 times a day  Multiple Vitamins with Minerals oral tablet: 1 tab(s) orally once a day  potassium chloride 20 mEq oral powder for reconstitution: 1 each orally once a day  senna oral tablet: 2 tab(s) orally once a day (at bedtime)  simvastatin 20 mg oral tablet: 1 tab(s) orally once a day (at bedtime)  tamsulosin 0.4 mg oral capsule: 1 cap(s) orally once a day

## 2020-08-06 NOTE — PROGRESS NOTE ADULT - SUBJECTIVE AND OBJECTIVE BOX
Internal Medicine Hospitalist Progress Note    follow up for hyponatremia , L1 compression fracture , multiple falls, weakness   pt is with difficulty of swallowing since yesterday , NPO , MBS is pending     pt is seen around noon , she is resting in the bed , no complaints  , back pain is better complaints     MR result reviewed , d/w neurologist           Vital Signs Last 24 Hrs                 T(C): 36.4 (05 Aug 2020 11:22), Max: 36.6 (04 Aug 2020 20:46)  T(F): 97.5 (05 Aug 2020 11:22), Max: 97.9 (04 Aug 2020 20:46)  HR: 75 (05 Aug 2020 11:22) (75 - 104)  BP: 94/60 (05 Aug 2020 11:22) (94/60 - 160/81)  BP(mean): --  RR: 18 (05 Aug 2020 11:22) (18 - 20)  SpO2: 94% (05 Aug 2020 11:22) (94% - 100%)    I&O's Summary    03 Aug 2020 07:01  -  04 Aug 2020 07:00  --------------------------------------------------------  IN: 0 mL / OUT: 1200 mL / NET: -1200 mL        Constitutional: awake, alert , no distress     Respiratory: CTA bilateral     Cardiovascular: regular s1/s2     Gastrointestinal: soft no tenderness , BS positive     Extremities: no pretibial edema      : field in place , urine yellow color                 < from: CT Head No Cont (08.03.20 @ 12:39) >  IMPRESSION:  Mild  chronic microvascular changes without evidence of an acute transcortical infarction or hemorrhage. MR is a more sensitive imaging modality for the evaluation of an acute infarction.    < end of copied text >                          10.6   5.58  )-----------( 176      ( 03 Aug 2020 14:45 )             31.8   08-04    133<L>  |  93<L>  |  26.0<H>  ----------------------------<  90  3.6   |  27.0  |  1.30    Ca    9.3      04 Aug 2020 08:37  Mg     2.2     08-04    < from: MR Head No Cont (08.05.20 @ 09:22) >  MPRESSION:    Suggestion of small acute subdural hematoma along the left posterior parietal convexity measuring 3 mm in thickness.  Noncontrast head CT is recommended for further assessment of this finding.    < end of copied text >

## 2020-08-07 DIAGNOSIS — R13.10 DYSPHAGIA, UNSPECIFIED: ICD-10-CM

## 2020-08-07 LAB
ANION GAP SERPL CALC-SCNC: 15 MMOL/L — SIGNIFICANT CHANGE UP (ref 5–17)
BUN SERPL-MCNC: 15 MG/DL — SIGNIFICANT CHANGE UP (ref 8–20)
CALCIUM SERPL-MCNC: 9.7 MG/DL — SIGNIFICANT CHANGE UP (ref 8.6–10.2)
CHLORIDE SERPL-SCNC: 96 MMOL/L — LOW (ref 98–107)
CO2 SERPL-SCNC: 27 MMOL/L — SIGNIFICANT CHANGE UP (ref 22–29)
CREAT SERPL-MCNC: 0.96 MG/DL — SIGNIFICANT CHANGE UP (ref 0.5–1.3)
GLUCOSE BLDC GLUCOMTR-MCNC: 91 MG/DL — SIGNIFICANT CHANGE UP (ref 70–99)
GLUCOSE SERPL-MCNC: 81 MG/DL — SIGNIFICANT CHANGE UP (ref 70–99)
POTASSIUM SERPL-MCNC: 3.7 MMOL/L — SIGNIFICANT CHANGE UP (ref 3.5–5.3)
POTASSIUM SERPL-SCNC: 3.7 MMOL/L — SIGNIFICANT CHANGE UP (ref 3.5–5.3)
SODIUM SERPL-SCNC: 138 MMOL/L — SIGNIFICANT CHANGE UP (ref 135–145)
T3FREE SERPL-MCNC: 2.46 PG/ML — SIGNIFICANT CHANGE UP (ref 1.8–4.6)
T4 FREE SERPL-MCNC: 1.3 NG/DL — SIGNIFICANT CHANGE UP (ref 0.9–1.8)

## 2020-08-07 PROCEDURE — 74230 X-RAY XM SWLNG FUNCJ C+: CPT | Mod: 26

## 2020-08-07 PROCEDURE — 99233 SBSQ HOSP IP/OBS HIGH 50: CPT

## 2020-08-07 PROCEDURE — 99232 SBSQ HOSP IP/OBS MODERATE 35: CPT

## 2020-08-07 RX ORDER — SODIUM CHLORIDE 9 MG/ML
1000 INJECTION, SOLUTION INTRAVENOUS
Refills: 0 | Status: DISCONTINUED | OUTPATIENT
Start: 2020-08-07 | End: 2020-08-08

## 2020-08-07 RX ADMIN — Medication 1 DROP(S): at 23:05

## 2020-08-07 RX ADMIN — SENNA PLUS 2 TABLET(S): 8.6 TABLET ORAL at 23:06

## 2020-08-07 RX ADMIN — HEPARIN SODIUM 5000 UNIT(S): 5000 INJECTION INTRAVENOUS; SUBCUTANEOUS at 06:35

## 2020-08-07 RX ADMIN — GABAPENTIN 200 MILLIGRAM(S): 400 CAPSULE ORAL at 00:04

## 2020-08-07 RX ADMIN — Medication 1 GRAM(S): at 18:29

## 2020-08-07 RX ADMIN — GABAPENTIN 200 MILLIGRAM(S): 400 CAPSULE ORAL at 23:11

## 2020-08-07 RX ADMIN — Medication 1 GRAM(S): at 06:35

## 2020-08-07 RX ADMIN — Medication 1 DROP(S): at 06:36

## 2020-08-07 RX ADMIN — Medication 0.5 MILLIGRAM(S): at 06:35

## 2020-08-07 RX ADMIN — HEPARIN SODIUM 5000 UNIT(S): 5000 INJECTION INTRAVENOUS; SUBCUTANEOUS at 18:30

## 2020-08-07 RX ADMIN — HEPARIN SODIUM 5000 UNIT(S): 5000 INJECTION INTRAVENOUS; SUBCUTANEOUS at 00:05

## 2020-08-07 RX ADMIN — Medication 25 MILLIGRAM(S): at 06:35

## 2020-08-07 RX ADMIN — SIMVASTATIN 20 MILLIGRAM(S): 20 TABLET, FILM COATED ORAL at 23:07

## 2020-08-07 RX ADMIN — PANTOPRAZOLE SODIUM 40 MILLIGRAM(S): 20 TABLET, DELAYED RELEASE ORAL at 16:55

## 2020-08-07 RX ADMIN — TAMSULOSIN HYDROCHLORIDE 0.4 MILLIGRAM(S): 0.4 CAPSULE ORAL at 00:04

## 2020-08-07 RX ADMIN — SENNA PLUS 2 TABLET(S): 8.6 TABLET ORAL at 00:04

## 2020-08-07 RX ADMIN — Medication 0.5 MILLIGRAM(S): at 00:04

## 2020-08-07 RX ADMIN — Medication 81 MILLIGRAM(S): at 16:55

## 2020-08-07 RX ADMIN — LIDOCAINE 1 PATCH: 4 CREAM TOPICAL at 16:54

## 2020-08-07 RX ADMIN — GABAPENTIN 200 MILLIGRAM(S): 400 CAPSULE ORAL at 16:55

## 2020-08-07 RX ADMIN — Medication 25 MICROGRAM(S): at 06:36

## 2020-08-07 RX ADMIN — SIMVASTATIN 20 MILLIGRAM(S): 20 TABLET, FILM COATED ORAL at 00:04

## 2020-08-07 RX ADMIN — LAMOTRIGINE 50 MILLIGRAM(S): 25 TABLET, ORALLY DISINTEGRATING ORAL at 06:35

## 2020-08-07 RX ADMIN — Medication 5 MILLIGRAM(S): at 06:35

## 2020-08-07 RX ADMIN — GABAPENTIN 200 MILLIGRAM(S): 400 CAPSULE ORAL at 06:35

## 2020-08-07 RX ADMIN — SODIUM CHLORIDE 80 MILLILITER(S): 9 INJECTION, SOLUTION INTRAVENOUS at 10:41

## 2020-08-07 RX ADMIN — TAMSULOSIN HYDROCHLORIDE 0.4 MILLIGRAM(S): 0.4 CAPSULE ORAL at 23:07

## 2020-08-07 RX ADMIN — LIDOCAINE 1 PATCH: 4 CREAM TOPICAL at 18:30

## 2020-08-07 RX ADMIN — LAMOTRIGINE 50 MILLIGRAM(S): 25 TABLET, ORALLY DISINTEGRATING ORAL at 18:29

## 2020-08-07 RX ADMIN — IRON SUCROSE 210 MILLIGRAM(S): 20 INJECTION, SOLUTION INTRAVENOUS at 16:46

## 2020-08-07 RX ADMIN — Medication 25 MILLIGRAM(S): at 18:30

## 2020-08-07 RX ADMIN — Medication 0.5 MILLIGRAM(S): at 16:47

## 2020-08-07 NOTE — SWALLOW VFSS/MBS ASSESSMENT ADULT - RECOMMENDED FEEDING/EATING TECHNIQUES
position upright (90 degrees)/crush medication (when feasible)/provide rest periods between swallows/small sips/bites

## 2020-08-07 NOTE — SWALLOW VFSS/MBS ASSESSMENT ADULT - COMMENTS
As per MD note: "79 yo F with Parkinson disease , h/o CAD and HTN admitted for frequents falls, hyponatremia and ARF , iv fluid given , cr is improving , CT of LS spine showed L1 vertebra fracture , seen by spine surgery no further rec pain control, PT , TLCO brace ordered , pt had nephrology evaluation for hyponatremia lasix on hold given iv fluid , improving , she had urinary retention over 500 cc o=in the Ed known to have croic retention field inserted , d/w Dr Begum urologist over the phone , pt 's MR showed no infarct"

## 2020-08-07 NOTE — PROGRESS NOTE ADULT - ASSESSMENT
JESSICA: +volume depletion on Losartan   +VENEGAS  No hydro on NC CT abdomen  Hyponatremia  Now stable labs  - cont to monitor  - will sign off; please recall if needed

## 2020-08-07 NOTE — SWALLOW VFSS/MBS ASSESSMENT ADULT - ADDITIONAL RECOMMENDATIONS
If pt with reduced ability to orally manage mech soft due to cognition, modify diet to puree & thin fluids, as needed   Please re-consult PRN

## 2020-08-07 NOTE — PROGRESS NOTE ADULT - ASSESSMENT
79 yo F with Parkinson disease , h/o CAD and HTN admitted for frequents falls, hyponatremia and ARF , iv fluid given , cr is improving , CT of LS spine showed L1 vertebra fracture , seen by spine surgery no further rec pain control, PT , TLCO brace ordered , pt had nephrology evaluation for hyponatremia lasix on hold given iv fluid , improving , she had urinary retention over 500 cc o=in the Ed known to have croic retention field inserted , d/w Dr Begum urologist over the phone , pt 's MR showed no infarct       1- Hyponatremia resolved   nephrology follow  up appreciated   cont to hold losartan and lasix   on iv fluid since she is NPO     2- Difficulty swallowing   GI DR Gauthier office contacted   EGD report obtained gastritis , no other pathology   MBS is pending   colonoscopy report may 2020 - diverticulosis     3-Weakness / frequent falls   MR is neg for infarcts   CT of head with /without contrast performed SDH ruled out   neurology consult appreciated     4- ARF improved   cont to hold lasix and losartan     5- retention of urine, cronic   discussed with Dr Begum on the phone   advise to stop ditropan   cont flomax and void trial in few days      6- h/o depression . anxiety   tried to contact pschyiatrist not able  will decrease dose of xanax   monitor closely     7- Ls spine fracture s/p fall last month and prior admission per family   copnt pain meds avoid narcotics due to age and confusion   tylenol prn   controlled   pt as tolerate     8- HTN - controlled   cont home meds   stable     9- CAD with h/o stent   no acute sign of ischemia troponin x2 neg   TTE result reviewed   normal EF         discharge to [facility] likely but son wants her to go home

## 2020-08-07 NOTE — SWALLOW VFSS/MBS ASSESSMENT ADULT - ORAL PHASE
Reduced anterior - posterior transport/+verbalizing with PO in oral cavity Delayed oral transit time/+verbalizing with PO/Uncontrolled bolus / spillover in tamara-pharynx Reduced anterior - posterior transport/Uncontrolled bolus / spillover in tamara-pharynx/Delayed oral transit time Uncontrolled bolus / spillover in tamara-pharynx/Uncontrolled bolus / spillover in hypopharynx

## 2020-08-07 NOTE — SWALLOW VFSS/MBS ASSESSMENT ADULT - SLP PERTINENT HISTORY OF CURRENT PROBLEM
Pt seen bedside 8/6 for swallow eval: diet deferred to MD at the time due to c/o "sticking & burning" in chest area with PO. MBS RX by GI

## 2020-08-07 NOTE — PROGRESS NOTE ADULT - SUBJECTIVE AND OBJECTIVE BOX
Internal Medicine Hospitalist Progress Note    follow up for hyponatremia , L1 compression fracture , multiple falls,   pt is with worsening confusion this am , on 1 :1 now agitation trying to get out of bed , fall risks , has field     MBS is pending     per my conversation with the son ; concern for [facility] , pt is on high dose gabapentin , xanax for depression   will contact  her psychiatrist Dr Cordero - [phone]      Vital Signs Last 24 Hrs  T(C): 37.1 (07 Aug 2020 04:37), Max: 37.1 (06 Aug 2020 15:35)  T(F): 98.8 (07 Aug 2020 04:37), Max: 98.8 (06 Aug 2020 19:27)  HR: 80 (07 Aug 2020 06:34) (79 - 100)  BP: 151/72 (07 Aug 2020 06:34) (138/74 - 153/81)  BP(mean): --  RR: 18 (07 Aug 2020 04:37) (18 - 20)  SpO2: 94% (07 Aug 2020 06:34) (92% - 97%)I&O's Summary        Constitutional: awake, confused to time , talking about doroteo and going home     Respiratory: CTA bilateral     Cardiovascular: regular s1/s2     Gastrointestinal: soft no tenderness , BS positive     Extremities: no pretibial edema      : field in place , urine yellow color                           10.5   6.30  )-----------( 204      ( 06 Aug 2020 05:53 )             32.0   08-06    139  |  99  |  20.0  ----------------------------<  114<H>  4.4   |  28.0  |  1.15    Ca    9.5      06 Aug 2020 05:53  Mg     2.2     08-06         < from: CT Head w/wo IV Cont (08.05.20 @ 19:12) >  IMPRESSION:    No acute abnormalities are present. No abnormal enhancement. There is no evidence of intraparenchymal or extra-axial hemorrhage. Previously visualized small focus of abnormal signal that was thought to reflect subdural hemorrhage, likely reflecting artifact. Mild periventricular chronic microvascular ischemic changes.          < end of copied text >

## 2020-08-07 NOTE — PROGRESS NOTE ADULT - ATTENDING COMMENTS
Patient is awake and conversant and very pleasant but confused although she knows it 2020. Will get MBS and if OK esophagram prior to EGD.

## 2020-08-07 NOTE — SWALLOW VFSS/MBS ASSESSMENT ADULT - DIAGNOSTIC IMPRESSIONS
Mild oral dysphagia for administered trials, with reduced lingual strength & coordination, further impacted by reduced cognition. Pharyngeal stage of swallow grossly WFL: no penetration/aspiration viewed t/o study. ?cricopharyngeal bar at ~C5-C6. Regular solids not administered during study, due to cognitive status & incomplete dentition. Mild oral dysphagia for administered trials, with reduced lingual strength & coordination, further impacted by reduced cognition. Pharyngeal stage of swallow grossly WFL: no penetration/aspiration viewed t/o study. ?cricopharyngeal bar at ~C5-C6. Regular solids not administered during study, due to cognitive status & incomplete dentition.    It should be noted that pt denied c/o PO sticking &/or burning in throat &/or chest area t/o study

## 2020-08-07 NOTE — PROGRESS NOTE ADULT - SUBJECTIVE AND OBJECTIVE BOX
NEPHROLOGY INTERVAL HPI/OVERNIGHT EVENTS:  pt comfortable but confused  no acute distress    MEDICATIONS  (STANDING):  aspirin enteric coated 81 milliGRAM(s) Oral daily  dextrose 5% + sodium chloride 0.9%. 1000 milliLiter(s) (80 mL/Hr) IV Continuous <Continuous>  ergocalciferol 67108 Unit(s) Oral every week  gabapentin 200 milliGRAM(s) Oral three times a day  heparin   Injectable 5000 Unit(s) SubCutaneous every 8 hours  hydrALAZINE 25 milliGRAM(s) Oral two times a day  iron sucrose IVPB 100 milliGRAM(s) IV Intermittent every 24 hours  lamoTRIgine 50 milliGRAM(s) Oral two times a day  levothyroxine 25 MICROGram(s) Oral daily  lidocaine   Patch 1 Patch Transdermal daily  ofloxacin 0.3% Solution 1 Drop(s) Both EYES two times a day  pantoprazole  Injectable 40 milliGRAM(s) IV Push daily  senna 2 Tablet(s) Oral at bedtime  simvastatin 20 milliGRAM(s) Oral at bedtime  sucralfate suspension 1 Gram(s) Oral two times a day  tamsulosin 0.4 milliGRAM(s) Oral at bedtime    MEDICATIONS  (PRN):  ALPRAZolam 0.5 milliGRAM(s) Oral three times a day PRN anxiety          Vital Signs Last 24 Hrs  T(C): 37.1 (07 Aug 2020 04:37), Max: 37.1 (06 Aug 2020 15:35)  T(F): 98.8 (07 Aug 2020 04:37), Max: 98.8 (06 Aug 2020 19:27)  HR: 80 (07 Aug 2020 06:34) (79 - 100)  BP: 151/72 (07 Aug 2020 06:34) (138/74 - 153/81)  BP(mean): --  RR: 18 (07 Aug 2020 04:37) (18 - 20)  SpO2: 94% (07 Aug 2020 06:34) (92% - 97%)    PHYSICAL EXAM:  GENERAL: Elderly, frail  NECK: Supple, neck  veins full  NERVOUS SYSTEM:  Alert, confused  CHEST/LUNG: Clear bilaterally  HEART: Regular rate and rhythm  ABDOMEN: Soft, Nontender, Nondistended; S+  EXTREMITIES:  Tr edema    LABS:                        10.5   6.30  )-----------( 204      ( 06 Aug 2020 05:53 )             32.0     08-07    138  |  96<L>  |  15.0  ----------------------------<  81  3.7   |  27.0  |  0.96    Ca    9.7      07 Aug 2020 11:08  Mg     2.2     08-06          Vitamin D, 25-Hydroxy: 47.6 ng/mL (08-06 @ 15:56)      RADIOLOGY & ADDITIONAL TESTS:

## 2020-08-07 NOTE — PROGRESS NOTE ADULT - SUBJECTIVE AND OBJECTIVE BOX
Chief Complaint: This is a 80y old woman patient being seen in follow-up consultation for failed swallow eval.    HPI / 24H events:  Patient seeing and evaluated at bedside, reporting no complains, on a 1:1 for increased confusion. On 08/05 swallow evaluation unremarkable for puree &  thin fluids: no overt s/s aspiration. As per swallow eval report patient complained of  food & liquids "sticking & burning" in mid to lower esophageal .Today schedule fo modified barium swallow eval,     ROS: Unable to obtain.    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 1000 milliLiter(s) (80 mL/Hr) IV Continuous <Continuous>  ergocalciferol 26661 Unit(s) Oral every week  gabapentin 200 milliGRAM(s) Oral three times a day  heparin   Injectable 5000 Unit(s) SubCutaneous every 8 hours  hydrALAZINE 25 milliGRAM(s) Oral two times a day  iron sucrose IVPB 100 milliGRAM(s) IV Intermittent every 24 hours  lamoTRIgine 50 milliGRAM(s) Oral two times a day  levothyroxine 25 MICROGram(s) Oral daily  lidocaine   Patch 1 Patch Transdermal daily  ofloxacin 0.3% Solution 1 Drop(s) Both EYES two times a day  pantoprazole  Injectable 40 milliGRAM(s) IV Push daily  senna 2 Tablet(s) Oral at bedtime  simvastatin 20 milliGRAM(s) Oral at bedtime  sucralfate suspension 1 Gram(s) Oral two times a day  tamsulosin 0.4 milliGRAM(s) Oral at bedtime    MEDICATIONS  (PRN):  ALPRAZolam 0.5 milliGRAM(s) Oral three times a day PRN anxiety    No Known Allergies    T(C): 37.1 (08-07-20 @ 04:37), Max: 37.1 (08-06-20 @ 15:35)  HR: 80 (08-07-20 @ 06:34) (79 - 100)  BP: 151/72 (08-07-20 @ 06:34) (138/74 - 153/81)  RR: 18 (08-07-20 @ 04:37) (18 - 20)  SpO2: 94% (08-07-20 @ 06:34) (92% - 97%)    I&O's Summary    06 Aug 2020 07:01  -  07 Aug 2020 07:00  --------------------------------------------------------  IN: 0 mL / OUT: 1700 mL / NET: -1700 mL      PHYSICAL EXAM:  Constitutional: Well-developed, well-nourished, in no apparent distress  Eyes: Sclerae anicteric, conjunctivae normal  ENMT: Mucus membranes moist, no oropharyngeal thrush noted  Neck: No thyroid nodules appreciated, no significant cervical or supraclavicular lymphadenopathy  Respiratory: Breathing nonlabored; clear to auscultation  Cardiovascular: Regular rate and rhythm  Gastrointestinal: Soft, nontender, nondistended, normoactive bowel sounds; no hepatosplenomegaly appreciated; no rebound tenderness or involuntary guarding  Rectal: Perianal exam within normal limits; normal sphincter tone; brown stool on glove  Extremities: No clubbing, cyanosis or edema  Neurological: Alert and oriented to person, place and time; no asterixis  Skin: No jaundice  Lymph Nodes: No significant lymphadenopathy  Musculoskeletal: No significant peripheral atrophy  Psychiatric: Affect and mood appropriate                   10.5   6.30  )-----------( 204      ( 08-06 @ 05:53 )             32.0       08-07    138  |  96<L>  |  15.0  ----------------------------<  81  3.7   |  27.0  |  0.96    Ca    9.7      07 Aug 2020 11:08  Mg     2.2     08-06                IMAGING: I personally reviewed the US kidney and bladder, and I agree with the radiologist's interpretation as described below:   EXAM:  US KIDNEYS AND BLADDER                          PROCEDURE DATE:  08/06/2020      FINDINGS: The study was technically difficult due to patient's inability to follow instruction and bowel gas.    Right kidney: Measures 8.6 cm in length. No hydronephrosis or obvious renal stone.    Left kidney: Suboptimally visualized. Measures approximately 7.4 cm in length. No hydronephrosis or obvious renal stone.    Bladder: Near completely decompressed by a Villegas catheter. Prevoid volume of 17 ml.    IMPRESSION:    Technically difficult study as described. No hydronephrosis or obvious renal stone.

## 2020-08-07 NOTE — PROGRESS NOTE ADULT - SUBJECTIVE AND OBJECTIVE BOX
HPI:  79 yo F with h/o Parkinson dx , HTN and CAD was brought to the Ed with c/o falls at home and confusion, generalized weakness , per ED and husbnad reports pt had fallen at home today x 2 , she was c/o abd pain nad SOB , at present denies any chest pain , or SOB , has lower back pain . ON arrival to the ED BP is low , good pulse oximetry , lab showed hyponatremia and elevated cr  , given iv fluid in the ED  BP improves , repeat labs na level up to 130 , images showed L! compression  fracture . Pt is poor historian  , she is with urinary issues followed by Dr Begum , in the ED field inserted 500 cc urine obtained . (03 Aug 2020 18:00)    Patient with known urinarty issues being treated by Dr. Begum.  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 1000 milliLiter(s) (80 mL/Hr) IV Continuous <Continuous>  ergocalciferol 43861 Unit(s) Oral every week  gabapentin 200 milliGRAM(s) Oral three times a day  heparin   Injectable 5000 Unit(s) SubCutaneous every 8 hours  hydrALAZINE 25 milliGRAM(s) Oral two times a day  iron sucrose IVPB 100 milliGRAM(s) IV Intermittent every 24 hours  lamoTRIgine 50 milliGRAM(s) Oral two times a day  levothyroxine 25 MICROGram(s) Oral daily  lidocaine   Patch 1 Patch Transdermal daily  ofloxacin 0.3% Solution 1 Drop(s) Both EYES two times a day  pantoprazole  Injectable 40 milliGRAM(s) IV Push daily  senna 2 Tablet(s) Oral at bedtime  simvastatin 20 milliGRAM(s) Oral at bedtime  sucralfate suspension 1 Gram(s) Oral two times a day  tamsulosin 0.4 milliGRAM(s) Oral at bedtime    MEDICATIONS  (PRN):  ALPRAZolam 0.5 milliGRAM(s) Oral three times a day PRN anxiety      Allergies    No Known Allergies    Intolerances        SOCIAL HISTORY:    FAMILY HISTORY:  Family history of cancer in father: he  at age 80&#x27;s      Vital Signs Last 24 Hrs  T(C): 37.1 (07 Aug 2020 15:18), Max: 37.1 (06 Aug 2020 19:27)  T(F): 98.7 (07 Aug 2020 15:18), Max: 98.8 (06 Aug 2020 19:27)  HR: 86 (07 Aug 2020 15:18) (79 - 100)  BP: 151/79 (07 Aug 2020 15:18) (138/74 - 153/81)  BP(mean): --  RR: 18 (07 Aug 2020 15:18) (18 - 20)  SpO2: 94% (07 Aug 2020 15:18) (92% - 97%)    PHYSICAL EXAM:    General: Well developed; well nourished; in no acute distress  Head: Normocephalic; atraumatic  Respiratory: No wheezes, rales or rhonchi  Cardiovascular: Regular rate and rhythm. S1 and S2 Normal; No murmurs, gallops or rubs  Gastrointestinal: Soft non-tender non-distended; Normal bowel sounds; No hepatosplenomegaly  Genitourinary: No costovertebral angle tenderness.  Urinary bladder is clinically not distended       LABS:                        10.5   6.30  )-----------( 204      ( 06 Aug 2020 05:53 )             32.0     08-07    138  |  96<L>  |  15.0  ----------------------------<  81  3.7   |  27.0  |  0.96    Ca    9.7      07 Aug 2020 11:08  Mg     2.2     08-06            RADIOLOGY & ADDITIONAL STUDIES:

## 2020-08-07 NOTE — SWALLOW VFSS/MBS ASSESSMENT ADULT - SLP GENERAL OBSERVATIONS
Pt received & seen seated upright via stretcher in radiology, awake/alert, confused (pt not as confused when last seen at bedside by this dept.) 1:1 initially present, 0/10 pain

## 2020-08-07 NOTE — PROGRESS NOTE ADULT - ASSESSMENT
In summary this is an 79 yo F with h/o Parkinson dx , HTN and CAD was brought to the Ed with c/o falls at home and confusion, generalized weakness , per ED and  reports pt had fallen at home today x 2 , she was c/o abd pain nad SOB. Consulted by GI for Failed Speech eval. In summary this is an 79 yo F with h/o Parkinson dx , HTN and CAD was brought to the Ed with c/o falls at home and confusion, generalized weakness , per ED and  reports pt had fallen at home today x 2 , she was c/o abd pain nad SOB. Consulted by GI for dysphagia

## 2020-08-07 NOTE — SWALLOW VFSS/MBS ASSESSMENT ADULT - NS SWALLOW VFSS REC ASPIR MON
change of breathing pattern/position upright (90Y)/cough/gurgly voice/pneumonia/throat clearing/upper respiratory infection/oral hygiene/fever

## 2020-08-07 NOTE — PROGRESS NOTE ADULT - PROBLEM SELECTOR PLAN 1
On 08/05 swallow evaluation unremarkable for puree &  thin fluids: no overt s/s aspiration. As per swallow eval report patient complained of  food & liquids "sticking & burning" in mid to lower esophageal .Today schedule fo modified barium swallow evaluation, if negative then esophagram. On 08/05 swallow evaluation unremarkable for puree &  thin fluids: no overt s/s aspiration. As per swallow eval report patient complained of  food & liquids "sticking & burning" in mid to lower esophageal .Today schedule fo modified barium swallow evaluation, if negative then esophagram. May require EGD as well.

## 2020-08-07 NOTE — PROGRESS NOTE ADULT - ASSESSMENT
Urinary retention    1.  continue flomax  2.  stop oxybutynin  3.  tov tomorrow morning  4.  will follow

## 2020-08-08 LAB
ANION GAP SERPL CALC-SCNC: 15 MMOL/L — SIGNIFICANT CHANGE UP (ref 5–17)
BUN SERPL-MCNC: 18 MG/DL — SIGNIFICANT CHANGE UP (ref 8–20)
CALCIUM SERPL-MCNC: 9.7 MG/DL — SIGNIFICANT CHANGE UP (ref 8.6–10.2)
CHLORIDE SERPL-SCNC: 100 MMOL/L — SIGNIFICANT CHANGE UP (ref 98–107)
CO2 SERPL-SCNC: 25 MMOL/L — SIGNIFICANT CHANGE UP (ref 22–29)
CREAT SERPL-MCNC: 1.1 MG/DL — SIGNIFICANT CHANGE UP (ref 0.5–1.3)
CULTURE RESULTS: SIGNIFICANT CHANGE UP
CULTURE RESULTS: SIGNIFICANT CHANGE UP
GLUCOSE SERPL-MCNC: 106 MG/DL — HIGH (ref 70–99)
HCT VFR BLD CALC: 31.7 % — LOW (ref 34.5–45)
HGB BLD-MCNC: 10.6 G/DL — LOW (ref 11.5–15.5)
MCHC RBC-ENTMCNC: 29.3 PG — SIGNIFICANT CHANGE UP (ref 27–34)
MCHC RBC-ENTMCNC: 33.4 GM/DL — SIGNIFICANT CHANGE UP (ref 32–36)
MCV RBC AUTO: 87.6 FL — SIGNIFICANT CHANGE UP (ref 80–100)
NT-PROBNP SERPL-SCNC: 2169 PG/ML — HIGH (ref 0–300)
PLATELET # BLD AUTO: 201 K/UL — SIGNIFICANT CHANGE UP (ref 150–400)
POTASSIUM SERPL-MCNC: 3.5 MMOL/L — SIGNIFICANT CHANGE UP (ref 3.5–5.3)
POTASSIUM SERPL-SCNC: 3.5 MMOL/L — SIGNIFICANT CHANGE UP (ref 3.5–5.3)
RBC # BLD: 3.62 M/UL — LOW (ref 3.8–5.2)
RBC # FLD: 13.5 % — SIGNIFICANT CHANGE UP (ref 10.3–14.5)
SODIUM SERPL-SCNC: 140 MMOL/L — SIGNIFICANT CHANGE UP (ref 135–145)
SPECIMEN SOURCE: SIGNIFICANT CHANGE UP
SPECIMEN SOURCE: SIGNIFICANT CHANGE UP
WBC # BLD: 5.52 K/UL — SIGNIFICANT CHANGE UP (ref 3.8–10.5)
WBC # FLD AUTO: 5.52 K/UL — SIGNIFICANT CHANGE UP (ref 3.8–10.5)

## 2020-08-08 PROCEDURE — 99233 SBSQ HOSP IP/OBS HIGH 50: CPT

## 2020-08-08 PROCEDURE — 99232 SBSQ HOSP IP/OBS MODERATE 35: CPT

## 2020-08-08 RX ORDER — ALPRAZOLAM 0.25 MG
0.5 TABLET ORAL THREE TIMES A DAY
Refills: 0 | Status: DISCONTINUED | OUTPATIENT
Start: 2020-08-08 | End: 2020-08-09

## 2020-08-08 RX ORDER — MULTIVIT-MIN/FERROUS GLUCONATE 9 MG/15 ML
1 LIQUID (ML) ORAL DAILY
Refills: 0 | Status: DISCONTINUED | OUTPATIENT
Start: 2020-08-08 | End: 2020-08-10

## 2020-08-08 RX ORDER — POTASSIUM CHLORIDE 20 MEQ
40 PACKET (EA) ORAL ONCE
Refills: 0 | Status: COMPLETED | OUTPATIENT
Start: 2020-08-08 | End: 2020-08-08

## 2020-08-08 RX ORDER — ALPRAZOLAM 0.25 MG
1.5 TABLET ORAL
Qty: 0 | Refills: 0 | DISCHARGE

## 2020-08-08 RX ORDER — GABAPENTIN 400 MG/1
1 CAPSULE ORAL
Qty: 0 | Refills: 0 | DISCHARGE

## 2020-08-08 RX ADMIN — HEPARIN SODIUM 5000 UNIT(S): 5000 INJECTION INTRAVENOUS; SUBCUTANEOUS at 21:30

## 2020-08-08 RX ADMIN — Medication 1 TABLET(S): at 14:45

## 2020-08-08 RX ADMIN — LAMOTRIGINE 50 MILLIGRAM(S): 25 TABLET, ORALLY DISINTEGRATING ORAL at 18:34

## 2020-08-08 RX ADMIN — SENNA PLUS 2 TABLET(S): 8.6 TABLET ORAL at 21:30

## 2020-08-08 RX ADMIN — Medication 25 MILLIGRAM(S): at 06:37

## 2020-08-08 RX ADMIN — Medication 0.5 MILLIGRAM(S): at 14:45

## 2020-08-08 RX ADMIN — TAMSULOSIN HYDROCHLORIDE 0.4 MILLIGRAM(S): 0.4 CAPSULE ORAL at 21:30

## 2020-08-08 RX ADMIN — SIMVASTATIN 20 MILLIGRAM(S): 20 TABLET, FILM COATED ORAL at 21:30

## 2020-08-08 RX ADMIN — Medication 81 MILLIGRAM(S): at 12:23

## 2020-08-08 RX ADMIN — Medication 25 MILLIGRAM(S): at 18:34

## 2020-08-08 RX ADMIN — GABAPENTIN 200 MILLIGRAM(S): 400 CAPSULE ORAL at 21:30

## 2020-08-08 RX ADMIN — Medication 0.5 MILLIGRAM(S): at 21:30

## 2020-08-08 RX ADMIN — Medication 1 DROP(S): at 06:38

## 2020-08-08 RX ADMIN — HEPARIN SODIUM 5000 UNIT(S): 5000 INJECTION INTRAVENOUS; SUBCUTANEOUS at 02:25

## 2020-08-08 RX ADMIN — LAMOTRIGINE 50 MILLIGRAM(S): 25 TABLET, ORALLY DISINTEGRATING ORAL at 06:38

## 2020-08-08 RX ADMIN — HEPARIN SODIUM 5000 UNIT(S): 5000 INJECTION INTRAVENOUS; SUBCUTANEOUS at 14:45

## 2020-08-08 RX ADMIN — LIDOCAINE 1 PATCH: 4 CREAM TOPICAL at 06:55

## 2020-08-08 RX ADMIN — Medication 1 GRAM(S): at 06:38

## 2020-08-08 RX ADMIN — Medication 0.5 MILLIGRAM(S): at 02:27

## 2020-08-08 RX ADMIN — Medication 25 MICROGRAM(S): at 06:37

## 2020-08-08 RX ADMIN — GABAPENTIN 200 MILLIGRAM(S): 400 CAPSULE ORAL at 06:37

## 2020-08-08 RX ADMIN — Medication 40 MILLIEQUIVALENT(S): at 12:22

## 2020-08-08 RX ADMIN — GABAPENTIN 200 MILLIGRAM(S): 400 CAPSULE ORAL at 14:45

## 2020-08-08 NOTE — DIETITIAN INITIAL EVALUATION ADULT. - ADD RECOMMEND
Rx: MVI daily. Encourage po intake, monitor diet tolerance, and provide assistance at meals as needed. Obtain daily weights to monitor trends.

## 2020-08-08 NOTE — DIETITIAN INITIAL EVALUATION ADULT. - PERTINENT LABORATORY DATA
08-08 Na140 mmol/L Glu 106 mg/dL<H> K+ 3.5 mmol/L Cr  1.10 mg/dL BUN 18.0 mg/dL Phos n/a   Alb n/a   PAB n/a

## 2020-08-08 NOTE — PROGRESS NOTE ADULT - ASSESSMENT
Dysphagia: Doing ok. Now on mechanical soft diet. Please obtain esophagram and then will proceed with EGD early next week.

## 2020-08-08 NOTE — DIETITIAN INITIAL EVALUATION ADULT. - PERTINENT MEDS FT
MEDICATIONS  (STANDING):  aspirin enteric coated 81 milliGRAM(s) Oral daily  dextrose 5% + sodium chloride 0.9%. 1000 milliLiter(s) (80 mL/Hr) IV Continuous <Continuous>  ergocalciferol 01429 Unit(s) Oral every week  gabapentin 200 milliGRAM(s) Oral three times a day  heparin   Injectable 5000 Unit(s) SubCutaneous every 8 hours  hydrALAZINE 25 milliGRAM(s) Oral two times a day  iron sucrose IVPB 100 milliGRAM(s) IV Intermittent every 24 hours  lamoTRIgine 50 milliGRAM(s) Oral two times a day  levothyroxine 25 MICROGram(s) Oral daily  lidocaine   Patch 1 Patch Transdermal daily  pantoprazole  Injectable 40 milliGRAM(s) IV Push daily  senna 2 Tablet(s) Oral at bedtime  simvastatin 20 milliGRAM(s) Oral at bedtime  sucralfate suspension 1 Gram(s) Oral two times a day  tamsulosin 0.4 milliGRAM(s) Oral at bedtime    MEDICATIONS  (PRN):  ALPRAZolam 0.5 milliGRAM(s) Oral three times a day PRN anxiety

## 2020-08-08 NOTE — PROGRESS NOTE ADULT - SUBJECTIVE AND OBJECTIVE BOX
Internal Medicine Hospitalist Progress Note    follow up for hyponatremia , L1 compression fracture , multiple falls  pt is confused to place time , she is calm cooperative   field is removed this am for void trial     Her pscychiatrist DR Ko returned my call today   apparently he has been treating her for a while and she has been on higher dose of xanax he had dropped it to 1.5 mg tis, she was very agitated when she was presented to him   he thinks she may have dementia ,he advised to cont xanax if she is tolerating and no withdrawal to 0.5 mg tid   cont lamictal and lexapro and he will see her in the office     Vital Signs Last 24 Hrs  T(C): 36.8 (08 Aug 2020 08:36), Max: 37.1 (07 Aug 2020 15:18)  T(F): 98.3 (08 Aug 2020 08:36), Max: 98.7 (07 Aug 2020 15:18)  HR: 102 (08 Aug 2020 08:36) (86 - 108)  BP: 115/72 (08 Aug 2020 08:36) (101/49 - 151/79)  BP(mean): --  RR: 18 (08 Aug 2020 08:36) (18 - 18)  SpO2: 93% (08 Aug 2020 08:36) (93% - 97%)    Constitutional: awake, confused to time and place     Respiratory: CTA bilateral     Cardiovascular: regular s1/s2     Gastrointestinal: soft no tenderness , BS positive     Extremities: no pretibial edema      : field is removed                           10.6   5.52  )-----------( 201      ( 08 Aug 2020 06:24 )             31.7   08-08    140  |  100  |  18.0  ----------------------------<  106<H>  3.5   |  25.0  |  1.10    Ca    9.7      08 Aug 2020 06:24           < from: CT Head w/wo IV Cont (08.05.20 @ 19:12) >  IMPRESSION:    No acute abnormalities are present. No abnormal enhancement. There is no evidence of intraparenchymal or extra-axial hemorrhage. Previously visualized small focus of abnormal signal that was thought to reflect subdural hemorrhage, likely reflecting artifact. Mild periventricular chronic microvascular ischemic changes.          < end of copied text >

## 2020-08-08 NOTE — DIETITIAN INITIAL EVALUATION ADULT. - PROBLEM SELECTOR PLAN 1
given iv fluid , cont repeat labs improving   will check TSH, blood urine osmolality   urine lytes   suspected dehydration   will hold lasix

## 2020-08-08 NOTE — DIETITIAN INITIAL EVALUATION ADULT. - OTHER INFO
80 year old female with Parkinson disease, h/o CAD and HTN admitted for frequents falls, hyponatremia and ARF. CT of LS spine showed L1 vertebra fracture, seen by spine surgery no further rec pain control, PT, TLCO brace ordered. Pt also with dysphagia s/p MBS with recommendations for a mechanical soft diet with thin liquids, +aspiration precautions. Pt needs assistance and 1:1 supervision at meals. Pt confused at time of interview. Breakfast tray observed at bedside, <25% consumed per plate waste. Pt drinking Ensure Enlive supplement and reports she likes. Pt able to report weight loss, when asked to specify amount she reported "a lot"; uncertain of time frame.

## 2020-08-08 NOTE — PROGRESS NOTE ADULT - ASSESSMENT
81 yo F with Parkinson disease , h/o CAD and HTN admitted for frequents falls, hyponatremia and ARF , iv fluid given , cr is improving , CT of LS spine showed L1 vertebra fracture , seen by spine surgery no further rec pain control, PT , TLCO brace ordered , pt had nephrology evaluation for hyponatremia lasix on hold given iv fluid , improving , she had urinary retention over 500 cc o=in the Ed known to have croic retention field inserted , d/w Dr Begum urologist over the phone , pt 's MR showed no infarct       1- Hyponatremia resolved   nephrology follow  up appreciated   cont to hold losartan and lasix     2- Difficulty swallowing   GI DR Gauthier office contacted , EGD report in the chart   GI is informed about EGD done in May , 2020   Dr Diego states that  no indication for further repeat EGD     3- Hypokalemia   replace keep over 3-5-4     4- Delirium likely multifactorial anxiety sundowning , early dementia   enhanced supervision   family wishes to take her home , they have attached room to her daughter's house   neurology consult appreciated     5-Weakness / frequent falls   MR is neg for infarcts   CT of head with /without contrast performed SDH ruled out   neurology consult noted     6- ARF improved   cont to hold lasix and losartan   s/p iv fluid     7- Anxiety depression    see discussion with her psychiatrist Dr Ko rec   cont xanax lower dose TID   lamictal and lexapro   he will see her in the office after discharge     8- retention of urine, cronic   Gu input appreciated   void trial field removed   cont flomax      9- Severe protein mik malnutrition   agree with nutrionist evaluation and rec     10- Ls spine fracture s/p fall last month and prior admission per family   cont pain meds avoid narcotics due to age and confusion   tylenol prn   controlled pain   home with assistance   pt as tolerate     11- CAD with h/o stent   no acute sign of ischemia troponin x2 neg   TTE result reviewed   normal EF       home with family soon

## 2020-08-08 NOTE — PROGRESS NOTE ADULT - SUBJECTIVE AND OBJECTIVE BOX
INTERVAL HPI/OVERNIGHT EVENTS:  FEELS WELL AND is voiding with opursick intact    MEDICATIONS  (STANDING):  ALPRAZolam 0.5 milliGRAM(s) Oral three times a day  aspirin enteric coated 81 milliGRAM(s) Oral daily  gabapentin 200 milliGRAM(s) Oral three times a day  heparin   Injectable 5000 Unit(s) SubCutaneous every 8 hours  hydrALAZINE 25 milliGRAM(s) Oral two times a day  lamoTRIgine 50 milliGRAM(s) Oral two times a day  multivitamin/minerals 1 Tablet(s) Oral daily  senna 2 Tablet(s) Oral at bedtime  simvastatin 20 milliGRAM(s) Oral at bedtime  tamsulosin 0.4 milliGRAM(s) Oral at bedtime    MEDICATIONS  (PRN):      Allergies    No Known Allergies    Intolerances        Vital Signs Last 24 Hrs  T(C): 36.8 (08 Aug 2020 20:42), Max: 36.9 (08 Aug 2020 00:00)  T(F): 98.2 (08 Aug 2020 20:42), Max: 98.5 (08 Aug 2020 00:00)  HR: 95 (08 Aug 2020 20:42) (95 - 108)  BP: 137/67 (08 Aug 2020 20:42) (101/49 - 148/80)  BP(mean): --  RR: 18 (08 Aug 2020 20:42) (18 - 18)  SpO2: 97% (08 Aug 2020 20:42) (93% - 98%)     ON PE:  General: alert and awake  Abdomen: soft, nt, nd, bs+       LABS:                        10.6   5.52  )-----------( 201      ( 08 Aug 2020 06:24 )             31.7     08-08    140  |  100  |  18.0  ----------------------------<  106<H>  3.5   |  25.0  |  1.10    Ca    9.7      08 Aug 2020 06:24            RADIOLOGY & ADDITIONAL TESTS:

## 2020-08-08 NOTE — DIETITIAN INITIAL EVALUATION ADULT. - MALNUTRITION
NFPE: moderate muscle loss of temples, clavicles, shoulders; moderate fat loss of orbitals, buccal pads, triceps severe, chronic

## 2020-08-08 NOTE — DIETITIAN INITIAL EVALUATION ADULT. - ETIOLOGY
related to inability to meet sufficient protein-energy in setting of advanced age, Parkinson's, dysphagia, and persistent lack of appetite

## 2020-08-08 NOTE — CHART NOTE - NSCHARTNOTEFT_GEN_A_CORE
Upon Nutritional Assessment by the Registered Dietitian your patient was determined to meet criteria / has evidence of the following diagnosis/diagnoses:          [ ]  Mild Protein Calorie Malnutrition        [ ]  Moderate Protein Calorie Malnutrition        [x] Severe Protein Calorie Malnutrition        [ ] Unspecified Protein Calorie Malnutrition        [ ] Underweight / BMI <19        [ ] Morbid Obesity / BMI > 40    Pt presents at high nutrition risk secondary to malnutrition (severe, chronic) related to inability to meet sufficient protein-energy in setting of advanced age, Parkinson's, dysphagia, and persistent lack of appetite as evidenced by meeting <75% nutrient needs >1 month, moderate muscle loss of temples, clavicles, shoulders; moderate fat loss of orbitals, buccal pads, triceps.    Findings as based on:  •  Comprehensive nutrition assessment and consultation  •  Calorie counts (nutrient intake analysis)  •  Food acceptance and intake status from observations by staff  •  Follow up  •  Patient education  •  Intervention secondary to interdisciplinary rounds  •   concerns      Treatment:    The following has been recommended:  1) Continue diet as tolerated.   2) Increase Ensure Enlive to TID to optimize po intake and provide an additional 350 kcal, 20g protein per serving.  3) Rx: MVI daily.  4) Encourage po intake, monitor diet tolerance, and provide assistance at meals as needed.   5) Obtain daily weights to monitor trends.    PROVIDER Section:     By signing this assessment you are acknowledging and agree with the diagnosis/diagnoses assigned by the Registered Dietitian    Comments:

## 2020-08-08 NOTE — PROGRESS NOTE ADULT - SUBJECTIVE AND OBJECTIVE BOX
INTERVAL HPI/OVERNIGHT EVENTS:FU for dysphagia. Evaluated by speech and swallow team. Diet advanced to mechanical soft with thin liquids. Patient is awake but confused.     MEDICATIONS  (STANDING):  aspirin enteric coated 81 milliGRAM(s) Oral daily  dextrose 5% + sodium chloride 0.9%. 1000 milliLiter(s) (80 mL/Hr) IV Continuous <Continuous>  ergocalciferol 25926 Unit(s) Oral every week  gabapentin 200 milliGRAM(s) Oral three times a day  heparin   Injectable 5000 Unit(s) SubCutaneous every 8 hours  hydrALAZINE 25 milliGRAM(s) Oral two times a day  iron sucrose IVPB 100 milliGRAM(s) IV Intermittent every 24 hours  lamoTRIgine 50 milliGRAM(s) Oral two times a day  levothyroxine 25 MICROGram(s) Oral daily  lidocaine   Patch 1 Patch Transdermal daily  pantoprazole  Injectable 40 milliGRAM(s) IV Push daily  senna 2 Tablet(s) Oral at bedtime  simvastatin 20 milliGRAM(s) Oral at bedtime  sucralfate suspension 1 Gram(s) Oral two times a day  tamsulosin 0.4 milliGRAM(s) Oral at bedtime    MEDICATIONS  (PRN):  ALPRAZolam 0.5 milliGRAM(s) Oral three times a day PRN anxiety      Allergies    No Known Allergies    Intolerances        Vital Signs Last 24 Hrs  T(C): 36.7 (08 Aug 2020 04:20), Max: 37.1 (07 Aug 2020 15:18)  T(F): 98 (08 Aug 2020 04:20), Max: 98.7 (07 Aug 2020 15:18)  HR: 108 (08 Aug 2020 04:20) (86 - 108)  BP: 136/76 (08 Aug 2020 04:20) (101/49 - 151/79)  BP(mean): --  RR: 18 (08 Aug 2020 04:20) (18 - 18)  SpO2: 95% (08 Aug 2020 04:20) (93% - 97%)    LABS:                        10.6   5.52  )-----------( 201      ( 08 Aug 2020 06:24 )             31.7     08-08    140  |  100  |  18.0  ----------------------------<  106<H>  3.5   |  25.0  |  1.10    Ca    9.7      08 Aug 2020 06:24            RADIOLOGY & ADDITIONAL TESTS:

## 2020-08-09 PROCEDURE — 99232 SBSQ HOSP IP/OBS MODERATE 35: CPT

## 2020-08-09 RX ORDER — ALPRAZOLAM 0.25 MG
0.5 TABLET ORAL
Refills: 0 | Status: DISCONTINUED | OUTPATIENT
Start: 2020-08-09 | End: 2020-08-10

## 2020-08-09 RX ORDER — FUROSEMIDE 40 MG
40 TABLET ORAL DAILY
Refills: 0 | Status: DISCONTINUED | OUTPATIENT
Start: 2020-08-09 | End: 2020-08-10

## 2020-08-09 RX ORDER — ALPRAZOLAM 0.25 MG
1 TABLET ORAL
Refills: 0 | Status: DISCONTINUED | OUTPATIENT
Start: 2020-08-09 | End: 2020-08-10

## 2020-08-09 RX ADMIN — GABAPENTIN 200 MILLIGRAM(S): 400 CAPSULE ORAL at 13:00

## 2020-08-09 RX ADMIN — LAMOTRIGINE 50 MILLIGRAM(S): 25 TABLET, ORALLY DISINTEGRATING ORAL at 17:07

## 2020-08-09 RX ADMIN — SENNA PLUS 2 TABLET(S): 8.6 TABLET ORAL at 21:26

## 2020-08-09 RX ADMIN — Medication 25 MILLIGRAM(S): at 05:05

## 2020-08-09 RX ADMIN — TAMSULOSIN HYDROCHLORIDE 0.4 MILLIGRAM(S): 0.4 CAPSULE ORAL at 21:26

## 2020-08-09 RX ADMIN — HEPARIN SODIUM 5000 UNIT(S): 5000 INJECTION INTRAVENOUS; SUBCUTANEOUS at 05:05

## 2020-08-09 RX ADMIN — GABAPENTIN 200 MILLIGRAM(S): 400 CAPSULE ORAL at 21:26

## 2020-08-09 RX ADMIN — Medication 0.5 MILLIGRAM(S): at 13:00

## 2020-08-09 RX ADMIN — Medication 0.5 MILLIGRAM(S): at 05:05

## 2020-08-09 RX ADMIN — Medication 25 MILLIGRAM(S): at 17:07

## 2020-08-09 RX ADMIN — HEPARIN SODIUM 5000 UNIT(S): 5000 INJECTION INTRAVENOUS; SUBCUTANEOUS at 21:27

## 2020-08-09 RX ADMIN — Medication 40 MILLIGRAM(S): at 11:23

## 2020-08-09 RX ADMIN — Medication 81 MILLIGRAM(S): at 11:23

## 2020-08-09 RX ADMIN — HEPARIN SODIUM 5000 UNIT(S): 5000 INJECTION INTRAVENOUS; SUBCUTANEOUS at 13:00

## 2020-08-09 RX ADMIN — SIMVASTATIN 20 MILLIGRAM(S): 20 TABLET, FILM COATED ORAL at 21:26

## 2020-08-09 RX ADMIN — GABAPENTIN 200 MILLIGRAM(S): 400 CAPSULE ORAL at 05:05

## 2020-08-09 RX ADMIN — LAMOTRIGINE 50 MILLIGRAM(S): 25 TABLET, ORALLY DISINTEGRATING ORAL at 05:05

## 2020-08-09 RX ADMIN — Medication 1 TABLET(S): at 11:23

## 2020-08-09 NOTE — PROGRESS NOTE ADULT - ASSESSMENT
81 yo F with Parkinson disease , h/o CAD and HTN admitted for frequents falls, hyponatremia and ARF , iv fluid given , cr is improving , CT of LS spine showed L1 vertebra fracture , seen by spine surgery no further rec pain control, PT , TLCO brace ordered , pt had nephrology evaluation for hyponatremia lasix on hold given iv fluid , improving , she had urinary retention over 500 cc in the Ed known to have cronic  retention field inserted . Pt had worsening confusion day 3 , neurologist called repeat CT of head MR showed no acute pathology or infract . Called her urologist   Dr Begum oxybutynin is stopped ,  field removed voiding  on flomax , pt 's MR showed no infarct       1- Hyponatremia resolved   nephrology follow  up appreciated   cont to hold losartan and lasix     2- Difficulty swallowing   Pt had EGD by Dr Gauthier ; EGD report in the chart   GI is informed about EGD done in May , 2020   Dr Diego states that  no indication for further repeat EGD     3- Hypokalemia replaced improved   repeat     4- Delirium likely multifactorial anxiety sundowning , early dementia   enhanced supervision   family wishes to take her home   will cont xanax for anxiety per her psychiatrist   neurology follow  up after discharge , pt may have underlying dementia per her psychiatrist     5-Weakness / frequent falls   MR is neg for infarcts   CT of head with /without contrast performed SDH ruled out   neurology input appreciated     6- ARF improved   BNP is up   will resume home dose lasix  cont to hold ace inh     7- Anxiety depression    see discussion with her psychiatrist Dr Ko rec   cont xanax lower dose TID ( she is under pscyhiatrist xanx high dose 1.5 tid under tapering as tolerate )   on lamictal and lexapro     Dr Ko  will see her in the office after discharge     8- retention of urine cronic   field removed , voiding   cont flomax   follow up with Dr Begum in the office in few weeks      9- Severe protein mik malnutrition   agree with nutrionist evaluation and rec     10- Ls spine fracture s/p fall last month and prior admission per family   cont pain meds avoid narcotics due to age and confusion   tylenol prn   pain controlled     11- CAD with h/o stent   no acute sign of ischemia troponin x2 neg   TTE result reviewed   normal EF   resume low dose lasix     home with family in 24 hours

## 2020-08-09 NOTE — PROGRESS NOTE ADULT - SUBJECTIVE AND OBJECTIVE BOX
Internal Medicine Hospitalist Progress Note    follow up for hyponatremia , L1 compression fracture , recurrent falls, confusion   pt is voiding s/p field removal  she gets agitated impulsive at times per nurses , confused , denies any pain no complaints       Vital Signs Last 24 Hrs  T(C): 36.8 (09 Aug 2020 11:25), Max: 36.8 (08 Aug 2020 17:02)  T(F): 98.2 (09 Aug 2020 11:25), Max: 98.3 (08 Aug 2020 17:02)  HR: 89 (09 Aug 2020 11:25) (89 - 102)  BP: 119/71 (09 Aug 2020 11:25) (118/74 - 153/83)  BP(mean): --  RR: 18 (09 Aug 2020 11:25) (18 - 19)  SpO2: 97% (09 Aug 2020 11:25) (97% - 98%)    Constitutional: awake, confused     Respiratory: CTA bilateral     Cardiovascular: regular s1/s2     Gastrointestinal: soft no tenderness , BS positive     Extremities: no pretibial edema      : field is removed                                    10.6   5.52  )-----------( 201      ( 08 Aug 2020 06:24 )             31.7   08-08    140  |  100  |  18.0  ----------------------------<  106<H>  3.5   |  25.0  |  1.10    Ca    9.7      08 Aug 2020 06:24           < from: CT Head w/wo IV Cont (08.05.20 @ 19:12) >  IMPRESSION:    No acute abnormalities are present. No abnormal enhancement. There is no evidence of intraparenchymal or extra-axial hemorrhage. Previously visualized small focus of abnormal signal that was thought to reflect subdural hemorrhage, likely reflecting artifact. Mild periventricular chronic microvascular ischemic changes.          < end of copied text >

## 2020-08-09 NOTE — PROGRESS NOTE ADULT - SUBJECTIVE AND OBJECTIVE BOX
Progress Note:   · Provider Specialty	Urology	    Reason for Admission:    Reason for Admission:  · Reason for Admission	fall , weakness	      · Subjective and Objective: 	  INTERVAL HPI/OVERNIGHT EVENTS:  FEELS WELL AND is voiding.  MEDICATIONS  (STANDING):  ALPRAZolam 0.5 milliGRAM(s) Oral three times a day  aspirin enteric coated 81 milliGRAM(s) Oral daily  gabapentin 200 milliGRAM(s) Oral three times a day  heparin   Injectable 5000 Unit(s) SubCutaneous every 8 hours  hydrALAZINE 25 milliGRAM(s) Oral two times a day  lamoTRIgine 50 milliGRAM(s) Oral two times a day  multivitamin/minerals 1 Tablet(s) Oral daily  senna 2 Tablet(s) Oral at bedtime  simvastatin 20 milliGRAM(s) Oral at bedtime  tamsulosin 0.4 milliGRAM(s) Oral at bedtime    MEDICATIONS  (PRN):      Allergies    No Known Allergies    Intolerances        Vital Signs Last 24 Hrs  Afebrile,  avss     ON PE:  General: alert and awake  Abdomen: soft, nt, nd, bs+       LABS:                        10.6   5.52  )-----------( 201      ( 08 Aug 2020 06:24 )             31.7     08-08    140  |  100  |  18.0  ----------------------------<  106<H>  3.5   |  25.0  |  1.10    Ca    9.7      08 Aug 2020 06:24            RADIOLOGY & ADDITIONAL TESTS:      Assessment and Plan:   · Assessment		  Urinary retention    1.  flomax  2.  no oxybutynin  3.  will follow  4.  currently voiding

## 2020-08-10 ENCOUNTER — TRANSCRIPTION ENCOUNTER (OUTPATIENT)
Age: 80
End: 2020-08-10

## 2020-08-10 VITALS
TEMPERATURE: 98 F | OXYGEN SATURATION: 94 % | DIASTOLIC BLOOD PRESSURE: 64 MMHG | SYSTOLIC BLOOD PRESSURE: 102 MMHG | RESPIRATION RATE: 18 BRPM | HEART RATE: 86 BPM

## 2020-08-10 LAB
ANION GAP SERPL CALC-SCNC: 13 MMOL/L — SIGNIFICANT CHANGE UP (ref 5–17)
BUN SERPL-MCNC: 25 MG/DL — HIGH (ref 8–20)
CALCIUM SERPL-MCNC: 9.8 MG/DL — SIGNIFICANT CHANGE UP (ref 8.6–10.2)
CHLORIDE SERPL-SCNC: 100 MMOL/L — SIGNIFICANT CHANGE UP (ref 98–107)
CO2 SERPL-SCNC: 27 MMOL/L — SIGNIFICANT CHANGE UP (ref 22–29)
CREAT SERPL-MCNC: 1.09 MG/DL — SIGNIFICANT CHANGE UP (ref 0.5–1.3)
GLUCOSE SERPL-MCNC: 91 MG/DL — SIGNIFICANT CHANGE UP (ref 70–99)
MAGNESIUM SERPL-MCNC: 2.2 MG/DL — SIGNIFICANT CHANGE UP (ref 1.8–2.6)
POTASSIUM SERPL-MCNC: 4.3 MMOL/L — SIGNIFICANT CHANGE UP (ref 3.5–5.3)
POTASSIUM SERPL-SCNC: 4.3 MMOL/L — SIGNIFICANT CHANGE UP (ref 3.5–5.3)
SODIUM SERPL-SCNC: 140 MMOL/L — SIGNIFICANT CHANGE UP (ref 135–145)

## 2020-08-10 PROCEDURE — 96361 HYDRATE IV INFUSION ADD-ON: CPT

## 2020-08-10 PROCEDURE — 84300 ASSAY OF URINE SODIUM: CPT

## 2020-08-10 PROCEDURE — 74176 CT ABD & PELVIS W/O CONTRAST: CPT

## 2020-08-10 PROCEDURE — 83935 ASSAY OF URINE OSMOLALITY: CPT

## 2020-08-10 PROCEDURE — 97116 GAIT TRAINING THERAPY: CPT

## 2020-08-10 PROCEDURE — 86769 SARS-COV-2 COVID-19 ANTIBODY: CPT

## 2020-08-10 PROCEDURE — 87040 BLOOD CULTURE FOR BACTERIA: CPT

## 2020-08-10 PROCEDURE — 93970 EXTREMITY STUDY: CPT

## 2020-08-10 PROCEDURE — 87086 URINE CULTURE/COLONY COUNT: CPT

## 2020-08-10 PROCEDURE — 70551 MRI BRAIN STEM W/O DYE: CPT

## 2020-08-10 PROCEDURE — 82607 VITAMIN B-12: CPT

## 2020-08-10 PROCEDURE — 99285 EMERGENCY DEPT VISIT HI MDM: CPT | Mod: 25

## 2020-08-10 PROCEDURE — 96365 THER/PROPH/DIAG IV INF INIT: CPT

## 2020-08-10 PROCEDURE — 83550 IRON BINDING TEST: CPT

## 2020-08-10 PROCEDURE — 99239 HOSP IP/OBS DSCHRG MGMT >30: CPT

## 2020-08-10 PROCEDURE — 84439 ASSAY OF FREE THYROXINE: CPT

## 2020-08-10 PROCEDURE — 82306 VITAMIN D 25 HYDROXY: CPT

## 2020-08-10 PROCEDURE — 71045 X-RAY EXAM CHEST 1 VIEW: CPT

## 2020-08-10 PROCEDURE — 36415 COLL VENOUS BLD VENIPUNCTURE: CPT

## 2020-08-10 PROCEDURE — 83735 ASSAY OF MAGNESIUM: CPT

## 2020-08-10 PROCEDURE — 74230 X-RAY XM SWLNG FUNCJ C+: CPT

## 2020-08-10 PROCEDURE — 92611 MOTION FLUOROSCOPY/SWALLOW: CPT

## 2020-08-10 PROCEDURE — 83605 ASSAY OF LACTIC ACID: CPT

## 2020-08-10 PROCEDURE — 85027 COMPLETE CBC AUTOMATED: CPT

## 2020-08-10 PROCEDURE — 85730 THROMBOPLASTIN TIME PARTIAL: CPT

## 2020-08-10 PROCEDURE — 82962 GLUCOSE BLOOD TEST: CPT

## 2020-08-10 PROCEDURE — 84481 FREE ASSAY (FT-3): CPT

## 2020-08-10 PROCEDURE — 83880 ASSAY OF NATRIURETIC PEPTIDE: CPT

## 2020-08-10 PROCEDURE — 85610 PROTHROMBIN TIME: CPT

## 2020-08-10 PROCEDURE — 83930 ASSAY OF BLOOD OSMOLALITY: CPT

## 2020-08-10 PROCEDURE — C8929: CPT

## 2020-08-10 PROCEDURE — 84443 ASSAY THYROID STIM HORMONE: CPT

## 2020-08-10 PROCEDURE — 82728 ASSAY OF FERRITIN: CPT

## 2020-08-10 PROCEDURE — 80053 COMPREHEN METABOLIC PANEL: CPT

## 2020-08-10 PROCEDURE — 93005 ELECTROCARDIOGRAM TRACING: CPT

## 2020-08-10 PROCEDURE — 96375 TX/PRO/DX INJ NEW DRUG ADDON: CPT

## 2020-08-10 PROCEDURE — 84484 ASSAY OF TROPONIN QUANT: CPT

## 2020-08-10 PROCEDURE — 96376 TX/PRO/DX INJ SAME DRUG ADON: CPT

## 2020-08-10 PROCEDURE — 81003 URINALYSIS AUTO W/O SCOPE: CPT

## 2020-08-10 PROCEDURE — 80048 BASIC METABOLIC PNL TOTAL CA: CPT

## 2020-08-10 PROCEDURE — 70450 CT HEAD/BRAIN W/O DYE: CPT

## 2020-08-10 PROCEDURE — 84466 ASSAY OF TRANSFERRIN: CPT

## 2020-08-10 PROCEDURE — 82570 ASSAY OF URINE CREATININE: CPT

## 2020-08-10 PROCEDURE — 70470 CT HEAD/BRAIN W/O & W/DYE: CPT

## 2020-08-10 PROCEDURE — 97530 THERAPEUTIC ACTIVITIES: CPT

## 2020-08-10 PROCEDURE — U0003: CPT

## 2020-08-10 PROCEDURE — 76770 US EXAM ABDO BACK WALL COMP: CPT

## 2020-08-10 PROCEDURE — 83540 ASSAY OF IRON: CPT

## 2020-08-10 PROCEDURE — 92610 EVALUATE SWALLOWING FUNCTION: CPT

## 2020-08-10 RX ORDER — HYDRALAZINE HCL 50 MG
1 TABLET ORAL
Qty: 0 | Refills: 0 | DISCHARGE
Start: 2020-08-10

## 2020-08-10 RX ORDER — POTASSIUM CHLORIDE 20 MEQ
1 PACKET (EA) ORAL
Qty: 0 | Refills: 0 | DISCHARGE

## 2020-08-10 RX ORDER — LOSARTAN POTASSIUM 100 MG/1
1 TABLET, FILM COATED ORAL
Qty: 0 | Refills: 0 | DISCHARGE

## 2020-08-10 RX ORDER — SENNA PLUS 8.6 MG/1
2 TABLET ORAL
Qty: 0 | Refills: 0 | DISCHARGE
Start: 2020-08-10

## 2020-08-10 RX ORDER — ERGOCALCIFEROL 1.25 MG/1
1 CAPSULE ORAL
Qty: 0 | Refills: 0 | DISCHARGE

## 2020-08-10 RX ORDER — FUROSEMIDE 40 MG
1 TABLET ORAL
Qty: 0 | Refills: 0 | DISCHARGE
Start: 2020-08-10

## 2020-08-10 RX ORDER — HYDRALAZINE HCL 50 MG
1 TABLET ORAL
Qty: 0 | Refills: 0 | DISCHARGE

## 2020-08-10 RX ORDER — POTASSIUM CHLORIDE 20 MEQ
1 PACKET (EA) ORAL
Qty: 0 | Refills: 0 | DISCHARGE
Start: 2020-08-10

## 2020-08-10 RX ORDER — GABAPENTIN 400 MG/1
1 CAPSULE ORAL
Qty: 0 | Refills: 0 | DISCHARGE

## 2020-08-10 RX ORDER — ASPIRIN/CALCIUM CARB/MAGNESIUM 324 MG
1 TABLET ORAL
Qty: 0 | Refills: 0 | DISCHARGE
Start: 2020-08-10

## 2020-08-10 RX ORDER — HYDROXYZINE HCL 10 MG
1 TABLET ORAL
Qty: 0 | Refills: 0 | DISCHARGE

## 2020-08-10 RX ORDER — ALPRAZOLAM 0.25 MG
1.5 TABLET ORAL
Qty: 0 | Refills: 0 | DISCHARGE

## 2020-08-10 RX ORDER — GABAPENTIN 400 MG/1
2 CAPSULE ORAL
Qty: 120 | Refills: 0
Start: 2020-08-10 | End: 2020-08-29

## 2020-08-10 RX ORDER — FUROSEMIDE 40 MG
1 TABLET ORAL
Qty: 0 | Refills: 0 | DISCHARGE

## 2020-08-10 RX ORDER — ESCITALOPRAM OXALATE 10 MG/1
1 TABLET, FILM COATED ORAL
Qty: 0 | Refills: 0 | DISCHARGE

## 2020-08-10 RX ORDER — MULTIVIT-MIN/FERROUS GLUCONATE 9 MG/15 ML
1 LIQUID (ML) ORAL
Qty: 0 | Refills: 0 | DISCHARGE
Start: 2020-08-10

## 2020-08-10 RX ORDER — OXYBUTYNIN CHLORIDE 5 MG
1 TABLET ORAL
Qty: 0 | Refills: 0 | DISCHARGE

## 2020-08-10 RX ADMIN — GABAPENTIN 200 MILLIGRAM(S): 400 CAPSULE ORAL at 13:07

## 2020-08-10 RX ADMIN — HEPARIN SODIUM 5000 UNIT(S): 5000 INJECTION INTRAVENOUS; SUBCUTANEOUS at 13:07

## 2020-08-10 RX ADMIN — LAMOTRIGINE 50 MILLIGRAM(S): 25 TABLET, ORALLY DISINTEGRATING ORAL at 05:18

## 2020-08-10 RX ADMIN — HEPARIN SODIUM 5000 UNIT(S): 5000 INJECTION INTRAVENOUS; SUBCUTANEOUS at 05:18

## 2020-08-10 RX ADMIN — Medication 81 MILLIGRAM(S): at 13:07

## 2020-08-10 RX ADMIN — Medication 25 MILLIGRAM(S): at 05:18

## 2020-08-10 RX ADMIN — Medication 1 TABLET(S): at 13:07

## 2020-08-10 RX ADMIN — Medication 1 MILLIGRAM(S): at 05:18

## 2020-08-10 RX ADMIN — GABAPENTIN 200 MILLIGRAM(S): 400 CAPSULE ORAL at 05:18

## 2020-08-10 RX ADMIN — Medication 0.5 MILLIGRAM(S): at 14:00

## 2020-08-10 RX ADMIN — Medication 40 MILLIGRAM(S): at 05:18

## 2020-08-10 NOTE — DISCHARGE NOTE NURSING/CASE MANAGEMENT/SOCIAL WORK - PATIENT PORTAL LINK FT
You can access the FollowMyHealth Patient Portal offered by [facility] by registering at the following website: [email or web address]. By joining [facility]’s FollowMyHealth portal, you will also be able to view your health information using other applications (apps) compatible with our system.

## 2020-08-10 NOTE — PROGRESS NOTE ADULT - SUBJECTIVE AND OBJECTIVE BOX
Internal Medicine Hospitalist Progress Note    follow up for hyponatremia , L1 compression fracture , recurrent falls, confusion   pt is seen earlier  , sitting in the chair , calmer , awake alert to place person today , much better less confused   she denies any pain   I  called the son and the  spoke to them in details , all questions answered , discharge planning and crae of plan explained   agree with discharge home          Vital Signs Last 24 Hrs  T(C): 36.8 (10 Aug 2020 07:40), Max: 36.9 (09 Aug 2020 17:02)  T(F): 98.2 (10 Aug 2020 07:40), Max: 98.5 (09 Aug 2020 17:02)  HR: 95 (10 Aug 2020 07:40) (83 - 98)  BP: 130/89 (10 Aug 2020 07:40) (121/66 - 138/85)  BP(mean): --  RR: 19 (10 Aug 2020 07:40) (18 - 20)  SpO2: 96% (10 Aug 2020 07:40) (93% - 96%)    Constitutional: awake, alert , no distress      Respiratory: CTA bilateral     Cardiovascular: regular s1/s2     Gastrointestinal: soft no tenderness , BS positive     Extremities: no pretibial edema             08-10    140  |  100  |  25.0<H>  ----------------------------<  91  4.3   |  27.0  |  1.09    Ca    9.8      10 Aug 2020 06:06  Mg     2.2     08-10         < from: CT Head w/wo IV Cont (08.05.20 @ 19:12) >  IMPRESSION:    No acute abnormalities are present. No abnormal enhancement. There is no evidence of intraparenchymal or extra-axial hemorrhage. Previously visualized small focus of abnormal signal that was thought to reflect subdural hemorrhage, likely reflecting artifact. Mild periventricular chronic microvascular ischemic changes.          < end of copied text >

## 2020-08-10 NOTE — PROGRESS NOTE ADULT - SUBJECTIVE AND OBJECTIVE BOX
INTERVAL HPI/OVERNIGHT EVENTS:  Villegas was placed one day ago and he has mild penile discomfort.    MEDICATIONS  (STANDING):  ALPRAZolam 1 milliGRAM(s) Oral two times a day  ALPRAZolam 0.5 milliGRAM(s) Oral <User Schedule>  aspirin enteric coated 81 milliGRAM(s) Oral daily  furosemide    Tablet 40 milliGRAM(s) Oral daily  gabapentin 200 milliGRAM(s) Oral three times a day  heparin   Injectable 5000 Unit(s) SubCutaneous every 8 hours  hydrALAZINE 25 milliGRAM(s) Oral two times a day  lamoTRIgine 50 milliGRAM(s) Oral two times a day  multivitamin/minerals 1 Tablet(s) Oral daily  senna 2 Tablet(s) Oral at bedtime  simvastatin 20 milliGRAM(s) Oral at bedtime  tamsulosin 0.4 milliGRAM(s) Oral at bedtime    MEDICATIONS  (PRN):      Allergies    No Known Allergies    Intolerances        Vital Signs Last 24 Hrs  T(C): 36.8 (10 Aug 2020 07:40), Max: 36.9 (09 Aug 2020 17:02)  T(F): 98.2 (10 Aug 2020 07:40), Max: 98.5 (09 Aug 2020 17:02)  HR: 95 (10 Aug 2020 07:40) (83 - 98)  BP: 130/89 (10 Aug 2020 07:40) (121/66 - 138/85)  BP(mean): --  RR: 19 (10 Aug 2020 07:40) (18 - 20)  SpO2: 96% (10 Aug 2020 07:40) (93% - 96%)     ON PE:  General: alert and awake  Abdomen: soft, nt, nd, bs+  :  Voiding    LABS:    08-10    140  |  100  |  25.0<H>  ----------------------------<  91  4.3   |  27.0  |  1.09    Ca    9.8      10 Aug 2020 06:06  Mg     2.2     08-10            RADIOLOGY & ADDITIONAL TESTS:

## 2020-08-10 NOTE — PROGRESS NOTE ADULT - ASSESSMENT
81 yo F with Parkinson disease , h/o CAD and HTN admitted for frequents falls, hyponatremia and ARF , iv fluid given , cr is improving , CT of LS spine showed L1 vertebra fracture , seen by spine surgery no further rec pain control, PT , TLCO brace ordered , pt had nephrology evaluation for hyponatremia lasix on hold given iv fluid , improving , she had urinary retention over 500 cc in the Ed known to have cronic  retention field inserted . Pt had worsening confusion day 3 , neurologist called repeat CT of head MR showed no acute pathology or infract . Called her urologist   Dr Begum oxybutynin is stopped ,  field removed voiding  on flomax , pt 's MR showed no infarct , mental status is improving       1- Hyponatremia resolved   nephrology follow  up appreciated   off losartan   lasix restarted stable     2- Difficulty swallowing/ dysphagia   EGD done in May from Dr Ruiz's office reviewed obtained   Gi on board no further testing needed   she can follow up with Dr Gauthier   stable   on mechanical soft diet      3- Hypokalemia replaced   resolved     4- Delirium likely multifactorial anxiety , sundowning with early  dementia   improving   family is aware of change  in xanax dosing being tapered by her psychiatrist who I spoke to on Sat   neurology follow  up after discharge as well re dementia and further medical therapy     5-Weakness / frequent falls - likely  due to dehydration  and polypharmacy    son instructed to monitor her medications and administration under his dad's  supervision   stroke is ruled out     6- ARF / prerenal azotemia improved   s/p iv fluid   cont to hold lisinopril , restarted  on low dose lasix   cont to monitor     7- Anxiety depression    see discussion with her psychiatrist Dr Ko rec   cont xanax lower dose TID ( she is under pscyhiatrist xanx high dose 1.5 tid under tapering as tolerate )   on lamictal and lexapro     Dr Ko  will see her in the office after discharge     8- retention of urine cronic   field removed , voiding   cont flomax   follow up with Dr Begum in the office in few weeks      9- Severe protein mik malnutrition   agree with nutritionist evaluation and rec     10- Ls spine fracture s/p fall last month and prior admission per family   cont pain meds avoid narcotics due to age and confusion   tylenol prn   pain controlled     11- CAD with h/o stent   no acute sign of ischemia troponin x2 neg   TTE result reviewed   normal EF   resume low dose lasix   follow up with cardiologist     home with family in 24 hours

## 2020-08-10 NOTE — PROGRESS NOTE ADULT - PROVIDER SPECIALTY LIST ADULT
Gastroenterology
Hospitalist
Nephrology
Orthopedics
Orthopedics
Urology
Gastroenterology
Urology

## 2020-08-10 NOTE — PROGRESS NOTE ADULT - ASSESSMENT
Urinary retention that has resolved    1.  flomax  2.  no oxybutynin  3.  will sign off  4.  FU Dr. Begum as an outpatient

## 2020-08-10 NOTE — PROGRESS NOTE ADULT - REASON FOR ADMISSION
fall , weakness

## 2020-08-11 PROBLEM — G20 PARKINSON'S DISEASE: Chronic | Status: ACTIVE | Noted: 2020-08-03

## 2020-08-28 ENCOUNTER — APPOINTMENT (OUTPATIENT)
Dept: ORTHOPEDIC SURGERY | Facility: CLINIC | Age: 80
End: 2020-08-28
Payer: MEDICARE

## 2020-08-28 VITALS
WEIGHT: 135 LBS | DIASTOLIC BLOOD PRESSURE: 57 MMHG | BODY MASS INDEX: 21.19 KG/M2 | SYSTOLIC BLOOD PRESSURE: 90 MMHG | HEART RATE: 74 BPM | HEIGHT: 67 IN

## 2020-08-28 VITALS — TEMPERATURE: 98 F

## 2020-08-28 PROCEDURE — 99024 POSTOP FOLLOW-UP VISIT: CPT

## 2020-08-28 PROCEDURE — 72100 X-RAY EXAM L-S SPINE 2/3 VWS: CPT

## 2020-08-28 NOTE — HISTORY OF PRESENT ILLNESS
[Ataxia] : no ataxia [de-identified] : Patient presents for followup with regard to lumbar compression fractures. Patient states she's improving nicely she feels as if the LSO brace provides no components of improved pain at this point in time she is accompanied by her son [Improving] : improving [Loss of Dexterity] : good dexterity [Incontinence] : no incontinence [Urinary Ret.] : no urinary retention

## 2020-08-28 NOTE — PHYSICAL EXAM
[de-identified] : x-ray of the lumbar spine shows a continued L1 compression fracture as per her ER imaging x-rays on today's date [de-identified] : CONSTITUTIONAL: The patient is a very pleasant individual who is well-nourished and who appears stated age.\par PSYCHIATRIC: The patient is alert and oriented X 3 and in no apparent distress, and participates with orthopedic evaluation well.\par HEAD: Atraumatic and is nonsyndromic in appearance.\par EENT: No visible thyromegaly, EOMI.\par RESPIRATORY: Respiratory rate is regular, not dyspneic on examination.\par LYMPHATICS: There is no inguinal lymphadenopathy\par INTEGUMENTARY: Skin is clean, dry, and intact about the bilateral lower extremities and lumbar spine.\par VASCULAR: There is brisk capillary refill about the bilateral lower extremities.\par NEUROLOGIC: There are no pathologic reflexes. There is no decrease in sensation of the bilateral lower extremities on Wartenberg pinwheel/manual examination. Deep tendon reflexes are well maintained at 2+/4 of the bilateral lower extremities and are symmetric..\par MUSCULOSKELETAL: There is no visible muscular atrophy. Manual motor strength is well maintained in the bilateral lower extremities. Range of motion of lumbar spine is well maintained. The patient ambulates in a non-myelopathic manner. Negative tension sign and straight leg raise bilaterally. Quad extension, ankle dorsiflexion, EHL, plantar flexion, and ankle eversion are well preserved. Normal secondary orthopaedic exam of bilateral hips, greater trochanteric area, knees and ankles\par Patient is seated supine LSO brace is in place there is very minimal pain with palpation

## 2020-08-28 NOTE — DISCUSSION/SUMMARY
[de-identified] : Patient may remove her LSO brace at this point in time secondary to no improvement in her pain profile with the brace. Patient can be enrolled in physical therapy for gait and balance training. Patient was instructed also followup with her primary care physician for bone health management/osteoporosis management I like to see this patient back in approximately 4-6 weeks. I also encouraged the patient to start home walking/light aerobic conditioning.

## 2020-10-09 ENCOUNTER — APPOINTMENT (OUTPATIENT)
Dept: ORTHOPEDIC SURGERY | Facility: CLINIC | Age: 80
End: 2020-10-09
Payer: MEDICARE

## 2020-10-09 PROCEDURE — 99214 OFFICE O/P EST MOD 30 MIN: CPT | Mod: 24,57

## 2020-10-09 PROCEDURE — 72100 X-RAY EXAM L-S SPINE 2/3 VWS: CPT

## 2020-10-09 PROCEDURE — 22310 CLOSED TX VERT FX W/O MANJ: CPT | Mod: 79

## 2020-10-09 RX ORDER — TRAMADOL HYDROCHLORIDE 50 MG/1
50 TABLET, COATED ORAL
Qty: 42 | Refills: 0 | Status: ACTIVE | COMMUNITY
Start: 2020-10-09 | End: 1900-01-01

## 2020-10-09 NOTE — DISCUSSION/SUMMARY
[de-identified] : Patient is improving at this time with severe pain only at night, I advised the patient on Tylenol usage for mild to moderate pain and Tramadol for severe pain. I advised her to follow up with bone health care provider, PCP, and rheumatology. Patient to follow up on a 1 month / PRN basis. We'll initiate care of the L2 compression fracture with re re discussion of the LSO brace. The posterior pain I do believe can be consistent with sacral insufficiency fractures.Patient will be followed up in approximately one month and/or on a p.r.n. basis

## 2020-10-09 NOTE — ADDENDUM
[FreeTextEntry1] : Documented by Bautista Merrill acting as a scribe for Nani Turner  on 08/20/2020. All medical record entries made by the Scribe were at my, Nani Turner , direction and personally dictated by me on 08/20/2020 . I have reviewed the chart and agree that the record accurately reflects my personal performance of the history, physical exam, assessment and plan. I have also personally directed, reviewed, and agreed with the chart.

## 2020-10-09 NOTE — PHYSICAL EXAM
[Poor Appearance] : well-appearing [Acute Distress] : not in acute distress [de-identified] : CONSTITUTIONAL: The patient is a very pleasant individual who is well-nourished and who appears stated age.\par PSYCHIATRIC: The patient is alert and oriented X 3 and in no apparent distress, and participates with orthopedic evaluation well.\par HEAD: Atraumatic and is nonsyndromic in appearance.\par EENT: No visible thyromegaly, EOMI.\par RESPIRATORY: Respiratory rate is regular, not dyspneic on examination.\par LYMPHATICS: There is no inguinal lymphadenopathy\par INTEGUMENTARY: Skin is clean, dry, and intact about the bilateral lower extremities and lumbar spine.\par VASCULAR: There is brisk capillary refill about the bilateral lower extremities.\par NEUROLOGIC: There are no pathologic reflexes. There is no decrease in sensation of the bilateral lower extremities on Wartenberg pinwheel/manual examination. Deep tendon reflexes are well maintained at 2+/4 of the bilateral lower extremities and are symmetric..\par MUSCULOSKELETAL: There is no visible muscular atrophy. Manual motor strength is well maintained in the bilateral lower extremities. Range of motion of lumbar spine is well maintained. The patient ambulates in a non-myelopathic manner. Negative tension sign and straight leg raise bilaterally. Quad extension, ankle dorsiflexion, EHL, plantar flexion, and ankle eversion are well preserved. Normal secondary orthopaedic exam of bilateral hips, greater trochanteric area, knees and ankles\par \par very minimal pain with palpation. complaints of pain over the left and right SI joint region as well as the right iliac crest [de-identified] : X-rays the lumbar spine reviewed on today's date that shows stable L1 compression fracture appears that she has a new L2 compression fracture

## 2020-10-09 NOTE — HISTORY OF PRESENT ILLNESS
[de-identified] : 80 year old  F Presents for evaluation of lumbar compression fracture, patient is currently improving with time.  [Ataxia] : no ataxia [Incontinence] : no incontinence [Loss of Dexterity] : good dexterity [Urinary Ret.] : no urinary retention

## 2020-11-10 ENCOUNTER — APPOINTMENT (OUTPATIENT)
Dept: ORTHOPEDIC SURGERY | Facility: CLINIC | Age: 80
End: 2020-11-10
Payer: MEDICARE

## 2020-11-10 VITALS — TEMPERATURE: 97.8 F

## 2020-11-10 VITALS
WEIGHT: 135 LBS | HEART RATE: 72 BPM | HEIGHT: 67 IN | BODY MASS INDEX: 21.19 KG/M2 | DIASTOLIC BLOOD PRESSURE: 76 MMHG | SYSTOLIC BLOOD PRESSURE: 142 MMHG

## 2020-11-10 PROCEDURE — 99024 POSTOP FOLLOW-UP VISIT: CPT

## 2020-11-10 PROCEDURE — 72100 X-RAY EXAM L-S SPINE 2/3 VWS: CPT

## 2020-11-10 NOTE — DISCUSSION/SUMMARY
[de-identified] : Patient has been referred to physical therapy for decreased pain modalities, core strengthening modalities, soft tissue modalities, and physical modalities. A lumbar MRI has been ordered and is medically necessary due to severe persistent pain stemming from a compression fracture, failure of conservative measures such as PT, and to assess the condition of these lumbar compression fractures as well as retropulsion of the bone. MRI will help guide treatment plan, possible surgical intervention vs injection therapy with pain management. The patient will follow up after the MRI results have been obtained.

## 2020-11-10 NOTE — HISTORY OF PRESENT ILLNESS
[de-identified] : 80 year old  F Presents for follow up evaluation of L1 and L2 lumbar compression fracture, patient states that getting out of bed in the morning is significantly painful. No complaints of weakness, the patients has just completed PT.   [Ataxia] : no ataxia [Incontinence] : no incontinence [Loss of Dexterity] : good dexterity [Urinary Ret.] : no urinary retention

## 2020-11-10 NOTE — ADDENDUM
[FreeTextEntry1] : Documented by Bautista Merrill acting as a scribe for Dr. Jose Francisco Lizarraga on 11/10/2020. All medical record entries made by the Scribe were at my, Dr. Jose Francisco Lizarraga, direction and personally dictated by me on 11/10/2020 . I have reviewed the chart and agree that the record accurately reflects my personal performance of the history, physical exam, assessment and plan. I have also personally directed, reviewed, and agreed with the chart.

## 2020-11-10 NOTE — PHYSICAL EXAM
[Poor Appearance] : well-appearing [Acute Distress] : not in acute distress [Obese] : not obese [de-identified] : CONSTITUTIONAL: The patient is a very pleasant individual who is well-nourished and who appears stated age.\par PSYCHIATRIC: The patient is alert and oriented X 3 and in no apparent distress, and participates with orthopedic evaluation well.\par HEAD: Atraumatic and is nonsyndromic in appearance.\par EENT: No visible thyromegaly, EOMI.\par RESPIRATORY: Respiratory rate is regular, not dyspneic on examination.\par LYMPHATICS: There is no inguinal lymphadenopathy\par INTEGUMENTARY: Skin is clean, dry, and intact about the bilateral lower extremities and lumbar spine.\par VASCULAR: There is brisk capillary refill about the bilateral lower extremities.\par NEUROLOGIC: There are no pathologic reflexes. There is no decrease in sensation of the bilateral lower extremities on Wartenberg pinwheel/manual examination. Deep tendon reflexes are well maintained at 2+/4 of the bilateral lower extremities and are symmetric..\par MUSCULOSKELETAL: There is no visible muscular atrophy. Manual motor strength is well maintained in the bilateral lower extremities. The patient ambulates in a non-myelopathic manner. Negative tension sign and straight leg raise bilaterally. Quad extension, ankle dorsiflexion, EHL, plantar flexion, and ankle eversion are well preserved. Normal secondary orthopaedic exam of bilateral hips, greater trochanteric area, knees and ankles.\par mechanically oriented lower back pain. [de-identified] : X-rays the lumbar spine reviewed on today's date that shows stable L1 compression fracture appears that she has a new L2 compression fracture

## 2020-11-21 ENCOUNTER — OUTPATIENT (OUTPATIENT)
Dept: OUTPATIENT SERVICES | Facility: HOSPITAL | Age: 80
LOS: 1 days | End: 2020-11-21
Payer: MEDICARE

## 2020-11-21 ENCOUNTER — APPOINTMENT (OUTPATIENT)
Dept: MRI IMAGING | Facility: CLINIC | Age: 80
End: 2020-11-21
Payer: MEDICARE

## 2020-11-21 DIAGNOSIS — S32.000S WEDGE COMPRESSION FRACTURE OF UNSPECIFIED LUMBAR VERTEBRA, SEQUELA: ICD-10-CM

## 2020-11-21 DIAGNOSIS — Z00.8 ENCOUNTER FOR OTHER GENERAL EXAMINATION: ICD-10-CM

## 2020-11-21 PROCEDURE — 72148 MRI LUMBAR SPINE W/O DYE: CPT

## 2020-11-21 PROCEDURE — 72148 MRI LUMBAR SPINE W/O DYE: CPT | Mod: 26

## 2020-12-17 ENCOUNTER — APPOINTMENT (OUTPATIENT)
Dept: ORTHOPEDIC SURGERY | Facility: CLINIC | Age: 80
End: 2020-12-17
Payer: MEDICARE

## 2020-12-17 VITALS
BODY MASS INDEX: 21.35 KG/M2 | DIASTOLIC BLOOD PRESSURE: 76 MMHG | SYSTOLIC BLOOD PRESSURE: 138 MMHG | HEIGHT: 67 IN | HEART RATE: 76 BPM | WEIGHT: 136 LBS

## 2020-12-17 VITALS — TEMPERATURE: 96.5 F

## 2020-12-17 DIAGNOSIS — S32.000S WEDGE COMPRESSION FRACTURE OF UNSPECIFIED LUMBAR VERTEBRA, SEQUELA: ICD-10-CM

## 2020-12-17 DIAGNOSIS — Z86.39 PERSONAL HISTORY OF OTHER ENDOCRINE, NUTRITIONAL AND METABOLIC DISEASE: ICD-10-CM

## 2020-12-17 DIAGNOSIS — I10 ESSENTIAL (PRIMARY) HYPERTENSION: ICD-10-CM

## 2020-12-17 DIAGNOSIS — Z80.9 FAMILY HISTORY OF MALIGNANT NEOPLASM, UNSPECIFIED: ICD-10-CM

## 2020-12-17 PROCEDURE — 99024 POSTOP FOLLOW-UP VISIT: CPT

## 2020-12-17 PROCEDURE — 72100 X-RAY EXAM L-S SPINE 2/3 VWS: CPT

## 2020-12-17 NOTE — DISCUSSION/SUMMARY
[de-identified] : We've been treating this patient since October for this lumbar compression fracture at L1 and L2 MRI still show STIR sequence highlights within L1 and L2 to greater degree at L2 based upon 3 months and failed conservative care patient still states her pain is an 8 to a 10 intermittently and failed LSO bracing. We're recommending an L1 and L2 kyphoplasty. Patient wishes to pursue this in order to gain some control of her lumbar a go.\par \par A long discussion was had with the patient regarding surgical plan. Patient is aware that surgery is elective in nature and is choosing to proceed with surgery. Risks, benefits, alternatives were discussed and all questions, comments and concerns were answered to the patient's satisfaction. The statistical probability of improvement was discussed at length as well as post surgical course.  Literature was provided regarding the specific type of surgery as well as a written surgical consent which the patient will need to sign and return to the office prior to surgical date.  \par If patient is a smoker, discontinuation of smoking was advised and must be accomplished 6-8 weeks prior to surgery date.  Patient was advised that help with quitting smoking is available through [place] [employer] [facility] and phone number/website was provided, or patient can ask assistance from primary care provider.  Elective surgery will not be performed unless patient complies with this policy. \par

## 2020-12-17 NOTE — PHYSICAL EXAM
[de-identified] : CONSTITUTIONAL: The patient is a very pleasant individual who is well-nourished and who appears stated age.\par PSYCHIATRIC: The patient is alert and oriented X 3 and in no apparent distress, and participates with orthopedic evaluation well.\par HEAD: Atraumatic and is nonsyndromic in appearance.\par EENT: No visible thyromegaly, EOMI.\par RESPIRATORY: Respiratory rate is regular, not dyspneic on examination.\par LYMPHATICS: There is no inguinal lymphadenopathy\par INTEGUMENTARY: Skin is clean, dry, and intact about the bilateral lower extremities and lumbar spine.\par VASCULAR: There is brisk capillary refill about the bilateral lower extremities.\par NEUROLOGIC: There are no pathologic reflexes. There is no decrease in sensation of the bilateral lower extremities on Wartenberg pinwheel/manual examination. Deep tendon reflexes are well maintained at 2+/4 of the bilateral lower extremities and are symmetric..\par MUSCULOSKELETAL: There is no visible muscular atrophy. Manual motor strength is well maintained in the bilateral lower extremities. Range of motion of lumbar spine is well maintained. The patient ambulates in a non-myelopathic manner. Negative tension sign and straight leg raise bilaterally. Quad extension, ankle dorsiflexion, EHL, plantar flexion, and ankle eversion are well preserved. Normal secondary orthopaedic exam of bilateral hips, greater trochanteric area, knees and ankles\par patient's primary physical exam finding on today's date is consistand was mechanically orientated low back pain and LSO brace is at bedside [de-identified] : mRI of the lumbar spine is reviewed it shows a stable L1 and L2 compression fracture when compared to office x-rays. There is some STIR sequence highlights within the L2 vertebral body consistent with osteoporotic compression fracture.\par \par X-rays of the lumbar spine show stable L1 and L2 compression fractures

## 2020-12-17 NOTE — HISTORY OF PRESENT ILLNESS
[de-identified] : Patient presents for followup with regard to L1 and L2 compression fractures patient states the pain is progressively getting worse. She states is routinely in 8-10. Only time she has relief is lying supine she is still using her LSO brace routinely.No neurologic complaints

## 2021-01-04 ENCOUNTER — OUTPATIENT (OUTPATIENT)
Dept: OUTPATIENT SERVICES | Facility: HOSPITAL | Age: 81
LOS: 1 days | End: 2021-01-04
Payer: MEDICARE

## 2021-01-04 VITALS
TEMPERATURE: 97 F | RESPIRATION RATE: 20 BRPM | HEIGHT: 66 IN | WEIGHT: 146.39 LBS | DIASTOLIC BLOOD PRESSURE: 60 MMHG | SYSTOLIC BLOOD PRESSURE: 100 MMHG | HEART RATE: 48 BPM

## 2021-01-04 DIAGNOSIS — Z13.89 ENCOUNTER FOR SCREENING FOR OTHER DISORDER: ICD-10-CM

## 2021-01-04 DIAGNOSIS — Z90.722 ACQUIRED ABSENCE OF OVARIES, BILATERAL: Chronic | ICD-10-CM

## 2021-01-04 DIAGNOSIS — Z98.49 CATARACT EXTRACTION STATUS, UNSPECIFIED EYE: Chronic | ICD-10-CM

## 2021-01-04 DIAGNOSIS — M47.816 SPONDYLOSIS WITHOUT MYELOPATHY OR RADICULOPATHY, LUMBAR REGION: ICD-10-CM

## 2021-01-04 DIAGNOSIS — Z98.890 OTHER SPECIFIED POSTPROCEDURAL STATES: Chronic | ICD-10-CM

## 2021-01-04 DIAGNOSIS — S32.9XXA FRACTURE OF UNSPECIFIED PARTS OF LUMBOSACRAL SPINE AND PELVIS, INITIAL ENCOUNTER FOR CLOSED FRACTURE: ICD-10-CM

## 2021-01-04 DIAGNOSIS — Z29.9 ENCOUNTER FOR PROPHYLACTIC MEASURES, UNSPECIFIED: ICD-10-CM

## 2021-01-04 DIAGNOSIS — Z01.818 ENCOUNTER FOR OTHER PREPROCEDURAL EXAMINATION: ICD-10-CM

## 2021-01-04 LAB
A1C WITH ESTIMATED AVERAGE GLUCOSE RESULT: 5.2 % — SIGNIFICANT CHANGE UP (ref 4–5.6)
ALBUMIN SERPL ELPH-MCNC: 4.1 G/DL — SIGNIFICANT CHANGE UP (ref 3.3–5.2)
ALP SERPL-CCNC: 165 U/L — HIGH (ref 40–120)
ALT FLD-CCNC: 28 U/L — SIGNIFICANT CHANGE UP
ANION GAP SERPL CALC-SCNC: 14 MMOL/L — SIGNIFICANT CHANGE UP (ref 5–17)
APTT BLD: 34.7 SEC — SIGNIFICANT CHANGE UP (ref 27.5–35.5)
AST SERPL-CCNC: 36 U/L — HIGH
BASOPHILS # BLD AUTO: 0.03 K/UL — SIGNIFICANT CHANGE UP (ref 0–0.2)
BASOPHILS NFR BLD AUTO: 0.6 % — SIGNIFICANT CHANGE UP (ref 0–2)
BILIRUB SERPL-MCNC: 0.2 MG/DL — LOW (ref 0.4–2)
BLD GP AB SCN SERPL QL: SIGNIFICANT CHANGE UP
BUN SERPL-MCNC: 22 MG/DL — HIGH (ref 8–20)
CALCIUM SERPL-MCNC: 9.4 MG/DL — SIGNIFICANT CHANGE UP (ref 8.6–10.2)
CHLORIDE SERPL-SCNC: 99 MMOL/L — SIGNIFICANT CHANGE UP (ref 98–107)
CO2 SERPL-SCNC: 27 MMOL/L — SIGNIFICANT CHANGE UP (ref 22–29)
CREAT SERPL-MCNC: 1.44 MG/DL — HIGH (ref 0.5–1.3)
EOSINOPHIL # BLD AUTO: 0.01 K/UL — SIGNIFICANT CHANGE UP (ref 0–0.5)
EOSINOPHIL NFR BLD AUTO: 0.2 % — SIGNIFICANT CHANGE UP (ref 0–6)
ESTIMATED AVERAGE GLUCOSE: 103 MG/DL — SIGNIFICANT CHANGE UP (ref 68–114)
GLUCOSE SERPL-MCNC: 91 MG/DL — SIGNIFICANT CHANGE UP (ref 70–99)
HCT VFR BLD CALC: 35.5 % — SIGNIFICANT CHANGE UP (ref 34.5–45)
HGB BLD-MCNC: 11.2 G/DL — LOW (ref 11.5–15.5)
IMM GRANULOCYTES NFR BLD AUTO: 0.2 % — SIGNIFICANT CHANGE UP (ref 0–1.5)
INR BLD: 1.04 RATIO — SIGNIFICANT CHANGE UP (ref 0.88–1.16)
LYMPHOCYTES # BLD AUTO: 1.36 K/UL — SIGNIFICANT CHANGE UP (ref 1–3.3)
LYMPHOCYTES # BLD AUTO: 26.8 % — SIGNIFICANT CHANGE UP (ref 13–44)
MCHC RBC-ENTMCNC: 29.8 PG — SIGNIFICANT CHANGE UP (ref 27–34)
MCHC RBC-ENTMCNC: 31.5 GM/DL — LOW (ref 32–36)
MCV RBC AUTO: 94.4 FL — SIGNIFICANT CHANGE UP (ref 80–100)
MONOCYTES # BLD AUTO: 0.53 K/UL — SIGNIFICANT CHANGE UP (ref 0–0.9)
MONOCYTES NFR BLD AUTO: 10.4 % — SIGNIFICANT CHANGE UP (ref 2–14)
NEUTROPHILS # BLD AUTO: 3.14 K/UL — SIGNIFICANT CHANGE UP (ref 1.8–7.4)
NEUTROPHILS NFR BLD AUTO: 61.8 % — SIGNIFICANT CHANGE UP (ref 43–77)
PLATELET # BLD AUTO: 197 K/UL — SIGNIFICANT CHANGE UP (ref 150–400)
POTASSIUM SERPL-MCNC: 4.2 MMOL/L — SIGNIFICANT CHANGE UP (ref 3.5–5.3)
POTASSIUM SERPL-SCNC: 4.2 MMOL/L — SIGNIFICANT CHANGE UP (ref 3.5–5.3)
PROT SERPL-MCNC: 7.2 G/DL — SIGNIFICANT CHANGE UP (ref 6.6–8.7)
PROTHROM AB SERPL-ACNC: 12 SEC — SIGNIFICANT CHANGE UP (ref 10.6–13.6)
RBC # BLD: 3.76 M/UL — LOW (ref 3.8–5.2)
RBC # FLD: 12.5 % — SIGNIFICANT CHANGE UP (ref 10.3–14.5)
SODIUM SERPL-SCNC: 140 MMOL/L — SIGNIFICANT CHANGE UP (ref 135–145)
WBC # BLD: 5.08 K/UL — SIGNIFICANT CHANGE UP (ref 3.8–10.5)
WBC # FLD AUTO: 5.08 K/UL — SIGNIFICANT CHANGE UP (ref 3.8–10.5)

## 2021-01-04 PROCEDURE — 93010 ELECTROCARDIOGRAM REPORT: CPT

## 2021-01-04 RX ORDER — TAMSULOSIN HYDROCHLORIDE 0.4 MG/1
1 CAPSULE ORAL
Qty: 0 | Refills: 0 | DISCHARGE

## 2021-01-04 NOTE — ASU PATIENT PROFILE, ADULT - PMH
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

## 2021-01-04 NOTE — H&P PST ADULT - NSICDXPASTSURGICALHX_GEN_ALL_CORE_FT
PAST SURGICAL HISTORY:  No significant past surgical history      PAST SURGICAL HISTORY:  H/O bilateral oophorectomy 2018    H/O cardiac catheterization     H/O colonoscopy     History of basal cell carcinoma excision     History of cataract surgery bilateral

## 2021-01-04 NOTE — H&P PST ADULT - ASSESSMENT
79 yo F 79 yo F PMH of HTN, HLD, pt reports she fell at home 2 mo ago and fractured her back. Recent MRI of the lumbar spine showed a stable L1 and L2 compression fracture when compared to office x-rays. There is some STIR sequence highlights within the L2 vertebral body consistent with osteoporotic compression fracture. X-rays of the lumbar spine show stable L1 and L2 compression fractures. Pt reports she is unable to do her ADLs due to severe pain. Preop assessment prior to L1-L2 kyphoplasty w/Dr Lizarraga on       OPIOID RISK TOOL    STEPHANIE EACH BOX THAT APPLIES AND ADD TOTALS AT THE END    FAMILY HISTORY OF SUBSTANCE ABUSE                 FEMALE         MALE                                                Alcohol                             [  ]1 pt          [  ]3pts                                               Illegal Durgs                     [  ]2 pts        [  ]3pts                                               Rx Drugs                           [  ]4 pts        [  ]4 pts    PERSONAL HISTORY OF SUBSTANCE ABUSE                                                                                          Alcohol                             [  ]3 pts       [  ]3 pts                                               Illegal Drugs                     [  ]4 pts        [  ]4 pts                                               Rx Drugs                           [  ]5 pts        [  ]5 pts    AGE BETWEEN 16-45 YEARS                                      [  ]1 pt         [  ]1 pt    HISTORY OF PREADOLESCENT   SEXUAL ABUSE                                                             [  ]3 pts        [  ]0pts    PSYCHOLOGICAL DISEASE                     ADD, OCD, Bipolar, Schizophrenia        [  ]2 pts         [  ]2 pts                      Depression                                               [ x ]1 pt           [  ]1 pt           SCORING TOTAL   (add numbers and type here)              ( 1 )                                    A score of 3 or lower indicated LOW risk for future opioid abuse  A score of 4 to 7 indicated moderate risk for future opioid abuse  A score of 8 or higher indicates a high risk for opioid abuse      CAPRINI SCORE [CLOT]    AGE RELATED RISK FACTORS                                                       MOBILITY RELATED FACTORS  [ ] Age 41-60 years                                            (1 Point)                  [ ] Bed rest                                                        (1 Point)  [ ] Age: 61-74 years                                           (2 Points)                 [ ] Plaster cast                                                   (2 Points)  [x ] Age= 75 years                                              (3 Points)                 [ ] Bed bound for more than 72 hours                 (2 Points)    DISEASE RELATED RISK FACTORS                                               GENDER SPECIFIC FACTORS  [ ] Edema in the lower extremities                       (1 Point)                  [ ] Pregnancy                                                     (1 Point)  [ ] Varicose veins                                               (1 Point)                  [ ] Post-partum < 6 weeks                                   (1 Point)             [ ] BMI > 25 Kg/m2                                            (1 Point)                  [ ] Hormonal therapy  or oral contraception          (1 Point)                 [ ] Sepsis (in the previous month)                        (1 Point)                  [ ] History of pregnancy complications                 (1 point)  [ ] Pneumonia or serious lung disease                                               [ ] Unexplained or recurrent                     (1 Point)           (in the previous month)                               (1 Point)  [ ] Abnormal pulmonary function test                     (1 Point)                 SURGERY RELATED RISK FACTORS  [ ] Acute myocardial infarction                              (1 Point)                 [ ]  Section                                             (1 Point)  [ ] Congestive heart failure (in the previous month)  (1 Point)               [ ] Minor surgery                                                  (1 Point)   [ ] Inflammatory bowel disease                             (1 Point)                 [ ] Arthroscopic surgery                                        (2 Points)  [ ] Central venous access                                      (2 Points)                [x ] General surgery lasting more than 45 minutes   (2 Points)       [ ] Stroke (in the previous month)                          (5 Points)               [ ] Elective arthroplasty                                         (5 Points)                                                                                                                                               HEMATOLOGY RELATED FACTORS                                                 TRAUMA RELATED RISK FACTORS  [ ] Prior episodes of VTE                                     (3 Points)                [ ] Fracture of the hip, pelvis, or leg                       (5 Points)  [ ] Positive family history for VTE                         (3 Points)                 [ ] Acute spinal cord injury (in the previous month)  (5 Points)  [ ] Prothrombin 30832 A                                     (3 Points)                 [ ] Paralysis  (less than 1 month)                             (5 Points)  [ ] Factor V Leiden                                             (3 Points)                  [ ] Multiple Trauma within 1 month                        (5 Points)  [ ] Lupus anticoagulants                                     (3 Points)                                                           [ ] Anticardiolipin antibodies                               (3 Points)                                                       [ ] High homocysteine in the blood                      (3 Points)                                             [ ] Other congenital or acquired thrombophilia      (3 Points)                                                [ ] Heparin induced thrombocytopenia                  (3 Points)                                          Total Score [    5      ]    Caprini Score 0 - 2:  Low Risk, No VTE Prophylaxis required for most patients, encourage ambulation  Caprini Score 3 - 6:  At Risk, pharmacologic VTE prophylaxis is indicated for most patients (in the absence of a contraindication)  Caprini Score Greater than or = 7:  High Risk, pharmacologic VTE prophylaxis is indicated for most patients (in the absence of a contraindication)

## 2021-01-04 NOTE — H&P PST ADULT - NSICDXPASTMEDICALHX_GEN_ALL_CORE_FT
PAST MEDICAL HISTORY:  Depression     HTN (hypertension)     Ovarian benign neoplasm with S/p Total  Hysterectomy    Parkinson's disease      PAST MEDICAL HISTORY:  Depression     Fracture of lumbosacral spine or pelvis     HTN (hypertension)     Hyperlipidemia     Ovarian benign neoplasm s/p Total  Hysterectomy    Parkinson's disease     Spondylosis without myelopathy or radiculopathy, lumbar region

## 2021-01-04 NOTE — ASU PATIENT PROFILE, ADULT - VISION (WITH CORRECTIVE LENSES IF THE PATIENT USUALLY WEARS THEM):
Cataract surgery both eyes IOL both eyes/Partially impaired: cannot see medication labels or newsprint, but can see obstacles in path, and the surrounding layout; can count fingers at arm's length

## 2021-01-04 NOTE — H&P PST ADULT - HEIGHT IN FEET
Botox     Patient did not get Botox once it was approved in July. The current auth expires 10/16/20. I submitted a new Botox PA to [facility] and informed them of same. No Botox appt is scheduled. Has patient decided against it due to cost as noted on July office visit?
5

## 2021-01-04 NOTE — ASU PATIENT PROFILE, ADULT - LEARNING ASSESSMENT (PATIENT) ADDITIONAL COMMENTS
pre op teaching surgical scrub pain management instructions given to pt and her  Covid swab to be done jan 17

## 2021-01-04 NOTE — H&P PST ADULT - HISTORY OF PRESENT ILLNESS
79 yo F s/p fall 2 mo ago  79 yo F PMH of HTN, HLD, pt reports she fell at home 2 mo ago. MRI of the lumbar spine is reviewed it shows a stable L1 and L2 compression fracture when compared to office x-rays. There is some STIR sequence highlights within the L2 vertebral body consistent with osteoporotic compression fracture. X-rays of the lumbar spine show stable L1 and L2 compression fractures. Preop assessment prior to L1-L2 kyphoplasty w/Dr Lizarraga on 1/20     81 yo F PMH of HTN, HLD, pt reports she fell at home 2 mo ago and fractured her back. Recent MRI of the lumbar spine showed a stable L1 and L2 compression fracture when compared to office x-rays. There is some STIR sequence highlights within the L2 vertebral body consistent with osteoporotic compression fracture. X-rays of the lumbar spine show stable L1 and L2 compression fractures. Pt reports she is unable to do her ADLs due to severe pain. Preop assessment prior to L1-L2 kyphoplasty w/Dr Lizarraga on 1/20

## 2021-01-04 NOTE — H&P PST ADULT - NSICDXFAMILYHX_GEN_ALL_CORE_FT
FAMILY HISTORY:  Family history of cancer in father, bladder CA  Family hx of lung cancer, mother

## 2021-01-04 NOTE — H&P PST ADULT - NSANTHOSAYNRD_GEN_A_CORE
No. AUDIE screening performed.  STOP BANG Legend: 0-2 = LOW Risk; 3-4 = INTERMEDIATE Risk; 5-8 = HIGH Risk

## 2021-01-04 NOTE — ASU PATIENT PROFILE, ADULT - PATIENT KNOW
pt confused aand forgetful pt  knows why she is here/yes pt confused and forgetful pt  knows why she is here/yes

## 2021-01-05 LAB
MRSA PCR RESULT.: SIGNIFICANT CHANGE UP
S AUREUS DNA NOSE QL NAA+PROBE: SIGNIFICANT CHANGE UP

## 2021-01-11 RX ORDER — CEFAZOLIN SODIUM 1 G
2000 VIAL (EA) INJECTION ONCE
Refills: 0 | Status: DISCONTINUED | OUTPATIENT
Start: 2021-01-20 | End: 2021-02-03

## 2021-01-16 DIAGNOSIS — Z01.818 ENCOUNTER FOR OTHER PREPROCEDURAL EXAMINATION: ICD-10-CM

## 2021-01-16 NOTE — PATIENT PROFILE ADULT - NSPROGENSOURCEINFO_GEN_A_NUR
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

## 2021-01-17 ENCOUNTER — APPOINTMENT (OUTPATIENT)
Dept: DISASTER EMERGENCY | Facility: CLINIC | Age: 81
End: 2021-01-17

## 2021-01-18 LAB — SARS-COV-2 N GENE NPH QL NAA+PROBE: NOT DETECTED

## 2021-01-19 ENCOUNTER — TRANSCRIPTION ENCOUNTER (OUTPATIENT)
Age: 81
End: 2021-01-19

## 2021-01-20 ENCOUNTER — RESULT REVIEW (OUTPATIENT)
Age: 81
End: 2021-01-20

## 2021-01-20 ENCOUNTER — OUTPATIENT (OUTPATIENT)
Dept: INPATIENT UNIT | Facility: HOSPITAL | Age: 81
LOS: 1 days | End: 2021-01-20
Payer: MEDICARE

## 2021-01-20 ENCOUNTER — APPOINTMENT (OUTPATIENT)
Dept: ORTHOPEDIC SURGERY | Facility: HOSPITAL | Age: 81
End: 2021-01-20

## 2021-01-20 VITALS
WEIGHT: 146.39 LBS | SYSTOLIC BLOOD PRESSURE: 139 MMHG | OXYGEN SATURATION: 100 % | DIASTOLIC BLOOD PRESSURE: 63 MMHG | RESPIRATION RATE: 16 BRPM | TEMPERATURE: 97 F | HEART RATE: 56 BPM | HEIGHT: 66 IN

## 2021-01-20 VITALS
SYSTOLIC BLOOD PRESSURE: 128 MMHG | DIASTOLIC BLOOD PRESSURE: 70 MMHG | HEART RATE: 72 BPM | OXYGEN SATURATION: 97 % | TEMPERATURE: 97 F | RESPIRATION RATE: 15 BRPM

## 2021-01-20 DIAGNOSIS — S32.000A WEDGE COMPRESSION FRACTURE OF UNSPECIFIED LUMBAR VERTEBRA, INITIAL ENCOUNTER FOR CLOSED FRACTURE: ICD-10-CM

## 2021-01-20 DIAGNOSIS — Z98.890 OTHER SPECIFIED POSTPROCEDURAL STATES: Chronic | ICD-10-CM

## 2021-01-20 DIAGNOSIS — Z98.49 CATARACT EXTRACTION STATUS, UNSPECIFIED EYE: Chronic | ICD-10-CM

## 2021-01-20 DIAGNOSIS — Z90.722 ACQUIRED ABSENCE OF OVARIES, BILATERAL: Chronic | ICD-10-CM

## 2021-01-20 DIAGNOSIS — M47.816 SPONDYLOSIS WITHOUT MYELOPATHY OR RADICULOPATHY, LUMBAR REGION: ICD-10-CM

## 2021-01-20 PROCEDURE — 88305 TISSUE EXAM BY PATHOLOGIST: CPT | Mod: 26

## 2021-01-20 PROCEDURE — G0463: CPT

## 2021-01-20 PROCEDURE — 88311 DECALCIFY TISSUE: CPT | Mod: 26

## 2021-01-20 PROCEDURE — 93005 ELECTROCARDIOGRAM TRACING: CPT

## 2021-01-20 PROCEDURE — 88311 DECALCIFY TISSUE: CPT

## 2021-01-20 PROCEDURE — 88342 IMHCHEM/IMCYTCHM 1ST ANTB: CPT | Mod: 26

## 2021-01-20 PROCEDURE — 22515 PERQ VERTEBRAL AUGMENTATION: CPT

## 2021-01-20 PROCEDURE — C1713: CPT

## 2021-01-20 PROCEDURE — 88305 TISSUE EXAM BY PATHOLOGIST: CPT

## 2021-01-20 PROCEDURE — 76000 FLUOROSCOPY <1 HR PHYS/QHP: CPT

## 2021-01-20 PROCEDURE — C1726: CPT

## 2021-01-20 PROCEDURE — 22514 PERQ VERTEBRAL AUGMENTATION: CPT | Mod: AS

## 2021-01-20 PROCEDURE — 88342 IMHCHEM/IMCYTCHM 1ST ANTB: CPT

## 2021-01-20 PROCEDURE — 36415 COLL VENOUS BLD VENIPUNCTURE: CPT

## 2021-01-20 PROCEDURE — 22514 PERQ VERTEBRAL AUGMENTATION: CPT

## 2021-01-20 PROCEDURE — 22515 PERQ VERTEBRAL AUGMENTATION: CPT | Mod: AS

## 2021-01-20 RX ORDER — TAMSULOSIN HYDROCHLORIDE 0.4 MG/1
0.4 CAPSULE ORAL
Qty: 90 | Refills: 0 | Status: ACTIVE | COMMUNITY
Start: 2020-09-24

## 2021-01-20 RX ORDER — ESCITALOPRAM OXALATE 20 MG/1
20 TABLET ORAL
Qty: 30 | Refills: 0 | Status: ACTIVE | COMMUNITY
Start: 2020-12-09

## 2021-01-20 RX ORDER — GABAPENTIN 300 MG/1
300 CAPSULE ORAL
Qty: 180 | Refills: 0 | Status: ACTIVE | COMMUNITY
Start: 2021-01-18

## 2021-01-20 RX ORDER — SODIUM CHLORIDE 9 MG/ML
3 INJECTION INTRAMUSCULAR; INTRAVENOUS; SUBCUTANEOUS ONCE
Refills: 0 | Status: DISCONTINUED | OUTPATIENT
Start: 2021-01-20 | End: 2021-01-20

## 2021-01-20 RX ORDER — LAMOTRIGINE 25 MG/1
25 TABLET ORAL
Qty: 120 | Refills: 0 | Status: ACTIVE | COMMUNITY
Start: 2020-12-09

## 2021-01-20 RX ORDER — NYSTATIN 100000 [USP'U]/ML
100000 SUSPENSION ORAL
Qty: 200 | Refills: 0 | Status: ACTIVE | COMMUNITY
Start: 2021-01-15

## 2021-01-20 RX ORDER — FUROSEMIDE 40 MG/1
40 TABLET ORAL
Qty: 90 | Refills: 0 | Status: ACTIVE | COMMUNITY
Start: 2021-01-03

## 2021-01-20 RX ORDER — CEPHALEXIN 500 MG
1 CAPSULE ORAL
Qty: 4 | Refills: 0
Start: 2021-01-20 | End: 2021-01-20

## 2021-01-20 RX ORDER — SODIUM CHLORIDE 9 MG/ML
1000 INJECTION, SOLUTION INTRAVENOUS
Refills: 0 | Status: DISCONTINUED | OUTPATIENT
Start: 2021-01-20 | End: 2021-01-20

## 2021-01-20 RX ORDER — ONDANSETRON 8 MG/1
4 TABLET, FILM COATED ORAL ONCE
Refills: 0 | Status: DISCONTINUED | OUTPATIENT
Start: 2021-01-20 | End: 2021-01-20

## 2021-01-20 RX ORDER — FENTANYL CITRATE 50 UG/ML
25 INJECTION INTRAVENOUS
Refills: 0 | Status: DISCONTINUED | OUTPATIENT
Start: 2021-01-20 | End: 2021-01-20

## 2021-01-20 RX ORDER — POTASSIUM CHLORIDE 1500 MG/1
20 TABLET, EXTENDED RELEASE ORAL
Qty: 90 | Refills: 0 | Status: ACTIVE | COMMUNITY
Start: 2020-12-21

## 2021-01-20 RX ORDER — ALPRAZOLAM 1 MG/1
1 TABLET ORAL
Qty: 90 | Refills: 0 | Status: ACTIVE | COMMUNITY
Start: 2021-01-07

## 2021-01-20 NOTE — BRIEF OPERATIVE NOTE - NSICDXBRIEFPOSTOP_GEN_ALL_CORE_FT
POST-OP DIAGNOSIS:  Lumbar compression fracture 20-Jan-2021 09:57:11  Jose Francisco Lizarraga  
POST-OP DIAGNOSIS:  Lumbar compression fracture 20-Jan-2021 09:57:11  Jose Francisco Lizarraga

## 2021-01-20 NOTE — BRIEF OPERATIVE NOTE - OPERATION/FINDINGS
comp fx
Patient was maintained supine and conscious sedation was induced. I then assisted with positioning patient prone on a well-padded well-positioned flattop James table draped the area and cleansed lumbar spine with Betadine scrub after which time the RN scrubbed with DuraPrep. I assisted with marking the operative vertebrae using biplanar fluoroscopy. A participated in operative time out. We instilled 20 mL of 1% lidocaine with epinephrine as a local anesthetic over the patient's operative pedicle on the L1, L2 on the left . Trocar was advanced to bipedicular position, placed into the vertebral body and bone biopsy was obtained with my assistance. Balloon/cavity creation was produced. Cement was injected into the vertebral body under intermittent biplanar fluoroscopy guidance. Final fluoroscopic imaging was confirmed placement of kyphoplasty cement and the affected vertebral body.  I instilled 20 cc of Marcaine with epinephrine, irrigated with normal saline, closed the incision with 3-0 Monocryl then Dermabond, Steri-Strips and a steril 4/4 topped with an OpSite.

## 2021-01-20 NOTE — ASU DISCHARGE PLAN (ADULT/PEDIATRIC) - MEDICATION INSTRUCTIONS
tylenol 975 mg every 6 hours as needed for pain, add ibuprofen 600 mg with food or milk every 8 hours as needed for breakthrough pain tylenol 975 mg every 6 hours as needed for pain, add ibuprofen 600 mg with food or milk every 8 hours as needed for breakthrough pain.  Do not use ibuprofen for greater than 7 days. .  keflex 500 mg by mouth 4 times a day x 1 day only to prevent skin infection.

## 2021-01-20 NOTE — BRIEF OPERATIVE NOTE - NSICDXBRIEFPROCEDURE_GEN_ALL_CORE_FT
PROCEDURES:  Kyphoplasty, spine, lumbar, 2 levels 20-Jan-2021 09:56:42  Jose Francisco Lizarraga  
PROCEDURES:  Kyphoplasty, spine, lumbar, 2 levels 20-Jan-2021 09:56:42  Jose Francisco Lizarraga

## 2021-01-20 NOTE — ASU DISCHARGE PLAN (ADULT/PEDIATRIC) - CARE PROVIDER_API CALL
Jose Francisco Lizarraga (DO)  Orthopaedic Surgery  [address] [place] [address], [address]  [place] [address]  Phone: [phone]  Fax: [phone]  Follow Up Time:

## 2021-01-25 LAB — SURGICAL PATHOLOGY STUDY: SIGNIFICANT CHANGE UP

## 2021-01-27 ENCOUNTER — NON-APPOINTMENT (OUTPATIENT)
Age: 81
End: 2021-01-27

## 2021-01-29 ENCOUNTER — APPOINTMENT (OUTPATIENT)
Dept: ORTHOPEDIC SURGERY | Facility: CLINIC | Age: 81
End: 2021-01-29
Payer: MEDICARE

## 2021-01-29 VITALS
WEIGHT: 136 LBS | SYSTOLIC BLOOD PRESSURE: 118 MMHG | BODY MASS INDEX: 21.35 KG/M2 | HEIGHT: 67 IN | DIASTOLIC BLOOD PRESSURE: 66 MMHG | HEART RATE: 63 BPM

## 2021-01-29 PROBLEM — E78.5 HYPERLIPIDEMIA, UNSPECIFIED: Chronic | Status: ACTIVE | Noted: 2021-01-04

## 2021-01-29 PROBLEM — M47.816 SPONDYLOSIS WITHOUT MYELOPATHY OR RADICULOPATHY, LUMBAR REGION: Chronic | Status: ACTIVE | Noted: 2021-01-04

## 2021-01-29 PROBLEM — D27.9 BENIGN NEOPLASM OF UNSPECIFIED OVARY: Chronic | Status: ACTIVE | Noted: 2017-06-24

## 2021-01-29 PROBLEM — S32.9XXA FRACTURE OF UNSPECIFIED PARTS OF LUMBOSACRAL SPINE AND PELVIS, INITIAL ENCOUNTER FOR CLOSED FRACTURE: Chronic | Status: ACTIVE | Noted: 2021-01-04

## 2021-01-29 PROCEDURE — 72100 X-RAY EXAM L-S SPINE 2/3 VWS: CPT

## 2021-01-29 PROCEDURE — 99024 POSTOP FOLLOW-UP VISIT: CPT

## 2021-01-29 NOTE — HISTORY OF PRESENT ILLNESS
[___ Weeks Post Op] : [unfilled] weeks post op [Clean/Dry/Intact] : clean, dry and intact [Healed] : healed [Neuro Intact] : an unremarkable neurological exam [Vascular Intact] : ~T peripheral vascular exam normal [Xray (Date:___)] : [unfilled] Xray -  [Doing Well] : is doing well [Excellent Pain Control] : has excellent pain control [No Sign of Infection] : is showing no signs of infection [Chills] : no chills [Fever] : no fever [Erythema] : not erythematous [Discharge] : absent of discharge [Swelling] : not swollen [Dehiscence] : not dehisced [de-identified] : S/p L1 and L2 kyphoplasty. DOS: 01- [de-identified] : 80 year old F 1 week S/p L1 and L2 kyphoplasty. Patient is doing well and pain is completely resolved at this point.  [de-identified] : constitutional- No acute distress\par neurologic- no LE radiculitis. No sensory deficits. \par skin- incision dry clean and intact. The incision was well healed. Two small band-aids in place. \par musculoskeletal - motor strength is 5/5. No motor deficits.  [de-identified] : Xray of the lumbar spine taken 01/29/2021 demonstrates 2 level kyphoplasty.  [de-identified] : 1 Week S/p L1 and L2 kyphoplasty.  [de-identified] : The patient will follow up in 1 month for a repeat clinical assessment.

## 2021-01-29 NOTE — ADDENDUM
[FreeTextEntry1] : Documented by Bautista Merrill acting as a scribe for Dr. Jose Francisco Lizarraga on 01/29/2021. All medical record entries made by the Scribe were at my, Dr. Jose Francisco Lizarraga, direction and personally dictated by me on 01/29/2021 . I have reviewed the chart and agree that the record accurately reflects my personal performance of the history, physical exam, assessment and plan. I have also personally directed, reviewed, and agreed with the chart.

## 2021-03-02 ENCOUNTER — APPOINTMENT (OUTPATIENT)
Dept: ORTHOPEDIC SURGERY | Facility: CLINIC | Age: 81
End: 2021-03-02

## 2021-04-06 ENCOUNTER — EMERGENCY (EMERGENCY)
Facility: HOSPITAL | Age: 81
LOS: 1 days | Discharge: HOME | End: 2021-04-06
Attending: STUDENT IN AN ORGANIZED HEALTH CARE EDUCATION/TRAINING PROGRAM
Payer: MEDICARE

## 2021-04-06 VITALS
TEMPERATURE: 98 F | WEIGHT: 139.99 LBS | SYSTOLIC BLOOD PRESSURE: 125 MMHG | HEART RATE: 79 BPM | OXYGEN SATURATION: 98 % | HEIGHT: 66 IN | RESPIRATION RATE: 20 BRPM | DIASTOLIC BLOOD PRESSURE: 64 MMHG

## 2021-04-06 VITALS
RESPIRATION RATE: 18 BRPM | DIASTOLIC BLOOD PRESSURE: 83 MMHG | OXYGEN SATURATION: 99 % | SYSTOLIC BLOOD PRESSURE: 155 MMHG | TEMPERATURE: 98 F | HEART RATE: 80 BPM

## 2021-04-06 DIAGNOSIS — Z98.49 CATARACT EXTRACTION STATUS, UNSPECIFIED EYE: Chronic | ICD-10-CM

## 2021-04-06 DIAGNOSIS — Z98.890 OTHER SPECIFIED POSTPROCEDURAL STATES: Chronic | ICD-10-CM

## 2021-04-06 DIAGNOSIS — Z90.722 ACQUIRED ABSENCE OF OVARIES, BILATERAL: Chronic | ICD-10-CM

## 2021-04-06 LAB
ALBUMIN SERPL ELPH-MCNC: 4.7 G/DL — SIGNIFICANT CHANGE UP (ref 3.3–5.2)
ALP SERPL-CCNC: 217 U/L — HIGH (ref 40–120)
ALT FLD-CCNC: 44 U/L — HIGH
ANION GAP SERPL CALC-SCNC: 14 MMOL/L — SIGNIFICANT CHANGE UP (ref 5–17)
AST SERPL-CCNC: 36 U/L — HIGH
BASOPHILS # BLD AUTO: 0.03 K/UL — SIGNIFICANT CHANGE UP (ref 0–0.2)
BASOPHILS NFR BLD AUTO: 0.5 % — SIGNIFICANT CHANGE UP (ref 0–2)
BILIRUB SERPL-MCNC: 0.3 MG/DL — LOW (ref 0.4–2)
BUN SERPL-MCNC: 20 MG/DL — SIGNIFICANT CHANGE UP (ref 8–20)
CALCIUM SERPL-MCNC: 9.9 MG/DL — SIGNIFICANT CHANGE UP (ref 8.6–10.2)
CHLORIDE SERPL-SCNC: 98 MMOL/L — SIGNIFICANT CHANGE UP (ref 98–107)
CO2 SERPL-SCNC: 26 MMOL/L — SIGNIFICANT CHANGE UP (ref 22–29)
CREAT SERPL-MCNC: 1.29 MG/DL — SIGNIFICANT CHANGE UP (ref 0.5–1.3)
EOSINOPHIL # BLD AUTO: 0.08 K/UL — SIGNIFICANT CHANGE UP (ref 0–0.5)
EOSINOPHIL NFR BLD AUTO: 1.4 % — SIGNIFICANT CHANGE UP (ref 0–6)
ERYTHROCYTE [SEDIMENTATION RATE] IN BLOOD: 6 MM/HR — SIGNIFICANT CHANGE UP (ref 0–20)
GLUCOSE SERPL-MCNC: 105 MG/DL — HIGH (ref 70–99)
HCT VFR BLD CALC: 39.9 % — SIGNIFICANT CHANGE UP (ref 34.5–45)
HGB BLD-MCNC: 13 G/DL — SIGNIFICANT CHANGE UP (ref 11.5–15.5)
IMM GRANULOCYTES NFR BLD AUTO: 0.4 % — SIGNIFICANT CHANGE UP (ref 0–1.5)
LYMPHOCYTES # BLD AUTO: 1.49 K/UL — SIGNIFICANT CHANGE UP (ref 1–3.3)
LYMPHOCYTES # BLD AUTO: 26.9 % — SIGNIFICANT CHANGE UP (ref 13–44)
MCHC RBC-ENTMCNC: 30.3 PG — SIGNIFICANT CHANGE UP (ref 27–34)
MCHC RBC-ENTMCNC: 32.6 GM/DL — SIGNIFICANT CHANGE UP (ref 32–36)
MCV RBC AUTO: 93 FL — SIGNIFICANT CHANGE UP (ref 80–100)
MONOCYTES # BLD AUTO: 0.57 K/UL — SIGNIFICANT CHANGE UP (ref 0–0.9)
MONOCYTES NFR BLD AUTO: 10.3 % — SIGNIFICANT CHANGE UP (ref 2–14)
NEUTROPHILS # BLD AUTO: 3.35 K/UL — SIGNIFICANT CHANGE UP (ref 1.8–7.4)
NEUTROPHILS NFR BLD AUTO: 60.5 % — SIGNIFICANT CHANGE UP (ref 43–77)
PLATELET # BLD AUTO: 198 K/UL — SIGNIFICANT CHANGE UP (ref 150–400)
POTASSIUM SERPL-MCNC: 4.2 MMOL/L — SIGNIFICANT CHANGE UP (ref 3.5–5.3)
POTASSIUM SERPL-SCNC: 4.2 MMOL/L — SIGNIFICANT CHANGE UP (ref 3.5–5.3)
PROT SERPL-MCNC: 8.1 G/DL — SIGNIFICANT CHANGE UP (ref 6.6–8.7)
RBC # BLD: 4.29 M/UL — SIGNIFICANT CHANGE UP (ref 3.8–5.2)
RBC # FLD: 14.3 % — SIGNIFICANT CHANGE UP (ref 10.3–14.5)
SODIUM SERPL-SCNC: 138 MMOL/L — SIGNIFICANT CHANGE UP (ref 135–145)
WBC # BLD: 5.54 K/UL — SIGNIFICANT CHANGE UP (ref 3.8–10.5)
WBC # FLD AUTO: 5.54 K/UL — SIGNIFICANT CHANGE UP (ref 3.8–10.5)

## 2021-04-06 PROCEDURE — 70496 CT ANGIOGRAPHY HEAD: CPT

## 2021-04-06 PROCEDURE — 96361 HYDRATE IV INFUSION ADD-ON: CPT

## 2021-04-06 PROCEDURE — 70496 CT ANGIOGRAPHY HEAD: CPT | Mod: 26,MD

## 2021-04-06 PROCEDURE — 85025 COMPLETE CBC W/AUTO DIFF WBC: CPT

## 2021-04-06 PROCEDURE — 80053 COMPREHEN METABOLIC PANEL: CPT

## 2021-04-06 PROCEDURE — 70498 CT ANGIOGRAPHY NECK: CPT | Mod: 26,MD

## 2021-04-06 PROCEDURE — 70450 CT HEAD/BRAIN W/O DYE: CPT

## 2021-04-06 PROCEDURE — 70450 CT HEAD/BRAIN W/O DYE: CPT | Mod: 26,59,MD

## 2021-04-06 PROCEDURE — 96374 THER/PROPH/DIAG INJ IV PUSH: CPT | Mod: XU

## 2021-04-06 PROCEDURE — 85652 RBC SED RATE AUTOMATED: CPT

## 2021-04-06 PROCEDURE — 36415 COLL VENOUS BLD VENIPUNCTURE: CPT

## 2021-04-06 PROCEDURE — 99285 EMERGENCY DEPT VISIT HI MDM: CPT

## 2021-04-06 PROCEDURE — 99284 EMERGENCY DEPT VISIT MOD MDM: CPT | Mod: 25

## 2021-04-06 PROCEDURE — 70498 CT ANGIOGRAPHY NECK: CPT

## 2021-04-06 RX ORDER — METOCLOPRAMIDE HCL 10 MG
10 TABLET ORAL ONCE
Refills: 0 | Status: COMPLETED | OUTPATIENT
Start: 2021-04-06 | End: 2021-04-06

## 2021-04-06 RX ORDER — ACETAMINOPHEN 500 MG
650 TABLET ORAL ONCE
Refills: 0 | Status: COMPLETED | OUTPATIENT
Start: 2021-04-06 | End: 2021-04-06

## 2021-04-06 RX ORDER — SODIUM CHLORIDE 9 MG/ML
1000 INJECTION INTRAMUSCULAR; INTRAVENOUS; SUBCUTANEOUS ONCE
Refills: 0 | Status: COMPLETED | OUTPATIENT
Start: 2021-04-06 | End: 2021-04-06

## 2021-04-06 RX ADMIN — Medication 10 MILLIGRAM(S): at 12:06

## 2021-04-06 RX ADMIN — SODIUM CHLORIDE 1000 MILLILITER(S): 9 INJECTION INTRAMUSCULAR; INTRAVENOUS; SUBCUTANEOUS at 12:02

## 2021-04-06 RX ADMIN — Medication 1 TABLET(S): at 13:13

## 2021-04-06 RX ADMIN — Medication 650 MILLIGRAM(S): at 15:27

## 2021-04-06 RX ADMIN — SODIUM CHLORIDE 1000 MILLILITER(S): 9 INJECTION INTRAMUSCULAR; INTRAVENOUS; SUBCUTANEOUS at 13:14

## 2021-04-06 NOTE — ED STATDOCS - ATTENDING CONTRIBUTION TO CARE
I, Krystina Cano, performed a face to face bedside interview with this patient regarding history of present illness, review of symptoms and relevant past medical, social and family history.  I completed an independent physical examination. Medical decision making, follow-up on ordered tests (ie labs, radiologic studies) and re-evaluation of the patient's status has been communicated to the ACP.  Disposition of the patient will be based on test outcome and response to ED interventions.

## 2021-04-06 NOTE — ED STATDOCS - PROGRESS NOTE DETAILS
patient awak and alert, well appearing, neuro intact - ambulating to bathroom by herself - pt has dementia, A&O x 2  f/u with neurologist and pmd

## 2021-04-06 NOTE — ED STATDOCS - CLINICAL SUMMARY MEDICAL DECISION MAKING FREE TEXT BOX
ha since yesterday, not sudden in onset, mostly posterior portion of head. Will obtain CT imaging, pain control and reassess.

## 2021-04-06 NOTE — ED ADULT NURSE NOTE - NSIMPLEMENTINTERV_GEN_ALL_ED
Implemented All Universal Safety Interventions:  [place] to call system. Call bell, personal items and telephone within reach. Instruct patient to call for assistance. Room bathroom lighting operational. Non-slip footwear when patient is off stretcher. Physically safe environment: no spills, clutter or unnecessary equipment. Stretcher in lowest position, wheels locked, appropriate side rails in place.

## 2021-04-06 NOTE — ED ADULT TRIAGE NOTE - CHIEF COMPLAINT QUOTE
Pt arrives to ED c/o posterior headache since yesterday not relieved with Tylenol , denies Hx of migraine TUCKER , sent by BRANDI Shabazz for futher  eval and treatment . Denies blurry vision or double vision

## 2021-04-06 NOTE — ED STATDOCS - PSH
H/O bilateral oophorectomy  2018  H/O cardiac catheterization    H/O colonoscopy    History of basal cell carcinoma excision    History of cataract surgery  bilateral

## 2021-04-06 NOTE — ED STATDOCS - NSFOLLOWUPINSTRUCTIONS_ED_ALL_ED_FT
follow up with neurologist  return to ED if you experience worsening symptoms such as fever, focal weaknesses, nausea/vomiting, seizure, altered mental state or visual changes

## 2021-04-06 NOTE — ED STATDOCS - OBJECTIVE STATEMENT
81 y/o F with PMHx of Dementia and Parkinson's presents to ED c/o constant posterior ha onset yesterday evening. Pt was sent here by PMD, Dr. Chavira. Pt denies trauma to head. Pt reports taking Tylenol for the pain w/o relief. Denies being on blood thinners. Denies fever. Denies N/V. Denies blurry vision, weakness or numbness. Denies neck pain. Non smoker. Denies ETOH use. 79 y/o F with PMHx of Dementia and Parkinson's and HTN presents to ED c/o constant posterior ha onset yesterday evening. Pt was sent here by PMD, Dr. Blake. Pt denies trauma to head. Pt reports taking Tylenol for the pain w/o relief. Denies being on blood thinners. Denies fever. Denies N/V. Denies blurry vision, weakness or numbness. Denies neck pain. Non smoker. Denies ETOH use.

## 2021-04-06 NOTE — ED ADULT NURSE NOTE - OBJECTIVE STATEMENT
80y female AOx4 c/o severe headache. BP WNL. no neuro deficits noted. denies dizziness or recent falls. respirations even and unlabored. denies chest discomfort. abd soft and nondistended. skin WNL for race. steady gait.

## 2021-04-06 NOTE — ED STATDOCS - PATIENT PORTAL LINK FT
You can access the FollowMyHealth Patient Portal offered by [facility] by registering at the following website: [email or web address]. By joining [facility]’s FollowMyHealth portal, you will also be able to view your health information using other applications (apps) compatible with our system.

## 2021-04-06 NOTE — ED STATDOCS - PMH
Depression    Fracture of lumbosacral spine or pelvis    HTN (hypertension)    Hyperlipidemia    Ovarian benign neoplasm  s/p Total  Hysterectomy  Parkinson's disease    Spondylosis without myelopathy or radiculopathy, lumbar region

## 2021-07-23 ENCOUNTER — EMERGENCY (EMERGENCY)
Facility: HOSPITAL | Age: 81
LOS: 1 days | Discharge: HOME | End: 2021-07-23
Attending: EMERGENCY MEDICINE
Payer: MEDICARE

## 2021-07-23 VITALS
SYSTOLIC BLOOD PRESSURE: 124 MMHG | HEIGHT: 66 IN | WEIGHT: 142.42 LBS | OXYGEN SATURATION: 99 % | DIASTOLIC BLOOD PRESSURE: 73 MMHG | TEMPERATURE: 98 F | HEART RATE: 69 BPM | RESPIRATION RATE: 16 BRPM

## 2021-07-23 DIAGNOSIS — Z98.890 OTHER SPECIFIED POSTPROCEDURAL STATES: Chronic | ICD-10-CM

## 2021-07-23 DIAGNOSIS — Z98.49 CATARACT EXTRACTION STATUS, UNSPECIFIED EYE: Chronic | ICD-10-CM

## 2021-07-23 DIAGNOSIS — Z90.722 ACQUIRED ABSENCE OF OVARIES, BILATERAL: Chronic | ICD-10-CM

## 2021-07-23 LAB
ALBUMIN SERPL ELPH-MCNC: 4.2 G/DL — SIGNIFICANT CHANGE UP (ref 3.3–5.2)
ALP SERPL-CCNC: 103 U/L — SIGNIFICANT CHANGE UP (ref 40–120)
ALT FLD-CCNC: 10 U/L — SIGNIFICANT CHANGE UP
ANION GAP SERPL CALC-SCNC: 14 MMOL/L — SIGNIFICANT CHANGE UP (ref 5–17)
APPEARANCE UR: CLEAR — SIGNIFICANT CHANGE UP
AST SERPL-CCNC: 17 U/L — SIGNIFICANT CHANGE UP
BASOPHILS # BLD AUTO: 0.04 K/UL — SIGNIFICANT CHANGE UP (ref 0–0.2)
BASOPHILS # BLD AUTO: 0.04 K/UL — SIGNIFICANT CHANGE UP (ref 0–0.2)
BASOPHILS NFR BLD AUTO: 0.6 % — SIGNIFICANT CHANGE UP (ref 0–2)
BASOPHILS NFR BLD AUTO: 0.6 % — SIGNIFICANT CHANGE UP (ref 0–2)
BILIRUB SERPL-MCNC: <0.2 MG/DL — LOW (ref 0.4–2)
BILIRUB UR-MCNC: NEGATIVE — SIGNIFICANT CHANGE UP
BUN SERPL-MCNC: 41.8 MG/DL — HIGH (ref 8–20)
CALCIUM SERPL-MCNC: 9.3 MG/DL — SIGNIFICANT CHANGE UP (ref 8.6–10.2)
CHLORIDE SERPL-SCNC: 104 MMOL/L — SIGNIFICANT CHANGE UP (ref 98–107)
CO2 SERPL-SCNC: 23 MMOL/L — SIGNIFICANT CHANGE UP (ref 22–29)
COLOR SPEC: YELLOW — SIGNIFICANT CHANGE UP
CREAT SERPL-MCNC: 1.39 MG/DL — HIGH (ref 0.5–1.3)
DIFF PNL FLD: NEGATIVE — SIGNIFICANT CHANGE UP
EOSINOPHIL # BLD AUTO: 0.01 K/UL — SIGNIFICANT CHANGE UP (ref 0–0.5)
EOSINOPHIL # BLD AUTO: 0.02 K/UL — SIGNIFICANT CHANGE UP (ref 0–0.5)
EOSINOPHIL NFR BLD AUTO: 0.2 % — SIGNIFICANT CHANGE UP (ref 0–6)
EOSINOPHIL NFR BLD AUTO: 0.3 % — SIGNIFICANT CHANGE UP (ref 0–6)
GLUCOSE SERPL-MCNC: 94 MG/DL — SIGNIFICANT CHANGE UP (ref 70–99)
GLUCOSE UR QL: NEGATIVE MG/DL — SIGNIFICANT CHANGE UP
HCT VFR BLD CALC: 26.4 % — LOW (ref 34.5–45)
HCT VFR BLD CALC: 26.8 % — LOW (ref 34.5–45)
HGB BLD-MCNC: 8.5 G/DL — LOW (ref 11.5–15.5)
HGB BLD-MCNC: 8.5 G/DL — LOW (ref 11.5–15.5)
IMM GRANULOCYTES NFR BLD AUTO: 0.3 % — SIGNIFICANT CHANGE UP (ref 0–1.5)
IMM GRANULOCYTES NFR BLD AUTO: 0.3 % — SIGNIFICANT CHANGE UP (ref 0–1.5)
KETONES UR-MCNC: NEGATIVE — SIGNIFICANT CHANGE UP
LEUKOCYTE ESTERASE UR-ACNC: NEGATIVE — SIGNIFICANT CHANGE UP
LYMPHOCYTES # BLD AUTO: 1.09 K/UL — SIGNIFICANT CHANGE UP (ref 1–3.3)
LYMPHOCYTES # BLD AUTO: 1.13 K/UL — SIGNIFICANT CHANGE UP (ref 1–3.3)
LYMPHOCYTES # BLD AUTO: 16.2 % — SIGNIFICANT CHANGE UP (ref 13–44)
LYMPHOCYTES # BLD AUTO: 17.1 % — SIGNIFICANT CHANGE UP (ref 13–44)
MCHC RBC-ENTMCNC: 31.5 PG — SIGNIFICANT CHANGE UP (ref 27–34)
MCHC RBC-ENTMCNC: 31.7 GM/DL — LOW (ref 32–36)
MCHC RBC-ENTMCNC: 31.7 PG — SIGNIFICANT CHANGE UP (ref 27–34)
MCHC RBC-ENTMCNC: 32.2 GM/DL — SIGNIFICANT CHANGE UP (ref 32–36)
MCV RBC AUTO: 98.5 FL — SIGNIFICANT CHANGE UP (ref 80–100)
MCV RBC AUTO: 99.3 FL — SIGNIFICANT CHANGE UP (ref 80–100)
MONOCYTES # BLD AUTO: 0.53 K/UL — SIGNIFICANT CHANGE UP (ref 0–0.9)
MONOCYTES # BLD AUTO: 0.6 K/UL — SIGNIFICANT CHANGE UP (ref 0–0.9)
MONOCYTES NFR BLD AUTO: 7.6 % — SIGNIFICANT CHANGE UP (ref 2–14)
MONOCYTES NFR BLD AUTO: 9.4 % — SIGNIFICANT CHANGE UP (ref 2–14)
NEUTROPHILS # BLD AUTO: 4.6 K/UL — SIGNIFICANT CHANGE UP (ref 1.8–7.4)
NEUTROPHILS # BLD AUTO: 5.25 K/UL — SIGNIFICANT CHANGE UP (ref 1.8–7.4)
NEUTROPHILS NFR BLD AUTO: 72.4 % — SIGNIFICANT CHANGE UP (ref 43–77)
NEUTROPHILS NFR BLD AUTO: 75 % — SIGNIFICANT CHANGE UP (ref 43–77)
NITRITE UR-MCNC: NEGATIVE — SIGNIFICANT CHANGE UP
OB PNL STL: NEGATIVE — SIGNIFICANT CHANGE UP
PH UR: 6 — SIGNIFICANT CHANGE UP (ref 5–8)
PLATELET # BLD AUTO: 244 K/UL — SIGNIFICANT CHANGE UP (ref 150–400)
PLATELET # BLD AUTO: 253 K/UL — SIGNIFICANT CHANGE UP (ref 150–400)
POTASSIUM SERPL-MCNC: 3.6 MMOL/L — SIGNIFICANT CHANGE UP (ref 3.5–5.3)
POTASSIUM SERPL-SCNC: 3.6 MMOL/L — SIGNIFICANT CHANGE UP (ref 3.5–5.3)
PROT SERPL-MCNC: 7 G/DL — SIGNIFICANT CHANGE UP (ref 6.6–8.7)
PROT UR-MCNC: NEGATIVE MG/DL — SIGNIFICANT CHANGE UP
RBC # BLD: 2.68 M/UL — LOW (ref 3.8–5.2)
RBC # BLD: 2.7 M/UL — LOW (ref 3.8–5.2)
RBC # FLD: 12.1 % — SIGNIFICANT CHANGE UP (ref 10.3–14.5)
RBC # FLD: 12.2 % — SIGNIFICANT CHANGE UP (ref 10.3–14.5)
SODIUM SERPL-SCNC: 141 MMOL/L — SIGNIFICANT CHANGE UP (ref 135–145)
SP GR SPEC: 1.01 — SIGNIFICANT CHANGE UP (ref 1.01–1.02)
UROBILINOGEN FLD QL: NEGATIVE MG/DL — SIGNIFICANT CHANGE UP
WBC # BLD: 6.36 K/UL — SIGNIFICANT CHANGE UP (ref 3.8–10.5)
WBC # BLD: 6.99 K/UL — SIGNIFICANT CHANGE UP (ref 3.8–10.5)
WBC # FLD AUTO: 6.36 K/UL — SIGNIFICANT CHANGE UP (ref 3.8–10.5)
WBC # FLD AUTO: 6.99 K/UL — SIGNIFICANT CHANGE UP (ref 3.8–10.5)

## 2021-07-23 PROCEDURE — 82272 OCCULT BLD FECES 1-3 TESTS: CPT

## 2021-07-23 PROCEDURE — 93005 ELECTROCARDIOGRAM TRACING: CPT

## 2021-07-23 PROCEDURE — 96374 THER/PROPH/DIAG INJ IV PUSH: CPT | Mod: XU

## 2021-07-23 PROCEDURE — 74177 CT ABD & PELVIS W/CONTRAST: CPT | Mod: 26,MG

## 2021-07-23 PROCEDURE — 99285 EMERGENCY DEPT VISIT HI MDM: CPT

## 2021-07-23 PROCEDURE — 87077 CULTURE AEROBIC IDENTIFY: CPT

## 2021-07-23 PROCEDURE — 87086 URINE CULTURE/COLONY COUNT: CPT

## 2021-07-23 PROCEDURE — 85025 COMPLETE CBC W/AUTO DIFF WBC: CPT

## 2021-07-23 PROCEDURE — 80053 COMPREHEN METABOLIC PANEL: CPT

## 2021-07-23 PROCEDURE — G1004: CPT

## 2021-07-23 PROCEDURE — 36415 COLL VENOUS BLD VENIPUNCTURE: CPT

## 2021-07-23 PROCEDURE — 93010 ELECTROCARDIOGRAM REPORT: CPT

## 2021-07-23 PROCEDURE — 81003 URINALYSIS AUTO W/O SCOPE: CPT

## 2021-07-23 PROCEDURE — 99284 EMERGENCY DEPT VISIT MOD MDM: CPT | Mod: 25

## 2021-07-23 PROCEDURE — 74177 CT ABD & PELVIS W/CONTRAST: CPT | Mod: MG

## 2021-07-23 RX ORDER — FERROUS SULFATE 325(65) MG
1 TABLET ORAL
Qty: 14 | Refills: 0
Start: 2021-07-23 | End: 2021-08-05

## 2021-07-23 RX ORDER — PANTOPRAZOLE SODIUM 20 MG/1
1 TABLET, DELAYED RELEASE ORAL
Qty: 14 | Refills: 0
Start: 2021-07-23 | End: 2021-08-05

## 2021-07-23 RX ORDER — MORPHINE SULFATE 50 MG/1
4 CAPSULE, EXTENDED RELEASE ORAL ONCE
Refills: 0 | Status: DISCONTINUED | OUTPATIENT
Start: 2021-07-23 | End: 2021-07-23

## 2021-07-23 RX ORDER — SODIUM CHLORIDE 9 MG/ML
1000 INJECTION INTRAMUSCULAR; INTRAVENOUS; SUBCUTANEOUS ONCE
Refills: 0 | Status: COMPLETED | OUTPATIENT
Start: 2021-07-23 | End: 2021-07-23

## 2021-07-23 RX ADMIN — MORPHINE SULFATE 4 MILLIGRAM(S): 50 CAPSULE, EXTENDED RELEASE ORAL at 14:23

## 2021-07-23 RX ADMIN — SODIUM CHLORIDE 1000 MILLILITER(S): 9 INJECTION INTRAMUSCULAR; INTRAVENOUS; SUBCUTANEOUS at 14:23

## 2021-07-23 NOTE — ED STATDOCS - OBJECTIVE STATEMENT
80 y/o female with PMHx of HTN, HLD, Parkinson's Disease, appendectomy, presents to the ED c/o intermittent abdominal pain for the past few days, became worse last night. Pt also notes frequent urinary. Pt states she had chest pain, went to cardiologist and had a negative workup. Denies worsening chest pain since abdominal pain began. Denies dysuria, vomiting, constipation, or diarrhea. Denies hx of similar pain. Denies trauma to abdomen. Denies relief with Pepto-Bismol. Denies hx of CHF or kidney failure. 80 y/o female with PMHx of HTN, HLD, Parkinson's Disease, appendectomy, presents to the ED c/o intermittent abdominal pain for the past few days, became worse last night. Pt also notes frequent urination. Pt states she had chest pain, went to cardiologist and had a negative workup. Denies worsening chest pain since abdominal pain began. Denies dysuria, vomiting, constipation, or diarrhea. Denies hx of similar pain. Denies trauma to abdomen. Denies relief with Pepto-Bismol. Denies hx of CHF or kidney failure.

## 2021-07-23 NOTE — ED STATDOCS - NSFOLLOWUPINSTRUCTIONS_ED_ALL_ED_FT
Abdominal Pain    Many things can cause abdominal pain. Many times, abdominal pain is not caused by a disease and will improve without treatment. Your health care provider will do a physical exam to determine if there is a dangerous cause of your pain; blood tests and imaging may help determine the cause of your pain. However, in many cases, no cause may be found and you may need further testing as an outpatient. Monitor your abdominal pain for any changes.     SEEK IMMEDIATE MEDICAL CARE IF YOU HAVE ANY OF THE FOLLOWING SYMPTOMS: worsening abdominal pain, uncontrollable vomiting, profuse diarrhea, inability to have bowel movements or pass gas, black or bloody stools, fever accompanying chest pain or back pain, or fainting. These symptoms may represent a serious problem that is an emergency. Do not wait to see if the symptoms will go away. Get medical help right away. Call 911 and do not drive yourself to the hospital.    Follow up with your primary gastroenterologist or Dr. Simmons upon discharge

## 2021-07-23 NOTE — ED STATDOCS - CLINICAL SUMMARY MEDICAL DECISION MAKING FREE TEXT BOX
Pt presenting with generalize abdominal pain since last night. Benign abdominal exam today, however pt has persistent pain. Will obtain labs, UA, CT, pain control, reassess.

## 2021-07-23 NOTE — ED STATDOCS - PROGRESS NOTE DETAILS
PA NOTE: Results of CT scan reviewed " Suspicious for peptic ulcer disease involving distal stomach; follow-up endoscopy is suggested to exclude neoplasm.". Stool occult negative. Repeat CBC stable. Pt reports significant improvement in symptoms and is tolerating PO intake at this time. Dr. Simmons called for consult given HGb 8.5 ( down from 13.0 in April ). Dr. Simmons recommended daily pantoprazole, iron supplementation, and outpatient follow up for endoscopy and return to ED for any vomiting, blood bowel movements, or worsening pain. Pt and  made aware of results and the plan as per Dr. Simmons and were agreeable. Pt states she will follow up with her primary GI doctor for repeat endoscopy ( last one performed 6 months ago as per pt ) and will return to ED for any new or worsening symptoms.

## 2021-07-23 NOTE — ED STATDOCS - PATIENT PORTAL LINK FT
You can access the FollowMyHealth Patient Portal offered by [facility] by registering at the following website: [email or web address]. By joining [facility]’s FollowMyHealth portal, you will also be able to view your health information using other applications (apps) compatible with our system.

## 2021-07-23 NOTE — ED STATDOCS - CARE PROVIDER_API CALL
Garett Simmons)  Gastroenterology; Internal Medicine  [address] [place] [address], [place] [address]  Phone: [phone]  Fax: [phone]  Follow Up Time:

## 2021-07-25 LAB
CULTURE RESULTS: SIGNIFICANT CHANGE UP
SPECIMEN SOURCE: SIGNIFICANT CHANGE UP

## 2021-11-07 NOTE — DIETITIAN INITIAL EVALUATION ADULT. - PROBLEM SELECTOR PLAN 3
Attempted, left message
spine surgery consulted by ED provider   lidoderm patch , pain meds   PT evaluation

## 2021-11-30 NOTE — ED ADULT NURSE NOTE - CINV DISCH MEDS REVIEWED YN
Expected Date Of Service: 11/30/2021
Performing Laboratory: 0
Billing Type: Third-Party Bill
Bill For Surgical Tray: no
Yes

## 2021-12-09 NOTE — ED ADULT NURSE NOTE - CAS TRG GEN SKIN CONDITION
Dry/Warm Azathioprine Counseling:  I discussed with the patient the risks of azathioprine including but not limited to myelosuppression, immunosuppression, hepatotoxicity, lymphoma, and infections.  The patient understands that monitoring is required including baseline LFTs, Creatinine, possible TPMP genotyping and weekly CBCs for the first month and then every 2 weeks thereafter.  The patient verbalized understanding of the proper use and possible adverse effects of azathioprine.  All of the patient's questions and concerns were addressed.

## 2021-12-12 ENCOUNTER — EMERGENCY (EMERGENCY)
Facility: HOSPITAL | Age: 81
LOS: 1 days | Discharge: HOME | End: 2021-12-12
Attending: EMERGENCY MEDICINE
Payer: MEDICARE

## 2021-12-12 VITALS
SYSTOLIC BLOOD PRESSURE: 99 MMHG | HEIGHT: 66 IN | DIASTOLIC BLOOD PRESSURE: 65 MMHG | HEART RATE: 100 BPM | WEIGHT: 130.07 LBS | RESPIRATION RATE: 18 BRPM | TEMPERATURE: 97 F | OXYGEN SATURATION: 95 %

## 2021-12-12 VITALS
OXYGEN SATURATION: 97 % | TEMPERATURE: 98 F | DIASTOLIC BLOOD PRESSURE: 76 MMHG | HEART RATE: 101 BPM | SYSTOLIC BLOOD PRESSURE: 138 MMHG | RESPIRATION RATE: 20 BRPM

## 2021-12-12 DIAGNOSIS — Z98.49 CATARACT EXTRACTION STATUS, UNSPECIFIED EYE: Chronic | ICD-10-CM

## 2021-12-12 DIAGNOSIS — Z98.890 OTHER SPECIFIED POSTPROCEDURAL STATES: Chronic | ICD-10-CM

## 2021-12-12 DIAGNOSIS — Z90.722 ACQUIRED ABSENCE OF OVARIES, BILATERAL: Chronic | ICD-10-CM

## 2021-12-12 LAB
ALBUMIN SERPL ELPH-MCNC: 4.3 G/DL — SIGNIFICANT CHANGE UP (ref 3.3–5.2)
ALP SERPL-CCNC: 134 U/L — HIGH (ref 40–120)
ALT FLD-CCNC: 16 U/L — SIGNIFICANT CHANGE UP
ANION GAP SERPL CALC-SCNC: 13 MMOL/L — SIGNIFICANT CHANGE UP (ref 5–17)
ANISOCYTOSIS BLD QL: SLIGHT — SIGNIFICANT CHANGE UP
APPEARANCE UR: CLEAR — SIGNIFICANT CHANGE UP
APTT BLD: 40.8 SEC — HIGH (ref 27.5–35.5)
AST SERPL-CCNC: 20 U/L — SIGNIFICANT CHANGE UP
BASOPHILS # BLD AUTO: 0.14 K/UL — SIGNIFICANT CHANGE UP (ref 0–0.2)
BASOPHILS NFR BLD AUTO: 4.3 % — HIGH (ref 0–2)
BILIRUB SERPL-MCNC: 0.3 MG/DL — LOW (ref 0.4–2)
BILIRUB UR-MCNC: NEGATIVE — SIGNIFICANT CHANGE UP
BUN SERPL-MCNC: 29.3 MG/DL — HIGH (ref 8–20)
CALCIUM SERPL-MCNC: 9.5 MG/DL — SIGNIFICANT CHANGE UP (ref 8.6–10.2)
CHLORIDE SERPL-SCNC: 99 MMOL/L — SIGNIFICANT CHANGE UP (ref 98–107)
CO2 SERPL-SCNC: 26 MMOL/L — SIGNIFICANT CHANGE UP (ref 22–29)
COLOR SPEC: YELLOW — SIGNIFICANT CHANGE UP
CREAT SERPL-MCNC: 1.5 MG/DL — HIGH (ref 0.5–1.3)
DIFF PNL FLD: ABNORMAL
EOSINOPHIL # BLD AUTO: 0 K/UL — SIGNIFICANT CHANGE UP (ref 0–0.5)
EOSINOPHIL NFR BLD AUTO: 0 % — SIGNIFICANT CHANGE UP (ref 0–6)
EPI CELLS # UR: SIGNIFICANT CHANGE UP
GIANT PLATELETS BLD QL SMEAR: PRESENT — SIGNIFICANT CHANGE UP
GLUCOSE SERPL-MCNC: 92 MG/DL — SIGNIFICANT CHANGE UP (ref 70–99)
GLUCOSE UR QL: NEGATIVE MG/DL — SIGNIFICANT CHANGE UP
HCT VFR BLD CALC: 33.7 % — LOW (ref 34.5–45)
HGB BLD-MCNC: 10.7 G/DL — LOW (ref 11.5–15.5)
INR BLD: 1.05 RATIO — SIGNIFICANT CHANGE UP (ref 0.88–1.16)
KETONES UR-MCNC: NEGATIVE — SIGNIFICANT CHANGE UP
LACTATE BLDV-MCNC: 0.7 MMOL/L — SIGNIFICANT CHANGE UP (ref 0.5–2)
LEUKOCYTE ESTERASE UR-ACNC: NEGATIVE — SIGNIFICANT CHANGE UP
LIDOCAIN IGE QN: 27 U/L — SIGNIFICANT CHANGE UP (ref 22–51)
LYMPHOCYTES # BLD AUTO: 0.88 K/UL — LOW (ref 1–3.3)
LYMPHOCYTES # BLD AUTO: 27 % — SIGNIFICANT CHANGE UP (ref 13–44)
MACROCYTES BLD QL: SLIGHT — SIGNIFICANT CHANGE UP
MANUAL SMEAR VERIFICATION: SIGNIFICANT CHANGE UP
MCHC RBC-ENTMCNC: 29.7 PG — SIGNIFICANT CHANGE UP (ref 27–34)
MCHC RBC-ENTMCNC: 31.8 GM/DL — LOW (ref 32–36)
MCV RBC AUTO: 93.6 FL — SIGNIFICANT CHANGE UP (ref 80–100)
MONOCYTES # BLD AUTO: 0.2 K/UL — SIGNIFICANT CHANGE UP (ref 0–0.9)
MONOCYTES NFR BLD AUTO: 6.1 % — SIGNIFICANT CHANGE UP (ref 2–14)
NEUTROPHILS # BLD AUTO: 1.87 K/UL — SIGNIFICANT CHANGE UP (ref 1.8–7.4)
NEUTROPHILS NFR BLD AUTO: 57.4 % — SIGNIFICANT CHANGE UP (ref 43–77)
NITRITE UR-MCNC: NEGATIVE — SIGNIFICANT CHANGE UP
OVALOCYTES BLD QL SMEAR: SLIGHT — SIGNIFICANT CHANGE UP
PH UR: 6.5 — SIGNIFICANT CHANGE UP (ref 5–8)
PLAT MORPH BLD: NORMAL — SIGNIFICANT CHANGE UP
PLATELET # BLD AUTO: 177 K/UL — SIGNIFICANT CHANGE UP (ref 150–400)
POIKILOCYTOSIS BLD QL AUTO: SLIGHT — SIGNIFICANT CHANGE UP
POTASSIUM SERPL-MCNC: 4.1 MMOL/L — SIGNIFICANT CHANGE UP (ref 3.5–5.3)
POTASSIUM SERPL-SCNC: 4.1 MMOL/L — SIGNIFICANT CHANGE UP (ref 3.5–5.3)
PROT SERPL-MCNC: 7 G/DL — SIGNIFICANT CHANGE UP (ref 6.6–8.7)
PROT UR-MCNC: NEGATIVE — SIGNIFICANT CHANGE UP
PROTHROM AB SERPL-ACNC: 12.1 SEC — SIGNIFICANT CHANGE UP (ref 10.6–13.6)
RBC # BLD: 3.6 M/UL — LOW (ref 3.8–5.2)
RBC # FLD: 15 % — HIGH (ref 10.3–14.5)
RBC BLD AUTO: ABNORMAL
RBC CASTS # UR COMP ASSIST: SIGNIFICANT CHANGE UP /HPF (ref 0–4)
SODIUM SERPL-SCNC: 137 MMOL/L — SIGNIFICANT CHANGE UP (ref 135–145)
SP GR SPEC: 1.01 — SIGNIFICANT CHANGE UP (ref 1.01–1.02)
TROPONIN T SERPL-MCNC: <0.01 NG/ML — SIGNIFICANT CHANGE UP (ref 0–0.06)
UROBILINOGEN FLD QL: NEGATIVE MG/DL — SIGNIFICANT CHANGE UP
VARIANT LYMPHS # BLD: 5.2 % — SIGNIFICANT CHANGE UP (ref 0–6)
WBC # BLD: 3.25 K/UL — LOW (ref 3.8–10.5)
WBC # FLD AUTO: 3.25 K/UL — LOW (ref 3.8–10.5)
WBC UR QL: NEGATIVE — SIGNIFICANT CHANGE UP

## 2021-12-12 PROCEDURE — 81001 URINALYSIS AUTO W/SCOPE: CPT

## 2021-12-12 PROCEDURE — 85730 THROMBOPLASTIN TIME PARTIAL: CPT

## 2021-12-12 PROCEDURE — 83605 ASSAY OF LACTIC ACID: CPT

## 2021-12-12 PROCEDURE — 99285 EMERGENCY DEPT VISIT HI MDM: CPT

## 2021-12-12 PROCEDURE — 99284 EMERGENCY DEPT VISIT MOD MDM: CPT | Mod: 25

## 2021-12-12 PROCEDURE — 80053 COMPREHEN METABOLIC PANEL: CPT

## 2021-12-12 PROCEDURE — 84484 ASSAY OF TROPONIN QUANT: CPT

## 2021-12-12 PROCEDURE — 87086 URINE CULTURE/COLONY COUNT: CPT

## 2021-12-12 PROCEDURE — 83690 ASSAY OF LIPASE: CPT

## 2021-12-12 PROCEDURE — 36415 COLL VENOUS BLD VENIPUNCTURE: CPT

## 2021-12-12 PROCEDURE — 74177 CT ABD & PELVIS W/CONTRAST: CPT | Mod: MB

## 2021-12-12 PROCEDURE — 74177 CT ABD & PELVIS W/CONTRAST: CPT | Mod: 26,MB

## 2021-12-12 PROCEDURE — 85025 COMPLETE CBC W/AUTO DIFF WBC: CPT

## 2021-12-12 PROCEDURE — 85610 PROTHROMBIN TIME: CPT

## 2021-12-12 RX ORDER — SODIUM CHLORIDE 9 MG/ML
1000 INJECTION INTRAMUSCULAR; INTRAVENOUS; SUBCUTANEOUS ONCE
Refills: 0 | Status: COMPLETED | OUTPATIENT
Start: 2021-12-12 | End: 2021-12-12

## 2021-12-12 RX ORDER — IOHEXOL 300 MG/ML
30 INJECTION, SOLUTION INTRAVENOUS ONCE
Refills: 0 | Status: COMPLETED | OUTPATIENT
Start: 2021-12-12 | End: 2021-12-12

## 2021-12-12 RX ADMIN — IOHEXOL 30 MILLILITER(S): 300 INJECTION, SOLUTION INTRAVENOUS at 16:01

## 2021-12-12 RX ADMIN — Medication 1 TABLET(S): at 20:40

## 2021-12-12 RX ADMIN — SODIUM CHLORIDE 1000 MILLILITER(S): 9 INJECTION INTRAMUSCULAR; INTRAVENOUS; SUBCUTANEOUS at 16:21

## 2021-12-12 NOTE — ED ADULT NURSE NOTE - OBJECTIVE STATEMENT
Patient complains of stomach pains that seems to get worse as the days go by and it is constant and does not go away.   at the bedside states yesterday patient was moaning from stomach pains. Patient is currently resting in bed, call bell within reach. Will continue to monitor.

## 2021-12-12 NOTE — ED PROVIDER NOTE - OBJECTIVE STATEMENT
80 yo F hx HTN, HLD, Parkinson's Disease p/w diffuse lower abdominal pain x 3 days. no fever. no nausea or no vomiting. no dysuria. no prior abdominal surgery.

## 2021-12-12 NOTE — ED PROVIDER NOTE - NS_EDPROVIDERDISPOUSERTYPE_ED_A_ED
Calling pt to notify that Dr. Gregorio Saunders agrees with recommendations of both [facility] and [facility]. Pt to keep scheduled appointments. Lizeth, patient's wife, is thankful for the call.    Attending Attestation (For Attendings USE Only)...

## 2021-12-12 NOTE — ED PROVIDER NOTE - PATIENT PORTAL LINK FT
You can access the FollowMyHealth Patient Portal offered by [facility] by registering at the following website: [email or web address]. By joining [facility]’s FollowMyHealth portal, you will also be able to view your health information using other applications (apps) compatible with our system.

## 2021-12-12 NOTE — ED ADULT NURSE NOTE - NS ED NURSE LEVEL OF CONSCIOUSNESS ORIENTATION
H/H stable today Gluteal fluid collection observed on CT abdomen/pelvis. Pt currently afebrile, WBC wnl but + L shift.   -F/u ultrasound results of gluteal collection to further evaluate for abscess. Oriented - self; Oriented - place; Oriented - time

## 2021-12-12 NOTE — ED ADULT NURSE REASSESSMENT NOTE - NS ED NURSE REASSESS COMMENT FT1
Pt comfortable, denies complaints at this time. [place] footwear.  at bedside. Bed locked and in lowest position. Call bell within reach. Will continue to monitor.

## 2021-12-12 NOTE — ED PROVIDER NOTE - PROGRESS NOTE DETAILS
CT found to have diverticulitis. UA negative. aumentin given. comfortable going home. she has appointment with GI next week already.

## 2021-12-12 NOTE — ED PROVIDER NOTE - PHYSICAL EXAMINATION
VITAL SIGNS: I have reviewed nursing notes and confirm.  CONSTITUTIONAL:  in no acute distress.  SKIN: Skin exam is warm and dry, no acute rash.  HEAD: Normocephalic; atraumatic.  EYES: PERRL, EOM intact; conjunctiva and sclera clear.  ENT: No nasal discharge; airway clear. Throat clear.  NECK: Supple; non tender.    CARD: Regular rate and rhythm.  RESP: No wheezes,  no rales or rhonchi.   ABD:  soft; non-distended;(+) diffuse LLQ and RLQ pain.   EXT: Normal ROM. No clubbing, cyanosis or edema.  NEURO: Alert, oriented. Grossly unremarkable. No focal deficits.  moves all extremities,  normal gait   PSYCH: Cooperative, appropriate.

## 2021-12-14 LAB
CULTURE RESULTS: SIGNIFICANT CHANGE UP
SPECIMEN SOURCE: SIGNIFICANT CHANGE UP

## 2021-12-21 NOTE — DISCHARGE NOTE PROVIDER - NSDCCPCAREPLAN_GEN_ALL_CORE_FT
Will recommend continuing current BP regimen for now. Her BP is was actually borderline low at her recent clinic visit. If it is elevated when she follows up, we can discuss adjusting her antihypertensive regimen at that time. Thanks. PRINCIPAL DISCHARGE DIAGNOSIS  Diagnosis: Chest pain with high risk of acute coronary syndrome  Assessment and Plan of Treatment:       SECONDARY DISCHARGE DIAGNOSES  Diagnosis: Syncope and collapse  Assessment and Plan of Treatment:

## 2022-02-11 ENCOUNTER — APPOINTMENT (OUTPATIENT)
Dept: ORTHOPEDIC SURGERY | Facility: CLINIC | Age: 82
End: 2022-02-11

## 2022-02-18 ENCOUNTER — INPATIENT (INPATIENT)
Facility: HOSPITAL | Age: 82
LOS: 2 days | Discharge: HOME | DRG: 391 | End: 2022-02-21
Attending: HOSPITALIST | Admitting: STUDENT IN AN ORGANIZED HEALTH CARE EDUCATION/TRAINING PROGRAM
Payer: MEDICARE

## 2022-02-18 VITALS
HEART RATE: 103 BPM | HEIGHT: 66 IN | TEMPERATURE: 98 F | OXYGEN SATURATION: 99 % | RESPIRATION RATE: 18 BRPM | DIASTOLIC BLOOD PRESSURE: 127 MMHG | SYSTOLIC BLOOD PRESSURE: 206 MMHG

## 2022-02-18 DIAGNOSIS — Z98.890 OTHER SPECIFIED POSTPROCEDURAL STATES: Chronic | ICD-10-CM

## 2022-02-18 DIAGNOSIS — Z90.722 ACQUIRED ABSENCE OF OVARIES, BILATERAL: Chronic | ICD-10-CM

## 2022-02-18 DIAGNOSIS — Z98.49 CATARACT EXTRACTION STATUS, UNSPECIFIED EYE: Chronic | ICD-10-CM

## 2022-02-18 DIAGNOSIS — K57.92 DIVERTICULITIS OF INTESTINE, PART UNSPECIFIED, WITHOUT PERFORATION OR ABSCESS WITHOUT BLEEDING: ICD-10-CM

## 2022-02-18 DIAGNOSIS — R93.89 ABNORMAL FINDINGS ON DIAGNOSTIC IMAGING OF OTHER SPECIFIED BODY STRUCTURES: ICD-10-CM

## 2022-02-18 DIAGNOSIS — R10.9 UNSPECIFIED ABDOMINAL PAIN: ICD-10-CM

## 2022-02-18 LAB
ALBUMIN SERPL ELPH-MCNC: 4.2 G/DL — SIGNIFICANT CHANGE UP (ref 3.3–5.2)
ALP SERPL-CCNC: 102 U/L — SIGNIFICANT CHANGE UP (ref 40–120)
ALT FLD-CCNC: 8 U/L — SIGNIFICANT CHANGE UP
ANION GAP SERPL CALC-SCNC: 13 MMOL/L — SIGNIFICANT CHANGE UP (ref 5–17)
APPEARANCE UR: CLEAR — SIGNIFICANT CHANGE UP
AST SERPL-CCNC: 16 U/L — SIGNIFICANT CHANGE UP
BACTERIA # UR AUTO: ABNORMAL
BASOPHILS # BLD AUTO: 0.04 K/UL — SIGNIFICANT CHANGE UP (ref 0–0.2)
BASOPHILS NFR BLD AUTO: 1.2 % — SIGNIFICANT CHANGE UP (ref 0–2)
BILIRUB SERPL-MCNC: <0.2 MG/DL — LOW (ref 0.4–2)
BILIRUB UR-MCNC: NEGATIVE — SIGNIFICANT CHANGE UP
BUN SERPL-MCNC: 11.7 MG/DL — SIGNIFICANT CHANGE UP (ref 8–20)
CALCIUM SERPL-MCNC: 9.1 MG/DL — SIGNIFICANT CHANGE UP (ref 8.6–10.2)
CHLORIDE SERPL-SCNC: 97 MMOL/L — LOW (ref 98–107)
CO2 SERPL-SCNC: 23 MMOL/L — SIGNIFICANT CHANGE UP (ref 22–29)
COLOR SPEC: YELLOW — SIGNIFICANT CHANGE UP
CREAT SERPL-MCNC: 0.87 MG/DL — SIGNIFICANT CHANGE UP (ref 0.5–1.3)
DIFF PNL FLD: ABNORMAL
EOSINOPHIL # BLD AUTO: 0.01 K/UL — SIGNIFICANT CHANGE UP (ref 0–0.5)
EOSINOPHIL NFR BLD AUTO: 0.3 % — SIGNIFICANT CHANGE UP (ref 0–6)
EPI CELLS # UR: ABNORMAL
GLUCOSE SERPL-MCNC: 97 MG/DL — SIGNIFICANT CHANGE UP (ref 70–99)
GLUCOSE UR QL: NEGATIVE MG/DL — SIGNIFICANT CHANGE UP
HCT VFR BLD CALC: 34.1 % — LOW (ref 34.5–45)
HGB BLD-MCNC: 11.3 G/DL — LOW (ref 11.5–15.5)
IMM GRANULOCYTES NFR BLD AUTO: 0.3 % — SIGNIFICANT CHANGE UP (ref 0–1.5)
KETONES UR-MCNC: NEGATIVE — SIGNIFICANT CHANGE UP
LEUKOCYTE ESTERASE UR-ACNC: ABNORMAL
LIDOCAIN IGE QN: 29 U/L — SIGNIFICANT CHANGE UP (ref 22–51)
LYMPHOCYTES # BLD AUTO: 0.92 K/UL — LOW (ref 1–3.3)
LYMPHOCYTES # BLD AUTO: 27.1 % — SIGNIFICANT CHANGE UP (ref 13–44)
MCHC RBC-ENTMCNC: 30.4 PG — SIGNIFICANT CHANGE UP (ref 27–34)
MCHC RBC-ENTMCNC: 33.1 GM/DL — SIGNIFICANT CHANGE UP (ref 32–36)
MCV RBC AUTO: 91.7 FL — SIGNIFICANT CHANGE UP (ref 80–100)
MONOCYTES # BLD AUTO: 0.5 K/UL — SIGNIFICANT CHANGE UP (ref 0–0.9)
MONOCYTES NFR BLD AUTO: 14.7 % — HIGH (ref 2–14)
NEUTROPHILS # BLD AUTO: 1.92 K/UL — SIGNIFICANT CHANGE UP (ref 1.8–7.4)
NEUTROPHILS NFR BLD AUTO: 56.4 % — SIGNIFICANT CHANGE UP (ref 43–77)
NITRITE UR-MCNC: NEGATIVE — SIGNIFICANT CHANGE UP
PH UR: 7 — SIGNIFICANT CHANGE UP (ref 5–8)
PLATELET # BLD AUTO: 227 K/UL — SIGNIFICANT CHANGE UP (ref 150–400)
POTASSIUM SERPL-MCNC: 3.5 MMOL/L — SIGNIFICANT CHANGE UP (ref 3.5–5.3)
POTASSIUM SERPL-SCNC: 3.5 MMOL/L — SIGNIFICANT CHANGE UP (ref 3.5–5.3)
PROT SERPL-MCNC: 7.2 G/DL — SIGNIFICANT CHANGE UP (ref 6.6–8.7)
PROT UR-MCNC: NEGATIVE — SIGNIFICANT CHANGE UP
RAPID RVP RESULT: SIGNIFICANT CHANGE UP
RBC # BLD: 3.72 M/UL — LOW (ref 3.8–5.2)
RBC # FLD: 12.8 % — SIGNIFICANT CHANGE UP (ref 10.3–14.5)
RBC CASTS # UR COMP ASSIST: ABNORMAL /HPF (ref 0–4)
SARS-COV-2 RNA SPEC QL NAA+PROBE: SIGNIFICANT CHANGE UP
SODIUM SERPL-SCNC: 133 MMOL/L — LOW (ref 135–145)
SP GR SPEC: 1.01 — SIGNIFICANT CHANGE UP (ref 1.01–1.02)
UROBILINOGEN FLD QL: NEGATIVE MG/DL — SIGNIFICANT CHANGE UP
WBC # BLD: 3.4 K/UL — LOW (ref 3.8–10.5)
WBC # FLD AUTO: 3.4 K/UL — LOW (ref 3.8–10.5)
WBC UR QL: SIGNIFICANT CHANGE UP /HPF (ref 0–5)

## 2022-02-18 PROCEDURE — 99223 1ST HOSP IP/OBS HIGH 75: CPT

## 2022-02-18 PROCEDURE — 93010 ELECTROCARDIOGRAM REPORT: CPT | Mod: 76

## 2022-02-18 PROCEDURE — 71046 X-RAY EXAM CHEST 2 VIEWS: CPT | Mod: 26

## 2022-02-18 PROCEDURE — 99285 EMERGENCY DEPT VISIT HI MDM: CPT

## 2022-02-18 PROCEDURE — 99233 SBSQ HOSP IP/OBS HIGH 50: CPT

## 2022-02-18 PROCEDURE — 74177 CT ABD & PELVIS W/CONTRAST: CPT | Mod: 26,MA

## 2022-02-18 RX ORDER — CARBAMAZEPINE 200 MG
200 TABLET ORAL
Refills: 0 | Status: DISCONTINUED | OUTPATIENT
Start: 2022-02-18 | End: 2022-02-21

## 2022-02-18 RX ORDER — CIPROFLOXACIN LACTATE 400MG/40ML
VIAL (ML) INTRAVENOUS
Refills: 0 | Status: DISCONTINUED | OUTPATIENT
Start: 2022-02-18 | End: 2022-02-20

## 2022-02-18 RX ORDER — GABAPENTIN 400 MG/1
0 CAPSULE ORAL
Qty: 0 | Refills: 0 | DISCHARGE

## 2022-02-18 RX ORDER — ONDANSETRON 8 MG/1
4 TABLET, FILM COATED ORAL ONCE
Refills: 0 | Status: COMPLETED | OUTPATIENT
Start: 2022-02-18 | End: 2022-02-18

## 2022-02-18 RX ORDER — POTASSIUM CHLORIDE 20 MEQ
20 PACKET (EA) ORAL DAILY
Refills: 0 | Status: DISCONTINUED | OUTPATIENT
Start: 2022-02-18 | End: 2022-02-21

## 2022-02-18 RX ORDER — METOCLOPRAMIDE HCL 10 MG
10 TABLET ORAL ONCE
Refills: 0 | Status: COMPLETED | OUTPATIENT
Start: 2022-02-18 | End: 2022-02-18

## 2022-02-18 RX ORDER — SENNA PLUS 8.6 MG/1
2 TABLET ORAL AT BEDTIME
Refills: 0 | Status: DISCONTINUED | OUTPATIENT
Start: 2022-02-18 | End: 2022-02-21

## 2022-02-18 RX ORDER — POLYETHYLENE GLYCOL 3350 17 G/17G
17 POWDER, FOR SOLUTION ORAL DAILY
Refills: 0 | Status: DISCONTINUED | OUTPATIENT
Start: 2022-02-18 | End: 2022-02-21

## 2022-02-18 RX ORDER — FUROSEMIDE 40 MG
20 TABLET ORAL DAILY
Refills: 0 | Status: DISCONTINUED | OUTPATIENT
Start: 2022-02-18 | End: 2022-02-21

## 2022-02-18 RX ORDER — MORPHINE SULFATE 50 MG/1
4 CAPSULE, EXTENDED RELEASE ORAL ONCE
Refills: 0 | Status: DISCONTINUED | OUTPATIENT
Start: 2022-02-18 | End: 2022-02-18

## 2022-02-18 RX ORDER — HEPARIN SODIUM 5000 [USP'U]/ML
5000 INJECTION INTRAVENOUS; SUBCUTANEOUS EVERY 12 HOURS
Refills: 0 | Status: DISCONTINUED | OUTPATIENT
Start: 2022-02-18 | End: 2022-02-21

## 2022-02-18 RX ORDER — HYDROMORPHONE HYDROCHLORIDE 2 MG/ML
1 INJECTION INTRAMUSCULAR; INTRAVENOUS; SUBCUTANEOUS ONCE
Refills: 0 | Status: DISCONTINUED | OUTPATIENT
Start: 2022-02-18 | End: 2022-02-18

## 2022-02-18 RX ORDER — SODIUM CHLORIDE 9 MG/ML
1000 INJECTION INTRAMUSCULAR; INTRAVENOUS; SUBCUTANEOUS ONCE
Refills: 0 | Status: COMPLETED | OUTPATIENT
Start: 2022-02-18 | End: 2022-02-18

## 2022-02-18 RX ORDER — MULTIVIT WITH MIN/MFOLATE/K2 340-15/3 G
1 POWDER (GRAM) ORAL ONCE
Refills: 0 | Status: COMPLETED | OUTPATIENT
Start: 2022-02-18 | End: 2022-02-18

## 2022-02-18 RX ORDER — CIPROFLOXACIN LACTATE 400MG/40ML
200 VIAL (ML) INTRAVENOUS EVERY 12 HOURS
Refills: 0 | Status: DISCONTINUED | OUTPATIENT
Start: 2022-02-18 | End: 2022-02-20

## 2022-02-18 RX ORDER — LOSARTAN POTASSIUM 100 MG/1
25 TABLET, FILM COATED ORAL DAILY
Refills: 0 | Status: DISCONTINUED | OUTPATIENT
Start: 2022-02-18 | End: 2022-02-20

## 2022-02-18 RX ORDER — GABAPENTIN 400 MG/1
600 CAPSULE ORAL THREE TIMES A DAY
Refills: 0 | Status: DISCONTINUED | OUTPATIENT
Start: 2022-02-18 | End: 2022-02-21

## 2022-02-18 RX ORDER — ACETAMINOPHEN 500 MG
1000 TABLET ORAL ONCE
Refills: 0 | Status: COMPLETED | OUTPATIENT
Start: 2022-02-18 | End: 2022-02-18

## 2022-02-18 RX ORDER — ALPRAZOLAM 0.25 MG
1.5 TABLET ORAL
Refills: 0 | Status: DISCONTINUED | OUTPATIENT
Start: 2022-02-18 | End: 2022-02-21

## 2022-02-18 RX ORDER — ALPRAZOLAM 0.25 MG
1 TABLET ORAL
Qty: 0 | Refills: 0 | DISCHARGE

## 2022-02-18 RX ORDER — PANTOPRAZOLE SODIUM 20 MG/1
40 TABLET, DELAYED RELEASE ORAL
Refills: 0 | Status: DISCONTINUED | OUTPATIENT
Start: 2022-02-18 | End: 2022-02-21

## 2022-02-18 RX ORDER — SIMVASTATIN 20 MG/1
1 TABLET, FILM COATED ORAL
Qty: 0 | Refills: 0 | DISCHARGE

## 2022-02-18 RX ORDER — ESCITALOPRAM OXALATE 10 MG/1
1 TABLET, FILM COATED ORAL
Qty: 0 | Refills: 0 | DISCHARGE

## 2022-02-18 RX ORDER — TRAZODONE HCL 50 MG
50 TABLET ORAL DAILY
Refills: 0 | Status: DISCONTINUED | OUTPATIENT
Start: 2022-02-18 | End: 2022-02-21

## 2022-02-18 RX ORDER — KETOROLAC TROMETHAMINE 30 MG/ML
15 SYRINGE (ML) INJECTION ONCE
Refills: 0 | Status: DISCONTINUED | OUTPATIENT
Start: 2022-02-18 | End: 2022-02-18

## 2022-02-18 RX ORDER — METRONIDAZOLE 500 MG
500 TABLET ORAL ONCE
Refills: 0 | Status: COMPLETED | OUTPATIENT
Start: 2022-02-18 | End: 2022-02-18

## 2022-02-18 RX ORDER — AMPICILLIN SODIUM AND SULBACTAM SODIUM 250; 125 MG/ML; MG/ML
3 INJECTION, POWDER, FOR SUSPENSION INTRAMUSCULAR; INTRAVENOUS ONCE
Refills: 0 | Status: COMPLETED | OUTPATIENT
Start: 2022-02-18 | End: 2022-02-18

## 2022-02-18 RX ORDER — METRONIDAZOLE 500 MG
500 TABLET ORAL EVERY 8 HOURS
Refills: 0 | Status: DISCONTINUED | OUTPATIENT
Start: 2022-02-18 | End: 2022-02-20

## 2022-02-18 RX ORDER — SENNA PLUS 8.6 MG/1
2 TABLET ORAL ONCE
Refills: 0 | Status: COMPLETED | OUTPATIENT
Start: 2022-02-18 | End: 2022-02-18

## 2022-02-18 RX ORDER — LAMOTRIGINE 25 MG/1
50 TABLET, ORALLY DISINTEGRATING ORAL
Refills: 0 | Status: DISCONTINUED | OUTPATIENT
Start: 2022-02-18 | End: 2022-02-21

## 2022-02-18 RX ORDER — ESCITALOPRAM OXALATE 10 MG/1
30 TABLET, FILM COATED ORAL DAILY
Refills: 0 | Status: DISCONTINUED | OUTPATIENT
Start: 2022-02-18 | End: 2022-02-21

## 2022-02-18 RX ORDER — CIPROFLOXACIN LACTATE 400MG/40ML
200 VIAL (ML) INTRAVENOUS ONCE
Refills: 0 | Status: COMPLETED | OUTPATIENT
Start: 2022-02-18 | End: 2022-02-18

## 2022-02-18 RX ORDER — METRONIDAZOLE 500 MG
TABLET ORAL
Refills: 0 | Status: DISCONTINUED | OUTPATIENT
Start: 2022-02-18 | End: 2022-02-20

## 2022-02-18 RX ADMIN — HEPARIN SODIUM 5000 UNIT(S): 5000 INJECTION INTRAVENOUS; SUBCUTANEOUS at 18:16

## 2022-02-18 RX ADMIN — SODIUM CHLORIDE 1000 MILLILITER(S): 9 INJECTION INTRAMUSCULAR; INTRAVENOUS; SUBCUTANEOUS at 04:30

## 2022-02-18 RX ADMIN — ESCITALOPRAM OXALATE 30 MILLIGRAM(S): 10 TABLET, FILM COATED ORAL at 11:06

## 2022-02-18 RX ADMIN — GABAPENTIN 600 MILLIGRAM(S): 400 CAPSULE ORAL at 22:43

## 2022-02-18 RX ADMIN — HYDROMORPHONE HYDROCHLORIDE 1 MILLIGRAM(S): 2 INJECTION INTRAMUSCULAR; INTRAVENOUS; SUBCUTANEOUS at 09:13

## 2022-02-18 RX ADMIN — LAMOTRIGINE 50 MILLIGRAM(S): 25 TABLET, ORALLY DISINTEGRATING ORAL at 18:14

## 2022-02-18 RX ADMIN — LOSARTAN POTASSIUM 25 MILLIGRAM(S): 100 TABLET, FILM COATED ORAL at 13:27

## 2022-02-18 RX ADMIN — Medication 20 MILLIEQUIVALENT(S): at 11:19

## 2022-02-18 RX ADMIN — MORPHINE SULFATE 4 MILLIGRAM(S): 50 CAPSULE, EXTENDED RELEASE ORAL at 03:33

## 2022-02-18 RX ADMIN — Medication 1.5 MILLIGRAM(S): at 18:13

## 2022-02-18 RX ADMIN — ONDANSETRON 4 MILLIGRAM(S): 8 TABLET, FILM COATED ORAL at 03:33

## 2022-02-18 RX ADMIN — HYDROMORPHONE HYDROCHLORIDE 1 MILLIGRAM(S): 2 INJECTION INTRAMUSCULAR; INTRAVENOUS; SUBCUTANEOUS at 05:17

## 2022-02-18 RX ADMIN — Medication 10 MILLIGRAM(S): at 14:08

## 2022-02-18 RX ADMIN — ONDANSETRON 4 MILLIGRAM(S): 8 TABLET, FILM COATED ORAL at 08:20

## 2022-02-18 RX ADMIN — Medication 10 MILLIGRAM(S): at 15:36

## 2022-02-18 RX ADMIN — POLYETHYLENE GLYCOL 3350 17 GRAM(S): 17 POWDER, FOR SOLUTION ORAL at 11:06

## 2022-02-18 RX ADMIN — Medication 20 MILLIGRAM(S): at 13:26

## 2022-02-18 RX ADMIN — Medication 1000 MILLIGRAM(S): at 04:03

## 2022-02-18 RX ADMIN — Medication 50 MILLIGRAM(S): at 11:06

## 2022-02-18 RX ADMIN — AMPICILLIN SODIUM AND SULBACTAM SODIUM 200 GRAM(S): 250; 125 INJECTION, POWDER, FOR SUSPENSION INTRAMUSCULAR; INTRAVENOUS at 06:14

## 2022-02-18 RX ADMIN — Medication 100 MILLIGRAM(S): at 18:00

## 2022-02-18 RX ADMIN — Medication 100 MILLIGRAM(S): at 22:43

## 2022-02-18 RX ADMIN — GABAPENTIN 600 MILLIGRAM(S): 400 CAPSULE ORAL at 13:23

## 2022-02-18 RX ADMIN — Medication 100 MILLIGRAM(S): at 14:08

## 2022-02-18 RX ADMIN — Medication 1 BOTTLE: at 06:14

## 2022-02-18 RX ADMIN — MORPHINE SULFATE 4 MILLIGRAM(S): 50 CAPSULE, EXTENDED RELEASE ORAL at 04:03

## 2022-02-18 RX ADMIN — SODIUM CHLORIDE 1000 MILLILITER(S): 9 INJECTION INTRAMUSCULAR; INTRAVENOUS; SUBCUTANEOUS at 03:33

## 2022-02-18 RX ADMIN — HYDROMORPHONE HYDROCHLORIDE 1 MILLIGRAM(S): 2 INJECTION INTRAMUSCULAR; INTRAVENOUS; SUBCUTANEOUS at 08:22

## 2022-02-18 RX ADMIN — SENNA PLUS 2 TABLET(S): 8.6 TABLET ORAL at 06:14

## 2022-02-18 RX ADMIN — ONDANSETRON 4 MILLIGRAM(S): 8 TABLET, FILM COATED ORAL at 14:08

## 2022-02-18 RX ADMIN — Medication 400 MILLIGRAM(S): at 03:33

## 2022-02-18 RX ADMIN — Medication 100 MILLIGRAM(S): at 11:24

## 2022-02-18 RX ADMIN — Medication 15 MILLIGRAM(S): at 16:09

## 2022-02-18 RX ADMIN — SENNA PLUS 2 TABLET(S): 8.6 TABLET ORAL at 22:43

## 2022-02-18 RX ADMIN — Medication 200 MILLIGRAM(S): at 18:14

## 2022-02-18 NOTE — ED PROVIDER NOTE - NSICDXPASTSURGICALHX_GEN_ALL_CORE_FT
PAST SURGICAL HISTORY:  H/O bilateral oophorectomy 2018    H/O cardiac catheterization     H/O colonoscopy     History of basal cell carcinoma excision     History of cataract surgery bilateral

## 2022-02-18 NOTE — CONSULT NOTE ADULT - ATTENDING COMMENTS
I evaluated this pt. with my NP and agree with the above assessment and management plan. CT w/o definitive evidence of diverticulitis but pt. likely has low grade uncomplicated diverticulitis. She states that she had a negative colonoscopy except for diverticulosis roughly 2 years ago with [facility] in [place]. Would Rx with IV Cipro and Flagyl and Zofran PRN. OK for CLD.

## 2022-02-18 NOTE — CONSULT NOTE ADULT - SUBJECTIVE AND OBJECTIVE BOX
HISTORY OF PRESENT ILLNESS: This is a 81y old woman with a past medical history of Parkinson dx , HTN and CAD presented to ED with LLQ abdominal pian and nausea x 1wwk. Patient reports intermittent nausea and left lower quadrant pain for the past week, progressively worsening and that prompted her to come to ED. Reports constipation x 1 week. Patient denies vomiting, hematemesis, hematochezia, melan. GI consult is called after radiology finding of segmental sigmoid colon wall thickening with many diverticula as well as moderate Stool burden. No definite superimposed acute diverticulitis. No evidence for small bowel obstruction, significant ascites or free air. Her previous Ct abd/pelv on 2021 revealed extensive colonic diverticulosis, hepatic steatosis.       Review of Systems:  · ENMT: negative  · Respiratory and Thorax: negative  · Cardiovascular: negative  · Gastrointestinal: see above.  · Genitourinary:	negative  · Musculoskeletal: negative  · Neurological: negative  · Psychiatric: negative  · Hematology/Lymphatics: negative  · Endocrine: negative      PAST MEDICAL/SURGICAL HISTORY:  HTN (hypertension)    Depression    Ovarian benign neoplasm  s/p Total  Hysterectomy    Parkinson&#x27;s disease    Spondylosis without myelopathy or radiculopathy, lumbar region    Fracture of lumbosacral spine or pelvis    Hyperlipidemia    H/O bilateral oophorectomy  2018    H/O colonoscopy    H/O cardiac catheterization    History of basal cell carcinoma excision    History of cataract surgery  bilateral      SOCIAL HISTORY:  - TOBACCO: Denies  - ALCOHOL: Denies  - ILLICIT DRUG USE: Denies    FAMILY HISTORY:  No known history of gastrointestinal or liver disease;  Family history of cancer in father  bladder CA    Family hx of lung cancer  mother        HOME MEDICATIONS:  ALPRAZolam 1 mg oral tablet: 1 tab(s) orally 3 times a day, As Needed (2021 09:42)  escitalopram 20 mg oral tablet: 1 tab(s) orally once a day (2021 09:42)  furosemide 20 mg oral tablet: 1 tab(s) orally once a day (2021 09:42)  gabapentin 300 mg oral capsule: orally 2 times a day (2021 09:42)  lamoTRIgine 25 mg oral tablet: 2 tab(s) orally 2 times a day (2021 09:42)  potassium chloride 20 mEq oral powder for reconstitution: 1 each orally once a day (2021 09:42)  simvastatin 20 mg oral tablet: 1 tab(s) orally once a day (at bedtime) (2021 09:42)    INPATIENT MEDICATIONS:  MEDICATIONS  (STANDING):    MEDICATIONS  (PRN):    ALLERGIES:  No Known Allergies    T(C): 36.7 (22 @ 08:36), Max: 36.9 (22 @ 02:03)  HR: 67 (22 @ 08:36) (66 - 103)  BP: 151/83 (22 @ 08:36) (135/82 - 206/127)  RR: 18 (22 @ 08:36) (18 - 18)  SpO2: 93% (22 @ 08:36) (93% - 99%)      PHYSICAL EXAM:  Constitutional: Thin appearing elderly woman, in no acute distress  Neuro: Awake alert, oriented,   HEENT: PERRL, anicteric sclerae  Neck: supple, no JVD  CV:  +S1S2,   Pulm/chest: lung sounds clear bilaterally, no accessory muscle use noted  Abd: soft, diffuse abdominal tenderness, nondistended, +BS. No rebound tenderness, guarding or rigidity.   Ext:  no Cyanosis, clubbing or edema  Skin: warm, no jaundice   Psych: calm, appropriate affect      LABS:             11.3   3.40  )-----------( 227      (  @ 03:33 )             34.1             133<L>  |  97<L>  |  11.7  ----------------------------<  97  3.5   |  23.0  |  0.87    Ca    9.1      2022 03:33    TPro  7.2  /  Alb  4.2  /  TBili  <0.2<L>  /  DBili  x   /  AST  16  /  ALT  8   /  AlkPhos  102  18    LIVER FUNCTIONS - ( 2022 03:33 )  Alb: 4.2 g/dL / Pro: 7.2 g/dL / ALK PHOS: 102 U/L / ALT: 8 U/L / AST: 16 U/L / GGT: x             Lipase, Serum: 29 U/L (22 @ 03:33)    Urinalysis Basic - ( 2022 04:35 )    Color: Yellow / Appearance: Clear / S.010 / pH: x  Gluc: x / Ketone: Negative  / Bili: Negative / Urobili: Negative mg/dL   Blood: x / Protein: Negative / Nitrite: Negative   Leuk Esterase: Trace / RBC: 3-5 /HPF / WBC 0-2 /HPF   Sq Epi: x / Non Sq Epi: Moderate / Bacteria: Few        < from: CT Abdomen and Pelvis w/ IV Cont (22 @ 04:40) >  FINDINGS:  LOWER CHEST: Bibasilar atelectasis and/or scarring.  LIVER: Left hepatic cyst to 4 cm again seen. Additional tiny   low-attenuation hepatic structures are too small to characterize. Right   hepatic prominence again seen.  BILE DUCTS: Normal caliber.  GALLBLADDER: No calcified gallstone.  SPLEEN: Within normal limits.  PANCREAS: Mild pancreatic ductal prominence of the 4 mm. Pancreatic   parenchyma is grossly unremarkable.  ADRENALS: Within normal limits.  KIDNEYS/URETERS: Kidneys enhance symmetrically without hydronephrosis.    BLADDER: Within normal limits.  REPRODUCTIVE ORGANS: Hysterectomy.    BOWEL: Evaluation of bowel limited without distention with oral contrast,   however there is no bowel obstruction. Segmental thickening in a region   of innumerable colonic diverticula involving the sigmoid colon is again   demonstrated suggesting chronic diverticular disease. No definite   superimposed acute diverticulitis. There is a moderate stool filled   colon. Stomach is underdistended for adequate evaluation, however there   is a small hiatal hernia.  PERITONEUM: No ascites.  VESSELS:  Within normal limits.  RETROPERITONEUM: No lymphadenopathy.  ABDOMINAL WALL: Small fat-containing right inguinal hernia.  BONES: Status post vertebroplasty of L1 and L2. Scoliosis.    IMPRESSION:    Segmental thickening of the sigmoid colon in a region of innumerable   diverticula again demonstrated, most consistent with chronic diverticular   disease. Please note underlying positive cannot be excluded. This can be   further evaluated with colonoscopy.    Moderate stool filled colon.    No evidence for small bowel obstruction, significant ascites or free air.    < end of copied text >    < from: CT Abdomen and Pelvis w/ Oral Cont and w/ IV Cont (21 @ 19:04) >  FINDINGS:  LOWER CHEST: Bilateral lower lobe linear scarring.    LIVER: Hepatic steatosis. Left hepatic cyst.  BILE DUCTS: Minimal left lateral biliary dilatation posterior to this   cyst grossly unchanged from prior exam.  GALLBLADDER: Within normal limits.  SPLEEN: Within normal limits.  PANCREAS: Within normal limits.  ADRENALS: Within normal limits.  KIDNEYS/URETERS: Within normal limits.    BLADDER: Within normal limits.  REPRODUCTIVE ORGANS: Prior hysterectomy.    BOWEL: No bowel obstruction. Appendix is not definitively visualized   however no secondary signs of acute appendicitis. Extensive colonic   diverticulosis. Minimal pericolonic infiltrative changes adjacent to the   sigmoid colon may represent very mild acute versus chronic   diverticulitis..  PERITONEUM: No ascites.  VESSELS: Within normal limits.  RETROPERITONEUM/LYMPH NODES: No lymphadenopathy.  ABDOMINAL WALL: Within normal limits.  BONES: Degenerative changes.  Kyphoplasty changes at L1 and L2.    IMPRESSION:  Extensive colonic diverticulosis. Minimal pericolonic infiltrative   changes adjacent to the sigmoid colon may represent very mild acute   versus chronic diverticulitis... No bowel obstruction or gross bowel wall   thickening.    < end of copied text >     HISTORY OF PRESENT ILLNESS: This is a 81y old woman with a past medical history of Parkinson dx , HTN and CAD presented to ED with LLQ abdominal pian and nausea x 1wwk. Patient reports intermittent nausea and left lower quadrant pain for the past week, progressively worsening and that prompted her to come to ED. Reports constipation x 1 week. Patient denies vomiting, hematemesis, hematochezia, melan. GI consult is called after radiology finding of segmental sigmoid colon wall thickening with many diverticula as well as moderate Stool burden. No definite superimposed acute diverticulitis. No evidence for small bowel obstruction, significant ascites or free air. Her previous Ct abd/pelv on 2021 revealed extensive colonic diverticulosis, hepatic steatosis. Last colonoscopy was 2 years ago and reported diverticulosis per patient.       Review of Systems:  · ENMT: negative  · Respiratory and Thorax: negative  · Cardiovascular: negative  · Gastrointestinal: see above.  · Genitourinary:	negative  · Musculoskeletal: negative  · Neurological: negative  · Psychiatric: negative  · Hematology/Lymphatics: negative  · Endocrine: negative      PAST MEDICAL/SURGICAL HISTORY:  HTN (hypertension)    Depression    Ovarian benign neoplasm  s/p Total  Hysterectomy    Parkinson&#x27;s disease    Spondylosis without myelopathy or radiculopathy, lumbar region    Fracture of lumbosacral spine or pelvis    Hyperlipidemia    H/O bilateral oophorectomy  2018    H/O colonoscopy    H/O cardiac catheterization    History of basal cell carcinoma excision    History of cataract surgery  bilateral      SOCIAL HISTORY:  - TOBACCO: Denies  - ALCOHOL: Denies  - ILLICIT DRUG USE: Denies    FAMILY HISTORY:  No known history of gastrointestinal or liver disease;  Family history of cancer in father  bladder CA    Family hx of lung cancer  mother        HOME MEDICATIONS:  ALPRAZolam 1 mg oral tablet: 1 tab(s) orally 3 times a day, As Needed (2021 09:42)  escitalopram 20 mg oral tablet: 1 tab(s) orally once a day (2021 09:42)  furosemide 20 mg oral tablet: 1 tab(s) orally once a day (2021 09:42)  gabapentin 300 mg oral capsule: orally 2 times a day (2021 09:42)  lamoTRIgine 25 mg oral tablet: 2 tab(s) orally 2 times a day (2021 09:42)  potassium chloride 20 mEq oral powder for reconstitution: 1 each orally once a day (2021 09:42)  simvastatin 20 mg oral tablet: 1 tab(s) orally once a day (at bedtime) (2021 09:42)    INPATIENT MEDICATIONS:  MEDICATIONS  (STANDING):    MEDICATIONS  (PRN):    ALLERGIES:  No Known Allergies    T(C): 36.7 (22 @ 08:36), Max: 36.9 (22 @ 02:03)  HR: 67 (22 @ 08:36) (66 - 103)  BP: 151/83 (22 @ 08:36) (135/82 - 206/127)  RR: 18 (22 @ 08:36) (18 - 18)  SpO2: 93% (22 @ 08:36) (93% - 99%)      PHYSICAL EXAM:  Constitutional: Thin appearing elderly woman, in no acute distress  Neuro: Awake alert, oriented,   HEENT: PERRL, anicteric sclerae  Neck: supple, no JVD  CV:  +S1S2,   Pulm/chest: lung sounds clear bilaterally, no accessory muscle use noted  Abd: soft, diffuse abdominal tenderness, nondistended, +BS. No rebound tenderness, guarding or rigidity.   Ext:  no Cyanosis, clubbing or edema  Skin: warm, no jaundice   Psych: calm, appropriate affect      LABS:             11.3   3.40  )-----------( 227      (  @ 03:33 )             34.1             133<L>  |  97<L>  |  11.7  ----------------------------<  97  3.5   |  23.0  |  0.87    Ca    9.1      2022 03:33    TPro  7.2  /  Alb  4.2  /  TBili  <0.2<L>  /  DBili  x   /  AST  16  /  ALT  8   /  AlkPhos  102  18    LIVER FUNCTIONS - ( 2022 03:33 )  Alb: 4.2 g/dL / Pro: 7.2 g/dL / ALK PHOS: 102 U/L / ALT: 8 U/L / AST: 16 U/L / GGT: x             Lipase, Serum: 29 U/L (22 @ 03:33)    Urinalysis Basic - ( 2022 04:35 )    Color: Yellow / Appearance: Clear / S.010 / pH: x  Gluc: x / Ketone: Negative  / Bili: Negative / Urobili: Negative mg/dL   Blood: x / Protein: Negative / Nitrite: Negative   Leuk Esterase: Trace / RBC: 3-5 /HPF / WBC 0-2 /HPF   Sq Epi: x / Non Sq Epi: Moderate / Bacteria: Few        < from: CT Abdomen and Pelvis w/ IV Cont (22 @ 04:40) >  FINDINGS:  LOWER CHEST: Bibasilar atelectasis and/or scarring.  LIVER: Left hepatic cyst to 4 cm again seen. Additional tiny   low-attenuation hepatic structures are too small to characterize. Right   hepatic prominence again seen.  BILE DUCTS: Normal caliber.  GALLBLADDER: No calcified gallstone.  SPLEEN: Within normal limits.  PANCREAS: Mild pancreatic ductal prominence of the 4 mm. Pancreatic   parenchyma is grossly unremarkable.  ADRENALS: Within normal limits.  KIDNEYS/URETERS: Kidneys enhance symmetrically without hydronephrosis.    BLADDER: Within normal limits.  REPRODUCTIVE ORGANS: Hysterectomy.    BOWEL: Evaluation of bowel limited without distention with oral contrast,   however there is no bowel obstruction. Segmental thickening in a region   of innumerable colonic diverticula involving the sigmoid colon is again   demonstrated suggesting chronic diverticular disease. No definite   superimposed acute diverticulitis. There is a moderate stool filled   colon. Stomach is underdistended for adequate evaluation, however there   is a small hiatal hernia.  PERITONEUM: No ascites.  VESSELS:  Within normal limits.  RETROPERITONEUM: No lymphadenopathy.  ABDOMINAL WALL: Small fat-containing right inguinal hernia.  BONES: Status post vertebroplasty of L1 and L2. Scoliosis.    IMPRESSION:    Segmental thickening of the sigmoid colon in a region of innumerable   diverticula again demonstrated, most consistent with chronic diverticular   disease. Please note underlying positive cannot be excluded. This can be   further evaluated with colonoscopy.    Moderate stool filled colon.    No evidence for small bowel obstruction, significant ascites or free air.    < end of copied text >    < from: CT Abdomen and Pelvis w/ Oral Cont and w/ IV Cont (21 @ 19:04) >  FINDINGS:  LOWER CHEST: Bilateral lower lobe linear scarring.    LIVER: Hepatic steatosis. Left hepatic cyst.  BILE DUCTS: Minimal left lateral biliary dilatation posterior to this   cyst grossly unchanged from prior exam.  GALLBLADDER: Within normal limits.  SPLEEN: Within normal limits.  PANCREAS: Within normal limits.  ADRENALS: Within normal limits.  KIDNEYS/URETERS: Within normal limits.    BLADDER: Within normal limits.  REPRODUCTIVE ORGANS: Prior hysterectomy.    BOWEL: No bowel obstruction. Appendix is not definitively visualized   however no secondary signs of acute appendicitis. Extensive colonic   diverticulosis. Minimal pericolonic infiltrative changes adjacent to the   sigmoid colon may represent very mild acute versus chronic   diverticulitis..  PERITONEUM: No ascites.  VESSELS: Within normal limits.  RETROPERITONEUM/LYMPH NODES: No lymphadenopathy.  ABDOMINAL WALL: Within normal limits.  BONES: Degenerative changes.  Kyphoplasty changes at L1 and L2.    IMPRESSION:  Extensive colonic diverticulosis. Minimal pericolonic infiltrative   changes adjacent to the sigmoid colon may represent very mild acute   versus chronic diverticulitis... No bowel obstruction or gross bowel wall   thickening.    < end of copied text >

## 2022-02-18 NOTE — H&P ADULT - NSHPPHYSICALEXAM_GEN_ALL_CORE
VITALS:   T(C): 36.7 (02-18-22 @ 08:36), Max: 36.9 (02-18-22 @ 02:03)  HR: 67 (02-18-22 @ 08:36) (66 - 103)  BP: 151/83 (02-18-22 @ 08:36) (135/82 - 206/127)  RR: 18 (02-18-22 @ 08:36) (18 - 18)  SpO2: 93% (02-18-22 @ 08:36) (93% - 99%)    GENERAL: NAD, lying in bed comfortably  HEAD:  Atraumatic, Normocephalic  EYES: EOMI, PERRLA, conjunctiva and sclera clear  ENT: Moist mucous membranes  NECK: Supple, No JVD  CHEST/LUNG: Clear to auscultation bilaterally; No rales, rhonchi, wheezing, or rubs. Unlabored respirations  HEART: Regular rate and rhythm; No murmurs, rubs, or gallops  ABDOMEN: BSx4; Soft, nondistended, tender to palpation   EXTREMITIES:  2+ Peripheral Pulses, brisk capillary refill. No clubbing, cyanosis, or edema  NERVOUS SYSTEM:  A&Ox3, no focal deficits   SKIN: No rashes or lesions  PSYCH: Normal affect, euthymic mood

## 2022-02-18 NOTE — CONSULT NOTE ADULT - PROBLEM SELECTOR RECOMMENDATION 9
"Initial BP 95/62   Pulse 96   Temp 99.1  F (37.3  C) (Tympanic)   Ht 4' 7.25\" (1.403 m)   Wt 93 lb 9.6 oz (42.5 kg)   BMI 21.56 kg/m   Estimated body mass index is 21.56 kg/m  as calculated from the following:    Height as of this encounter: 4' 7.25\" (1.403 m).    Weight as of this encounter: 93 lb 9.6 oz (42.5 kg). .    Rhonda Beasley, CMA  r  " Reports LLQ pain x 1 week, progressively worsening. Etiology unclear. CT of abdomen and pelvis revealed chronic diverticulosis with  no evidence of diverticulitis, abscess or perforation.   Patient reports constipation and nausea x 1 week  Will Reports LLQ pain x 1 week, progressively worsening. Etiology unclear. CT of abdomen and pelvis revealed chronic diverticulosis with  no evidence of diverticulitis, abscess or perforation.   Patient reports constipation and nausea x 1 week. Abdominal exam with LLQ tenderness.   IV antibiotics for diverticulitis, Cipro and flagyl   Zofran for nausea  Okay to start clear liquid diet  IV fluids for hydration  Will order Miralax for constipation

## 2022-02-18 NOTE — ED ADULT NURSE REASSESSMENT NOTE - NS ED NURSE REASSESS COMMENT FT1
Patient received at 0730; awake; alert and oriented x4. Denies SOB, dizziness, VSS. Respirations unlabored. Report received at bedside by Nguyen.  at the bedside, patient complains of 10/10 abdominal pain and nausea, still has no urge to pass stool. Dr. Daley made aware patient has pain and nausea, awaiting orders. Call bell and personal items in reach. Continue to monitor patient and maintain safety.

## 2022-02-18 NOTE — ED ADULT TRIAGE NOTE - CHIEF COMPLAINT QUOTE
pt with lower abdominal pain and nausea starting today. + constipation. STAT EKG obtained. hx of HTN.

## 2022-02-18 NOTE — PATIENT PROFILE ADULT - FALL HARM RISK - RISK INTERVENTIONS

## 2022-02-18 NOTE — CHART NOTE - NSCHARTNOTEFT_GEN_A_CORE
Received call by RN patient c/o of nausea, Zofran 4mg IVP x 1 ordered without relief of symptoms. Patient is an 81y.o female admitted for mild diverticulitis and is being actively followed by GI.  Patient in NAD, VSS, resting comfortably in bed. Patient endorses constipation and nausea with persistent LLQ abdominal pain that is unchanged x 1 week. Patient adamantly denies: chest pain, palpitations, SOB, difficulty breathing, lightheadedness, dizziness, hematochezia, hematemesis, or weakness.    Vital Signs Last 24 Hrs  T(C): 36.5 (18 Feb 2022 13:51), Max: 36.9 (18 Feb 2022 02:03)  T(F): 97.7 (18 Feb 2022 13:51), Max: 98.4 (18 Feb 2022 02:03)  HR: 72 (18 Feb 2022 13:51) (66 - 103)  BP: 184/82 (18 Feb 2022 13:51) (135/82 - 206/127)  RR: 18 (18 Feb 2022 13:51) (18 - 18)  SpO2: 96% (18 Feb 2022 13:51) (91% - 99%)    Physical Exam:  General: NAD, NCAT, resting comfortably in bed  Cardio: normal S1, S2, no MRG appreciated  Lungs: CTAB, no abnormal breath sounds appreciated, respirations unlabored on RA  Abdomen: soft, LLQ tenderness appreciated, non-distended, +BS, no guarding, rigidity or rebound tenderness is appreciated   Extremities: no pedal edema appreciated  Neuro: AOX4, no focal deficits appreciated     A/P  -Reglan 10mg IVP x 1 STAT   -Toradol 15mg IVP x 1 STAT   -GI following  -CLD, bowel regimen, antibiotics per GI   -AM labs ordered  -BP elevated likely 2/2 to pain, RN to please repeat VS 15minutes after administering pain medication, RN to call provider if repeat VS abnormal or BP elevated  -RN to call provider with any acute changes / questions / concerns  [phone]    The above patient and plan was d/w Dr. Murillo.

## 2022-02-18 NOTE — ED PROVIDER NOTE - CLINICAL SUMMARY MEDICAL DECISION MAKING FREE TEXT BOX
LLQ pain suspicious for diverticulitis, CT with extensive diverticular disease with areas of segmental wall thickening. Pain unable to be controlled despite multiple doses of opiate. Will treat with IV abx and admit to medicine for continued treatment and GI consult.

## 2022-02-18 NOTE — H&P ADULT - HISTORY OF PRESENT ILLNESS
80 yo F hx HTN, HLD, Parkinson's Disease p/w abdominal pain.    Pain isolated to LLQ, sharp in nature  N/V/D?  Similar Pain July 2021    CT A/P with segmental sigmoid colon wall thickening with many diverticula as well as moderate Stool burden.  Patient required morphine for pain control.  Received Unasyn for suspected acute diverticulitis     82 yo F hx HTN, HLD, Parkinson's Disease p/w abdominal pain.  Patient states that she has had abdominal pain for many weeks now. Pain is intermittent gnawing in nature. Has progressively worsened in severity and frequency. Pain distribution involves both lower abdominal quadrants but is more severe in the LLQ.   Furthermore, patient describes lack of BM x 1 week as well as 1 month of black stools. Denies nausea, vomiting diarrhea. Typically has BM 1-2 x daily. Reports maintaining cancer screening. Last c-scope was approx 2 year ago and was reported to be normal as per patient.   Does endorse 203 lb weight loss this month.    In the ED   CT A/P with segmental sigmoid colon wall thickening with many diverticula as well as moderate Stool burden.  Patient required morphine for pain control.  Received Unasyn for suspected acute diverticulitis

## 2022-02-18 NOTE — ED ADULT NURSE REASSESSMENT NOTE - NS ED NURSE REASSESS COMMENT FT1
Patient A & O x 3, complaining of severe abdominal pain still 10/10. Patient now complains of a new onset of SOB, patient's O2 saturation was at 92 on room air. Patient placed on 2L nc and saturation went up to 96%, patient verbalizes feeling more comfortable with the oxygen on. Admitting MD to be made aware. Patient A & O x 3, complaining of severe abdominal pain still 10/10. Patient now complains of a new onset of SOB, patient's O2 saturation was at 92 on room air. Patient placed on 2L nc and saturation went up to 96%, patient verbalizes feeling more comfortable with the oxygen on. Dr. Murillo made aware and will order chest x-ray. Will continue to monitor and reassess.

## 2022-02-18 NOTE — H&P ADULT - ASSESSMENT
81F hx HTN, HLD, Parkinson's Disease, Anxiety  p/w abdominal pain admitted to medicine with concerns for acute diverticulitis.    #Colonic Wall thickening with diverticula - Concerning underlying malignancy vs acute diverticulitis.  No fevers or leukocytosis however is elderly and may not mount response.  #Constipation x 1 week - may also be contributing to abdominal pain  GI consulted  Aggressive BR  S/p Unasyn x 1 dose in ED   Hold abx for now     #Reported Dark stools x 1 month - Does not endorse being on iron tablets. Takes pepto bismol sometimes but not daily  Trend HGB  FOBT  GI following     #Acute Respiratory failure with Hypoxia to 90-91%  Does not appear dyspneic on exam, may have chronic component. on 2LNC  CXR to eval for PNA or effusion    #HTN  cont home losartan    #Parkinson's  #Anxiety  #Depression  Continue home meds including Gabapentin, Lamotrigine, Trazadone, Xanax (long term med)          DVT ppx: SHARITA  Diet: Regular Diet  Dispo: Admit to Medicine    81F hx HTN, HLD, Parkinson's Disease, Anxiety  p/w abdominal pain admitted to medicine with concerns for acute diverticulitis.    #Colonic Wall thickening with diverticula - Concerning underlying malignancy vs acute diverticulitis.  No fevers or leukocytosis however is elderly and may not mount response.  #Constipation x 1 week - may also be contributing to abdominal pain  GI consulted  Aggressive BR  S/p Unasyn x 1 dose in ED   Hold abx for now     #Reported Dark stools x 1 month - Does not endorse being on iron tablets. Takes pepto bismol sometimes but not daily  Trend HGB  FOBT  GI following     #Acute Respiratory failure with Hypoxia to 90-91%  Does not appear dyspneic on exam, may have chronic component. on 2LNC  CXR to eval for PNA or effusion    #HTN  cont home losartan    #Parkinson's  #Anxiety  #Depression  Continue home meds including Gabapentin, Lamotrigine, Trazadone, Xanax (long term med)          DVT ppx: SHARITA  Diet: Regular Diet  Dispo: Admit to Medicine    Reg updated: [facility] - [phone]  Pharmacy [facility]

## 2022-02-18 NOTE — PATIENT PROFILE ADULT - BRAND OF COVID-19 VACCINATION
You were found to have a buildup of fluid on your right lung today.  This was drained.  The results of this test will be back in the next 1 to 2 days.  Follow-up closely with your primary care doctor as an outpatient and return to the ER if you develop any worsening symptoms or if you have any other concerns.   Moderna dose 1, 2, and 3

## 2022-02-18 NOTE — ED PROVIDER NOTE - OBJECTIVE STATEMENT
81yof w/ Parkinson's p/w abd pain. She states she had an acute onset of LLQ abdominal pain after dinner this evening, sharp pain, non-radiating, constant since onset and worsening in severity, associated with nausea and constipation 81yof w/ Parkinson's p/w abd pain. She states she had an acute onset of LLQ abdominal pain after dinner this evening, sharp pain, non-radiating, constant since onset and worsening in severity, associated with nausea and constipation. Has hx of diverticulitis.

## 2022-02-18 NOTE — ED ADULT NURSE NOTE - OBJECTIVE STATEMENT
Patient A&Opresents to the ED c/o sudden onset LLQ abdominal pain after dinner this evening with associated constipation.  Pt has hx of diverticulitis.  Patient denies fevers and chills.

## 2022-02-18 NOTE — H&P ADULT - NSHPLABSRESULTS_GEN_ALL_CORE
11.3   3.40   )----------(  227       ( 2022 03:33 )               34.1      133    |  97     |  11.7   ----------------------------<  97         ( 2022 03:33 )  3.5     |  23.0   |  0.87     Ca    9.1        ( 2022 03:33 )    TPro  7.2    /  Alb  4.2    /  TBili  <0.2   /  DBili  x      /  AST  16     /  ALT  8      /  AlkPhos  102    ( 2022 03:33 )    LIVER FUNCTIONS - ( 2022 03:33 )  Alb: 4.2 g/dL / Pro: 7.2 g/dL / ALK PHOS: 102 U/L / ALT: 8 U/L / AST: 16 U/L / GGT: x               CAPILLARY BLOOD GLUCOSE        Urinalysis Basic - ( 2022 04:35 )    Color: Yellow / Appearance: Clear / S.010 / pH: x  Gluc: x / Ketone: Negative  / Bili: Negative / Urobili: Negative mg/dL   Blood: x / Protein: Negative / Nitrite: Negative   Leuk Esterase: Trace / RBC: 3-5 /HPF / WBC 0-2 /HPF   Sq Epi: x / Non Sq Epi: Moderate / Bacteria: Few

## 2022-02-18 NOTE — ED PROVIDER NOTE - NSICDXPASTMEDICALHX_GEN_ALL_CORE_FT
PAST MEDICAL HISTORY:  Depression     Fracture of lumbosacral spine or pelvis     HTN (hypertension)     Hyperlipidemia     Ovarian benign neoplasm s/p Total  Hysterectomy    Parkinson's disease     Spondylosis without myelopathy or radiculopathy, lumbar region

## 2022-02-19 LAB
ALBUMIN SERPL ELPH-MCNC: 3.8 G/DL — SIGNIFICANT CHANGE UP (ref 3.3–5.2)
ANION GAP SERPL CALC-SCNC: 9 MMOL/L — SIGNIFICANT CHANGE UP (ref 5–17)
BASOPHILS # BLD AUTO: 0.04 K/UL — SIGNIFICANT CHANGE UP (ref 0–0.2)
BASOPHILS NFR BLD AUTO: 1.1 % — SIGNIFICANT CHANGE UP (ref 0–2)
BUN SERPL-MCNC: 9.3 MG/DL — SIGNIFICANT CHANGE UP (ref 8–20)
CALCIUM SERPL-MCNC: 9.1 MG/DL — SIGNIFICANT CHANGE UP (ref 8.6–10.2)
CHLORIDE SERPL-SCNC: 101 MMOL/L — SIGNIFICANT CHANGE UP (ref 98–107)
CO2 SERPL-SCNC: 27 MMOL/L — SIGNIFICANT CHANGE UP (ref 22–29)
CREAT SERPL-MCNC: 0.77 MG/DL — SIGNIFICANT CHANGE UP (ref 0.5–1.3)
CULTURE RESULTS: SIGNIFICANT CHANGE UP
EOSINOPHIL # BLD AUTO: 0.02 K/UL — SIGNIFICANT CHANGE UP (ref 0–0.5)
EOSINOPHIL NFR BLD AUTO: 0.5 % — SIGNIFICANT CHANGE UP (ref 0–6)
GLUCOSE SERPL-MCNC: 80 MG/DL — SIGNIFICANT CHANGE UP (ref 70–99)
HCT VFR BLD CALC: 32.4 % — LOW (ref 34.5–45)
HGB BLD-MCNC: 10.4 G/DL — LOW (ref 11.5–15.5)
IMM GRANULOCYTES NFR BLD AUTO: 0.3 % — SIGNIFICANT CHANGE UP (ref 0–1.5)
LYMPHOCYTES # BLD AUTO: 0.84 K/UL — LOW (ref 1–3.3)
LYMPHOCYTES # BLD AUTO: 22.3 % — SIGNIFICANT CHANGE UP (ref 13–44)
MAGNESIUM SERPL-MCNC: 2.6 MG/DL — SIGNIFICANT CHANGE UP (ref 1.6–2.6)
MCHC RBC-ENTMCNC: 30.3 PG — SIGNIFICANT CHANGE UP (ref 27–34)
MCHC RBC-ENTMCNC: 32.1 GM/DL — SIGNIFICANT CHANGE UP (ref 32–36)
MCV RBC AUTO: 94.5 FL — SIGNIFICANT CHANGE UP (ref 80–100)
MONOCYTES # BLD AUTO: 0.55 K/UL — SIGNIFICANT CHANGE UP (ref 0–0.9)
MONOCYTES NFR BLD AUTO: 14.6 % — HIGH (ref 2–14)
NEUTROPHILS # BLD AUTO: 2.31 K/UL — SIGNIFICANT CHANGE UP (ref 1.8–7.4)
NEUTROPHILS NFR BLD AUTO: 61.2 % — SIGNIFICANT CHANGE UP (ref 43–77)
PHOSPHATE SERPL-MCNC: 2.7 MG/DL — SIGNIFICANT CHANGE UP (ref 2.4–4.7)
PLATELET # BLD AUTO: 216 K/UL — SIGNIFICANT CHANGE UP (ref 150–400)
POTASSIUM SERPL-MCNC: 3.7 MMOL/L — SIGNIFICANT CHANGE UP (ref 3.5–5.3)
POTASSIUM SERPL-SCNC: 3.7 MMOL/L — SIGNIFICANT CHANGE UP (ref 3.5–5.3)
RBC # BLD: 3.43 M/UL — LOW (ref 3.8–5.2)
RBC # FLD: 13.2 % — SIGNIFICANT CHANGE UP (ref 10.3–14.5)
SODIUM SERPL-SCNC: 137 MMOL/L — SIGNIFICANT CHANGE UP (ref 135–145)
SPECIMEN SOURCE: SIGNIFICANT CHANGE UP
WBC # BLD: 3.77 K/UL — LOW (ref 3.8–10.5)
WBC # FLD AUTO: 3.77 K/UL — LOW (ref 3.8–10.5)

## 2022-02-19 PROCEDURE — 99233 SBSQ HOSP IP/OBS HIGH 50: CPT

## 2022-02-19 RX ORDER — LACTULOSE 10 G/15ML
20 SOLUTION ORAL
Refills: 0 | Status: DISCONTINUED | OUTPATIENT
Start: 2022-02-19 | End: 2022-02-21

## 2022-02-19 RX ORDER — ACETAMINOPHEN 500 MG
650 TABLET ORAL ONCE
Refills: 0 | Status: COMPLETED | OUTPATIENT
Start: 2022-02-19 | End: 2022-02-19

## 2022-02-19 RX ADMIN — PANTOPRAZOLE SODIUM 40 MILLIGRAM(S): 20 TABLET, DELAYED RELEASE ORAL at 05:54

## 2022-02-19 RX ADMIN — Medication 100 MILLIGRAM(S): at 05:56

## 2022-02-19 RX ADMIN — HEPARIN SODIUM 5000 UNIT(S): 5000 INJECTION INTRAVENOUS; SUBCUTANEOUS at 17:04

## 2022-02-19 RX ADMIN — HEPARIN SODIUM 5000 UNIT(S): 5000 INJECTION INTRAVENOUS; SUBCUTANEOUS at 05:56

## 2022-02-19 RX ADMIN — Medication 100 MILLIGRAM(S): at 17:03

## 2022-02-19 RX ADMIN — Medication 1.5 MILLIGRAM(S): at 05:55

## 2022-02-19 RX ADMIN — GABAPENTIN 600 MILLIGRAM(S): 400 CAPSULE ORAL at 21:06

## 2022-02-19 RX ADMIN — LAMOTRIGINE 50 MILLIGRAM(S): 25 TABLET, ORALLY DISINTEGRATING ORAL at 17:03

## 2022-02-19 RX ADMIN — Medication 20 MILLIEQUIVALENT(S): at 11:21

## 2022-02-19 RX ADMIN — Medication 50 MILLIGRAM(S): at 21:06

## 2022-02-19 RX ADMIN — GABAPENTIN 600 MILLIGRAM(S): 400 CAPSULE ORAL at 05:55

## 2022-02-19 RX ADMIN — Medication 100 MILLIGRAM(S): at 21:06

## 2022-02-19 RX ADMIN — Medication 650 MILLIGRAM(S): at 12:00

## 2022-02-19 RX ADMIN — Medication 1.5 MILLIGRAM(S): at 17:03

## 2022-02-19 RX ADMIN — GABAPENTIN 600 MILLIGRAM(S): 400 CAPSULE ORAL at 13:29

## 2022-02-19 RX ADMIN — POLYETHYLENE GLYCOL 3350 17 GRAM(S): 17 POWDER, FOR SOLUTION ORAL at 11:24

## 2022-02-19 RX ADMIN — LACTULOSE 20 GRAM(S): 10 SOLUTION ORAL at 17:06

## 2022-02-19 RX ADMIN — Medication 200 MILLIGRAM(S): at 17:04

## 2022-02-19 RX ADMIN — Medication 10 MILLIGRAM(S): at 11:18

## 2022-02-19 RX ADMIN — Medication 100 MILLIGRAM(S): at 14:55

## 2022-02-19 RX ADMIN — LAMOTRIGINE 50 MILLIGRAM(S): 25 TABLET, ORALLY DISINTEGRATING ORAL at 05:55

## 2022-02-19 RX ADMIN — SENNA PLUS 2 TABLET(S): 8.6 TABLET ORAL at 21:06

## 2022-02-19 RX ADMIN — ESCITALOPRAM OXALATE 30 MILLIGRAM(S): 10 TABLET, FILM COATED ORAL at 11:21

## 2022-02-19 RX ADMIN — Medication 650 MILLIGRAM(S): at 11:19

## 2022-02-19 RX ADMIN — Medication 200 MILLIGRAM(S): at 05:55

## 2022-02-19 RX ADMIN — LOSARTAN POTASSIUM 25 MILLIGRAM(S): 100 TABLET, FILM COATED ORAL at 05:55

## 2022-02-19 RX ADMIN — Medication 20 MILLIGRAM(S): at 05:55

## 2022-02-19 NOTE — PROGRESS NOTE ADULT - ASSESSMENT
81y/oF PMH HTN, HLD, Parkinson's disease, anxiety presenting with abd pain admitted with diverticulitis     Abd pain 2/2 mild diverticulitis   Constipation   - 81y/oF PMH HTN, HLD, Parkinson's disease, anxiety presenting with abd pain admitted with diverticulitis     Abd pain 2/2 mild diverticulitis   Constipation   -CT a/p: segmental thickening of sigmoid colon, moderate stool filled colon  -GI recs appreciated   -advance diet to FLD today 2/19   -cont IV abx, on cipro, flagyl   -IVF   -lactulose increased to bid     HTN  -losartan     Parkinson's  Anxiety, depression  -cont home meds, on gabpentin, lamotrigine, trazadone, xanax

## 2022-02-19 NOTE — PROGRESS NOTE ADULT - PROBLEM SELECTOR PLAN 1
Abdominal pain now resolving and most likely secondary to a low grade uncomplicated diverticulitis. CT of abdomen and pelvis revealed chronic diverticulosis with  no evidence of diverticulitis, abscess or perforation.   Continue IV antibiotics Cipro and flagyl   Continue clear liquid diet  IV fluids for hydration  Miralax for constipation.  Zofran PRN for nausea Abdominal pain now resolving and most likely secondary to a low grade uncomplicated diverticulitis. CT of abdomen and pelvis revealed chronic diverticulosis with  no evidence of diverticulitis, abscess or perforation.   Continue IV antibiotics Cipro and flagyl   IV fluids for hydration  Miralax for constipation.  Zofran PRN for nausea

## 2022-02-19 NOTE — PROGRESS NOTE ADULT - SUBJECTIVE AND OBJECTIVE BOX
Chief Complaint:  Patient is a 81y old  Female who presents with a chief complaint of Acute Diverticulitis. GI follow up for abdominal pain and Radiology finding of segmental sigmoid colon wall thickening      Interval Events / Subjective: Patient seen and evaluated at bedside, reporting no complaints, no overnight events. CT of abdomen and pelvis revealed Segmental thickening in a region of innumerable colonic diverticula involving the sigmoid colon is again demonstrated suggesting chronic diverticular disease. There is a moderate stool filled colon. Patient slightly lethargic this morning. Denies nausea, vomiting, abdominal pain, chest pain, shortness of breath, hematemesis, hematochezia, melena. Currently on Cipro & flagyl.       REVIEW OF SYSTEMS:   General: Negative  HEENT: Negative  CV: Negative  Respiratory: Negative  GI: See HPI  : Negative  MSK: Negative  Hematologic: Negative  Skin: Negative    MEDICATIONS:   MEDICATIONS  (STANDING):  ALPRAZolam 1.5 milliGRAM(s) Oral two times a day  bisacodyl Suppository 10 milliGRAM(s) Rectal daily  carBAMazepine 200 milliGRAM(s) Oral two times a day  ciprofloxacin   IVPB 200 milliGRAM(s) IV Intermittent every 12 hours  ciprofloxacin   IVPB      escitalopram 30 milliGRAM(s) Oral daily  furosemide    Tablet 20 milliGRAM(s) Oral daily  gabapentin 600 milliGRAM(s) Oral three times a day  heparin   Injectable 5000 Unit(s) SubCutaneous every 12 hours  lamoTRIgine 50 milliGRAM(s) Oral two times a day  losartan 25 milliGRAM(s) Oral daily  metroNIDAZOLE  IVPB      metroNIDAZOLE  IVPB 500 milliGRAM(s) IV Intermittent every 8 hours  pantoprazole    Tablet 40 milliGRAM(s) Oral before breakfast  polyethylene glycol 3350 17 Gram(s) Oral daily  potassium chloride   Powder 20 milliEquivalent(s) Oral daily  senna 2 Tablet(s) Oral at bedtime  traZODone 50 milliGRAM(s) Oral daily    MEDICATIONS  (PRN):      ALLERGIES:   Allergies    No Known Allergies    Intolerances        VITAL SIGNS:   Vital Signs Last 24 Hrs  T(C): 36.7 (2022 08:11), Max: 36.7 (2022 19:15)  T(F): 98 (2022 08:11), Max: 98 (2022 19:15)  HR: 107 (2022 08:11) (70 - 107)  BP: 118/70 (2022 08:11) (118/70 - 184/82)  BP(mean): --  RR: 18 (2022 08:11) (18 - 18)  SpO2: 97% (2022 08:11) (91% - 100%)  I&O's Summary      PHYSICAL EXAM:   GENERAL:  Elderly & thin appearing female, no acute distress  HEENT:  NC/AT,  conjunctivae clear, sclera -anicteric  CHEST:  Full & symmetric excursion, no increased effort, breath sounds clear  HEART:  Regular rhythm, S1, S2, no murmur/rub/S3/S4,  no edema  ABDOMEN:  Soft, non-tender, non-distended, normoactive bowel sounds, No rebound tenderness, guarding or rigidity.   EXTREMITIES: No cyanosis, clubbing or edema  SKIN:  No rash/erythema/ecchymoses/petechiae/wounds/abscess/warm/dry  Psych: calm, appropriate affect    LABS:  CBC Full  -  ( 2022 03:33 )  WBC Count : 3.40 K/uL  RBC Count : 3.72 M/uL  Hemoglobin : 11.3 g/dL  Hematocrit : 34.1 %  Platelet Count - Automated : 227 K/uL  Mean Cell Volume : 91.7 fl  Mean Cell Hemoglobin : 30.4 pg  Mean Cell Hemoglobin Concentration : 33.1 gm/dL  Auto Neutrophil # : 1.92 K/uL  Auto Lymphocyte # : 0.92 K/uL  Auto Monocyte # : 0.50 K/uL  Auto Eosinophil # : 0.01 K/uL  Auto Basophil # : 0.04 K/uL  Auto Neutrophil % : 56.4 %  Auto Lymphocyte % : 27.1 %  Auto Monocyte % : 14.7 %  Auto Eosinophil % : 0.3 %  Auto Basophil % : 1.2 %        133<L>  |  97<L>  |  11.7  ----------------------------<  97  3.5   |  23.0  |  0.87    Ca    9.1      2022 03:33    TPro  7.2  /  Alb  4.2  /  TBili  <0.2<L>  /  DBili  x   /  AST  16  /  ALT  8   /  AlkPhos  102  -18    LIVER FUNCTIONS - ( 2022 03:33 )  Alb: 4.2 g/dL / Pro: 7.2 g/dL / ALK PHOS: 102 U/L / ALT: 8 U/L / AST: 16 U/L / GGT: x             Urinalysis Basic - ( 2022 04:35 )    Color: Yellow / Appearance: Clear / S.010 / pH: x  Gluc: x / Ketone: Negative  / Bili: Negative / Urobili: Negative mg/dL   Blood: x / Protein: Negative / Nitrite: Negative   Leuk Esterase: Trace / RBC: 3-5 /HPF / WBC 0-2 /HPF   Sq Epi: x / Non Sq Epi: Moderate / Bacteria: Few          RADIOLOGY & ADDITIONAL STUDIES (The following images were personally reviewed):    ACC: [id number] EXAM:  CT ABDOMEN AND PELVIS IC                          PROCEDURE DATE:  2022          INTERPRETATION:  CLINICAL INFORMATION:    CLINICAL INFORMATION:  LLQ abd pain    COMPARISON: None.  IV Contrast: Omnipaque 300  80 cc administered   20 cc discarded  Oral Contrast: NONE  Complications: None reported at time of study completion      PROCEDURE:  Axial CT images were acquired through the abdomen and pelvis following   intravenous contrast. Coronal and sagittal reformatted images provided.    FINDINGS:  LOWER CHEST: Bibasilar atelectasis and/or scarring.  LIVER: Left hepatic cyst to 4 cm again seen. Additional tiny   low-attenuation hepatic structures are too small to characterize. Right   hepatic prominence again seen.  BILE DUCTS: Normal caliber.  GALLBLADDER: No calcified gallstone.  SPLEEN: Within normal limits.  PANCREAS: Mild pancreatic ductal prominence of the 4 mm. Pancreatic   parenchyma is grossly unremarkable.  ADRENALS: Within normal limits.  KIDNEYS/URETERS: Kidneys enhance symmetrically without hydronephrosis.    BLADDER: Within normal limits.  REPRODUCTIVE ORGANS: Hysterectomy.    BOWEL: Evaluation of bowel limited without distention with oral contrast,   however there is no bowel obstruction. Segmental thickening in a region   of innumerable colonic diverticula involving the sigmoid colon is again   demonstrated suggesting chronic diverticular disease. No definite   superimposed acute diverticulitis. There is a moderate stool filled   colon. Stomach is underdistended for adequate evaluation, however there   is a small hiatal hernia.  PERITONEUM: No ascites.  VESSELS:  Within normal limits.  RETROPERITONEUM: No lymphadenopathy.  ABDOMINAL WALL: Small fat-containing right inguinal hernia.  BONES: Status post vertebroplasty of L1 and L2. Scoliosis.    IMPRESSION:    Segmental thickening of the sigmoid colon in a region of innumerable   diverticula again demonstrated, most consistent with chronic diverticular   disease. Please note underlying positive cannot be excluded. This can be   further evaluated with colonoscopy.    Moderate stool filled colon.    No evidence for small bowel obstruction, significant ascites or free air.    --- End of Report ---            SHAWNA HER MD; Attending Radiologist  This document has been electronically signed. 2022  5:11AM       Chief Complaint:  Patient is a 81y old  Female who presents with a chief complaint of Acute Diverticulitis. GI follow up for abdominal pain and Radiology finding of segmental sigmoid colon wall thickening as well as constipation      Interval Events / Subjective: Patient seen and evaluated at bedside, reporting no complaints, no overnight events. CT of abdomen and pelvis revealed Segmental thickening in a region of innumerable colonic diverticula involving the sigmoid colon is again demonstrated suggesting chronic diverticular disease. There is a moderate stool filled colon. Patient slightly lethargic this morning. Denies nausea, vomiting, abdominal pain, chest pain, shortness of breath, hematemesis, hematochezia, melena. Currently on Cipro & flagyl.       REVIEW OF SYSTEMS:   General: Negative  HEENT: Negative  CV: Negative  Respiratory: Negative  GI: See HPI  : Negative  MSK: Negative  Hematologic: Negative  Skin: Negative    MEDICATIONS:   MEDICATIONS  (STANDING):  ALPRAZolam 1.5 milliGRAM(s) Oral two times a day  bisacodyl Suppository 10 milliGRAM(s) Rectal daily  carBAMazepine 200 milliGRAM(s) Oral two times a day  ciprofloxacin   IVPB 200 milliGRAM(s) IV Intermittent every 12 hours  ciprofloxacin   IVPB      escitalopram 30 milliGRAM(s) Oral daily  furosemide    Tablet 20 milliGRAM(s) Oral daily  gabapentin 600 milliGRAM(s) Oral three times a day  heparin   Injectable 5000 Unit(s) SubCutaneous every 12 hours  lamoTRIgine 50 milliGRAM(s) Oral two times a day  losartan 25 milliGRAM(s) Oral daily  metroNIDAZOLE  IVPB      metroNIDAZOLE  IVPB 500 milliGRAM(s) IV Intermittent every 8 hours  pantoprazole    Tablet 40 milliGRAM(s) Oral before breakfast  polyethylene glycol 3350 17 Gram(s) Oral daily  potassium chloride   Powder 20 milliEquivalent(s) Oral daily  senna 2 Tablet(s) Oral at bedtime  traZODone 50 milliGRAM(s) Oral daily    MEDICATIONS  (PRN):      ALLERGIES:   Allergies    No Known Allergies    Intolerances        VITAL SIGNS:   Vital Signs Last 24 Hrs  T(C): 36.7 (2022 08:11), Max: 36.7 (2022 19:15)  T(F): 98 (2022 08:11), Max: 98 (2022 19:15)  HR: 107 (2022 08:11) (70 - 107)  BP: 118/70 (2022 08:11) (118/70 - 184/82)  BP(mean): --  RR: 18 (2022 08:11) (18 - 18)  SpO2: 97% (2022 08:11) (91% - 100%)  I&O's Summary      PHYSICAL EXAM:   GENERAL:  Elderly & thin appearing female, no acute distress  HEENT:  NC/AT,  conjunctivae clear, sclera -anicteric  CHEST:  Full & symmetric excursion, no increased effort, breath sounds clear  HEART:  Regular rhythm, S1, S2, no murmur/rub/S3/S4,  no edema  ABDOMEN:  Soft, non-tender, non-distended, normoactive bowel sounds, No rebound tenderness, guarding or rigidity.   EXTREMITIES: No cyanosis, clubbing or edema  SKIN:  No rash/erythema/ecchymoses/petechiae/wounds/abscess/warm/dry  Psych: calm, appropriate affect    LABS:  CBC Full  -  ( 2022 03:33 )  WBC Count : 3.40 K/uL  RBC Count : 3.72 M/uL  Hemoglobin : 11.3 g/dL  Hematocrit : 34.1 %  Platelet Count - Automated : 227 K/uL  Mean Cell Volume : 91.7 fl  Mean Cell Hemoglobin : 30.4 pg  Mean Cell Hemoglobin Concentration : 33.1 gm/dL  Auto Neutrophil # : 1.92 K/uL  Auto Lymphocyte # : 0.92 K/uL  Auto Monocyte # : 0.50 K/uL  Auto Eosinophil # : 0.01 K/uL  Auto Basophil # : 0.04 K/uL  Auto Neutrophil % : 56.4 %  Auto Lymphocyte % : 27.1 %  Auto Monocyte % : 14.7 %  Auto Eosinophil % : 0.3 %  Auto Basophil % : 1.2 %        133<L>  |  97<L>  |  11.7  ----------------------------<  97  3.5   |  23.0  |  0.87    Ca    9.1      2022 03:33    TPro  7.2  /  Alb  4.2  /  TBili  <0.2<L>  /  DBili  x   /  AST  16  /  ALT  8   /  AlkPhos  102      LIVER FUNCTIONS - ( 2022 03:33 )  Alb: 4.2 g/dL / Pro: 7.2 g/dL / ALK PHOS: 102 U/L / ALT: 8 U/L / AST: 16 U/L / GGT: x             Urinalysis Basic - ( 2022 04:35 )    Color: Yellow / Appearance: Clear / S.010 / pH: x  Gluc: x / Ketone: Negative  / Bili: Negative / Urobili: Negative mg/dL   Blood: x / Protein: Negative / Nitrite: Negative   Leuk Esterase: Trace / RBC: 3-5 /HPF / WBC 0-2 /HPF   Sq Epi: x / Non Sq Epi: Moderate / Bacteria: Few          RADIOLOGY & ADDITIONAL STUDIES (The following images were personally reviewed):    ACC: [id number] EXAM:  CT ABDOMEN AND PELVIS IC                          PROCEDURE DATE:  2022          INTERPRETATION:  CLINICAL INFORMATION:    CLINICAL INFORMATION:  LLQ abd pain    COMPARISON: None.  IV Contrast: Omnipaque 300  80 cc administered   20 cc discarded  Oral Contrast: NONE  Complications: None reported at time of study completion      PROCEDURE:  Axial CT images were acquired through the abdomen and pelvis following   intravenous contrast. Coronal and sagittal reformatted images provided.    FINDINGS:  LOWER CHEST: Bibasilar atelectasis and/or scarring.  LIVER: Left hepatic cyst to 4 cm again seen. Additional tiny   low-attenuation hepatic structures are too small to characterize. Right   hepatic prominence again seen.  BILE DUCTS: Normal caliber.  GALLBLADDER: No calcified gallstone.  SPLEEN: Within normal limits.  PANCREAS: Mild pancreatic ductal prominence of the 4 mm. Pancreatic   parenchyma is grossly unremarkable.  ADRENALS: Within normal limits.  KIDNEYS/URETERS: Kidneys enhance symmetrically without hydronephrosis.    BLADDER: Within normal limits.  REPRODUCTIVE ORGANS: Hysterectomy.    BOWEL: Evaluation of bowel limited without distention with oral contrast,   however there is no bowel obstruction. Segmental thickening in a region   of innumerable colonic diverticula involving the sigmoid colon is again   demonstrated suggesting chronic diverticular disease. No definite   superimposed acute diverticulitis. There is a moderate stool filled   colon. Stomach is underdistended for adequate evaluation, however there   is a small hiatal hernia.  PERITONEUM: No ascites.  VESSELS:  Within normal limits.  RETROPERITONEUM: No lymphadenopathy.  ABDOMINAL WALL: Small fat-containing right inguinal hernia.  BONES: Status post vertebroplasty of L1 and L2. Scoliosis.    IMPRESSION:    Segmental thickening of the sigmoid colon in a region of innumerable   diverticula again demonstrated, most consistent with chronic diverticular   disease. Please note underlying positive cannot be excluded. This can be   further evaluated with colonoscopy.    Moderate stool filled colon.    No evidence for small bowel obstruction, significant ascites or free air.    --- End of Report ---            SHAWNA HER MD; Attending Radiologist  This document has been electronically signed. 2022  5:11AM

## 2022-02-19 NOTE — PROGRESS NOTE ADULT - ATTENDING COMMENTS
This morning the patient is no longer experiencing any abdominal pain and is tolerating clear liquids without nausea or vomiting.  However she has not yet had a bowel movement.  No probability the patient's pain was either due to a very mild diverticulitis was simply due to severe sigmoid diverticulosis with myochosis and sigmoid spasm with underlying constipation.  At this time I will advance her diet to a full liquid diet and add lactulose 20 g p.o. twice daily to the medical regimen.  Would continue IV antibiotics. This morning the patient is no longer experiencing any abdominal pain and is tolerating clear liquids without nausea or vomiting.  However she has not yet had a bowel movement. At the time I saw the patient after having been seen earlier today by our nurse practitioner she was fully awake, alert and oriented x3. In all probability the patient's pain was either due to a very mild diverticulitis or was simply due to severe sigmoid diverticulosis with myochosis and sigmoid spasm with underlying constipation.  At this time I will advance her diet to a full liquid diet and add lactulose 20 g p.o. twice daily to the medical regimen.  Would continue IV antibiotics.

## 2022-02-19 NOTE — PROGRESS NOTE ADULT - SUBJECTIVE AND OBJECTIVE BOX
CATHLEEN CRUZ    [id number]    81y      Female    CC: abd pain     INTERVAL HPI/OVERNIGHT EVENTS: pt seen and examined. denies abd pain, n/v this am     REVIEW OF SYSTEMS:    CONSTITUTIONAL: No fever, weight loss, or fatigue  RESPIRATORY: No cough, wheezing, hemoptysis; No shortness of breath  CARDIOVASCULAR: No chest pain, palpitations  GASTROINTESTINAL: No abdominal or epigastric pain. No nausea, vomiting  NEUROLOGICAL: No headaches, memory loss, loss of strength.    Vital Signs Last 24 Hrs  T(C): 36.3 (2022 10:42), Max: 36.7 (2022 19:15)  T(F): 97.3 (2022 10:42), Max: 98 (2022 19:15)  HR: 76 (2022 10:42) (70 - 107)  BP: 125/69 (2022 10:42) (118/70 - 184/82)  BP(mean): --  RR: 18 (2022 10:42) (18 - 18)  SpO2: 95% (2022 10:42) (95% - 100%)    PHYSICAL EXAM:    GENERAL: NAD  HEENT: PERRL, +EOMI  NECK: soft, supple  CHEST/LUNG: Clear to auscultation bilaterally; No wheezing  HEART: S1S2+, Regular rate and rhythm; No murmurs, rubs, or gallops  ABDOMEN: Soft, Nontender, Nondistended; Bowel sounds present  SKIN: No rashes or lesions  NEURO: AAOX3, no focal deficits, no motor or sensory loss  PSYCH: normal mood    LABS:                        10.4   3.77  )-----------( 216      ( 2022 08:57 )             32.4     -    137  |  101  |  9.3  ----------------------------<  80  3.7   |  27.0  |  0.77    Ca    9.1      2022 08:57  Phos  2.7       Mg     2.6         TPro  x   /  Alb  3.8  /  TBili  x   /  DBili  x   /  AST  x   /  ALT  x   /  AlkPhos  x         Urinalysis Basic - ( 2022 04:35 )    Color: Yellow / Appearance: Clear / S.010 / pH: x  Gluc: x / Ketone: Negative  / Bili: Negative / Urobili: Negative mg/dL   Blood: x / Protein: Negative / Nitrite: Negative   Leuk Esterase: Trace / RBC: 3-5 /HPF / WBC 0-2 /HPF   Sq Epi: x / Non Sq Epi: Moderate / Bacteria: Few          MEDICATIONS  (STANDING):  acetaminophen     Tablet .. 650 milliGRAM(s) Oral once  ALPRAZolam 1.5 milliGRAM(s) Oral two times a day  bisacodyl Suppository 10 milliGRAM(s) Rectal daily  carBAMazepine 200 milliGRAM(s) Oral two times a day  ciprofloxacin   IVPB 200 milliGRAM(s) IV Intermittent every 12 hours  ciprofloxacin   IVPB      escitalopram 30 milliGRAM(s) Oral daily  furosemide    Tablet 20 milliGRAM(s) Oral daily  gabapentin 600 milliGRAM(s) Oral three times a day  heparin   Injectable 5000 Unit(s) SubCutaneous every 12 hours  lactulose Syrup 20 Gram(s) Oral two times a day  lamoTRIgine 50 milliGRAM(s) Oral two times a day  losartan 25 milliGRAM(s) Oral daily  metroNIDAZOLE  IVPB      metroNIDAZOLE  IVPB 500 milliGRAM(s) IV Intermittent every 8 hours  pantoprazole    Tablet 40 milliGRAM(s) Oral before breakfast  polyethylene glycol 3350 17 Gram(s) Oral daily  potassium chloride   Powder 20 milliEquivalent(s) Oral daily  senna 2 Tablet(s) Oral at bedtime  traZODone 50 milliGRAM(s) Oral daily    MEDICATIONS  (PRN):      RADIOLOGY & ADDITIONAL TESTS:   CATHLEEN CRUZ    [id number]    81y      Female    CC: abd pain     INTERVAL HPI/OVERNIGHT EVENTS: pt seen and examined. denies abd pain, n/v this am     REVIEW OF SYSTEMS:    CONSTITUTIONAL: No fever, weight loss, or fatigue  RESPIRATORY: No cough, wheezing, hemoptysis; No shortness of breath  CARDIOVASCULAR: No chest pain, palpitations  GASTROINTESTINAL: No abdominal or epigastric pain. No nausea, vomiting  NEUROLOGICAL: No headaches, memory loss, loss of strength.    Vital Signs Last 24 Hrs  T(C): 36.3 (2022 10:42), Max: 36.7 (2022 19:15)  T(F): 97.3 (2022 10:42), Max: 98 (2022 19:15)  HR: 76 (2022 10:42) (70 - 107)  BP: 125/69 (2022 10:42) (118/70 - 184/82)  BP(mean): --  RR: 18 (2022 10:42) (18 - 18)  SpO2: 95% (2022 10:42) (95% - 100%)    PHYSICAL EXAM:    GENERAL: NAD  HEENT:  +EOMI  NECK: soft, supple  CHEST/LUNG: Clear to auscultation bilaterally  HEART: S1S2+, Regular rate and rhythm  ABDOMEN: Soft, Nontender, Nondistended; Bowel sounds present  SKIN: warm, dry   NEURO: awake, alert; non-focal   PSYCH: normal affect     LABS:                        10.4   3.77  )-----------( 216      ( 2022 08:57 )             32.4     -    137  |  101  |  9.3  ----------------------------<  80  3.7   |  27.0  |  0.77    Ca    9.1      2022 08:57  Phos  2.7     -  Mg     2.6         TPro  x   /  Alb  3.8  /  TBili  x   /  DBili  x   /  AST  x   /  ALT  x   /  AlkPhos  x         Urinalysis Basic - ( 2022 04:35 )    Color: Yellow / Appearance: Clear / S.010 / pH: x  Gluc: x / Ketone: Negative  / Bili: Negative / Urobili: Negative mg/dL   Blood: x / Protein: Negative / Nitrite: Negative   Leuk Esterase: Trace / RBC: 3-5 /HPF / WBC 0-2 /HPF   Sq Epi: x / Non Sq Epi: Moderate / Bacteria: Few          MEDICATIONS  (STANDING):  acetaminophen     Tablet .. 650 milliGRAM(s) Oral once  ALPRAZolam 1.5 milliGRAM(s) Oral two times a day  bisacodyl Suppository 10 milliGRAM(s) Rectal daily  carBAMazepine 200 milliGRAM(s) Oral two times a day  ciprofloxacin   IVPB 200 milliGRAM(s) IV Intermittent every 12 hours  ciprofloxacin   IVPB      escitalopram 30 milliGRAM(s) Oral daily  furosemide    Tablet 20 milliGRAM(s) Oral daily  gabapentin 600 milliGRAM(s) Oral three times a day  heparin   Injectable 5000 Unit(s) SubCutaneous every 12 hours  lactulose Syrup 20 Gram(s) Oral two times a day  lamoTRIgine 50 milliGRAM(s) Oral two times a day  losartan 25 milliGRAM(s) Oral daily  metroNIDAZOLE  IVPB      metroNIDAZOLE  IVPB 500 milliGRAM(s) IV Intermittent every 8 hours  pantoprazole    Tablet 40 milliGRAM(s) Oral before breakfast  polyethylene glycol 3350 17 Gram(s) Oral daily  potassium chloride   Powder 20 milliEquivalent(s) Oral daily  senna 2 Tablet(s) Oral at bedtime  traZODone 50 milliGRAM(s) Oral daily    MEDICATIONS  (PRN):      RADIOLOGY & ADDITIONAL TESTS:   CATHLEEN CRUZ    [id number]    81y      Female    CC: abd pain     INTERVAL HPI/OVERNIGHT EVENTS: pt seen and examined. denies abd pain, n/v this am     REVIEW OF SYSTEMS:    CONSTITUTIONAL: No fever, weight loss, or fatigue  RESPIRATORY: No cough, wheezing, hemoptysis; No shortness of breath  CARDIOVASCULAR: No chest pain, palpitations  GASTROINTESTINAL: No abdominal or epigastric pain. No nausea, vomiting  NEUROLOGICAL: No headaches, memory loss, loss of strength.    Vital Signs Last 24 Hrs  T(C): 36.3 (2022 10:42), Max: 36.7 (2022 19:15)  T(F): 97.3 (2022 10:42), Max: 98 (2022 19:15)  HR: 76 (2022 10:42) (70 - 107)  BP: 125/69 (2022 10:42) (118/70 - 184/82)  BP(mean): --  RR: 18 (2022 10:42) (18 - 18)  SpO2: 95% (2022 10:42) (95% - 100%)    PHYSICAL EXAM:    GENERAL: NAD  HEENT:  +EOMI  NECK: soft, supple  CHEST/LUNG: Clear to auscultation bilaterally  HEART: S1S2+, Regular rate and rhythm  ABDOMEN: Soft, Nontender, Nondistended; Bowel sounds present  SKIN: warm, dry   NEURO: awake, alert; non-focal   PSYCH: normal affect     LABS:                        10.4   3.77  )-----------( 216      ( 2022 08:57 )             32.4     -    137  |  101  |  9.3  ----------------------------<  80  3.7   |  27.0  |  0.77    Ca    9.1      2022 08:57  Phos  2.7     -  Mg     2.6         TPro  x   /  Alb  3.8  /  TBili  x   /  DBili  x   /  AST  x   /  ALT  x   /  AlkPhos  x         Urinalysis Basic - ( 2022 04:35 )    Color: Yellow / Appearance: Clear / S.010 / pH: x  Gluc: x / Ketone: Negative  / Bili: Negative / Urobili: Negative mg/dL   Blood: x / Protein: Negative / Nitrite: Negative   Leuk Esterase: Trace / RBC: 3-5 /HPF / WBC 0-2 /HPF   Sq Epi: x / Non Sq Epi: Moderate / Bacteria: Few          MEDICATIONS  (STANDING):  acetaminophen     Tablet .. 650 milliGRAM(s) Oral once  ALPRAZolam 1.5 milliGRAM(s) Oral two times a day  bisacodyl Suppository 10 milliGRAM(s) Rectal daily  carBAMazepine 200 milliGRAM(s) Oral two times a day  ciprofloxacin   IVPB 200 milliGRAM(s) IV Intermittent every 12 hours  ciprofloxacin   IVPB      escitalopram 30 milliGRAM(s) Oral daily  furosemide    Tablet 20 milliGRAM(s) Oral daily  gabapentin 600 milliGRAM(s) Oral three times a day  heparin   Injectable 5000 Unit(s) SubCutaneous every 12 hours  lactulose Syrup 20 Gram(s) Oral two times a day  lamoTRIgine 50 milliGRAM(s) Oral two times a day  losartan 25 milliGRAM(s) Oral daily  metroNIDAZOLE  IVPB      metroNIDAZOLE  IVPB 500 milliGRAM(s) IV Intermittent every 8 hours  pantoprazole    Tablet 40 milliGRAM(s) Oral before breakfast  polyethylene glycol 3350 17 Gram(s) Oral daily  potassium chloride   Powder 20 milliEquivalent(s) Oral daily  senna 2 Tablet(s) Oral at bedtime  traZODone 50 milliGRAM(s) Oral daily    MEDICATIONS  (PRN):      RADIOLOGY & ADDITIONAL TESTS:  < from: CT Abdomen and Pelvis w/ IV Cont (22 @ 04:40) >    IMPRESSION:    Segmental thickening of the sigmoid colon in a region of innumerable   diverticula again demonstrated, most consistent with chronic diverticular   disease. Please note underlying positive cannot be excluded. This can be   further evaluated with colonoscopy.    Moderate stool filled colon.    No evidence for small bowel obstruction, significant ascites or free air.    < end of copied text >

## 2022-02-20 LAB
ANION GAP SERPL CALC-SCNC: 8 MMOL/L — SIGNIFICANT CHANGE UP (ref 5–17)
BUN SERPL-MCNC: 9.4 MG/DL — SIGNIFICANT CHANGE UP (ref 8–20)
CALCIUM SERPL-MCNC: 8.6 MG/DL — SIGNIFICANT CHANGE UP (ref 8.6–10.2)
CHLORIDE SERPL-SCNC: 101 MMOL/L — SIGNIFICANT CHANGE UP (ref 98–107)
CO2 SERPL-SCNC: 27 MMOL/L — SIGNIFICANT CHANGE UP (ref 22–29)
CREAT SERPL-MCNC: 0.74 MG/DL — SIGNIFICANT CHANGE UP (ref 0.5–1.3)
GLUCOSE SERPL-MCNC: 88 MG/DL — SIGNIFICANT CHANGE UP (ref 70–99)
HCT VFR BLD CALC: 31.7 % — LOW (ref 34.5–45)
HGB BLD-MCNC: 10.2 G/DL — LOW (ref 11.5–15.5)
MAGNESIUM SERPL-MCNC: 2.2 MG/DL — SIGNIFICANT CHANGE UP (ref 1.8–2.6)
MCHC RBC-ENTMCNC: 30.1 PG — SIGNIFICANT CHANGE UP (ref 27–34)
MCHC RBC-ENTMCNC: 32.2 GM/DL — SIGNIFICANT CHANGE UP (ref 32–36)
MCV RBC AUTO: 93.5 FL — SIGNIFICANT CHANGE UP (ref 80–100)
PLATELET # BLD AUTO: 218 K/UL — SIGNIFICANT CHANGE UP (ref 150–400)
POTASSIUM SERPL-MCNC: 3.7 MMOL/L — SIGNIFICANT CHANGE UP (ref 3.5–5.3)
POTASSIUM SERPL-SCNC: 3.7 MMOL/L — SIGNIFICANT CHANGE UP (ref 3.5–5.3)
RBC # BLD: 3.39 M/UL — LOW (ref 3.8–5.2)
RBC # FLD: 13 % — SIGNIFICANT CHANGE UP (ref 10.3–14.5)
SODIUM SERPL-SCNC: 136 MMOL/L — SIGNIFICANT CHANGE UP (ref 135–145)
WBC # BLD: 3.6 K/UL — LOW (ref 3.8–10.5)
WBC # FLD AUTO: 3.6 K/UL — LOW (ref 3.8–10.5)

## 2022-02-20 PROCEDURE — 99232 SBSQ HOSP IP/OBS MODERATE 35: CPT

## 2022-02-20 PROCEDURE — 99233 SBSQ HOSP IP/OBS HIGH 50: CPT

## 2022-02-20 RX ORDER — LOSARTAN POTASSIUM 100 MG/1
25 TABLET, FILM COATED ORAL ONCE
Refills: 0 | Status: COMPLETED | OUTPATIENT
Start: 2022-02-20 | End: 2022-02-20

## 2022-02-20 RX ORDER — METRONIDAZOLE 500 MG
500 TABLET ORAL EVERY 8 HOURS
Refills: 0 | Status: DISCONTINUED | OUTPATIENT
Start: 2022-02-20 | End: 2022-02-21

## 2022-02-20 RX ORDER — CIPROFLOXACIN LACTATE 400MG/40ML
500 VIAL (ML) INTRAVENOUS EVERY 12 HOURS
Refills: 0 | Status: DISCONTINUED | OUTPATIENT
Start: 2022-02-20 | End: 2022-02-21

## 2022-02-20 RX ORDER — LOSARTAN POTASSIUM 100 MG/1
50 TABLET, FILM COATED ORAL DAILY
Refills: 0 | Status: DISCONTINUED | OUTPATIENT
Start: 2022-02-21 | End: 2022-02-21

## 2022-02-20 RX ADMIN — Medication 200 MILLIGRAM(S): at 05:15

## 2022-02-20 RX ADMIN — Medication 100 MILLIGRAM(S): at 05:13

## 2022-02-20 RX ADMIN — POLYETHYLENE GLYCOL 3350 17 GRAM(S): 17 POWDER, FOR SOLUTION ORAL at 11:39

## 2022-02-20 RX ADMIN — GABAPENTIN 600 MILLIGRAM(S): 400 CAPSULE ORAL at 21:39

## 2022-02-20 RX ADMIN — Medication 200 MILLIGRAM(S): at 17:00

## 2022-02-20 RX ADMIN — Medication 10 MILLIGRAM(S): at 11:42

## 2022-02-20 RX ADMIN — Medication 1.5 MILLIGRAM(S): at 17:03

## 2022-02-20 RX ADMIN — LACTULOSE 20 GRAM(S): 10 SOLUTION ORAL at 17:01

## 2022-02-20 RX ADMIN — PANTOPRAZOLE SODIUM 40 MILLIGRAM(S): 20 TABLET, DELAYED RELEASE ORAL at 05:15

## 2022-02-20 RX ADMIN — Medication 500 MILLIGRAM(S): at 13:55

## 2022-02-20 RX ADMIN — Medication 50 MILLIGRAM(S): at 11:35

## 2022-02-20 RX ADMIN — Medication 500 MILLIGRAM(S): at 17:00

## 2022-02-20 RX ADMIN — Medication 20 MILLIGRAM(S): at 05:14

## 2022-02-20 RX ADMIN — ESCITALOPRAM OXALATE 30 MILLIGRAM(S): 10 TABLET, FILM COATED ORAL at 11:36

## 2022-02-20 RX ADMIN — Medication 500 MILLIGRAM(S): at 21:39

## 2022-02-20 RX ADMIN — LOSARTAN POTASSIUM 25 MILLIGRAM(S): 100 TABLET, FILM COATED ORAL at 18:39

## 2022-02-20 RX ADMIN — Medication 1.5 MILLIGRAM(S): at 05:15

## 2022-02-20 RX ADMIN — GABAPENTIN 600 MILLIGRAM(S): 400 CAPSULE ORAL at 11:37

## 2022-02-20 RX ADMIN — HEPARIN SODIUM 5000 UNIT(S): 5000 INJECTION INTRAVENOUS; SUBCUTANEOUS at 17:03

## 2022-02-20 RX ADMIN — LACTULOSE 20 GRAM(S): 10 SOLUTION ORAL at 05:14

## 2022-02-20 RX ADMIN — LAMOTRIGINE 50 MILLIGRAM(S): 25 TABLET, ORALLY DISINTEGRATING ORAL at 05:14

## 2022-02-20 RX ADMIN — Medication 20 MILLIEQUIVALENT(S): at 11:37

## 2022-02-20 RX ADMIN — HEPARIN SODIUM 5000 UNIT(S): 5000 INJECTION INTRAVENOUS; SUBCUTANEOUS at 05:14

## 2022-02-20 RX ADMIN — SENNA PLUS 2 TABLET(S): 8.6 TABLET ORAL at 21:39

## 2022-02-20 RX ADMIN — LAMOTRIGINE 50 MILLIGRAM(S): 25 TABLET, ORALLY DISINTEGRATING ORAL at 17:00

## 2022-02-20 RX ADMIN — LOSARTAN POTASSIUM 25 MILLIGRAM(S): 100 TABLET, FILM COATED ORAL at 05:14

## 2022-02-20 RX ADMIN — GABAPENTIN 600 MILLIGRAM(S): 400 CAPSULE ORAL at 05:14

## 2022-02-20 NOTE — PROGRESS NOTE ADULT - ASSESSMENT
81y/oF PMH HTN, HLD, Parkinson's disease, anxiety presenting with abd pain admitted with diverticulitis     Abd pain 2/2 mild diverticulitis   Constipation   -CT a/p: segmental thickening of sigmoid colon, moderate stool filled colon  -GI recs appreciated    -IV abx --> PO 2/20  -IVF   -cont lactulose, miralax, dulcolax  -in 2 weeks start benefiber 1 tbsp qd; f/u Dr. Velasco outpt for eventual colonoscopy   -advance diet as tolerated     HTN  -losartan     Parkinson's  Anxiety, depression  -cont home meds, on gabpentin, lamotrigine, trazadone, xanax     dispo: home likely next 24hrs if tolerating diet

## 2022-02-20 NOTE — PROGRESS NOTE ADULT - SUBJECTIVE AND OBJECTIVE BOX
CATHLEEN CRUZ    [id number]    81y      Female    CC: abd pain    INTERVAL HPI/OVERNIGHT EVENTS: pt seen and examined. denies abd pain. seen ambulating without difficulty     REVIEW OF SYSTEMS:    CONSTITUTIONAL: No fever, weight loss  RESPIRATORY: No cough, wheezing, hemoptysis; No shortness of breath  CARDIOVASCULAR: No chest pain, palpitations  GASTROINTESTINAL: No abdominal or epigastric pain. No nausea, vomiting  NEUROLOGICAL: No headaches    Vital Signs Last 24 Hrs  T(C): 36.6 (20 Feb 2022 04:50), Max: 36.6 (19 Feb 2022 17:05)  T(F): 97.8 (20 Feb 2022 04:50), Max: 97.9 (19 Feb 2022 17:05)  HR: 75 (20 Feb 2022 04:50) (75 - 85)  BP: 158/68 (20 Feb 2022 11:33) (142/69 - 170/88)  BP(mean): --  RR: 18 (20 Feb 2022 04:50) (18 - 18)  SpO2: 98% (20 Feb 2022 04:50) (97% - 98%)    PHYSICAL EXAM:    GENERAL: NAD  HEENT: +EOMI  NECK: soft, supple  CHEST/LUNG: Clear to auscultation bilaterally; No wheezing  HEART: S1S2+, Regular rate and rhythm; No murmurs, rubs, or gallops  ABDOMEN: Soft, Nontender, Nondistended; Bowel sounds present  SKIN: warm, dry  NEURO: awake, alert; grossly non-focal  PSYCH: normal affect     LABS:                        10.2   3.60  )-----------( 218      ( 20 Feb 2022 06:58 )             31.7     02-20    136  |  101  |  9.4  ----------------------------<  88  3.7   |  27.0  |  0.74    Ca    8.6      20 Feb 2022 06:58  Phos  2.7     02-19  Mg     2.2     02-20    TPro  x   /  Alb  3.8  /  TBili  x   /  DBili  x   /  AST  x   /  ALT  x   /  AlkPhos  x   02-19            MEDICATIONS  (STANDING):  ALPRAZolam 1.5 milliGRAM(s) Oral two times a day  bisacodyl Suppository 10 milliGRAM(s) Rectal daily  carBAMazepine 200 milliGRAM(s) Oral two times a day  ciprofloxacin     Tablet 500 milliGRAM(s) Oral every 12 hours  escitalopram 30 milliGRAM(s) Oral daily  furosemide    Tablet 20 milliGRAM(s) Oral daily  gabapentin 600 milliGRAM(s) Oral three times a day  heparin   Injectable 5000 Unit(s) SubCutaneous every 12 hours  lactulose Syrup 20 Gram(s) Oral two times a day  lamoTRIgine 50 milliGRAM(s) Oral two times a day  losartan 25 milliGRAM(s) Oral daily  metroNIDAZOLE    Tablet 500 milliGRAM(s) Oral every 8 hours  pantoprazole    Tablet 40 milliGRAM(s) Oral before breakfast  polyethylene glycol 3350 17 Gram(s) Oral daily  potassium chloride   Powder 20 milliEquivalent(s) Oral daily  senna 2 Tablet(s) Oral at bedtime  traZODone 50 milliGRAM(s) Oral daily    MEDICATIONS  (PRN):      RADIOLOGY & ADDITIONAL TESTS:

## 2022-02-20 NOTE — PROGRESS NOTE ADULT - ATTENDING COMMENTS
She is doing well with no further abdominal pain.  She had at least one bowel movement since starting the lactulose.  At this time I will switch her antibiotics to oral Cipro and Flagyl.  She should also be discharged on lactulose 20 g p.o. twice daily and MiraLAX 17 g in 8 ounces of water every evening.  In 2 weeks she should start Benefiber 1 tablespoon in 6 to 8 ounces of fluid daily.  She should follow-up with Dr. Velasco as outpatient for an eventual colonoscopy.  We will see only as needed your request while in hospital.  Okay from GI standpoint for discharge.

## 2022-02-20 NOTE — PROGRESS NOTE ADULT - PROBLEM SELECTOR PLAN 1
Abdominal pain now resolved and most likely secondary to a low grade uncomplicated diverticulitis vs severe sigmoid diverticulosis. CT of abdomen and pelvis revealed chronic diverticulosis with  no evidence of diverticulitis, abscess or perforation. Moderate stool filled colon.  Continue IV antibiotics Cipro and flagyl   Continue Full liquid diet  Miralax, Lactulose 20mg BID & Dulcolax sup for constipation.  Zofran PRN for nausea. Abdominal pain now resolved and most likely secondary to a low grade uncomplicated diverticulitis vs severe sigmoid diverticulosis. CT of abdomen and pelvis revealed chronic diverticulosis with  no evidence of diverticulitis, abscess or perforation. Moderate stool filled colon.  will switch to oral antibiotics  will advance diet  Miralax, Lactulose 20mg BID & Dulcolax sup for constipation.  Zofran PRN for nausea.

## 2022-02-20 NOTE — PROGRESS NOTE ADULT - SUBJECTIVE AND OBJECTIVE BOX
Chief Complaint:  Patient is a 81y old  Female who presents with a chief complaint of Acute Diverticulitis. GI follow up for abdominal pain and Radiology finding of segmental sigmoid colon wall thickening as well as constipation        Interval Events / Subjective: Patient seen and evaluated at bedside, reporting no complaints, no overnight events. Denies nausea, vomiting, abdominal pain, chest pain, shortness of breath, hematemesis, hematochezia, melena. Lactulose 20mg 2xday was added to bowel regimen yesterday. As per nursing staff, patient had 1 small brown BM last night. Hemoglobin stable at 10.2.       REVIEW OF SYSTEMS:   General: Negative  HEENT: Negative  CV: Negative  Respiratory: Negative  GI: See HPI  : Negative  MSK: Negative  Hematologic: Negative  Skin: Negative    MEDICATIONS:   MEDICATIONS  (STANDING):  ALPRAZolam 1.5 milliGRAM(s) Oral two times a day  bisacodyl Suppository 10 milliGRAM(s) Rectal daily  carBAMazepine 200 milliGRAM(s) Oral two times a day  ciprofloxacin   IVPB 200 milliGRAM(s) IV Intermittent every 12 hours  ciprofloxacin   IVPB      escitalopram 30 milliGRAM(s) Oral daily  furosemide    Tablet 20 milliGRAM(s) Oral daily  gabapentin 600 milliGRAM(s) Oral three times a day  heparin   Injectable 5000 Unit(s) SubCutaneous every 12 hours  lactulose Syrup 20 Gram(s) Oral two times a day  lamoTRIgine 50 milliGRAM(s) Oral two times a day  losartan 25 milliGRAM(s) Oral daily  metroNIDAZOLE  IVPB 500 milliGRAM(s) IV Intermittent every 8 hours  metroNIDAZOLE  IVPB      pantoprazole    Tablet 40 milliGRAM(s) Oral before breakfast  polyethylene glycol 3350 17 Gram(s) Oral daily  potassium chloride   Powder 20 milliEquivalent(s) Oral daily  senna 2 Tablet(s) Oral at bedtime  traZODone 50 milliGRAM(s) Oral daily    MEDICATIONS  (PRN):      ALLERGIES:   Allergies    No Known Allergies    Intolerances        VITAL SIGNS:   Vital Signs Last 24 Hrs  T(C): 36.6 (20 Feb 2022 04:50), Max: 36.6 (19 Feb 2022 17:05)  T(F): 97.8 (20 Feb 2022 04:50), Max: 97.9 (19 Feb 2022 17:05)  HR: 75 (20 Feb 2022 04:50) (75 - 85)  BP: 170/88 (20 Feb 2022 04:50) (125/69 - 170/88)  BP(mean): --  RR: 18 (20 Feb 2022 04:50) (18 - 18)  SpO2: 98% (20 Feb 2022 04:50) (95% - 98%)  I&O's Summary    19 Feb 2022 07:01  -  20 Feb 2022 07:00  --------------------------------------------------------  IN: 200 mL / OUT: 0 mL / NET: 200 mL        PHYSICAL EXAM:   GENERAL:  Elderly & thin appearing female, no acute distress  HEENT:  NC/AT,  conjunctivae clear, sclera -anicteric  CHEST:  Full & symmetric excursion, no increased effort, breath sounds clear  HEART:  Regular rhythm, S1, S2, no murmur/rub/S3/S4,  no edema  ABDOMEN:  Soft, non-tender, non-distended, normoactive bowel sounds, No rebound tenderness, guarding or rigidity.   EXTREMITIES: No cyanosis, clubbing or edema  SKIN:  No rash/erythema/ecchymoses/petechiae/wounds/abscess/warm/dry  Psych: calm, appropriate affect      LABS:  CBC Full  -  ( 20 Feb 2022 06:58 )  WBC Count : 3.60 K/uL  RBC Count : 3.39 M/uL  Hemoglobin : 10.2 g/dL  Hematocrit : 31.7 %  Platelet Count - Automated : 218 K/uL  Mean Cell Volume : 93.5 fl  Mean Cell Hemoglobin : 30.1 pg  Mean Cell Hemoglobin Concentration : 32.2 gm/dL  Auto Neutrophil # : x  Auto Lymphocyte # : x  Auto Monocyte # : x  Auto Eosinophil # : x  Auto Basophil # : x  Auto Neutrophil % : x  Auto Lymphocyte % : x  Auto Monocyte % : x  Auto Eosinophil % : x  Auto Basophil % : x    02-20    136  |  101  |  9.4  ----------------------------<  88  3.7   |  27.0  |  0.74    Ca    8.6      20 Feb 2022 06:58  Phos  2.7     02-19  Mg     2.2     02-20    TPro  x   /  Alb  3.8  /  TBili  x   /  DBili  x   /  AST  x   /  ALT  x   /  AlkPhos  x   02-19    LIVER FUNCTIONS - ( 19 Feb 2022 08:57 )  Alb: 3.8 g/dL / Pro: x     / ALK PHOS: x     / ALT: x     / AST: x     / GGT: x                 Culture - Urine (collected 18 Feb 2022 12:32)  Source: Clean Catch Clean Catch (Midstream)  Final Report (19 Feb 2022 20:18):    >=3 organisms. Probable collection contamination.        RADIOLOGY & ADDITIONAL STUDIES (The following images were personally reviewed):    ACC: [id number] EXAM:  CT ABDOMEN AND PELVIS IC                          PROCEDURE DATE:  02/18/2022          INTERPRETATION:  CLINICAL INFORMATION:    CLINICAL INFORMATION:  LLQ abd pain    COMPARISON: None.  IV Contrast: Omnipaque 300  80 cc administered   20 cc discarded  Oral Contrast: NONE  Complications: None reported at time of study completion      PROCEDURE:  Axial CT images were acquired through the abdomen and pelvis following   intravenous contrast. Coronal and sagittal reformatted images provided.    FINDINGS:  LOWER CHEST: Bibasilar atelectasis and/or scarring.  LIVER: Left hepatic cyst to 4 cm again seen. Additional tiny   low-attenuation hepatic structures are too small to characterize. Right   hepatic prominence again seen.  BILE DUCTS: Normal caliber.  GALLBLADDER: No calcified gallstone.  SPLEEN: Within normal limits.  PANCREAS: Mild pancreatic ductal prominence of the 4 mm. Pancreatic   parenchyma is grossly unremarkable.  ADRENALS: Within normal limits.  KIDNEYS/URETERS: Kidneys enhance symmetrically without hydronephrosis.    BLADDER: Within normal limits.  REPRODUCTIVE ORGANS: Hysterectomy.    BOWEL: Evaluation of bowel limited without distention with oral contrast,   however there is no bowel obstruction. Segmental thickening in a region   of innumerable colonic diverticula involving the sigmoid colon is again   demonstrated suggesting chronic diverticular disease. No definite   superimposed acute diverticulitis. There is a moderate stool filled   colon. Stomach is underdistended for adequate evaluation, however there   is a small hiatal hernia.  PERITONEUM: No ascites.  VESSELS:  Within normal limits.  RETROPERITONEUM: No lymphadenopathy.  ABDOMINAL WALL: Small fat-containing right inguinal hernia.  BONES: Status post vertebroplasty of L1 and L2. Scoliosis.    IMPRESSION:    Segmental thickening of the sigmoid colon in a region of innumerable   diverticula again demonstrated, most consistent with chronic diverticular   disease. Please note underlying positive cannot be excluded. This can be   further evaluated with colonoscopy.    Moderate stool filled colon.    No evidence for small bowel obstruction, significant ascites or free air.    --- End of Report ---            SHAWNA HER MD; Attending Radiologist  This document has been electronically signed. Feb 18 2022  5:11AM

## 2022-02-21 ENCOUNTER — TRANSCRIPTION ENCOUNTER (OUTPATIENT)
Age: 82
End: 2022-02-21

## 2022-02-21 VITALS
OXYGEN SATURATION: 96 % | TEMPERATURE: 98 F | RESPIRATION RATE: 18 BRPM | HEART RATE: 79 BPM | DIASTOLIC BLOOD PRESSURE: 77 MMHG | SYSTOLIC BLOOD PRESSURE: 150 MMHG

## 2022-02-21 PROCEDURE — 80053 COMPREHEN METABOLIC PANEL: CPT

## 2022-02-21 PROCEDURE — 87086 URINE CULTURE/COLONY COUNT: CPT

## 2022-02-21 PROCEDURE — 99285 EMERGENCY DEPT VISIT HI MDM: CPT | Mod: 25

## 2022-02-21 PROCEDURE — 85025 COMPLETE CBC W/AUTO DIFF WBC: CPT

## 2022-02-21 PROCEDURE — 83690 ASSAY OF LIPASE: CPT

## 2022-02-21 PROCEDURE — 0225U NFCT DS DNA&RNA 21 SARSCOV2: CPT

## 2022-02-21 PROCEDURE — 96374 THER/PROPH/DIAG INJ IV PUSH: CPT

## 2022-02-21 PROCEDURE — 82040 ASSAY OF SERUM ALBUMIN: CPT

## 2022-02-21 PROCEDURE — 93005 ELECTROCARDIOGRAM TRACING: CPT

## 2022-02-21 PROCEDURE — 71046 X-RAY EXAM CHEST 2 VIEWS: CPT

## 2022-02-21 PROCEDURE — 84100 ASSAY OF PHOSPHORUS: CPT

## 2022-02-21 PROCEDURE — 96375 TX/PRO/DX INJ NEW DRUG ADDON: CPT

## 2022-02-21 PROCEDURE — 80048 BASIC METABOLIC PNL TOTAL CA: CPT

## 2022-02-21 PROCEDURE — 36415 COLL VENOUS BLD VENIPUNCTURE: CPT

## 2022-02-21 PROCEDURE — 74177 CT ABD & PELVIS W/CONTRAST: CPT | Mod: MA

## 2022-02-21 PROCEDURE — 99239 HOSP IP/OBS DSCHRG MGMT >30: CPT

## 2022-02-21 PROCEDURE — 85027 COMPLETE CBC AUTOMATED: CPT

## 2022-02-21 PROCEDURE — 81001 URINALYSIS AUTO W/SCOPE: CPT

## 2022-02-21 PROCEDURE — 83735 ASSAY OF MAGNESIUM: CPT

## 2022-02-21 RX ORDER — LOSARTAN POTASSIUM 100 MG/1
1 TABLET, FILM COATED ORAL
Qty: 0 | Refills: 0 | DISCHARGE

## 2022-02-21 RX ORDER — CIPROFLOXACIN LACTATE 400MG/40ML
1 VIAL (ML) INTRAVENOUS
Qty: 8 | Refills: 0
Start: 2022-02-21 | End: 2022-02-24

## 2022-02-21 RX ORDER — METRONIDAZOLE 500 MG
1 TABLET ORAL
Qty: 12 | Refills: 0
Start: 2022-02-21 | End: 2022-02-24

## 2022-02-21 RX ORDER — POLYETHYLENE GLYCOL 3350 17 G/17G
17 POWDER, FOR SOLUTION ORAL
Qty: 238 | Refills: 0
Start: 2022-02-21 | End: 2022-03-06

## 2022-02-21 RX ORDER — LACTULOSE 10 G/15ML
30 SOLUTION ORAL
Qty: 840 | Refills: 0
Start: 2022-02-21 | End: 2022-03-06

## 2022-02-21 RX ORDER — LOSARTAN POTASSIUM 100 MG/1
1 TABLET, FILM COATED ORAL
Qty: 0 | Refills: 0 | DISCHARGE
Start: 2022-02-21

## 2022-02-21 RX ADMIN — GABAPENTIN 600 MILLIGRAM(S): 400 CAPSULE ORAL at 05:51

## 2022-02-21 RX ADMIN — HEPARIN SODIUM 5000 UNIT(S): 5000 INJECTION INTRAVENOUS; SUBCUTANEOUS at 05:49

## 2022-02-21 RX ADMIN — Medication 500 MILLIGRAM(S): at 05:51

## 2022-02-21 RX ADMIN — LOSARTAN POTASSIUM 50 MILLIGRAM(S): 100 TABLET, FILM COATED ORAL at 05:50

## 2022-02-21 RX ADMIN — PANTOPRAZOLE SODIUM 40 MILLIGRAM(S): 20 TABLET, DELAYED RELEASE ORAL at 05:52

## 2022-02-21 RX ADMIN — Medication 500 MILLIGRAM(S): at 05:50

## 2022-02-21 RX ADMIN — Medication 200 MILLIGRAM(S): at 05:50

## 2022-02-21 RX ADMIN — LAMOTRIGINE 50 MILLIGRAM(S): 25 TABLET, ORALLY DISINTEGRATING ORAL at 05:50

## 2022-02-21 RX ADMIN — Medication 1.5 MILLIGRAM(S): at 05:51

## 2022-02-21 RX ADMIN — Medication 20 MILLIGRAM(S): at 05:49

## 2022-02-21 NOTE — DISCHARGE NOTE PROVIDER - CARE PROVIDERS DIRECT ADDRESSES
,DirectAddress_Unknown,[email or web address]

## 2022-02-21 NOTE — DISCHARGE NOTE PROVIDER - NSDCMRMEDTOKEN_GEN_ALL_CORE_FT
ALPRAZolam 1 mg oral tablet: 1.5 tab(s) orally 2 times a day, As Needed  ciprofloxacin 500 mg oral tablet: 1 tab(s) orally every 12 hours  escitalopram 20 mg oral tablet: 1.5 tab(s) orally once a day  furosemide 20 mg oral tablet: 1 tab(s) orally once a day  gabapentin 600 mg oral tablet: 1 tab(s) orally 3 times a day  lactulose 10 g/15 mL oral syrup: 30 milliliter(s) orally 2 times a day  lamoTRIgine 25 mg oral tablet: 2 tab(s) orally 2 times a day  losartan 50 mg oral tablet: 1 tab(s) orally once a day  metroNIDAZOLE 500 mg oral tablet: 1 tab(s) orally every 8 hours  pantoprazole 40 mg oral delayed release tablet: 1 tab(s) orally once a day   polyethylene glycol 3350 oral powder for reconstitution: 17 gram(s) orally once a day  potassium chloride 20 mEq oral powder for reconstitution: 1 each orally once a day  TEGretol 200 mg oral tablet: 1 tab(s) orally 2 times a day  traZODone 50 mg oral tablet: 1 tab(s) orally once a day

## 2022-02-21 NOTE — DISCHARGE NOTE PROVIDER - CARE PROVIDER_API CALL
Thalia Blake)  Family Medicine  [address] [place] [address]  Phone: [phone]  Fax: [phone]  Follow Up Time:     Lance Velasco)  Gastroenterology; Internal Medicine  [address] [facility], [facility] [address]  [place] [address]  Phone: [phone]  Fax: [phone]  Follow Up Time: 2 weeks

## 2022-02-21 NOTE — DISCHARGE NOTE PROVIDER - NSDCCONDITION_GEN_ALL_CORE
Last OV: 4/8/19  Dx: Tracheobronchomalacia    At last OV Dr. Nixon states:          Respiratory culture and smear are final. Please advise on results.    Stable

## 2022-02-21 NOTE — DISCHARGE NOTE NURSING/CASE MANAGEMENT/SOCIAL WORK - HISTORY OF COVID-19 VACCINATION
Care Management Enrollment Note    Patient was made aware of eligibility for the Care Management Program and provided with the rationale for how the patient was identified due to pharmacy data . Patient made aware of the ability to opt out of the Care Management Program at any time.    Clinical History:    0 hospital visits within past 12 months.   Hospital admission dates and admitting diagnosis include:  n/a    Patient has no history of  Major/Surgical Procedures.  Type and dates of Major/Surgical Procedures: N/A    Screening completed for COVID -19 using the [facility] Symptom . Screening is Negative for symptoms    Current Issues/Problems reviewed on call:  OPCM initial outreach; Education    Details for Interventions/Education completed on call:   Diabetes screening started. Bikes daily. Poor diet choices due to sister visiting and eating out. BG has been over 150's, up to 200's therefore states restarted her insulin and monitoring closely, QID. Encouraged medication adherence. Reviewed when to contact physician or seek urgent medical assistance. Denies any hypo/hyperglycemia symptoms. Lowest number has been in 80s, never reaching 300. Confirmed upcoming appointments, encouraged to keep. Seeing Podiatry regulargly and Ophthalmology annually. Left HA. A1C reviewed. Education sent.    X   Patient assessed for life planning activities. Patient has Medical POA completed. Encouraged to bring in  N/A   Patient assessed for life planning activities and declines to complete at this time.   N/A   Patient assessed for life planning activities. Patient provided with life-planning information: n/a    Time Frame for Follow up:  Within: within 2-3 weeks    Plan for Next Call: TRISTON Dm educator; Education; Monthly    Care Plan reviewed with Patient and she agrees with the plan of care and the call follow up plan.    Care Plan Reviewed with Wendy Guzman DO on 6/11/2021 via ELODIA Garibay RN,  BSN  Outpatient Care Management  [facility]  Maribel DUGGAN  M:[phone]      Yes

## 2022-02-21 NOTE — DISCHARGE NOTE NURSING/CASE MANAGEMENT/SOCIAL WORK - PATIENT PORTAL LINK FT
You can access the FollowMyHealth Patient Portal offered by [facility] by registering at the following website: [email or web address]. By joining [employer]’s FollowMyHealth portal, you will also be able to view your health information using other applications (apps) compatible with our system.

## 2022-02-21 NOTE — DISCHARGE NOTE PROVIDER - ATTENDING DISCHARGE PHYSICAL EXAMINATION:
Vital Signs Last 24 Hrs  T(C): 36.6 (21 Feb 2022 04:47), Max: 36.7 (20 Feb 2022 11:33)  T(F): 97.8 (21 Feb 2022 04:47), Max: 98.1 (20 Feb 2022 17:14)  HR: 84 (21 Feb 2022 04:47) (77 - 88)  BP: 176/91 (21 Feb 2022 04:47) (158/68 - 188/91)  BP(mean): --  RR: 18 (21 Feb 2022 04:47) (18 - 18)  SpO2: 97% (21 Feb 2022 04:47) (96% - 99%)    CONSTITUTIONAL: NAD  EYES: +EOMI  CARDIAC: Regular rate, regular rhythm.  normal +S1, S2.  No murmurs, rubs or gallops.  RESPIRATORY: Clear to auscultation bilaterally  GASTROINTESTINAL: Abdomen soft, non-tender, no guarding.  NEUROLOGICAL: Awake, alert; grossly non-focal  SKIN: warm, dry, no rashes noted  PSYCHIATRIC: calm, cooperative

## 2022-02-21 NOTE — DISCHARGE NOTE NURSING/CASE MANAGEMENT/SOCIAL WORK - NSDCPEFALRISK_GEN_ALL_CORE
For information on Fall & Injury Prevention, visit: [email or web address] OR  [email or web address] OR  https://www.cdc.gov/steadi/patient.html

## 2022-02-21 NOTE — DISCHARGE NOTE PROVIDER - HOSPITAL COURSE
81y/oF PMH HTN, HLD, Parkinson's Disease presenting with abd pain to LLQ and nausea x1 week. Additionally c/o constipation. CT abd/pelvis showed chronic diverticulosis with no evidence diverticulitis, abscess, perforation. Started on abx. Seen by GI. Bowel regimen increased; to dc with miralax qd, lactulose bid. to start benefiber qd in 2 weeks. Pt tolerating diet, pain improved. Pt stable for dc home.

## 2022-02-21 NOTE — DISCHARGE NOTE PROVIDER - NSDCFUADDAPPT_GEN_ALL_CORE_FT
Follow up with GI, Dr. Velasco  Continue lactulose 20g twice daily and miralax 17g in 8 ounces of water every evening.   You should also start Benefiber 1 tablespoon in 6-8 ounces of fluid daily in 2 weeks time.     Additionally follow up with your primary care physician in 7-10 days

## 2022-02-27 ENCOUNTER — EMERGENCY (EMERGENCY)
Facility: HOSPITAL | Age: 82
LOS: 1 days | Discharge: HOME | End: 2022-02-27
Attending: STUDENT IN AN ORGANIZED HEALTH CARE EDUCATION/TRAINING PROGRAM
Payer: MEDICARE

## 2022-02-27 VITALS
TEMPERATURE: 98 F | DIASTOLIC BLOOD PRESSURE: 95 MMHG | RESPIRATION RATE: 18 BRPM | OXYGEN SATURATION: 98 % | HEIGHT: 66 IN | WEIGHT: 130.07 LBS | SYSTOLIC BLOOD PRESSURE: 171 MMHG | HEART RATE: 94 BPM

## 2022-02-27 VITALS
OXYGEN SATURATION: 97 % | DIASTOLIC BLOOD PRESSURE: 91 MMHG | HEART RATE: 61 BPM | TEMPERATURE: 98 F | SYSTOLIC BLOOD PRESSURE: 174 MMHG | RESPIRATION RATE: 16 BRPM

## 2022-02-27 DIAGNOSIS — Z98.890 OTHER SPECIFIED POSTPROCEDURAL STATES: Chronic | ICD-10-CM

## 2022-02-27 DIAGNOSIS — Z90.722 ACQUIRED ABSENCE OF OVARIES, BILATERAL: Chronic | ICD-10-CM

## 2022-02-27 DIAGNOSIS — Z98.49 CATARACT EXTRACTION STATUS, UNSPECIFIED EYE: Chronic | ICD-10-CM

## 2022-02-27 LAB
ANION GAP SERPL CALC-SCNC: 13 MMOL/L — SIGNIFICANT CHANGE UP (ref 5–17)
BASOPHILS # BLD AUTO: 0.03 K/UL — SIGNIFICANT CHANGE UP (ref 0–0.2)
BASOPHILS NFR BLD AUTO: 0.9 % — SIGNIFICANT CHANGE UP (ref 0–2)
BUN SERPL-MCNC: 11.1 MG/DL — SIGNIFICANT CHANGE UP (ref 8–20)
CALCIUM SERPL-MCNC: 9 MG/DL — SIGNIFICANT CHANGE UP (ref 8.6–10.2)
CHLORIDE SERPL-SCNC: 100 MMOL/L — SIGNIFICANT CHANGE UP (ref 98–107)
CO2 SERPL-SCNC: 24 MMOL/L — SIGNIFICANT CHANGE UP (ref 22–29)
CREAT SERPL-MCNC: 1.01 MG/DL — SIGNIFICANT CHANGE UP (ref 0.5–1.3)
EOSINOPHIL # BLD AUTO: 0.02 K/UL — SIGNIFICANT CHANGE UP (ref 0–0.5)
EOSINOPHIL NFR BLD AUTO: 0.6 % — SIGNIFICANT CHANGE UP (ref 0–6)
GLUCOSE SERPL-MCNC: 91 MG/DL — SIGNIFICANT CHANGE UP (ref 70–99)
HCT VFR BLD CALC: 35.2 % — SIGNIFICANT CHANGE UP (ref 34.5–45)
HGB BLD-MCNC: 11.3 G/DL — LOW (ref 11.5–15.5)
IMM GRANULOCYTES NFR BLD AUTO: 0.3 % — SIGNIFICANT CHANGE UP (ref 0–1.5)
LYMPHOCYTES # BLD AUTO: 0.85 K/UL — LOW (ref 1–3.3)
LYMPHOCYTES # BLD AUTO: 25.5 % — SIGNIFICANT CHANGE UP (ref 13–44)
MCHC RBC-ENTMCNC: 30.1 PG — SIGNIFICANT CHANGE UP (ref 27–34)
MCHC RBC-ENTMCNC: 32.1 GM/DL — SIGNIFICANT CHANGE UP (ref 32–36)
MCV RBC AUTO: 93.6 FL — SIGNIFICANT CHANGE UP (ref 80–100)
MONOCYTES # BLD AUTO: 0.44 K/UL — SIGNIFICANT CHANGE UP (ref 0–0.9)
MONOCYTES NFR BLD AUTO: 13.2 % — SIGNIFICANT CHANGE UP (ref 2–14)
NEUTROPHILS # BLD AUTO: 1.98 K/UL — SIGNIFICANT CHANGE UP (ref 1.8–7.4)
NEUTROPHILS NFR BLD AUTO: 59.5 % — SIGNIFICANT CHANGE UP (ref 43–77)
PLATELET # BLD AUTO: 241 K/UL — SIGNIFICANT CHANGE UP (ref 150–400)
POTASSIUM SERPL-MCNC: 3.7 MMOL/L — SIGNIFICANT CHANGE UP (ref 3.5–5.3)
POTASSIUM SERPL-SCNC: 3.7 MMOL/L — SIGNIFICANT CHANGE UP (ref 3.5–5.3)
RBC # BLD: 3.76 M/UL — LOW (ref 3.8–5.2)
RBC # FLD: 12.6 % — SIGNIFICANT CHANGE UP (ref 10.3–14.5)
SODIUM SERPL-SCNC: 137 MMOL/L — SIGNIFICANT CHANGE UP (ref 135–145)
WBC # BLD: 3.33 K/UL — LOW (ref 3.8–10.5)
WBC # FLD AUTO: 3.33 K/UL — LOW (ref 3.8–10.5)

## 2022-02-27 PROCEDURE — 36415 COLL VENOUS BLD VENIPUNCTURE: CPT

## 2022-02-27 PROCEDURE — 99284 EMERGENCY DEPT VISIT MOD MDM: CPT | Mod: 25

## 2022-02-27 PROCEDURE — 85025 COMPLETE CBC W/AUTO DIFF WBC: CPT

## 2022-02-27 PROCEDURE — 70450 CT HEAD/BRAIN W/O DYE: CPT | Mod: MA

## 2022-02-27 PROCEDURE — 70450 CT HEAD/BRAIN W/O DYE: CPT | Mod: 26,MA

## 2022-02-27 PROCEDURE — 99285 EMERGENCY DEPT VISIT HI MDM: CPT

## 2022-02-27 PROCEDURE — 80048 BASIC METABOLIC PNL TOTAL CA: CPT

## 2022-02-27 RX ORDER — METOCLOPRAMIDE HCL 10 MG
10 TABLET ORAL ONCE
Refills: 0 | Status: COMPLETED | OUTPATIENT
Start: 2022-02-27 | End: 2022-02-27

## 2022-02-27 RX ORDER — DIPHENHYDRAMINE HCL 50 MG
25 CAPSULE ORAL ONCE
Refills: 0 | Status: COMPLETED | OUTPATIENT
Start: 2022-02-27 | End: 2022-02-27

## 2022-02-27 RX ORDER — DIAZEPAM 5 MG
5 TABLET ORAL ONCE
Refills: 0 | Status: DISCONTINUED | OUTPATIENT
Start: 2022-02-27 | End: 2022-02-27

## 2022-02-27 RX ORDER — KETOROLAC TROMETHAMINE 30 MG/ML
15 SYRINGE (ML) INJECTION ONCE
Refills: 0 | Status: DISCONTINUED | OUTPATIENT
Start: 2022-02-27 | End: 2022-02-27

## 2022-02-27 RX ORDER — ACETAMINOPHEN 500 MG
1000 TABLET ORAL ONCE
Refills: 0 | Status: COMPLETED | OUTPATIENT
Start: 2022-02-27 | End: 2022-02-27

## 2022-02-27 RX ORDER — SODIUM CHLORIDE 9 MG/ML
1000 INJECTION INTRAMUSCULAR; INTRAVENOUS; SUBCUTANEOUS ONCE
Refills: 0 | Status: COMPLETED | OUTPATIENT
Start: 2022-02-27 | End: 2022-02-27

## 2022-02-27 RX ADMIN — Medication 1000 MILLIGRAM(S): at 08:19

## 2022-02-27 RX ADMIN — Medication 25 MILLIGRAM(S): at 06:17

## 2022-02-27 RX ADMIN — Medication 10 MILLIGRAM(S): at 06:17

## 2022-02-27 RX ADMIN — SODIUM CHLORIDE 1000 MILLILITER(S): 9 INJECTION INTRAMUSCULAR; INTRAVENOUS; SUBCUTANEOUS at 06:17

## 2022-02-27 RX ADMIN — Medication 15 MILLIGRAM(S): at 06:17

## 2022-02-27 RX ADMIN — Medication 400 MILLIGRAM(S): at 07:47

## 2022-02-27 RX ADMIN — SODIUM CHLORIDE 1000 MILLILITER(S): 9 INJECTION INTRAMUSCULAR; INTRAVENOUS; SUBCUTANEOUS at 07:32

## 2022-02-27 RX ADMIN — Medication 5 MILLIGRAM(S): at 07:47

## 2022-02-27 RX ADMIN — Medication 15 MILLIGRAM(S): at 07:32

## 2022-02-27 NOTE — ED PROVIDER NOTE - ATTENDING CONTRIBUTION TO CARE
3 days of headache with normal CT imaging, no neurologic deficits, no findings concerning for SAH or thrombosis. Multimodal analgesia, reassess after treatment.

## 2022-02-27 NOTE — ED ADULT NURSE REASSESSMENT NOTE - NS ED NURSE REASSESS COMMENT FT1
Pt is Pt is alert and oriented to person, place, time and situation., pt discharged home with  at bedside, IV removed, catheter in tact, bleeding controlled, review of discharge plan and follow up care, pt educated on medications administered and desired effects and expected duration of relief, pt states that her symptoms have resolved, pt ambulated to the restroom without any difficulty and without any gait issues, pt states understanding of education and education deemed successful after teach back shows proficiency.

## 2022-02-27 NOTE — ED ADULT NURSE NOTE - OBJECTIVE STATEMENT
Pt reports to the ED for headache x3 days, pt states she has also been  nauseous over the past month but states she has Diverticulitis. Pt states her headache is located on the occipital area. PT denies vision changes at this time but states "sometimes I have blurry vision." Pt states she has taken Tylenol for her pain without relief. Pt also states she has occasional dizziness  that "comes and goes." Pt denies any chest pain/SOB. Pt denies any injury.

## 2022-02-27 NOTE — ED PROVIDER NOTE - PHYSICAL EXAMINATION
Gen: Well appearing in NAD  Head: NC/AT  Neck: trachea midline  Resp:  No distress  Cardiac: Heart RRR, no murmur.   Ext: no deformities  Neuro:  A&O, no focal neurologic deficits on exam.   Skin:  Warm and dry as visualized  Psych:  Normal affect and mood

## 2022-02-27 NOTE — ED PROVIDER NOTE - CLINICAL SUMMARY MEDICAL DECISION MAKING FREE TEXT BOX
80 yo female presents with ha x 3 days. Will obtain ct head, basic labs. IVF, Meclizine for symptom control. Monitor and reassess.

## 2022-02-27 NOTE — ED PROVIDER NOTE - OBJECTIVE STATEMENT
80 yo female hx of Diverticulitis, HTN, Trigeminal Neuralgia presents with headache over the past three days. She states that she has been experiencing an occipital headache over the past several days. The headache is pounding in nature, does not radiate. She has been taking Tylenol for her pain, last took Tylenol earlier this am. Patient states that she feels increasingly nauseous. Reports occasional blurred vision. Denies fever/chills, cough, sore throat, cp, vomiting, diarrhea, focal weakness/numbness/tingling in extremities.

## 2022-02-27 NOTE — ED ADULT TRIAGE NOTE - DIRECT TO ROOM CARE INITIATED:
27-Feb-2022 05:11 Complex Repair And Modified Advancement Flap Text: The defect edges were debeveled with a #15 scalpel blade.  The primary defect was closed partially with a complex linear closure.  Given the location of the remaining defect, shape of the defect and the proximity to free margins a modified advancement flap was deemed most appropriate for complete closure of the defect.  Using a sterile surgical marker, an appropriate advancement flap was drawn incorporating the defect and placing the expected incisions within the relaxed skin tension lines where possible.    The area thus outlined was incised deep to adipose tissue with a #15 scalpel blade.  The skin margins were undermined to an appropriate distance in all directions utilizing iris scissors.

## 2022-02-27 NOTE — ED PROVIDER NOTE - PROGRESS NOTE DETAILS
patient reports improved symptoms. will dc with outpatient follow up. patient states she has good follow up with neurology. -DO [place]

## 2022-02-27 NOTE — ED ADULT NURSE NOTE - BEFAST EYES
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

## 2022-02-27 NOTE — ED ADULT TRIAGE NOTE - CHIEF COMPLAINT QUOTE
Ambulatory reporting HA x3 days with associated nausea but no vomiting. Denies photosensitivity, vision changes, dizziness, fall. Neuro in tact. Last took Tylenol yesterday without relief. Also took an aspirin. PMH trigeminal neuralgia (taking gabapentin and tegretol), depression. Hypertensive in triage.

## 2022-02-27 NOTE — ED PROVIDER NOTE - PATIENT PORTAL LINK FT
You can access the FollowMyHealth Patient Portal offered by [facility] by registering at the following website: [email or web address]. By joining [employer]’s FollowMyHealth portal, you will also be able to view your health information using other applications (apps) compatible with our system.

## 2022-03-31 ENCOUNTER — EMERGENCY (EMERGENCY)
Facility: HOSPITAL | Age: 82
LOS: 1 days | Discharge: HOME | End: 2022-03-31
Attending: STUDENT IN AN ORGANIZED HEALTH CARE EDUCATION/TRAINING PROGRAM
Payer: MEDICARE

## 2022-03-31 VITALS
DIASTOLIC BLOOD PRESSURE: 77 MMHG | SYSTOLIC BLOOD PRESSURE: 122 MMHG | OXYGEN SATURATION: 99 % | HEIGHT: 66 IN | TEMPERATURE: 97 F | HEART RATE: 78 BPM | WEIGHT: 119.93 LBS | RESPIRATION RATE: 18 BRPM

## 2022-03-31 VITALS
OXYGEN SATURATION: 99 % | HEART RATE: 64 BPM | DIASTOLIC BLOOD PRESSURE: 78 MMHG | RESPIRATION RATE: 18 BRPM | TEMPERATURE: 98 F | SYSTOLIC BLOOD PRESSURE: 173 MMHG

## 2022-03-31 DIAGNOSIS — Z98.890 OTHER SPECIFIED POSTPROCEDURAL STATES: Chronic | ICD-10-CM

## 2022-03-31 DIAGNOSIS — Z98.49 CATARACT EXTRACTION STATUS, UNSPECIFIED EYE: Chronic | ICD-10-CM

## 2022-03-31 DIAGNOSIS — Z90.722 ACQUIRED ABSENCE OF OVARIES, BILATERAL: Chronic | ICD-10-CM

## 2022-03-31 LAB
ALBUMIN SERPL ELPH-MCNC: 3.9 G/DL — SIGNIFICANT CHANGE UP (ref 3.3–5.2)
ALP SERPL-CCNC: 80 U/L — SIGNIFICANT CHANGE UP (ref 40–120)
ALT FLD-CCNC: 12 U/L — SIGNIFICANT CHANGE UP
ANION GAP SERPL CALC-SCNC: 11 MMOL/L — SIGNIFICANT CHANGE UP (ref 5–17)
APPEARANCE UR: CLEAR — SIGNIFICANT CHANGE UP
APTT BLD: 31.7 SEC — SIGNIFICANT CHANGE UP (ref 27.5–35.5)
AST SERPL-CCNC: 17 U/L — SIGNIFICANT CHANGE UP
BACTERIA # UR AUTO: ABNORMAL
BASOPHILS # BLD AUTO: 0.03 K/UL — SIGNIFICANT CHANGE UP (ref 0–0.2)
BASOPHILS NFR BLD AUTO: 1 % — SIGNIFICANT CHANGE UP (ref 0–2)
BILIRUB SERPL-MCNC: <0.2 MG/DL — LOW (ref 0.4–2)
BILIRUB UR-MCNC: NEGATIVE — SIGNIFICANT CHANGE UP
BUN SERPL-MCNC: 15.1 MG/DL — SIGNIFICANT CHANGE UP (ref 8–20)
CALCIUM SERPL-MCNC: 9.2 MG/DL — SIGNIFICANT CHANGE UP (ref 8.6–10.2)
CHLORIDE SERPL-SCNC: 100 MMOL/L — SIGNIFICANT CHANGE UP (ref 98–107)
CO2 SERPL-SCNC: 25 MMOL/L — SIGNIFICANT CHANGE UP (ref 22–29)
COLOR SPEC: YELLOW — SIGNIFICANT CHANGE UP
CREAT SERPL-MCNC: 0.8 MG/DL — SIGNIFICANT CHANGE UP (ref 0.5–1.3)
DIFF PNL FLD: ABNORMAL
EGFR: 74 ML/MIN/1.73M2 — SIGNIFICANT CHANGE UP
EOSINOPHIL # BLD AUTO: 0.04 K/UL — SIGNIFICANT CHANGE UP (ref 0–0.5)
EOSINOPHIL NFR BLD AUTO: 1.3 % — SIGNIFICANT CHANGE UP (ref 0–6)
EPI CELLS # UR: SIGNIFICANT CHANGE UP
GLUCOSE SERPL-MCNC: 91 MG/DL — SIGNIFICANT CHANGE UP (ref 70–99)
GLUCOSE UR QL: NEGATIVE MG/DL — SIGNIFICANT CHANGE UP
HCT VFR BLD CALC: 34.4 % — LOW (ref 34.5–45)
HGB BLD-MCNC: 11.1 G/DL — LOW (ref 11.5–15.5)
IMM GRANULOCYTES NFR BLD AUTO: 0.3 % — SIGNIFICANT CHANGE UP (ref 0–1.5)
INR BLD: 0.93 RATIO — SIGNIFICANT CHANGE UP (ref 0.88–1.16)
KETONES UR-MCNC: NEGATIVE — SIGNIFICANT CHANGE UP
LEUKOCYTE ESTERASE UR-ACNC: ABNORMAL
LYMPHOCYTES # BLD AUTO: 1.01 K/UL — SIGNIFICANT CHANGE UP (ref 1–3.3)
LYMPHOCYTES # BLD AUTO: 32.3 % — SIGNIFICANT CHANGE UP (ref 13–44)
MAGNESIUM SERPL-MCNC: 2.2 MG/DL — SIGNIFICANT CHANGE UP (ref 1.6–2.6)
MCHC RBC-ENTMCNC: 30.4 PG — SIGNIFICANT CHANGE UP (ref 27–34)
MCHC RBC-ENTMCNC: 32.3 GM/DL — SIGNIFICANT CHANGE UP (ref 32–36)
MCV RBC AUTO: 94.2 FL — SIGNIFICANT CHANGE UP (ref 80–100)
MONOCYTES # BLD AUTO: 0.4 K/UL — SIGNIFICANT CHANGE UP (ref 0–0.9)
MONOCYTES NFR BLD AUTO: 12.8 % — SIGNIFICANT CHANGE UP (ref 2–14)
NEUTROPHILS # BLD AUTO: 1.64 K/UL — LOW (ref 1.8–7.4)
NEUTROPHILS NFR BLD AUTO: 52.3 % — SIGNIFICANT CHANGE UP (ref 43–77)
NITRITE UR-MCNC: NEGATIVE — SIGNIFICANT CHANGE UP
PH UR: 6.5 — SIGNIFICANT CHANGE UP (ref 5–8)
PLATELET # BLD AUTO: 215 K/UL — SIGNIFICANT CHANGE UP (ref 150–400)
POTASSIUM SERPL-MCNC: 4.2 MMOL/L — SIGNIFICANT CHANGE UP (ref 3.5–5.3)
POTASSIUM SERPL-SCNC: 4.2 MMOL/L — SIGNIFICANT CHANGE UP (ref 3.5–5.3)
PROT SERPL-MCNC: 6.5 G/DL — LOW (ref 6.6–8.7)
PROT UR-MCNC: 30 MG/DL
PROTHROM AB SERPL-ACNC: 10.8 SEC — SIGNIFICANT CHANGE UP (ref 10.5–13.4)
RBC # BLD: 3.65 M/UL — LOW (ref 3.8–5.2)
RBC # FLD: 12.4 % — SIGNIFICANT CHANGE UP (ref 10.3–14.5)
RBC CASTS # UR COMP ASSIST: SIGNIFICANT CHANGE UP /HPF (ref 0–4)
SODIUM SERPL-SCNC: 136 MMOL/L — SIGNIFICANT CHANGE UP (ref 135–145)
SP GR SPEC: 1.01 — SIGNIFICANT CHANGE UP (ref 1.01–1.02)
UROBILINOGEN FLD QL: NEGATIVE MG/DL — SIGNIFICANT CHANGE UP
WBC # BLD: 3.13 K/UL — LOW (ref 3.8–10.5)
WBC # FLD AUTO: 3.13 K/UL — LOW (ref 3.8–10.5)
WBC UR QL: SIGNIFICANT CHANGE UP /HPF (ref 0–5)

## 2022-03-31 PROCEDURE — 85730 THROMBOPLASTIN TIME PARTIAL: CPT

## 2022-03-31 PROCEDURE — 70450 CT HEAD/BRAIN W/O DYE: CPT | Mod: 26,MA

## 2022-03-31 PROCEDURE — 87086 URINE CULTURE/COLONY COUNT: CPT

## 2022-03-31 PROCEDURE — 80053 COMPREHEN METABOLIC PANEL: CPT

## 2022-03-31 PROCEDURE — 70450 CT HEAD/BRAIN W/O DYE: CPT | Mod: MA

## 2022-03-31 PROCEDURE — 96374 THER/PROPH/DIAG INJ IV PUSH: CPT

## 2022-03-31 PROCEDURE — 99284 EMERGENCY DEPT VISIT MOD MDM: CPT | Mod: 25

## 2022-03-31 PROCEDURE — 99285 EMERGENCY DEPT VISIT HI MDM: CPT

## 2022-03-31 PROCEDURE — 81001 URINALYSIS AUTO W/SCOPE: CPT

## 2022-03-31 PROCEDURE — 72125 CT NECK SPINE W/O DYE: CPT | Mod: MA

## 2022-03-31 PROCEDURE — 36415 COLL VENOUS BLD VENIPUNCTURE: CPT

## 2022-03-31 PROCEDURE — 85610 PROTHROMBIN TIME: CPT

## 2022-03-31 PROCEDURE — 72125 CT NECK SPINE W/O DYE: CPT | Mod: 26,MA

## 2022-03-31 PROCEDURE — 96375 TX/PRO/DX INJ NEW DRUG ADDON: CPT

## 2022-03-31 PROCEDURE — 85025 COMPLETE CBC W/AUTO DIFF WBC: CPT

## 2022-03-31 PROCEDURE — 83735 ASSAY OF MAGNESIUM: CPT

## 2022-03-31 RX ORDER — METOCLOPRAMIDE HCL 10 MG
10 TABLET ORAL ONCE
Refills: 0 | Status: COMPLETED | OUTPATIENT
Start: 2022-03-31 | End: 2022-03-31

## 2022-03-31 RX ORDER — DEXAMETHASONE 0.5 MG/5ML
4 ELIXIR ORAL ONCE
Refills: 0 | Status: DISCONTINUED | OUTPATIENT
Start: 2022-03-31 | End: 2022-04-04

## 2022-03-31 RX ORDER — MAGNESIUM SULFATE 500 MG/ML
2 VIAL (ML) INJECTION ONCE
Refills: 0 | Status: DISCONTINUED | OUTPATIENT
Start: 2022-03-31 | End: 2022-04-04

## 2022-03-31 RX ORDER — ACETAMINOPHEN 500 MG
975 TABLET ORAL ONCE
Refills: 0 | Status: COMPLETED | OUTPATIENT
Start: 2022-03-31 | End: 2022-03-31

## 2022-03-31 RX ORDER — DIPHENHYDRAMINE HCL 50 MG
25 CAPSULE ORAL ONCE
Refills: 0 | Status: COMPLETED | OUTPATIENT
Start: 2022-03-31 | End: 2022-03-31

## 2022-03-31 RX ORDER — KETOROLAC TROMETHAMINE 30 MG/ML
15 SYRINGE (ML) INJECTION ONCE
Refills: 0 | Status: DISCONTINUED | OUTPATIENT
Start: 2022-03-31 | End: 2022-03-31

## 2022-03-31 RX ADMIN — Medication 15 MILLIGRAM(S): at 11:35

## 2022-03-31 RX ADMIN — Medication 975 MILLIGRAM(S): at 11:35

## 2022-03-31 RX ADMIN — Medication 15 MILLIGRAM(S): at 10:42

## 2022-03-31 RX ADMIN — Medication 10 MILLIGRAM(S): at 10:42

## 2022-03-31 RX ADMIN — Medication 975 MILLIGRAM(S): at 10:50

## 2022-03-31 RX ADMIN — Medication 25 MILLIGRAM(S): at 10:42

## 2022-03-31 NOTE — ED PROVIDER NOTE - CLINICAL SUMMARY MEDICAL DECISION MAKING FREE TEXT BOX
81yF with pmh htn, depression, dementia, parkinson's, trigeminal neuralgia presenting with 4 days of headache. Patient neuro intact and is now 4 days from mechanical fall. No red flag signs for headache. Meds for pain control. Will check CTH and neck, lytes, H/H, urine and if normal will DC with neurology follow-up.

## 2022-03-31 NOTE — ED PROVIDER NOTE - NSFOLLOWUPCLINICS_GEN_ALL_ED_FT
[facility]  Concussion  [address] [place] [address]  Phone: [phone]  Fax:

## 2022-03-31 NOTE — ED PROVIDER NOTE - NSFOLLOWUPINSTRUCTIONS_ED_ALL_ED_FT
You were seen and evaluated in the emergency room for headache.    Please follow up with your primary care provider in the next few days.  You should also be evaluated by your neurologist.  You can call the number attached for an appointment at the concussion clinic.    Continue all home medications as prescribed.    Take 650mg tylenol every 6 hours as needed for pain.  Take 400mg ibuprofen every 6 hours as needed for pain, make sure to take with food.    Drink plenty of fluids to stay hydrated.    Return to the Emergency Department immediately if you have slurred speech, difficulty swallowing, change in vision, weakness in arms or legs, worsening headache, or any new and concerning symptoms or concerns.     Please read all attached.

## 2022-03-31 NOTE — ED PROVIDER NOTE - OBJECTIVE STATEMENT
81yF with pmh htn, depression, dementia, parkinson's, trigeminal neuralgia presenting with 4 days of headache. Patient reports headache as at the top of her head, is not radiating. Headache is not associated with visual changes, fever, LOC, nausea, vomiting. Reports she does not suffer from headaches. Took Tylenol 2 days ago but it didn't help so she has not taken anything since. Reports falling 4 days ago which was witnessed by . Reports patient lost her balance, tripped over edge of sidewalk and fell backwards. Denies LOC or bleeding. Patient not on AC, ASA, or plavix.

## 2022-03-31 NOTE — ED ADULT NURSE NOTE - NSIMPLEMENTINTERV_GEN_ALL_ED
Implemented All Fall Risk Interventions:  [place] to call system. Call bell, personal items and telephone within reach. Instruct patient to call for assistance. Room bathroom lighting operational. Non-slip footwear when patient is off stretcher. Physically safe environment: no spills, clutter or unnecessary equipment. Stretcher in lowest position, wheels locked, appropriate side rails in place. Provide visual cue, wrist band, yellow gown, etc. Monitor gait and stability. Monitor for mental status changes and reorient to person, place, and time. Review medications for side effects contributing to fall risk. Reinforce activity limits and safety measures with patient and family.

## 2022-03-31 NOTE — ED PROVIDER NOTE - ATTENDING CONTRIBUTION TO CARE
81yoF PMH HTN, dementia, trigeminal neuralgia presents with headache after tripping on a curb and falling backwards. No LOC. No change in vision or focal numbness or weakness. No fever, cough, n/v/d, abdominal pain, dysuria. No neck or back pain. Pt denies any a/c use, Took tylenol without relief 2 days ago. Pt's  at bedside.  Pt appears well on exam. Small tender hematoma on right parietal/occipital scalp. No open abrasions or ecchymosis. No midline spinal tenderness to palpation with full ROM of neck and extremity joints. No pelvis instability or tenderness. CN III-XII intact. A&Ox2-3.  Evaluated for possible metabolic/infectious etiology of fall but likely mechanical as it was witnessed. CTH and cspine without acute traumatic injuries noted. Likely concussive symptoms. Discussed symptomatic management, return precautions, and instructions for outpt f/u.

## 2022-03-31 NOTE — ED PROVIDER NOTE - PROGRESS NOTE DETAILS
MD Italia: no acute traumatic injuries noted on CT. pt had some residual headache but states it has improved since after medications. pt is ambulating. possible concussive injury. Discussed symptomatic management, return precautions, and instructions for outpt f/u at concussion clinic.

## 2022-03-31 NOTE — ED PROVIDER NOTE - PHYSICAL EXAMINATION
Gen: no acute distress  Head: normocephalic, atraumatic, neg angeles sign, no periorbital ecchymosis  EENT: EOMI, PERRLA, neck supple, no hemotympani; n oc-spine tenderenss  Lung: no increased work of breathing, CTABL, no wheezing, rales, rhonchi  CV: normal s1/s2, rrr, 2+ radial pulses b/l  Abd: soft, non-tender, non-distended, no rebound tenderness or guarding  MSK: No edema, no visible deformities, full range of motion in all 4 extremities  Neuro: A&Ox3; CNII-XII intact, UE: 5/5 strength throughtout b/l, LE: 5/5 strength throughout b/l, no dysmetria on FTN or HTS, normal sensation intact b/l, gait non-ataxic; resting tremor

## 2022-03-31 NOTE — ED PROVIDER NOTE - PATIENT PORTAL LINK FT
You can access the FollowMyHealth Patient Portal offered by [facility] by registering at the following website: [email or web address]. By joining [employer]’s FollowMyHealth portal, you will also be able to view your health information using other applications (apps) compatible with our system.

## 2022-03-31 NOTE — ED ADULT TRIAGE NOTE - CHIEF COMPLAINT QUOTE
pt c/o severe headache x the last 4 days, denies nausea, fell prior, 2 days later had headaches,  denies blood thinners or aspirin  A&Ox3, resp wnl

## 2022-04-02 LAB
CULTURE RESULTS: SIGNIFICANT CHANGE UP
SPECIMEN SOURCE: SIGNIFICANT CHANGE UP

## 2022-04-07 NOTE — ED ADULT TRIAGE NOTE - WEIGHT IN LBS
139.9 Information: Selecting Yes will display possible errors in your note based on the variables you have selected. This validation is only offered as a suggestion for you. PLEASE NOTE THAT THE VALIDATION TEXT WILL BE REMOVED WHEN YOU FINALIZE YOUR NOTE. IF YOU WANT TO FAX A PRELIMINARY NOTE YOU WILL NEED TO TOGGLE THIS TO 'NO' IF YOU DO NOT WANT IT IN YOUR FAXED NOTE.

## 2022-04-19 ENCOUNTER — EMERGENCY (EMERGENCY)
Facility: HOSPITAL | Age: 82
LOS: 1 days | Discharge: HOME | End: 2022-04-19
Attending: STUDENT IN AN ORGANIZED HEALTH CARE EDUCATION/TRAINING PROGRAM
Payer: MEDICARE

## 2022-04-19 VITALS
SYSTOLIC BLOOD PRESSURE: 165 MMHG | HEART RATE: 87 BPM | RESPIRATION RATE: 16 BRPM | OXYGEN SATURATION: 98 % | DIASTOLIC BLOOD PRESSURE: 90 MMHG | TEMPERATURE: 98 F

## 2022-04-19 VITALS
HEIGHT: 66 IN | DIASTOLIC BLOOD PRESSURE: 80 MMHG | RESPIRATION RATE: 16 BRPM | TEMPERATURE: 98 F | WEIGHT: 112.88 LBS | SYSTOLIC BLOOD PRESSURE: 148 MMHG | HEART RATE: 72 BPM | OXYGEN SATURATION: 99 %

## 2022-04-19 DIAGNOSIS — Z98.890 OTHER SPECIFIED POSTPROCEDURAL STATES: Chronic | ICD-10-CM

## 2022-04-19 DIAGNOSIS — Z90.722 ACQUIRED ABSENCE OF OVARIES, BILATERAL: Chronic | ICD-10-CM

## 2022-04-19 DIAGNOSIS — Z98.49 CATARACT EXTRACTION STATUS, UNSPECIFIED EYE: Chronic | ICD-10-CM

## 2022-04-19 LAB
ALBUMIN SERPL ELPH-MCNC: 4 G/DL — SIGNIFICANT CHANGE UP (ref 3.3–5.2)
ALP SERPL-CCNC: 107 U/L — SIGNIFICANT CHANGE UP (ref 40–120)
ALT FLD-CCNC: 47 U/L — HIGH
ANION GAP SERPL CALC-SCNC: 11 MMOL/L — SIGNIFICANT CHANGE UP (ref 5–17)
AST SERPL-CCNC: 48 U/L — HIGH
BILIRUB SERPL-MCNC: <0.2 MG/DL — LOW (ref 0.4–2)
BUN SERPL-MCNC: 15.5 MG/DL — SIGNIFICANT CHANGE UP (ref 8–20)
CALCIUM SERPL-MCNC: 9.1 MG/DL — SIGNIFICANT CHANGE UP (ref 8.6–10.2)
CHLORIDE SERPL-SCNC: 103 MMOL/L — SIGNIFICANT CHANGE UP (ref 98–107)
CO2 SERPL-SCNC: 24 MMOL/L — SIGNIFICANT CHANGE UP (ref 22–29)
CREAT SERPL-MCNC: 0.88 MG/DL — SIGNIFICANT CHANGE UP (ref 0.5–1.3)
EGFR: 66 ML/MIN/1.73M2 — SIGNIFICANT CHANGE UP
GLUCOSE SERPL-MCNC: 98 MG/DL — SIGNIFICANT CHANGE UP (ref 70–99)
HCT VFR BLD CALC: 35.5 % — SIGNIFICANT CHANGE UP (ref 34.5–45)
HGB BLD-MCNC: 11.1 G/DL — LOW (ref 11.5–15.5)
MCHC RBC-ENTMCNC: 29.6 PG — SIGNIFICANT CHANGE UP (ref 27–34)
MCHC RBC-ENTMCNC: 31.3 GM/DL — LOW (ref 32–36)
MCV RBC AUTO: 94.7 FL — SIGNIFICANT CHANGE UP (ref 80–100)
PLATELET # BLD AUTO: 166 K/UL — SIGNIFICANT CHANGE UP (ref 150–400)
POTASSIUM SERPL-MCNC: 4.3 MMOL/L — SIGNIFICANT CHANGE UP (ref 3.5–5.3)
POTASSIUM SERPL-SCNC: 4.3 MMOL/L — SIGNIFICANT CHANGE UP (ref 3.5–5.3)
PROT SERPL-MCNC: 6.6 G/DL — SIGNIFICANT CHANGE UP (ref 6.6–8.7)
RBC # BLD: 3.75 M/UL — LOW (ref 3.8–5.2)
RBC # FLD: 13 % — SIGNIFICANT CHANGE UP (ref 10.3–14.5)
SODIUM SERPL-SCNC: 138 MMOL/L — SIGNIFICANT CHANGE UP (ref 135–145)
WBC # BLD: 2.96 K/UL — LOW (ref 3.8–10.5)
WBC # FLD AUTO: 2.96 K/UL — LOW (ref 3.8–10.5)

## 2022-04-19 PROCEDURE — 99284 EMERGENCY DEPT VISIT MOD MDM: CPT | Mod: 25

## 2022-04-19 PROCEDURE — 85027 COMPLETE CBC AUTOMATED: CPT

## 2022-04-19 PROCEDURE — 93005 ELECTROCARDIOGRAM TRACING: CPT

## 2022-04-19 PROCEDURE — 36415 COLL VENOUS BLD VENIPUNCTURE: CPT

## 2022-04-19 PROCEDURE — 96375 TX/PRO/DX INJ NEW DRUG ADDON: CPT

## 2022-04-19 PROCEDURE — 99284 EMERGENCY DEPT VISIT MOD MDM: CPT

## 2022-04-19 PROCEDURE — 96361 HYDRATE IV INFUSION ADD-ON: CPT

## 2022-04-19 PROCEDURE — 93010 ELECTROCARDIOGRAM REPORT: CPT

## 2022-04-19 PROCEDURE — 83735 ASSAY OF MAGNESIUM: CPT

## 2022-04-19 PROCEDURE — 80053 COMPREHEN METABOLIC PANEL: CPT

## 2022-04-19 PROCEDURE — 84100 ASSAY OF PHOSPHORUS: CPT

## 2022-04-19 PROCEDURE — 96366 THER/PROPH/DIAG IV INF ADDON: CPT

## 2022-04-19 PROCEDURE — 96365 THER/PROPH/DIAG IV INF INIT: CPT

## 2022-04-19 RX ORDER — MAGNESIUM SULFATE 500 MG/ML
2 VIAL (ML) INJECTION ONCE
Refills: 0 | Status: COMPLETED | OUTPATIENT
Start: 2022-04-19 | End: 2022-04-19

## 2022-04-19 RX ORDER — DEXAMETHASONE 0.5 MG/5ML
6 ELIXIR ORAL ONCE
Refills: 0 | Status: COMPLETED | OUTPATIENT
Start: 2022-04-19 | End: 2022-04-19

## 2022-04-19 RX ORDER — SODIUM CHLORIDE 9 MG/ML
1000 INJECTION, SOLUTION INTRAVENOUS ONCE
Refills: 0 | Status: COMPLETED | OUTPATIENT
Start: 2022-04-19 | End: 2022-04-19

## 2022-04-19 RX ORDER — DIPHENHYDRAMINE HCL 50 MG
25 CAPSULE ORAL ONCE
Refills: 0 | Status: COMPLETED | OUTPATIENT
Start: 2022-04-19 | End: 2022-04-19

## 2022-04-19 RX ORDER — METOCLOPRAMIDE HCL 10 MG
10 TABLET ORAL ONCE
Refills: 0 | Status: COMPLETED | OUTPATIENT
Start: 2022-04-19 | End: 2022-04-19

## 2022-04-19 RX ORDER — ACETAMINOPHEN 500 MG
1000 TABLET ORAL ONCE
Refills: 0 | Status: COMPLETED | OUTPATIENT
Start: 2022-04-19 | End: 2022-04-19

## 2022-04-19 RX ORDER — KETOROLAC TROMETHAMINE 30 MG/ML
15 SYRINGE (ML) INJECTION ONCE
Refills: 0 | Status: DISCONTINUED | OUTPATIENT
Start: 2022-04-19 | End: 2022-04-19

## 2022-04-19 RX ADMIN — SODIUM CHLORIDE 1000 MILLILITER(S): 9 INJECTION, SOLUTION INTRAVENOUS at 10:35

## 2022-04-19 RX ADMIN — SODIUM CHLORIDE 1000 MILLILITER(S): 9 INJECTION, SOLUTION INTRAVENOUS at 11:39

## 2022-04-19 RX ADMIN — Medication 15 MILLIGRAM(S): at 11:22

## 2022-04-19 RX ADMIN — Medication 1000 MILLIGRAM(S): at 10:50

## 2022-04-19 RX ADMIN — Medication 2 GRAM(S): at 14:11

## 2022-04-19 RX ADMIN — Medication 15 MILLIGRAM(S): at 11:30

## 2022-04-19 RX ADMIN — Medication 50 GRAM(S): at 11:55

## 2022-04-19 RX ADMIN — Medication 25 MILLIGRAM(S): at 10:35

## 2022-04-19 RX ADMIN — Medication 6 MILLIGRAM(S): at 10:35

## 2022-04-19 RX ADMIN — Medication 10 MILLIGRAM(S): at 10:43

## 2022-04-19 RX ADMIN — Medication 1000 MILLIGRAM(S): at 10:39

## 2022-04-19 RX ADMIN — Medication 400 MILLIGRAM(S): at 10:35

## 2022-04-19 NOTE — ED PROVIDER NOTE - CLINICAL SUMMARY MEDICAL DECISION MAKING FREE TEXT BOX
ASSESSMENT:   CATHLEEN CRUZ is a 80yo F who presented with HEADACHE. PMH of Trigeminal neuralgia. Seen here on 03/31 for same complaint, negative CT head at that time. PE w/o concerning findings.     Concerning for HA 2/2 TN.     PLAN: Screening labs, abort w/ phenytoin.     Medications  lactated ringers Bolus  phenytoin  IVPB    Studies  Comprehensive Metabolic Panel  Complete Blood Count

## 2022-04-19 NOTE — ED ADULT NURSE NOTE - OBJECTIVE STATEMENT
Pt arrives to ED A&Ox3 disoriented to time, with  at bedside stating this is her baseline. Complains of 10/10 headache and dizziness with positional changes with near syncope.  states previous recent visit came in for the same complaints but CT Scan didn't show any abnormalities. Pt complains of having no appetite and not being able to sleep. VS WNL, NSR on CM, 100% on . IV to be placed, labs to be drawn. Other orders pending.

## 2022-04-19 NOTE — ED PROVIDER NOTE - PATIENT PORTAL LINK FT
You can access the FollowMyHealth Patient Portal offered by [facility] by registering at the following website: [email or web address]. By joining [employer]’s FollowMyHealth portal, you will also be able to view your health information using other applications (apps) compatible with our system.

## 2022-04-19 NOTE — ED PROVIDER NOTE - ATTENDING CONTRIBUTION TO CARE
I have personally performed a face to face medical and diagnostic evaluation of the patient. I have discussed with and reviewed the resident's note and agree with the History, ROS, Physical Exam and MDM unless otherwise indicated. A brief summary of my personal evaluation and impression can be found below.    82yo woman PMH trigeminal neuralgia on gabapentin and following with neurology presents with headache x 3-4 weeks and has been seen by neurologist and neurosurgeon and had cyberknife treatment and CTH recently but has been having the same headache and came for evaluation. No other associated fever, cough, n/v/d, abdominal pain, dysuria, chest pain. Denies focal weakness or numbness in arms or legs, change in vision, slurred speech.  Pt has not taken any other medication for pain.    On exam, initial vitals were reviewed.  Pt is frail-appearing elderly woman in in no acute distress. NC/AT, PERRL, MMM, supple neck, RRR, pulses 2+ in all extremities, lungs CTABL, abdomen soft, nontender, nondistended, no obvious joint deformities, pt is awake and alert and moving all extremities without difficulty, no rash noted.     Low suspicion for intra-cranial abnormality with recent CTH wnl with no change in headache quality or severity. Blood work obtained to evaluate for any metabolic abnormality. Pt had resolution of headache after medications and fluids. Discussed symptomatic management, return precautions, and instructions for outpt f/u.

## 2022-04-19 NOTE — ED STATDOCS - PROGRESS NOTE DETAILS
Mike AGEE for ED attending, Dr. Archuleta. 80 y/o female with PMHx of Parkinson's and trigeminal neuralgia on gabapentin c/o headache x 1 month with dizziness "room-spinning", sob, abdominal pain, and nausea. Patient has seen here for the headache with CT scan coming back normal. Patient has an appointment to f/u with neurologist 5/5/22. Denies chest pain. Pt will be sent to the main ED for evaluation by another provider. Mike AGEE for ED attending, Dr. Archuleta. 80 y/o female with PMHx of Parkinson's and trigeminal neuralgia on gabapentin c/o headache x 1 month with dizziness, sob, abdominal pain, and nausea. Patient has seen here for the headache with CT scan coming back normal. Patient has an appointment to f/u with neurologist 5/5/22. Denies chest pain. Pt will be sent to the main ED for evaluation by another provider.

## 2022-04-19 NOTE — ED ADULT NURSE NOTE - NSIMPLEMENTINTERV_GEN_ALL_ED
Implemented All Fall Risk Interventions:  [place] to call system. Call bell, personal items and telephone within reach. Instruct patient to call for assistance. Room bathroom lighting operational. Non-slip footwear when patient is off stretcher. Physically safe environment: no spills, clutter or unnecessary equipment. Stretcher in lowest position, wheels locked, appropriate side rails in place. Provide visual cue, wrist band, yellow gown, etc. Monitor gait and stability. Monitor for mental status changes and reorient to person, place, and time. Review medications for side effects contributing to fall risk. Reinforce activity limits and safety measures with patient and family.

## 2022-04-19 NOTE — ED PROVIDER NOTE - NS ED ROS FT
Review of Systems  CONSTITUTIONAL: afebrile w/no diaphoresis or weight changes  SKIN: warm, dry w/ no rash or bleeding  EYES: no changes to vision  ENT: no changes in hearing, no sore throat  RESPIRATORY: no cough or SOB  CARDIAC: no chest pain & no palpitations  GI: no abd pain, nausea, vomiting, diarrhea, constipation, or blood in stool/[place] blood  GENITO-URINARY: no discharge, dysuria or hematuria,   MUSCULOSKELETAL:  no joint pain, swelling or redness  NEUROLOGIC: no weakness, anesthesia or paresthesias +HEADACHE  PSYCH: no anxiety, non suicidal, non homicidal, without hallucinations or depression

## 2022-04-19 NOTE — ED PROVIDER NOTE - PROGRESS NOTE DETAILS
PHYSICAL EXAMINATION FORM   Name: Buck Underwood      EXAMINATION     Vitals: /62   Pulse 76   Temp 98.2 °F (36.8 °C) (Temporal)   Resp 16   Ht 5' 10.5\" (1.791 m)   Wt 72.9 kg (160 lb 12.8 oz)   BMI 22.75 kg/m²   BSA 1.91 m²  Gender: male  Vision: R 20/               L 20/                      Corrected   Y         N     MEDICAL NORMAL ABNORMAL FINDINGS   Appearance  · Marfan stigmata (kyphoscoliosis, high-arched palate, pectus excavatum, arachnodactyly, arm span > height, hyperlaxity, myopia, MVP, aortic insufficiency) Yes    Eyes/ears/nose/throat  · Pupils equal  · Hearing Yes    Lymph nodes Yes    Heart*  · Murmurs (auscultation standing, supine, +/- Valsalva)  · Location of point of maximal impulse (PMI) Yes    Pulses  · Simultaneous femoral and radial pulses Yes    Lungs Yes    Abdomen Yes    Genitourinary (males only)** Yes    Skin  · HSV, lesions suggestive of MRSA, tinea corporis Yes    Neurologic# Yes    MUSCULOSKELETAL     Neck Yes    Back Yes    Shoulder/arm Yes    Elbow/forearm Yes    Wrist/hand/fingers Yes    Hip/thigh Yes    Knee Yes    Leg/Ankle Yes    Foot/toes Yes    Functional  · Duck-walk, single leg hop Yes    * Consider ECG, echocardiogram, and referral to cardiology for abnormal cardiac history or exam  ** Consider  exam in private setting.  Having third party present is recommended.  # Consider cognitive evaluation or baseline neuropsychiatric testing if a history of significant concussion.    On the basic on the examination on this day, I approve this child's participation in interscholastic sports for 395 days from this date.  Yes  Examination Date: 7/18/2019    Additional Comments:                    Signature*: _______________________________________           Print Name:  Deja Griffin MD                         * effective January 2003, the [facility] Board of Directors approved a recommendation, consistent with the [place] [employer] Code, that allow Physician's Assistants  or Advanced Nurse Probationers to sign off on physicals.               To be completed by athlete or parent prior to examination  Name: Buck Underwood  School Year: ____________________   Address: ___________________________________________ City/State: ________________________________________________   Phone No: __________________________________________ Birthday: 2005    Age: 13 year old   Class: ________ Student ID No: _______   Parent's Name: ______________________________________ Phone No: ________________________________________________   Address: ___________________________________________ City/State: ________________________________________________   HISTORY FORM    Medicines and Allergies: Please list all of the prescription and over- the-counter medicines (herbal and nutritional) that your are currently taking   Do you have allergies?  __ Yes    __ No If yes, please identify specific allergy below.   __ Medicines                                              __Pollens                                              __Food                                              __Stinging Insects   Explain \"Yes\" answers below.  [place] questions you don't know the answer to.  GENERAL QUESTIONS Yes No    1  Has a doctor ever denied or restricted your participation in sports       for any reason?      2  Do you have any ongoing medical conditions? If so, please        identify below: __Asthma  __Anemia  __Diabetes  __Infections      Other: _________________________________________      3  Have you ever spent the night in the hospital?      4  Have you ever had surgery?      HEART HEALTH QUESTIONS ABOUT YOU Yes No    5  Have you ever passed out or nearly passes out DURING or        AFTER exercise?      6  Have you ever had discomfort, pain, tightness or pressure in       your chest during exercise?       7  Does your heart ever race or skip beats (irregular beats)       during exercise?      8  Has a doctor ever told  you that you have any heart problems?       If so check all that apply: __High blood pressure       __Heart murmur  __High cholesterol  __ Heart infection      __Kawasaki disease  __Other_________________________      9   Has a doctor ever ordered a test for your heart? (for example        ECG/EKG, echocardiogram)      10  Do you every get lightheaded or feel more short of breath         than expected during exercise?       11  Have you ever had an unexplained seizure?      12  Do you get more tired or short of breath more quickly than         your friends during exercise?      HEART HEALTH QUESTIONS ABOUT YOUR FAMILY Yes No    13 Has any family member or relative  of heart problems or         had an unexpected or unexplained sudden death before age         50 (including drowning, unexplained car accident, or sudden        infant death syndrome)?      14 Does anyone in your family have hypertrophic        cardiomyopathy, Marfan syndrome, arrhythmogenic right        ventricular cardiomyopathy, long QT syndrome, short QT        syndrome, Brugada syndrome, or catecholaminergic        polymorphic ventricular tachycardia)?      15 Does anyone in your family have a heart problem,        pacemaker, or implanted defibrillator?      16 Has anyone in your family had unexplained fainting,        unexplained seizure, or near drowning?      BONE AND JOINT QUESTIONS Yes No    17 Have you ever had an injury to a bone, muscle, ligament or        tendon that caused you to miss a practice or a game?      18 Have you ever had any broken or fracture bones or         dislocated joints?      19 Have you ever had any injury that required x-rays, MRI, CT        scan, injections, therapy, brace, cast, or crutches?      20 Have you ever had a stress fracture?      21 Have you ever been told that you have or have had an x-ray        for neck instability or atlantoaxial instability? (Down         syndrome or dwarfism)      22 Do you  regularly use a brace, orthotics, or other assistive         device?      23 Do you have a bone, muscle, or joint injury that bothers you?      24 Do any of your joints become painful, swollen, feel warm, or       look red?      25 Do you have any history of juvenile arthritis or connective       tissue disease?       MEDICAL QUESTIONS Yes No   26 Do you cough, wheeze, or have difficulty breathing during       or after exercise?     27 Have you ever used an inhaler or taken asthma medicine?     28. Is there anyone in your family who has asthma?     29 Were you born without or are you missing a kidney, an eye,         a testicle (males), your spleen, or any other organ?     30 Do you have groin pain or painful bulge or hernia in the groin area?     31 Have you had infectious mononucleosis (mono) within the last        month?     32 Do you have any rashes, pressure sores, or other skin problems?     33 Have you had a herpes or MRSA skin infection?     34 Have you ever had a head injury or concussion?     35 Have you ever had a hit or blow to the head that caused         confusion, prolonged headache, or memory problems?     36 Do you have a history of seizure disorder?     37 Do you have headaches with exercise?     38 Have you ever had numbness, tingling, or weakness in your arms        or legs after being hit or falling?     39 Have you ever been unable to move your or legs after being hit or        falling?     40 Have you ever become ill while exercising in the heat?     41 Do you get frequent muscle cramps when exercising?     42 Do you or someone in your family have sickle cell trait or disease?     43 Have you had any problems with your eyes or vision?     44 Have you had any eye injuries?     45 Do you wear glasses or contact lenses?     46 Do you wear protective eyewear, such as goggles or a face shield?     47 Do you worry about your weight?     48 Are you trying to or has anyone recommended that you gain or          lose weight?     49 Are you on a special diet or do you avoid certain types of foods?     50 Have you ever had an eating disorder?     51 Have you or any family member or relative been diagnosed with        cancer?     52 Do you have any concerns that you would like to discuss with a        doctor?     FEMALES ONLY Yes No   53 Have you ever had a menstrual period?     54 How old were you when you had your first menstrual period?     55 How many periods have you had in the last 12 months?       Explain 'yes' answers here:  ________________________________________________________________    ________________________________________________________________    ________________________________________________________________    ________________________________________________________________    ________________________________________________________________    ________________________________________________________________    ________________________________________________________________    ________________________________________________________________     I hereby state that, to the best of my knowledge, my answers to the above questions are complete and correct.   Signature of athlete: _____________________________________________ Signature of parent/guardian: ________________________________________        Zakiya: Patient re-evaluated and continues c/o headache. Vitals stable. Will escalate treatement. MD Italia: pt was resting comfortably and states that her headache has resolved and she feels better. Discussed continued symptomatic management, return precautions, and instructions for outpt f/u neurology.

## 2022-04-19 NOTE — ED PROVIDER NOTE - PHYSICAL EXAMINATION
VITAL SIGNS: I have reviewed vital signs  CONSTITUTIONAL:  in no acute distress.  SKIN: Warm, dry, no rash.  HEAD: Normocephalic, atraumatic.  EYES: PERRL, conjunctiva and sclera clear.  ENT: pink & moist mucosa, no pharyngeal erythema  NECK: Supple, non tender. No cervical lymphadenopathy.  CARD: Regular rate and rhythm. No murmurs.   RESP: No wheezes, rales or rhonchi.   ABD:  soft, non-distended, non-tender.   MSK:  Good ROM in upper/lower extremities w/o pain.   NEURO: Alert, oriented. Grossly unremarkable. No focal deficits.   PSYCH: Cooperative, alert & oriented x3

## 2022-04-19 NOTE — ED PROVIDER NOTE - NSFOLLOWUPINSTRUCTIONS_ED_ALL_ED_FT
You were seen and evaluated in the emergency room for headache.    Please follow up with your primary care provider in the next few days.  You should also follow up with your neurologist in the next few days.     Take 650mg tylenol every 6 hours as needed for pain.  Take 400mg ibuprofen every 6 hours as needed for pain, make sure to take with food.    Drink plenty of fluids to stay hydrated.     Continue all other home medications as prescribed.    Return to the Emergency Department immediately if you have slurred speech, difficulty swallowing, change in vision, weakness in arms or legs, worsening headache, or any new and concerning symptoms or concerns.     Please read all attached.

## 2022-04-19 NOTE — ED PROVIDER NOTE - OBJECTIVE STATEMENT
LIZETH CRUZ is a 80yo F with PMH Trigeminal Neuralgia who presents c/o HEADACHE. Describes a frontal headache that has been constant since three weeks ago. The headache is severe, a/w phonophobia and keeps her up at night. She has taken Tylenol and Ibuprofen w/o effect. She was seen here March 31 for the same headache and had a negative CT head at that time. She denies any other associated symptoms specifically denying Fever/Chills/N/V/Abdominal pain/Diarrhea/Constipation, CP/SOB, Changes to vision/Fatigue/Weakness, Urinary symptoms.

## 2022-04-19 NOTE — ED ADULT TRIAGE NOTE - CHIEF COMPLAINT QUOTE
Pt brought in by her  for c/o a headache. He states that she was seen here a few weeks ago for the same and they followed up. Was not prescribed anything and was told" it will go away" as per . Pt is normally confused at baseline. Has not taken anything for the headache "because nothing helps".

## 2022-04-21 ENCOUNTER — EMERGENCY (EMERGENCY)
Facility: HOSPITAL | Age: 82
LOS: 1 days | Discharge: HOME | End: 2022-04-21
Attending: EMERGENCY MEDICINE
Payer: MEDICARE

## 2022-04-21 VITALS
TEMPERATURE: 98 F | RESPIRATION RATE: 18 BRPM | SYSTOLIC BLOOD PRESSURE: 139 MMHG | HEART RATE: 66 BPM | OXYGEN SATURATION: 98 % | DIASTOLIC BLOOD PRESSURE: 85 MMHG | WEIGHT: 119.93 LBS | HEIGHT: 66 IN

## 2022-04-21 DIAGNOSIS — Z98.890 OTHER SPECIFIED POSTPROCEDURAL STATES: Chronic | ICD-10-CM

## 2022-04-21 DIAGNOSIS — Z98.49 CATARACT EXTRACTION STATUS, UNSPECIFIED EYE: Chronic | ICD-10-CM

## 2022-04-21 DIAGNOSIS — Z90.722 ACQUIRED ABSENCE OF OVARIES, BILATERAL: Chronic | ICD-10-CM

## 2022-04-21 PROCEDURE — 96374 THER/PROPH/DIAG INJ IV PUSH: CPT

## 2022-04-21 PROCEDURE — 99284 EMERGENCY DEPT VISIT MOD MDM: CPT

## 2022-04-21 PROCEDURE — 93005 ELECTROCARDIOGRAM TRACING: CPT

## 2022-04-21 PROCEDURE — 93010 ELECTROCARDIOGRAM REPORT: CPT

## 2022-04-21 PROCEDURE — 99284 EMERGENCY DEPT VISIT MOD MDM: CPT | Mod: 25

## 2022-04-21 PROCEDURE — 96375 TX/PRO/DX INJ NEW DRUG ADDON: CPT

## 2022-04-21 RX ORDER — KETOROLAC TROMETHAMINE 30 MG/ML
15 SYRINGE (ML) INJECTION ONCE
Refills: 0 | Status: DISCONTINUED | OUTPATIENT
Start: 2022-04-21 | End: 2022-04-21

## 2022-04-21 RX ORDER — SUMATRIPTAN SUCCINATE 4 MG/.5ML
25 INJECTION, SOLUTION SUBCUTANEOUS ONCE
Refills: 0 | Status: COMPLETED | OUTPATIENT
Start: 2022-04-21 | End: 2022-04-21

## 2022-04-21 RX ORDER — METOCLOPRAMIDE HCL 10 MG
10 TABLET ORAL ONCE
Refills: 0 | Status: COMPLETED | OUTPATIENT
Start: 2022-04-21 | End: 2022-04-21

## 2022-04-21 RX ORDER — SODIUM CHLORIDE 9 MG/ML
500 INJECTION INTRAMUSCULAR; INTRAVENOUS; SUBCUTANEOUS ONCE
Refills: 0 | Status: COMPLETED | OUTPATIENT
Start: 2022-04-21 | End: 2022-04-21

## 2022-04-21 RX ORDER — MAGNESIUM SULFATE 500 MG/ML
1 VIAL (ML) INJECTION ONCE
Refills: 0 | Status: COMPLETED | OUTPATIENT
Start: 2022-04-21 | End: 2022-04-21

## 2022-04-21 RX ADMIN — SUMATRIPTAN SUCCINATE 25 MILLIGRAM(S): 4 INJECTION, SOLUTION SUBCUTANEOUS at 23:38

## 2022-04-21 RX ADMIN — SUMATRIPTAN SUCCINATE 25 MILLIGRAM(S): 4 INJECTION, SOLUTION SUBCUTANEOUS at 23:30

## 2022-04-21 RX ADMIN — Medication 100 GRAM(S): at 22:31

## 2022-04-21 RX ADMIN — Medication 15 MILLIGRAM(S): at 21:39

## 2022-04-21 RX ADMIN — SODIUM CHLORIDE 500 MILLILITER(S): 9 INJECTION INTRAMUSCULAR; INTRAVENOUS; SUBCUTANEOUS at 21:35

## 2022-04-21 RX ADMIN — Medication 1 TABLET(S): at 21:39

## 2022-04-21 RX ADMIN — Medication 15 MILLIGRAM(S): at 21:32

## 2022-04-21 RX ADMIN — Medication 1 TABLET(S): at 21:32

## 2022-04-21 RX ADMIN — Medication 10 MILLIGRAM(S): at 21:32

## 2022-04-21 RX ADMIN — SODIUM CHLORIDE 500 MILLILITER(S): 9 INJECTION INTRAMUSCULAR; INTRAVENOUS; SUBCUTANEOUS at 22:24

## 2022-04-21 NOTE — ED PROVIDER NOTE - PATIENT PORTAL LINK FT
You can access the FollowMyHealth Patient Portal offered by [facility] by registering at the following website: [email or web address]. By joining [employer]’s FollowMyHealth portal, you will also be able to view your health information using other applications (apps) compatible with our system.

## 2022-04-21 NOTE — ED PROVIDER NOTE - CARE PROVIDER_API CALL
Benny Pierre; PhD)  Neurology; Vascular Neurology  [address] [place] [address]  Phone: [phone]  Fax: [phone]  Follow Up Time:

## 2022-04-21 NOTE — ED PROVIDER NOTE - CLINICAL SUMMARY MEDICAL DECISION MAKING FREE TEXT BOX
TUCKER has been present x 2 months, with extensive work up and specialist consultation, neuro exam normal and the pt is at her [facility]. Pt medicated and improved and will dc with Rx for fioricet

## 2022-04-21 NOTE — ED PROVIDER NOTE - CROS ED MUSC ALL NEG
Attempted to call patient at      Telephone Information:   Mobile [phone]   . Left a voicemail stating that I was calling to inform her that her surgery time has changed on 10/28/2021. New time is 530am arrival time and 730am start time at [facility]    Patient is to return the call to [facility] at [phone] with any questions.     - - -

## 2022-04-21 NOTE — ED PROVIDER NOTE - OBJECTIVE STATEMENT
81 year old with PMHx of depression, HTN, ovarian neoplasm with total hysterectomy, HLD, and Parkinsons presents to the ED complaining of a severe headache located at the top of her head for the past 2 months. She was here 2 days ago with the same concern. She received a cyberknife treatment 2 weeks ago for relief, but it did not relieve the pain. She also has jaw pain alongside the headache. She took Tylenol at 4pm today with no relief of pain. 81 year old with PMHx of depression, HTN, ovarian CA with total hysterectomy, HLD, and Parkinsons presents to the ED complaining of a severe headache located at the top of her head for the past 2 months. She was here 2 days ago with the same concern. She received a cyberknife treatment 2 weeks ago for relief, but it did not relieve the pain. She took Tylenol at 4pm today with no relief of pain. pt reprts that she has seen neurology with a negative MRI for this headache, and that she has seen neurosurgery as they believed this to be trigeminal neuralgia.

## 2022-04-21 NOTE — ED PROVIDER NOTE - CONSTITUTIONAL, MLM
Well appearing, awake, alert, oriented to person, place, time/situation and in no apparent distress. Elderly appearing. normal...

## 2022-04-21 NOTE — ED ADULT NURSE NOTE - OBJECTIVE STATEMENT
Pt c/o headache for the past few months.  She's been seen here several times but the headache is worse.  Pupils are equal and reactive.  Patient denies nausea or vomiting.

## 2022-04-21 NOTE — ED ADULT NURSE NOTE - NSIMPLEMENTINTERV_GEN_ALL_ED
Implemented All Fall Risk Interventions:  [place] to call system. Call bell, personal items and telephone within reach. Instruct patient to call for assistance. Room bathroom lighting operational. Non-slip footwear when patient is off stretcher. Physically safe environment: no spills, clutter or unnecessary equipment. Stretcher in lowest position, wheels locked, appropriate side rails in place. Provide visual cue, wrist band, yellow gown, etc. Monitor gait and stability. Monitor for mental status changes and reorient to person, place, and time. Review medications for side effects contributing to fall risk. Reinforce activity limits and safety measures with patient and family.

## 2022-04-29 ENCOUNTER — EMERGENCY (EMERGENCY)
Facility: HOSPITAL | Age: 82
LOS: 1 days | Discharge: HOME | End: 2022-04-29
Attending: EMERGENCY MEDICINE
Payer: MEDICARE

## 2022-04-29 VITALS
SYSTOLIC BLOOD PRESSURE: 183 MMHG | WEIGHT: 149.91 LBS | TEMPERATURE: 98 F | DIASTOLIC BLOOD PRESSURE: 73 MMHG | HEART RATE: 73 BPM | HEIGHT: 66 IN | OXYGEN SATURATION: 98 % | RESPIRATION RATE: 16 BRPM

## 2022-04-29 VITALS
DIASTOLIC BLOOD PRESSURE: 85 MMHG | OXYGEN SATURATION: 100 % | SYSTOLIC BLOOD PRESSURE: 181 MMHG | RESPIRATION RATE: 18 BRPM | HEART RATE: 76 BPM | TEMPERATURE: 98 F

## 2022-04-29 DIAGNOSIS — Z98.890 OTHER SPECIFIED POSTPROCEDURAL STATES: Chronic | ICD-10-CM

## 2022-04-29 DIAGNOSIS — Z90.722 ACQUIRED ABSENCE OF OVARIES, BILATERAL: Chronic | ICD-10-CM

## 2022-04-29 DIAGNOSIS — F05 DELIRIUM DUE TO KNOWN PHYSIOLOGICAL CONDITION: ICD-10-CM

## 2022-04-29 DIAGNOSIS — Z98.49 CATARACT EXTRACTION STATUS, UNSPECIFIED EYE: Chronic | ICD-10-CM

## 2022-04-29 DIAGNOSIS — F41.9 ANXIETY DISORDER, UNSPECIFIED: ICD-10-CM

## 2022-04-29 LAB
AMPHET UR-MCNC: NEGATIVE — SIGNIFICANT CHANGE UP
ANION GAP SERPL CALC-SCNC: 12 MMOL/L — SIGNIFICANT CHANGE UP (ref 5–17)
APAP SERPL-MCNC: 7.3 UG/ML — LOW (ref 10–26)
APPEARANCE UR: CLEAR — SIGNIFICANT CHANGE UP
BARBITURATES UR SCN-MCNC: POSITIVE
BASOPHILS # BLD AUTO: 0.03 K/UL — SIGNIFICANT CHANGE UP (ref 0–0.2)
BASOPHILS NFR BLD AUTO: 0.7 % — SIGNIFICANT CHANGE UP (ref 0–2)
BENZODIAZ UR-MCNC: NEGATIVE — SIGNIFICANT CHANGE UP
BILIRUB UR-MCNC: NEGATIVE — SIGNIFICANT CHANGE UP
BUN SERPL-MCNC: 13.2 MG/DL — SIGNIFICANT CHANGE UP (ref 8–20)
CALCIUM SERPL-MCNC: 9.2 MG/DL — SIGNIFICANT CHANGE UP (ref 8.6–10.2)
CHLORIDE SERPL-SCNC: 105 MMOL/L — SIGNIFICANT CHANGE UP (ref 98–107)
CO2 SERPL-SCNC: 22 MMOL/L — SIGNIFICANT CHANGE UP (ref 22–29)
COCAINE METAB.OTHER UR-MCNC: NEGATIVE — SIGNIFICANT CHANGE UP
COLOR SPEC: YELLOW — SIGNIFICANT CHANGE UP
CREAT SERPL-MCNC: 0.81 MG/DL — SIGNIFICANT CHANGE UP (ref 0.5–1.3)
DIFF PNL FLD: NEGATIVE — SIGNIFICANT CHANGE UP
EGFR: 73 ML/MIN/1.73M2 — SIGNIFICANT CHANGE UP
EOSINOPHIL # BLD AUTO: 0.01 K/UL — SIGNIFICANT CHANGE UP (ref 0–0.5)
EOSINOPHIL NFR BLD AUTO: 0.2 % — SIGNIFICANT CHANGE UP (ref 0–6)
ETHANOL SERPL-MCNC: <10 MG/DL — SIGNIFICANT CHANGE UP (ref 0–9)
FLUAV AG NPH QL: SIGNIFICANT CHANGE UP
FLUBV AG NPH QL: SIGNIFICANT CHANGE UP
GLUCOSE SERPL-MCNC: 100 MG/DL — HIGH (ref 70–99)
GLUCOSE UR QL: NEGATIVE MG/DL — SIGNIFICANT CHANGE UP
HCT VFR BLD CALC: 35.1 % — SIGNIFICANT CHANGE UP (ref 34.5–45)
HGB BLD-MCNC: 11.2 G/DL — LOW (ref 11.5–15.5)
IMM GRANULOCYTES NFR BLD AUTO: 0.2 % — SIGNIFICANT CHANGE UP (ref 0–1.5)
KETONES UR-MCNC: NEGATIVE — SIGNIFICANT CHANGE UP
LEUKOCYTE ESTERASE UR-ACNC: NEGATIVE — SIGNIFICANT CHANGE UP
LYMPHOCYTES # BLD AUTO: 0.63 K/UL — LOW (ref 1–3.3)
LYMPHOCYTES # BLD AUTO: 15.5 % — SIGNIFICANT CHANGE UP (ref 13–44)
MCHC RBC-ENTMCNC: 30.1 PG — SIGNIFICANT CHANGE UP (ref 27–34)
MCHC RBC-ENTMCNC: 31.9 GM/DL — LOW (ref 32–36)
MCV RBC AUTO: 94.4 FL — SIGNIFICANT CHANGE UP (ref 80–100)
METHADONE UR-MCNC: NEGATIVE — SIGNIFICANT CHANGE UP
MONOCYTES # BLD AUTO: 0.49 K/UL — SIGNIFICANT CHANGE UP (ref 0–0.9)
MONOCYTES NFR BLD AUTO: 12 % — SIGNIFICANT CHANGE UP (ref 2–14)
NEUTROPHILS # BLD AUTO: 2.9 K/UL — SIGNIFICANT CHANGE UP (ref 1.8–7.4)
NEUTROPHILS NFR BLD AUTO: 71.4 % — SIGNIFICANT CHANGE UP (ref 43–77)
NITRITE UR-MCNC: NEGATIVE — SIGNIFICANT CHANGE UP
OPIATES UR-MCNC: POSITIVE
PCP SPEC-MCNC: SIGNIFICANT CHANGE UP
PCP UR-MCNC: NEGATIVE — SIGNIFICANT CHANGE UP
PH UR: 7 — SIGNIFICANT CHANGE UP (ref 5–8)
PLATELET # BLD AUTO: 188 K/UL — SIGNIFICANT CHANGE UP (ref 150–400)
POTASSIUM SERPL-MCNC: 4 MMOL/L — SIGNIFICANT CHANGE UP (ref 3.5–5.3)
POTASSIUM SERPL-SCNC: 4 MMOL/L — SIGNIFICANT CHANGE UP (ref 3.5–5.3)
PROT UR-MCNC: NEGATIVE — SIGNIFICANT CHANGE UP
RBC # BLD: 3.72 M/UL — LOW (ref 3.8–5.2)
RBC # FLD: 13.3 % — SIGNIFICANT CHANGE UP (ref 10.3–14.5)
RSV RNA NPH QL NAA+NON-PROBE: SIGNIFICANT CHANGE UP
SALICYLATES SERPL-MCNC: <0.6 MG/DL — LOW (ref 10–20)
SARS-COV-2 RNA SPEC QL NAA+PROBE: SIGNIFICANT CHANGE UP
SODIUM SERPL-SCNC: 139 MMOL/L — SIGNIFICANT CHANGE UP (ref 135–145)
SP GR SPEC: 1.01 — SIGNIFICANT CHANGE UP (ref 1.01–1.02)
THC UR QL: POSITIVE
TROPONIN T SERPL-MCNC: <0.01 NG/ML — SIGNIFICANT CHANGE UP (ref 0–0.06)
TSH SERPL-MCNC: 2.19 UIU/ML — SIGNIFICANT CHANGE UP (ref 0.27–4.2)
UROBILINOGEN FLD QL: NEGATIVE MG/DL — SIGNIFICANT CHANGE UP
WBC # BLD: 4.07 K/UL — SIGNIFICANT CHANGE UP (ref 3.8–10.5)
WBC # FLD AUTO: 4.07 K/UL — SIGNIFICANT CHANGE UP (ref 3.8–10.5)

## 2022-04-29 PROCEDURE — 70450 CT HEAD/BRAIN W/O DYE: CPT | Mod: 26,MA

## 2022-04-29 PROCEDURE — 70450 CT HEAD/BRAIN W/O DYE: CPT | Mod: MA

## 2022-04-29 PROCEDURE — 71045 X-RAY EXAM CHEST 1 VIEW: CPT

## 2022-04-29 PROCEDURE — 93010 ELECTROCARDIOGRAM REPORT: CPT

## 2022-04-29 PROCEDURE — 84484 ASSAY OF TROPONIN QUANT: CPT

## 2022-04-29 PROCEDURE — 71045 X-RAY EXAM CHEST 1 VIEW: CPT | Mod: 26

## 2022-04-29 PROCEDURE — 80048 BASIC METABOLIC PNL TOTAL CA: CPT

## 2022-04-29 PROCEDURE — 36415 COLL VENOUS BLD VENIPUNCTURE: CPT

## 2022-04-29 PROCEDURE — 81003 URINALYSIS AUTO W/O SCOPE: CPT

## 2022-04-29 PROCEDURE — 87637 SARSCOV2&INF A&B&RSV AMP PRB: CPT

## 2022-04-29 PROCEDURE — 99285 EMERGENCY DEPT VISIT HI MDM: CPT | Mod: 25

## 2022-04-29 PROCEDURE — 84443 ASSAY THYROID STIM HORMONE: CPT

## 2022-04-29 PROCEDURE — 93005 ELECTROCARDIOGRAM TRACING: CPT

## 2022-04-29 PROCEDURE — 80307 DRUG TEST PRSMV CHEM ANLYZR: CPT

## 2022-04-29 PROCEDURE — 85025 COMPLETE CBC W/AUTO DIFF WBC: CPT

## 2022-04-29 PROCEDURE — 99285 EMERGENCY DEPT VISIT HI MDM: CPT

## 2022-04-29 PROCEDURE — 90792 PSYCH DIAG EVAL W/MED SRVCS: CPT

## 2022-04-29 PROCEDURE — 87086 URINE CULTURE/COLONY COUNT: CPT

## 2022-04-29 RX ORDER — TRAZODONE HCL 50 MG
1 TABLET ORAL
Qty: 0 | Refills: 0 | DISCHARGE

## 2022-04-29 RX ORDER — METOCLOPRAMIDE HCL 10 MG
10 TABLET ORAL ONCE
Refills: 0 | Status: COMPLETED | OUTPATIENT
Start: 2022-04-29 | End: 2022-04-29

## 2022-04-29 RX ORDER — OLANZAPINE 15 MG/1
1 TABLET, FILM COATED ORAL
Qty: 30 | Refills: 0
Start: 2022-04-29 | End: 2022-05-28

## 2022-04-29 RX ORDER — CARBAMAZEPINE 200 MG
1 TABLET ORAL
Qty: 0 | Refills: 0 | DISCHARGE

## 2022-04-29 RX ORDER — ACETAMINOPHEN 500 MG
975 TABLET ORAL ONCE
Refills: 0 | Status: COMPLETED | OUTPATIENT
Start: 2022-04-29 | End: 2022-04-29

## 2022-04-29 NOTE — ED PROVIDER NOTE - CLINICAL SUMMARY MEDICAL DECISION MAKING FREE TEXT BOX
80 y/o F with PMH HTN, HLD, parkinson's presents for agitation and bizzare behavior - made suicidal statements last night, today complaining of chest tightness x 2 days and near syncope - no real strong psych history of similar symptoms - 80 y/o F with PMH HTN, HLD, parkinson's presents for agitation and bizzare behavior - made suicidal statements last night, today complaining of chest tightness x 2 days and near syncope - no real strong psych history of similar symptoms - will medically clear and have psych evaluate patient. Patient currently denies any suicidal statements, is confused to situation (baseline per ), consider dementia with behavioral disturbance as well.

## 2022-04-29 NOTE — ED ADULT NURSE NOTE - OBJECTIVE STATEMENT
hansa from home; per ems family stated pt "is altered and making suicidal remarks and having a psychotic break down". pt a&ox2 on arrival, no acute distress; poor historian,  at bedside and historian. reports pt is on "xanax , gabapentin, tramadol, medical marijuana capsules " and has been "disoriented and confused making no sense or saying suicidal things like she wants to kill herself and then doesn't remember she said it". anel family episode has been on going for "5 months" pt seen at [facility] for similar as per family and requesting pt " not come home because its not safe". family also reports hx dementia

## 2022-04-29 NOTE — ED BEHAVIORAL HEALTH ASSESSMENT NOTE - HPI (INCLUDE ILLNESS QUALITY, SEVERITY, DURATION, TIMING, CONTEXT, MODIFYING FACTORS, ASSOCIATED SIGNS AND SYMPTOMS)
82 y/o F with PMH HTN, HLD, Parkinson's disease, depression, anxiety presents BIBEMS for bizarre behavior, agitation and suicidal statements. Psychiatry called for suicidal ideation.    Patient was seen and evaluated in the emergency room, noted to be on constant observation for safety. She is a poor historian, poorly related, calm and cooperative. She reports she has been anxious about her "children and grandchildren, you know just worries about them and getting stuff done around the house." She denies depressive mood symptoms including low mood, irritability, hopelessness and suicidality. Sleep and appetite are unremarkable. No Suicidal/ homicidal ideation, intent or plan. No subjective manic features. No AVH, no delusions or paranoia elicited.     Spoke with  Reg who reports that she has been having cognitive deterioration over the past several months, insidious onset. He reports she has worsening short term memory, requiring more assistance with ADLs, she is no longer driving. She has also been complaining about increased c/o headaches for several years, more recently she has had CT scan performed with no formal diagnosis or treatment, last night specifically she was c/o of "a bad headache, up all night pacing the house, screaming in pain." He reports that she has had a previous psychiatric admission at [facility] for similar symptoms presentation 10-15 years ago. She has only been diagnosed with Anxiety that he knows of. She follows with an outpatient psychiatric provider via telehealth, she also has been followed by and outpatient Neurologist, with a formal Dementia diagnosis. She has been increasingly agitated and aggressive/impulsive at home. Last night she was throwing things, making bizarre statements, and laughing inappropriately. Per chart review,  states, she is paranoid about having cancer all over her body, has had multiple work ups that are completely negative.

## 2022-04-29 NOTE — ED PROVIDER NOTE - PROGRESS NOTE DETAILS
Rodolfo:  at bedside and I spoke with son (also Ray) on the phone. I shared that patient was cleared by psych and other than positive drug screen (all of which are prescribed medications to the patient), no other medical reason for patient to be acting this way. Upon further discussion the patient has been having gradually worsening similar behaviors for the past 5 months, son has brought her to [place] multiple times and was cleared, patient's psychiatrist is unwilling to adjust medications and  states that he cannot take her home as he feels she is a danger to herself and to him when she becomes agitated. I explained that polypharmacy is likely a contributing factor, as well as dementia with behavioral disturbance and sundowning.  and son feel that patient needs to be placed. I spoke with social work who will speak with family. PT consult ordered. [place]: Family wants to take patient to another hospital. Patient is medically cleared and psychiatrically cleared, can be discharged at this time.

## 2022-04-29 NOTE — ED ADULT TRIAGE NOTE - CHIEF COMPLAINT QUOTE
as per EMS presents from home for SI with a plan to jump in front of traffic. calm and cooperative in the ED, xanax recently changed from 4mg to 2mg, patient currently denies any SI/HI

## 2022-04-29 NOTE — ED PROVIDER NOTE - OBJECTIVE STATEMENT
82 y/o F with PMH HTN, HLD, Parkinson's disease, depression, anxiety presents BIBEMS for bizzare behavior, agitation and suicidal statements. To me, the patient complains of 2 days of chest tightness and "wooziness", feeling as though she is going to pass out. She denies SI, HI, AVH. She states her  knows the rest of the story. I called  Reg for collateral:     states that she is completely out of her mind and is acting like a lunatic. She is in bed, out of bed, runs around, throws things around, said she wanted to run into the road and get hit by a car. One minute she says she loves him and one minute she tries to hit him. She has never voiced SI or HI before. At baseline, she is paranoid about having cancer all over her body, has had multiple work ups that are completely negative. For the past year, her psychiatrist has been telehealth. She follows with a neurologist as well and is due to have MRI in 2 days. Her appetite has been normal, no coughing, no fevers, no complaints of back pain or abdominal pain. She has anxiety, but he states this is very unusual behavior for her, however maybe 10-15 years ago she was in [facility] for psychiatric admission for 1 day with similar symptoms to today's presentation. At baseline she is very unsteady on her feet - does not use walker or cane.     Her only medications are Gabapentin 600 mg BID, lamotrigine 50 mg BID, lexapro 1.5 tabs qd, trazadone 50 mg qhs, xanax 1 mg 1-2x/day prn, medical marijuana (not taking it anymore) - unclear why she was prescribed this.    Psych: Naqui  Neuro: Deven  Cards: Josh Finney: [phone]  Lillian (daughter): [phone]

## 2022-04-29 NOTE — ED PROVIDER NOTE - PATIENT PORTAL LINK FT
You can access the FollowMyHealth Patient Portal offered by [facility] by registering at the following website: [email or web address]. By joining [employer]’s FollowMyHealth portal, you will also be able to view your health information using other applications (apps) compatible with our system. You can access the FollowMyHealth Patient Portal offered by [facility] by registering at the following website: [email or web address]. By joining [employer]’s FollowMyHealth portal, you will also be able to view your health information using other applications (apps) compatible with our system.

## 2022-04-29 NOTE — ED PROVIDER NOTE - NSFOLLOWUPINSTRUCTIONS_ED_ALL_ED_FT
Delirium      Delirium is a state of mental confusion. It comes on quickly and causes significant changes in a person's thinking and behavior.    People with delirium usually have trouble paying attention to what is going on or knowing where they are. They may become very withdrawn or very emotional and unable to sit still. They may even see or feel things that are not there (hallucinations). Delirium is a sign of a serious underlying medical condition.      What are the causes?    Delirium occurs when something suddenly affects the signals that the brain sends out. Brain signals can be affected by anything that puts severe stress on the body and brain and causes brain chemicals to be out of balance. The most common causes of delirium include:  •Infections. These may be bacterial, viral, fungal, or protozoal.      •Medicines. These include many over-the-counter and prescription medicines.      •Recreational drugs.      •Substance withdrawal. This occurs with sudden discontinuation of alcohol, certain medicines, or recreational drugs.      •Surgery and anesthesia.      •Sudden vascular events, such as stroke and brain hemorrhage.      •Other brain disorders, such as migraines, tumors, seizures, and physical head trauma.      •Metabolic disorders, such as kidney or liver failure.      •Low blood oxygen (anoxia). This may occur with lung disease, cardiac arrest, or carbon monoxide poisoning.      •Hormone imbalances (endocrinopathies), such as an overactive thyroid (hyperthyroidism) or underactive thyroid (hypothyroidism).      •Vitamin deficiencies.        What increases the risk?    The following factors may make someone more likely to develop this condition:  •Being a child.      •Being an older person.      •Living alone.      •Having vision loss or hearing loss.      •Having an existing brain disease, such as dementia.      •Having long-lasting (chronic) medical conditions, such as heart disease.      •Being hospitalized for long periods of time.        What are the signs or symptoms?    Delirium starts with a sudden change in a person's thinking or behavior. Symptoms include:  •Not being able to stay awake (drowsiness) or pay attention.      •Being confused about places, time, and people.      •Forgetfulness.      •Having extreme energy levels. These may be low or high.      •Changes in sleep patterns.      •Extreme mood swings, such as sudden anger or anxiety.      •Focusing on things or ideas that are not important.      •Rambling and senseless talking.      •Difficulty speaking, understanding speech, or both.      •Hallucinations.      •Tremor or unsteady gait.      Symptoms come and go throughout the day and are often worse at the end of the day.      How is this diagnosed?    People with delirium may not realize that they have the condition. Often, a family member or health care provider is the first person to notice the changes. This condition may be diagnosed based on a physical exam, health history, and tests.•The health care provider will obtain a detailed history. This may include questions about:  •Current symptoms.      •Medical conditions that you have.      •Medicines.      •Drug use.      •The health care provider will perform a mental status test by:  •Asking questions to check for confusion.      •Watching for abnormal behavior.        •The health care provider may also order lab tests or additional studies to determine the cause of the delirium.        How is this treated?     Treatment of delirium depends on the cause and severity. Delirium usually goes away within days or weeks of treating the underlying cause. In the meantime, do not leave the person alone because he or she may accidentally cause self-harm. This condition may be treated with supportive care, such as:  •Increased light during the day and decreased light at night.      •Low noise level.      •Uninterrupted sleep.      •A regular daily schedule.      •Clocks and calendars to help with orientation.      •Familiar objects, including the person's pictures and clothing.      •Frequent visits from familiar family and friends.      •A healthy diet.      •Gentle exercise.      In more severe cases of delirium, medicine may be prescribed to help the person keep calm and think more clearly.      Follow these instructions at home:     •Continue supportive care as told by a health care provider.      •Take over-the-counter and prescription medicines only as told by your health care provider.      •Ask a health care provider before using herbs or supplements.      • Do not use alcohol or illegal drugs.      •Keep all follow-up visits. This is important.        Contact a health care provider if:    •Symptoms do not get better or they become worse.      •New symptoms of delirium develop.      •Caring for the person at home does not seem safe.      •Eating, drinking, or communicating stops.      •There are side effects of medicines, such as changes in sleep patterns, dizziness, weight gain, restlessness, movement changes, or tremors.        Get help right away if:    •The person has thoughts of harming self or harming others.    •There are serious side effects of medicine, such as:  •Swelling of the face, lips, tongue, or throat.      •Fever, confusion, muscle spasms, or seizures.        If you ever feel like a loved one may hurt himself or herself or others, or shares thoughts about taking his or her own life, get help right away. You can go to your nearest emergency department or:   • Call your local emergency services (541 in the [place]       • Call a suicide crisis helpline, such as the National Suicide Prevention Lifeline at 1-248.626.6141. This is open 24 hours a day in the [place]        • Text the Crisis Text Line at 071346 (in the [place]         Summary    •Delirium is a state of mental confusion. It comes on quickly and causes significant changes in a person's thinking and behavior.      •Delirium is a sign of a serious underlying medical condition.      •Certain medical conditions or a long hospital stay may increase the risk of developing delirium.      •Treatment of delirium involves treating the underlying cause and providing supportive treatments, such as a calm and familiar environment.      This information is not intended to replace advice given to you by your health care provider. Make sure you discuss any questions you have with your health care provider. STOP TRAZADONE  DECREASE LEXAPRO TO 20 MG DAILY  DECREASE XANAX TO 1 MG TWICE DAILY AS NEEDED  STOP PERCOCET  STOP MARIJUANA    START TAKING OLANZAPINE 2.5 MG AT BEDTIME      Delirium      Delirium is a state of mental confusion. It comes on quickly and causes significant changes in a person's thinking and behavior.    People with delirium usually have trouble paying attention to what is going on or knowing where they are. They may become very withdrawn or very emotional and unable to sit still. They may even see or feel things that are not there (hallucinations). Delirium is a sign of a serious underlying medical condition.      What are the causes?    Delirium occurs when something suddenly affects the signals that the brain sends out. Brain signals can be affected by anything that puts severe stress on the body and brain and causes brain chemicals to be out of balance. The most common causes of delirium include:  •Infections. These may be bacterial, viral, fungal, or protozoal.      •Medicines. These include many over-the-counter and prescription medicines.      •Recreational drugs.      •Substance withdrawal. This occurs with sudden discontinuation of alcohol, certain medicines, or recreational drugs.      •Surgery and anesthesia.      •Sudden vascular events, such as stroke and brain hemorrhage.      •Other brain disorders, such as migraines, tumors, seizures, and physical head trauma.      •Metabolic disorders, such as kidney or liver failure.      •Low blood oxygen (anoxia). This may occur with lung disease, cardiac arrest, or carbon monoxide poisoning.      •Hormone imbalances (endocrinopathies), such as an overactive thyroid (hyperthyroidism) or underactive thyroid (hypothyroidism).      •Vitamin deficiencies.        What increases the risk?    The following factors may make someone more likely to develop this condition:  •Being a child.      •Being an older person.      •Living alone.      •Having vision loss or hearing loss.      •Having an existing brain disease, such as dementia.      •Having long-lasting (chronic) medical conditions, such as heart disease.      •Being hospitalized for long periods of time.        What are the signs or symptoms?    Delirium starts with a sudden change in a person's thinking or behavior. Symptoms include:  •Not being able to stay awake (drowsiness) or pay attention.      •Being confused about places, time, and people.      •Forgetfulness.      •Having extreme energy levels. These may be low or high.      •Changes in sleep patterns.      •Extreme mood swings, such as sudden anger or anxiety.      •Focusing on things or ideas that are not important.      •Rambling and senseless talking.      •Difficulty speaking, understanding speech, or both.      •Hallucinations.      •Tremor or unsteady gait.      Symptoms come and go throughout the day and are often worse at the end of the day.      How is this diagnosed?    People with delirium may not realize that they have the condition. Often, a family member or health care provider is the first person to notice the changes. This condition may be diagnosed based on a physical exam, health history, and tests.•The health care provider will obtain a detailed history. This may include questions about:  •Current symptoms.      •Medical conditions that you have.      •Medicines.      •Drug use.      •The health care provider will perform a mental status test by:  •Asking questions to check for confusion.      •Watching for abnormal behavior.        •The health care provider may also order lab tests or additional studies to determine the cause of the delirium.        How is this treated?     Treatment of delirium depends on the cause and severity. Delirium usually goes away within days or weeks of treating the underlying cause. In the meantime, do not leave the person alone because he or she may accidentally cause self-harm. This condition may be treated with supportive care, such as:  •Increased light during the day and decreased light at night.      •Low noise level.      •Uninterrupted sleep.      •A regular daily schedule.      •Clocks and calendars to help with orientation.      •Familiar objects, including the person's pictures and clothing.      •Frequent visits from familiar family and friends.      •A healthy diet.      •Gentle exercise.      In more severe cases of delirium, medicine may be prescribed to help the person keep calm and think more clearly.      Follow these instructions at home:     •Continue supportive care as told by a health care provider.      •Take over-the-counter and prescription medicines only as told by your health care provider.      •Ask a health care provider before using herbs or supplements.      • Do not use alcohol or illegal drugs.      •Keep all follow-up visits. This is important.        Contact a health care provider if:    •Symptoms do not get better or they become worse.      •New symptoms of delirium develop.      •Caring for the person at home does not seem safe.      •Eating, drinking, or communicating stops.      •There are side effects of medicines, such as changes in sleep patterns, dizziness, weight gain, restlessness, movement changes, or tremors.        Get help right away if:    •The person has thoughts of harming self or harming others.    •There are serious side effects of medicine, such as:  •Swelling of the face, lips, tongue, or throat.      •Fever, confusion, muscle spasms, or seizures.        If you ever feel like a loved one may hurt himself or herself or others, or shares thoughts about taking his or her own life, get help right away. You can go to your nearest emergency department or:   • Call your local emergency services (641 in the [place]       • Call a suicide crisis helpline, such as the National Suicide Prevention Lifeline at 1-149.611.4642. This is open 24 hours a day in the [place]        • Text the Crisis Text Line at 628369 (in the [place]         Summary    •Delirium is a state of mental confusion. It comes on quickly and causes significant changes in a person's thinking and behavior.      •Delirium is a sign of a serious underlying medical condition.      •Certain medical conditions or a long hospital stay may increase the risk of developing delirium.      •Treatment of delirium involves treating the underlying cause and providing supportive treatments, such as a calm and familiar environment.      This information is not intended to replace advice given to you by your health care provider. Make sure you discuss any questions you have with your health care provider. STOP TRAZADONE  DECREASE LEXAPRO TO 20 MG DAILY  DECREASE XANAX TO 1 MG TWICE DAILY AS NEEDED AND EVENTUALLY  TAPER OFF UNDER PHYSICIAN GUIDANCE  STOP PERCOCET  STOP MARIJUANA    START TAKING OLANZAPINE 2.5 MG AT BEDTIME      Delirium      Delirium is a state of mental confusion. It comes on quickly and causes significant changes in a person's thinking and behavior.    People with delirium usually have trouble paying attention to what is going on or knowing where they are. They may become very withdrawn or very emotional and unable to sit still. They may even see or feel things that are not there (hallucinations). Delirium is a sign of a serious underlying medical condition.      What are the causes?    Delirium occurs when something suddenly affects the signals that the brain sends out. Brain signals can be affected by anything that puts severe stress on the body and brain and causes brain chemicals to be out of balance. The most common causes of delirium include:  •Infections. These may be bacterial, viral, fungal, or protozoal.      •Medicines. These include many over-the-counter and prescription medicines.      •Recreational drugs.      •Substance withdrawal. This occurs with sudden discontinuation of alcohol, certain medicines, or recreational drugs.      •Surgery and anesthesia.      •Sudden vascular events, such as stroke and brain hemorrhage.      •Other brain disorders, such as migraines, tumors, seizures, and physical head trauma.      •Metabolic disorders, such as kidney or liver failure.      •Low blood oxygen (anoxia). This may occur with lung disease, cardiac arrest, or carbon monoxide poisoning.      •Hormone imbalances (endocrinopathies), such as an overactive thyroid (hyperthyroidism) or underactive thyroid (hypothyroidism).      •Vitamin deficiencies.        What increases the risk?    The following factors may make someone more likely to develop this condition:  •Being a child.      •Being an older person.      •Living alone.      •Having vision loss or hearing loss.      •Having an existing brain disease, such as dementia.      •Having long-lasting (chronic) medical conditions, such as heart disease.      •Being hospitalized for long periods of time.        What are the signs or symptoms?    Delirium starts with a sudden change in a person's thinking or behavior. Symptoms include:  •Not being able to stay awake (drowsiness) or pay attention.      •Being confused about places, time, and people.      •Forgetfulness.      •Having extreme energy levels. These may be low or high.      •Changes in sleep patterns.      •Extreme mood swings, such as sudden anger or anxiety.      •Focusing on things or ideas that are not important.      •Rambling and senseless talking.      •Difficulty speaking, understanding speech, or both.      •Hallucinations.      •Tremor or unsteady gait.      Symptoms come and go throughout the day and are often worse at the end of the day.      How is this diagnosed?    People with delirium may not realize that they have the condition. Often, a family member or health care provider is the first person to notice the changes. This condition may be diagnosed based on a physical exam, health history, and tests.•The health care provider will obtain a detailed history. This may include questions about:  •Current symptoms.      •Medical conditions that you have.      •Medicines.      •Drug use.      •The health care provider will perform a mental status test by:  •Asking questions to check for confusion.      •Watching for abnormal behavior.        •The health care provider may also order lab tests or additional studies to determine the cause of the delirium.        How is this treated?     Treatment of delirium depends on the cause and severity. Delirium usually goes away within days or weeks of treating the underlying cause. In the meantime, do not leave the person alone because he or she may accidentally cause self-harm. This condition may be treated with supportive care, such as:  •Increased light during the day and decreased light at night.      •Low noise level.      •Uninterrupted sleep.      •A regular daily schedule.      •Clocks and calendars to help with orientation.      •Familiar objects, including the person's pictures and clothing.      •Frequent visits from familiar family and friends.      •A healthy diet.      •Gentle exercise.      In more severe cases of delirium, medicine may be prescribed to help the person keep calm and think more clearly.      Follow these instructions at home:     •Continue supportive care as told by a health care provider.      •Take over-the-counter and prescription medicines only as told by your health care provider.      •Ask a health care provider before using herbs or supplements.      • Do not use alcohol or illegal drugs.      •Keep all follow-up visits. This is important.        Contact a health care provider if:    •Symptoms do not get better or they become worse.      •New symptoms of delirium develop.      •Caring for the person at home does not seem safe.      •Eating, drinking, or communicating stops.      •There are side effects of medicines, such as changes in sleep patterns, dizziness, weight gain, restlessness, movement changes, or tremors.        Get help right away if:    •The person has thoughts of harming self or harming others.    •There are serious side effects of medicine, such as:  •Swelling of the face, lips, tongue, or throat.      •Fever, confusion, muscle spasms, or seizures.        If you ever feel like a loved one may hurt himself or herself or others, or shares thoughts about taking his or her own life, get help right away. You can go to your nearest emergency department or:   • Call your local emergency services (751 in the [place]       • Call a suicide crisis helpline, such as the National Suicide Prevention Lifeline at 1-108.191.9118. This is open 24 hours a day in the [place]        • Text the Crisis Text Line at 727515 (in the [place]         Summary    •Delirium is a state of mental confusion. It comes on quickly and causes significant changes in a person's thinking and behavior.      •Delirium is a sign of a serious underlying medical condition.      •Certain medical conditions or a long hospital stay may increase the risk of developing delirium.      •Treatment of delirium involves treating the underlying cause and providing supportive treatments, such as a calm and familiar environment.      This information is not intended to replace advice given to you by your health care provider. Make sure you discuss any questions you have with your health care provider.

## 2022-04-29 NOTE — ED BEHAVIORAL HEALTH ASSESSMENT NOTE - RISK ASSESSMENT
Modifiable:  Acute AMS, severe agitation/anxiety/panic,  poor judgment and impaired insight, low frustration tolerance, impulsivity, violent behaviors.    Non Modifiable: Chronic mental illness, HX of psychiatric hospitalizations, chronic medical diagnosis, chronic pain, Neurocognitive impairment, HX of suicide attempt,    Protective factors:  medication and treatment compliant; no suicide attempts; no self-injurious behavior;  no legal issues;  strong psychosocial support; access to health services, future- oriented    Overall Risk: Risk state deviates from baseline given noted modifiable/non-modifiable risk factors, which may potentiate the risk of mortality. Currently the patient is at  LOW acute risk for harm towards self and/or others. Low Acute Suicide Risk

## 2022-04-29 NOTE — ED PROVIDER NOTE - NS ED ROS FT
Const: Denies fever, chills  HEENT: Denies blurry vision, sore throat  Neck: Denies neck pain/stiffness  Resp: Denies coughing, SOB  Cardiovascular: + CP. Denies palpitations, LE edema  GI: Denies nausea, vomiting, abdominal pain, diarrhea, constipation, blood in stool  : Denies urinary frequency/urgency/dysuria, hematuria  MSK: Denies back pain  Neuro: + lightheadedness. Denies HA, numbness, weakness  Skin: Denies rashes.   Psych: Denies SI, HI, AVH.

## 2022-04-29 NOTE — ED BEHAVIORAL HEALTH ASSESSMENT NOTE - SUMMARY
80 y/o F , retired, domiciled with spouse in private home. PPHX of Anxiety with 1 prior psychiatric hospitalization 10-15 years ago at [facility] due to AMS. ETOH use socially, no known illicit drug use, no legal hx, no hx of incarceration, hx of violent behaviors/ aggression. PMHX with PMH HTN, HLD, Parkinson's disease, Dementia, Trigeminal Neuralgias. She presents BIBEMS for bizarre behavior, agitation and suicidal statements. Psychiatry called for suicidal ideation.    Patient was seen and evaluated. Patient presents with euthymic/constricted affect, with mild anxious mood features. No acute depressive mood features. No subjective or reportable ellis. No Suicidal/ homicidal ideation, intent or plan. No overt psychosis. Evidence of AMS likely Delirium with behavioral disturbance superimposed on Dementia. Patient with increasing agitation/ aggression at home. Patient does not meet criteria for inpatient psychiatric admission. + Bacteria noted in UA on admission, would consider R/O underlying medical cause of delirium    Plan  Lamictal 50mg orally BID (trigeminal neuralgia)  Reduce Lexapro to 20mg orally daily  Discontinue Trazodone  Taper Xanax 1mg BID (PRN anxiety)  Consider starting Olanzapine 2.5mg qHS for agitation  Labs/ EKG appreciated  If possible hold all anticholinergics, opioids, benzos as they may worsen cognition  Consider additional medical work up for AMS  Psychiatry cleared for discharge, may follow with outpatient psych

## 2022-04-29 NOTE — CHART NOTE - NSCHARTNOTEFT_GEN_A_CORE
SWNote: pt seen by behavioral provider , pt to benefit from outpatient therapy. [facility] explained pt in agreement to services. Schedule reviewed, apptgiven for Monday May 2 ,2022 at 13:00. Brochure/appt card given release of info signed per protocol. Pt aware services via telephone. Email to be sent asap. Aware to call 911 or to go to nearest ED if symptoms worsen.  No other SW concerns reported at this moment.

## 2022-04-29 NOTE — ED BEHAVIORAL HEALTH ASSESSMENT NOTE - DESCRIPTION
Alcohol, Blood: <10: TOXIC CONCENTRATIONS (mg/dL):  UA: Bacteria: Occasional (03.31.22 @ 12:43)  Culture Results: >=3 organisms. Probable collection contamination. (03.31.22 @ 14:53)                        11.2   4.07  )-----------( 188      ( 29 Apr 2022 10:06 )             35.1   04-29    139  |  105  |  13.2  ----------------------------<  100<H>  4.0   |  22.0  |  0.81    Ca    9.2      29 Apr 2022 10:06    QTC Calculation([facility]per) 434 ms 4/23/2022 1:20:19 PM    Patient on 1:1, no behavioral disturbances, no IV Medications PRN agitation Reside in a private home with her spouse in [place], she is a former nurse, she has three children who reside locally and are supportive HTN, HLD, Parkinson's disease, Dementia, Depression, anxiety, trigeminal neuralgia

## 2022-04-29 NOTE — ED BEHAVIORAL HEALTH ASSESSMENT NOTE - PAST PSYCHOTROPIC MEDICATION
Gabapentin 600 mg BID, lamotrigine 50 mg BID, lexapro (20mg) 1.5 tabs qd, trazodone 50 mg qhs, xanax 1.5 mg 1-2x/day prn,

## 2022-04-29 NOTE — ED PROVIDER NOTE - PHYSICAL EXAMINATION
Const: Awake, alert and oriented. In no acute distress. Well appearing. Thin.  HEENT: NC/AT. Dry mucous membranes.  Eyes: No scleral icterus. EOMI.  Neck:. Soft and supple. Full ROM without pain.  Cardiac: Regular rate and regular rhythm. +S1/S2. No murmurs. Peripheral pulses 2+ and symmetric. No LE edema.  Resp: Speaking in full sentences. No evidence of respiratory distress. No wheezes, rales or rhonchi.  Abd: Soft, non-tender, non-distended. Normal bowel sounds in all 4 quadrants. No guarding or rebound.  Back: Spine midline and non-tender. No CVAT.  Skin: No rashes, abrasions or lacerations.  Neuro: Awake, alert & oriented x 2. Moves all extremities symmetrically.   Psych: Calm and cooperative, but anxious. Does not appear to be responding to internal stimuli.

## 2022-04-29 NOTE — ED ADULT NURSE REASSESSMENT NOTE - NS ED NURSE REASSESS COMMENT FT1
pt family requesting to take pt to inpatient psych facility s/p "making arrangements" per md pt cleared with our psych and medically ok to dispo pt family requesting  inpatient psych facility pending SW to s/w family in regards to placement

## 2022-04-30 LAB
CULTURE RESULTS: SIGNIFICANT CHANGE UP
SPECIMEN SOURCE: SIGNIFICANT CHANGE UP

## 2022-11-06 ENCOUNTER — OFFICE (OUTPATIENT)
Dept: URBAN - METROPOLITAN AREA CLINIC 12 | Facility: CLINIC | Age: 82
Setting detail: OPHTHALMOLOGY
End: 2022-11-06
Payer: MEDICARE

## 2022-11-06 DIAGNOSIS — S05.02XA: ICD-10-CM

## 2022-11-06 PROCEDURE — 92012 INTRM OPH EXAM EST PATIENT: CPT | Performed by: OPTOMETRIST

## 2022-11-06 ASSESSMENT — SPHEQUIV_DERIVED
OD_SPHEQUIV: -1.375
OS_SPHEQUIV: -0.125
OS_SPHEQUIV: -0.5
OD_SPHEQUIV: -0.75

## 2022-11-06 ASSESSMENT — VISUAL ACUITY
OS_BCVA: 20/60-2
OD_BCVA: 20/60-1

## 2022-11-06 ASSESSMENT — KERATOMETRY
OS_K1POWER_DIOPTERS: 42.50
OD_K2POWER_DIOPTERS: 43.00
OS_K2POWER_DIOPTERS: 43.00
OD_AXISANGLE_DEGREES: 031
OD_K1POWER_DIOPTERS: 42.25
METHOD_AUTO_MANUAL: AUTO
OS_AXISANGLE_DEGREES: 175

## 2022-11-06 ASSESSMENT — AXIALLENGTH_DERIVED
OS_AL: 24.0718
OD_AL: 24.4813
OS_AL: 23.9206
OD_AL: 24.2219

## 2022-11-06 ASSESSMENT — REFRACTION_CURRENTRX
OS_SPHERE: -3.00
OD_CYLINDER: -0.75
OS_VPRISM_DIRECTION: SV
OD_VPRISM_DIRECTION: SV
OD_OVR_VA: 20/
OS_AXIS: 106
OS_OVR_VA: 20/
OS_CYLINDER: -1.00
OD_SPHERE: -2.25
OD_AXIS: 098

## 2022-11-06 ASSESSMENT — CONFRONTATIONAL VISUAL FIELD TEST (CVF)
OS_FINDINGS: FULL
OD_FINDINGS: FULL

## 2022-11-06 ASSESSMENT — REFRACTION_MANIFEST
OS_ADD: +1.50
OS_AXIS: 90
OD_SPHERE: -0.50
OS_SPHERE: +0.25
OD_SPHERE: -1.00
OD_CYLINDER: -0.75
OS_VA1: 20/25
OD_AXIS: 105
OU_VA: 20/25
OD_VA2: 20/25
OS_VA1: 20/25
OS_SPHERE: PL
OD_VA1: 20/25
OD_CYLINDER: SPH
OU_VA: 20/25
OD_ADD: +1.50
OD_VA1: 20/30+1
OS_CYLINDER: -0.75
OS_AXIS: 090
OS_VA2: 20/25
OS_CYLINDER: -0.75

## 2022-11-06 ASSESSMENT — TONOMETRY
OD_IOP_MMHG: 17
OS_IOP_MMHG: 17

## 2022-11-06 ASSESSMENT — REFRACTION_AUTOREFRACTION
OD_CYLINDER: -0.50
OS_CYLINDER: -1.50
OD_SPHERE: -0.50
OS_SPHERE: +0.25
OS_AXIS: 102
OD_AXIS: 091

## 2022-11-06 ASSESSMENT — LID EXAM ASSESSMENTS
OD_BLEPHARITIS: RUL T 1+
OS_BLEPHARITIS: LUL T 1+

## 2022-11-06 ASSESSMENT — SUPERFICIAL PUNCTATE KERATITIS (SPK): OD_SPK: 1+

## 2022-11-06 ASSESSMENT — DRY EYES - PHYSICIAN NOTES
OS_GENERALCOMMENTS: MILD
OD_GENERALCOMMENTS: LOW TBUT

## 2022-11-06 ASSESSMENT — CORNEAL TRAUMA - ABRASION: OS_ABRASION: PRESENT

## 2022-12-11 NOTE — ED ADULT NURSE NOTE - PATIENT DISCHARGE SIGNATURE
Goal Outcome Evaluation:   Pt resting with eyes closed. Able to make needs known. Calm and cooperative. No c/o pain at this time. VSS . Will continue to assess.               14-Feb-2017

## 2023-01-24 NOTE — ED ADULT TRIAGE NOTE - HEIGHT IN FEET
[FreeTextEntry1] : MS: Awake, alert, oriented to person, place, situation and time.  Follows commands. \par \par Language: Speech is clear, fluent. No dysarthria. \par \par CNs 2 - 12 intact. EOMI no nystagmus, no diplopia. No facial asymmetry b/l. Tongue midline, normal movements, no atrophy.\par \par Motor: Normal muscle bulk & tone. No noticeable tremor or seizure. No pronator drift. Muscle strength of b/l UE and b/l LE 5/5. \par \par Sensation: Intact to LT b/l throughout\par \par Cortical: No extinction\par \par Coordination: Intact rapid-alternating movements. No dysmetria to FTN. \par \par DTR: 2+ in biceps, brachioradialis and triceps; 1+ in patellar and ankle; plantars are down b/l\par \par Gait: No postural instability. Normal stance and tandem gait.\par \par General: Alert and oriented to person, place, time, and situation. In no acute distress. Cooperative. Follows commands. \par Eyes: Sclera and conjunctiva were normal and pupils were equal in size, round, reactive to light, with normal accommodation\par ENT: Ears and nose normal in appearance. \par Vascular: No peripheral edema.\par Respiratory: No visible respiratory distress. \par Musculoskeletal: No involuntary movements were seen.\par Skin: Normal skin color and pigmentation.\par 
5

## 2023-02-07 NOTE — ED PROVIDER NOTE - NS ED ATTENDING STATEMENT MOD
I have personally seen and examined this patient.  I have fully participated in the care of this patient. I have reviewed all pertinent clinical information, including history, physical exam, plan and the Resident’s note and agree except as noted. Skyrizi Counseling: I discussed with the patient the risks of risankizumab-rzaa including but not limited to immunosuppression, and serious infections.  The patient understands that monitoring is required including a PPD at baseline and must alert us or the primary physician if symptoms of infection or other concerning signs are noted.

## 2023-02-09 ENCOUNTER — APPOINTMENT (OUTPATIENT)
Dept: ORTHOPEDIC SURGERY | Facility: CLINIC | Age: 83
End: 2023-02-09
Payer: MEDICARE

## 2023-02-09 VITALS
DIASTOLIC BLOOD PRESSURE: 89 MMHG | HEART RATE: 76 BPM | HEIGHT: 67 IN | BODY MASS INDEX: 21.35 KG/M2 | SYSTOLIC BLOOD PRESSURE: 152 MMHG | WEIGHT: 136 LBS

## 2023-02-09 DIAGNOSIS — M60.9 MYOSITIS, UNSPECIFIED: ICD-10-CM

## 2023-02-09 DIAGNOSIS — Z98.890 OTHER SPECIFIED POSTPROCEDURAL STATES: ICD-10-CM

## 2023-02-09 DIAGNOSIS — M47.816 SPONDYLOSIS W/OUT MYELOPATHY OR RADICULOPATHY, LUMBAR REGION: ICD-10-CM

## 2023-02-09 PROCEDURE — 72100 X-RAY EXAM L-S SPINE 2/3 VWS: CPT

## 2023-02-09 PROCEDURE — 99213 OFFICE O/P EST LOW 20 MIN: CPT | Mod: 24

## 2023-02-09 PROCEDURE — 20552 NJX 1/MLT TRIGGER POINT 1/2: CPT

## 2023-02-09 RX ORDER — TIZANIDINE 4 MG/1
4 TABLET ORAL EVERY 8 HOURS
Qty: 20 | Refills: 0 | Status: ACTIVE | COMMUNITY
Start: 2023-02-09 | End: 1900-01-01

## 2023-02-09 NOTE — PHYSICAL EXAM
[de-identified] : CONSTITUTIONAL: The patient is a very pleasant individual who is well-nourished and who appears stated age.\par PSYCHIATRIC: The patient is alert and oriented X 3 and in no apparent distress, and participates with orthopedic evaluation well.\par HEAD: Atraumatic and is nonsyndromic in appearance.\par EENT: No visible thyromegaly, EOMI.\par RESPIRATORY: Respiratory rate is regular, not dyspneic on examination.\par LYMPHATICS: There is no inguinal lymphadenopathy\par INTEGUMENTARY: Skin is clean, dry, and intact about the bilateral lower extremities and lumbar spine.\par VASCULAR: There is brisk capillary refill about the bilateral lower extremities.\par NEUROLOGIC: There are no pathologic reflexes. There is no decrease in sensation of the bilateral lower extremities on Wartenberg pinwheel examination. Deep tendon reflexes are well maintained at 2+/4 of the bilateral lower extremities and are symmetric..\par MUSCULOSKELETAL: There is no visible muscular atrophy. Manual motor strength is well maintained in the bilateral lower extremities. Range of motion of lumbar spine is well maintained. The patient ambulates in a non-myelopathic manner. Negative tension sign and straight leg raise bilaterally. Quad extension, ankle dorsiflexion, EHL, plantar flexion, and ankle eversion are well preserved. Normal secondary orthopaedic exam of bilateral hips, greater trochanteric area, knees and ankles, significant reproducible pain over the lower left ribs in the mid scapular line. [de-identified] : Patient is status post L1-L2 kyphoplasty these were compared to previous x-rays that show stable placement of Kyphon cement/Nineveh cement.\par \par These x-rays were compared to previous x-rays from 2000 21 January 2021 shows no acute compression fractures etc.

## 2023-02-09 NOTE — PROCEDURE
[de-identified] : Verbal consent was obtained and all questions, comments and concerns were addressed.  After left lower thoracic paraspinal trigger point in the affected area into superficial muscular trigger point was cleansed with alcohol prep X 3, ethyl chloride was used as local anesthetic.  Under sterile precautions 1cc of 1 percent lidocaine without epinephrine, plus 10 mg depomedrol, were instilled into the affected trigger points.  Patient tolerated procedure well. Dry sterile dressing was placed and patient described relief of pain 5 minutes after procedure was performed.

## 2023-02-09 NOTE — HISTORY OF PRESENT ILLNESS
[de-identified] : Very pleasant 82-year-old female had an acute onset of low back pain starting on Tuesday.  Does not around her back its on her left lateral rib cage.  Patient states there is no trauma pain is reproducible with palpation. [Stable] : stable [10] : a maximum pain level of 10/10 [Bending] : worsened by bending [Lifting] : worsened by lifting [None] : No relieving factors are noted [Ataxia] : no ataxia [Incontinence] : no incontinence [Loss of Dexterity] : good dexterity [Urinary Ret.] : no urinary retention [de-identified] : Transitions [de-identified] : Motionless

## 2023-02-13 ENCOUNTER — NON-APPOINTMENT (OUTPATIENT)
Age: 83
End: 2023-02-13

## 2023-03-11 ENCOUNTER — EMERGENCY (EMERGENCY)
Facility: HOSPITAL | Age: 83
LOS: 1 days | Discharge: HOME | End: 2023-03-11
Attending: STUDENT IN AN ORGANIZED HEALTH CARE EDUCATION/TRAINING PROGRAM
Payer: MEDICARE

## 2023-03-11 VITALS
DIASTOLIC BLOOD PRESSURE: 81 MMHG | HEART RATE: 103 BPM | SYSTOLIC BLOOD PRESSURE: 139 MMHG | RESPIRATION RATE: 18 BRPM | TEMPERATURE: 99 F | OXYGEN SATURATION: 98 %

## 2023-03-11 VITALS
TEMPERATURE: 98 F | DIASTOLIC BLOOD PRESSURE: 68 MMHG | OXYGEN SATURATION: 98 % | HEART RATE: 107 BPM | SYSTOLIC BLOOD PRESSURE: 124 MMHG | RESPIRATION RATE: 16 BRPM

## 2023-03-11 DIAGNOSIS — Z98.890 OTHER SPECIFIED POSTPROCEDURAL STATES: Chronic | ICD-10-CM

## 2023-03-11 DIAGNOSIS — Z98.49 CATARACT EXTRACTION STATUS, UNSPECIFIED EYE: Chronic | ICD-10-CM

## 2023-03-11 DIAGNOSIS — Z90.722 ACQUIRED ABSENCE OF OVARIES, BILATERAL: Chronic | ICD-10-CM

## 2023-03-11 LAB
ALBUMIN SERPL ELPH-MCNC: 3.9 G/DL — SIGNIFICANT CHANGE UP (ref 3.3–5.2)
ALP SERPL-CCNC: 114 U/L — SIGNIFICANT CHANGE UP (ref 40–120)
ALT FLD-CCNC: 11 U/L — SIGNIFICANT CHANGE UP
ANION GAP SERPL CALC-SCNC: 15 MMOL/L — SIGNIFICANT CHANGE UP (ref 5–17)
AST SERPL-CCNC: 15 U/L — SIGNIFICANT CHANGE UP
BASOPHILS # BLD AUTO: 0.03 K/UL — SIGNIFICANT CHANGE UP (ref 0–0.2)
BASOPHILS NFR BLD AUTO: 0.3 % — SIGNIFICANT CHANGE UP (ref 0–2)
BILIRUB SERPL-MCNC: 0.2 MG/DL — LOW (ref 0.4–2)
BUN SERPL-MCNC: 28.5 MG/DL — HIGH (ref 8–20)
CALCIUM SERPL-MCNC: 9.2 MG/DL — SIGNIFICANT CHANGE UP (ref 8.4–10.5)
CHLORIDE SERPL-SCNC: 103 MMOL/L — SIGNIFICANT CHANGE UP (ref 96–108)
CO2 SERPL-SCNC: 17 MMOL/L — LOW (ref 22–29)
CREAT SERPL-MCNC: 0.84 MG/DL — SIGNIFICANT CHANGE UP (ref 0.5–1.3)
EGFR: 69 ML/MIN/1.73M2 — SIGNIFICANT CHANGE UP
EOSINOPHIL # BLD AUTO: 0 K/UL — SIGNIFICANT CHANGE UP (ref 0–0.5)
EOSINOPHIL NFR BLD AUTO: 0 % — SIGNIFICANT CHANGE UP (ref 0–6)
GLUCOSE SERPL-MCNC: 113 MG/DL — HIGH (ref 70–99)
HCT VFR BLD CALC: 24.6 % — LOW (ref 34.5–45)
HGB BLD-MCNC: 8.1 G/DL — LOW (ref 11.5–15.5)
IMM GRANULOCYTES NFR BLD AUTO: 0.6 % — SIGNIFICANT CHANGE UP (ref 0–0.9)
LIDOCAIN IGE QN: 19 U/L — LOW (ref 22–51)
LYMPHOCYTES # BLD AUTO: 0.64 K/UL — LOW (ref 1–3.3)
LYMPHOCYTES # BLD AUTO: 6.5 % — LOW (ref 13–44)
MAGNESIUM SERPL-MCNC: 2 MG/DL — SIGNIFICANT CHANGE UP (ref 1.8–2.6)
MCHC RBC-ENTMCNC: 31.5 PG — SIGNIFICANT CHANGE UP (ref 27–34)
MCHC RBC-ENTMCNC: 32.9 GM/DL — SIGNIFICANT CHANGE UP (ref 32–36)
MCV RBC AUTO: 95.7 FL — SIGNIFICANT CHANGE UP (ref 80–100)
MONOCYTES # BLD AUTO: 0.68 K/UL — SIGNIFICANT CHANGE UP (ref 0–0.9)
MONOCYTES NFR BLD AUTO: 6.9 % — SIGNIFICANT CHANGE UP (ref 2–14)
NEUTROPHILS # BLD AUTO: 8.51 K/UL — HIGH (ref 1.8–7.4)
NEUTROPHILS NFR BLD AUTO: 85.7 % — HIGH (ref 43–77)
PLATELET # BLD AUTO: 249 K/UL — SIGNIFICANT CHANGE UP (ref 150–400)
POTASSIUM SERPL-MCNC: 3.6 MMOL/L — SIGNIFICANT CHANGE UP (ref 3.5–5.3)
POTASSIUM SERPL-SCNC: 3.6 MMOL/L — SIGNIFICANT CHANGE UP (ref 3.5–5.3)
PROT SERPL-MCNC: 6.6 G/DL — SIGNIFICANT CHANGE UP (ref 6.6–8.7)
RBC # BLD: 2.57 M/UL — LOW (ref 3.8–5.2)
RBC # FLD: 12.9 % — SIGNIFICANT CHANGE UP (ref 10.3–14.5)
SODIUM SERPL-SCNC: 135 MMOL/L — SIGNIFICANT CHANGE UP (ref 135–145)
TROPONIN T SERPL-MCNC: <0.01 NG/ML — SIGNIFICANT CHANGE UP (ref 0–0.06)
TROPONIN T SERPL-MCNC: <0.01 NG/ML — SIGNIFICANT CHANGE UP (ref 0–0.06)
WBC # BLD: 9.92 K/UL — SIGNIFICANT CHANGE UP (ref 3.8–10.5)
WBC # FLD AUTO: 9.92 K/UL — SIGNIFICANT CHANGE UP (ref 3.8–10.5)

## 2023-03-11 PROCEDURE — 99285 EMERGENCY DEPT VISIT HI MDM: CPT

## 2023-03-11 PROCEDURE — 71046 X-RAY EXAM CHEST 2 VIEWS: CPT | Mod: 26

## 2023-03-11 PROCEDURE — 36415 COLL VENOUS BLD VENIPUNCTURE: CPT

## 2023-03-11 PROCEDURE — 96375 TX/PRO/DX INJ NEW DRUG ADDON: CPT

## 2023-03-11 PROCEDURE — 93010 ELECTROCARDIOGRAM REPORT: CPT

## 2023-03-11 PROCEDURE — 99285 EMERGENCY DEPT VISIT HI MDM: CPT | Mod: 25

## 2023-03-11 PROCEDURE — 93005 ELECTROCARDIOGRAM TRACING: CPT

## 2023-03-11 PROCEDURE — 96361 HYDRATE IV INFUSION ADD-ON: CPT

## 2023-03-11 PROCEDURE — 85025 COMPLETE CBC W/AUTO DIFF WBC: CPT

## 2023-03-11 PROCEDURE — 71046 X-RAY EXAM CHEST 2 VIEWS: CPT

## 2023-03-11 PROCEDURE — 83735 ASSAY OF MAGNESIUM: CPT

## 2023-03-11 PROCEDURE — 96374 THER/PROPH/DIAG INJ IV PUSH: CPT

## 2023-03-11 PROCEDURE — 83690 ASSAY OF LIPASE: CPT

## 2023-03-11 PROCEDURE — 84484 ASSAY OF TROPONIN QUANT: CPT

## 2023-03-11 PROCEDURE — 80053 COMPREHEN METABOLIC PANEL: CPT

## 2023-03-11 RX ORDER — ONDANSETRON 8 MG/1
4 TABLET, FILM COATED ORAL ONCE
Refills: 0 | Status: COMPLETED | OUTPATIENT
Start: 2023-03-11 | End: 2023-03-11

## 2023-03-11 RX ORDER — SUCRALFATE 1 G
1 TABLET ORAL ONCE
Refills: 0 | Status: COMPLETED | OUTPATIENT
Start: 2023-03-11 | End: 2023-03-11

## 2023-03-11 RX ORDER — ASPIRIN/CALCIUM CARB/MAGNESIUM 324 MG
325 TABLET ORAL ONCE
Refills: 0 | Status: COMPLETED | OUTPATIENT
Start: 2023-03-11 | End: 2023-03-11

## 2023-03-11 RX ORDER — FAMOTIDINE 10 MG/ML
20 INJECTION INTRAVENOUS ONCE
Refills: 0 | Status: COMPLETED | OUTPATIENT
Start: 2023-03-11 | End: 2023-03-11

## 2023-03-11 RX ORDER — ALPRAZOLAM 0.25 MG
1 TABLET ORAL ONCE
Refills: 0 | Status: DISCONTINUED | OUTPATIENT
Start: 2023-03-11 | End: 2023-03-11

## 2023-03-11 RX ORDER — ACETAMINOPHEN 500 MG
1000 TABLET ORAL ONCE
Refills: 0 | Status: COMPLETED | OUTPATIENT
Start: 2023-03-11 | End: 2023-03-11

## 2023-03-11 RX ORDER — SODIUM CHLORIDE 9 MG/ML
500 INJECTION INTRAMUSCULAR; INTRAVENOUS; SUBCUTANEOUS ONCE
Refills: 0 | Status: COMPLETED | OUTPATIENT
Start: 2023-03-11 | End: 2023-03-11

## 2023-03-11 RX ADMIN — Medication 400 MILLIGRAM(S): at 07:04

## 2023-03-11 RX ADMIN — Medication 1 GRAM(S): at 05:26

## 2023-03-11 RX ADMIN — Medication 1 MILLIGRAM(S): at 02:21

## 2023-03-11 RX ADMIN — Medication 30 MILLILITER(S): at 07:04

## 2023-03-11 RX ADMIN — ONDANSETRON 4 MILLIGRAM(S): 8 TABLET, FILM COATED ORAL at 02:21

## 2023-03-11 RX ADMIN — SODIUM CHLORIDE 500 MILLILITER(S): 9 INJECTION INTRAMUSCULAR; INTRAVENOUS; SUBCUTANEOUS at 08:06

## 2023-03-11 RX ADMIN — FAMOTIDINE 20 MILLIGRAM(S): 10 INJECTION INTRAVENOUS at 05:26

## 2023-03-11 NOTE — ED PROVIDER NOTE - ATTENDING CONTRIBUTION TO CARE
83 yo F with hx HTN, HLD and anxiety c/o worsening chest pain which started 2 days ago.  Pt seen by [facility] 3 months ago with reported neg echo and stress.  EKG sinus with no acute changes.  on exam elderly F awake and alert in NAD, mm moist, Neck supple, Cor reg, Lungs clear b/l, Abd soft, NT, Ext FROM, Neuro nonfocal.  Will monitor check CE x 2 and if neg d/c with f/u as outpt

## 2023-03-11 NOTE — ED PROVIDER NOTE - PHYSICAL EXAMINATION
General: well appearing, NAD  Head: NC, AT  EENT: EOMI, no scleral icterus  Cardiac: tachycardic, NSR   Respiratory: CTABL, no respiratory distress   Abdomen: soft, ND, NT, nonperitonitic  MSK/Vascular: full ROM, soft compartments, warm extremities  Neuro: AAOx3, sensation to light touch intact  Psych: calm, cooperative

## 2023-03-11 NOTE — ED ADULT TRIAGE NOTE - CHIEF COMPLAINT QUOTE
Pt. BIBA for chest pain. pt. states its substernal, with SOB, gotten worse. Pt. received 324 of ASA by EMS, without relief. 22 to left hand.

## 2023-03-11 NOTE — ED PROVIDER NOTE - OBJECTIVE STATEMENT
82 year old female with hx of HTN, HLD, and anxiety who presents with chest pain. Patient states that for the last 2 days she has had slowly progressively worsening chest pressure accompanied by nausea and dyspnea. Feels anxious right now but has otherwise been well. Seen by cardiology (Dr. Grajeda) and in the last 3 months has had an ECHO, carotid US, and stress test which were all normal. No hx of CAD or stents. States that she saw cardiology because her PCP said "its about time you saw cardiology."

## 2023-03-11 NOTE — ED PROVIDER NOTE - CLINICAL SUMMARY MEDICAL DECISION MAKING FREE TEXT BOX
82 year old female with hx of HTN, HLD, and anxiety who presents with chest pain. Exam with reproducible midsternal ttp. Labs with anemia however patient with similar hgb as recently as 7/2021. Trop x2 nl and EKG x 2 without ischemic changes. Pt given multiple medications for symptomatic relief. Will dc w return precautions

## 2023-03-11 NOTE — ED ADULT NURSE NOTE - OBJECTIVE STATEMENT
Pt BIBA for chest pain. pt. states its substernal, with SOB, gotten worse. Pt. received 324 of ASA by EMS, without relief. 22 to left hand. Pt BIBA for chest pain. pt states its substernal, with SOB, gotten worse. Pt. received 324 of ASA by EMS, without relief. Pt is pointing to her umbilical area when asked where the pain is. Pt also reported episode of n/v. Hooked pt to cardiac monitor - showing sinus tach rate of 105

## 2023-03-11 NOTE — ED PROVIDER NOTE - NSFOLLOWUPINSTRUCTIONS_ED_ALL_ED_FT
Chest Pain    Chest pain can be caused by many different conditions which may or may not be dangerous. Causes include heartburn, lung infections, heart attack, blood clot in lungs, skin infections, strain or damage to muscle, cartilage, or bones, etc. In addition to a history and physical examination, an electrocardiogram (ECG) or other lab tests may have been performed to determine the cause of your chest pain. Follow up with your primary care provider or with a cardiologist as instructed.     SEEK IMMEDIATE MEDICAL CARE IF YOU HAVE ANY OF THE FOLLOWING SYMPTOMS: worsening chest pain, coughing up blood, unexplained back/neck/jaw pain, severe abdominal pain, dizziness or lightheadedness, fainting, shortness of breath, sweaty or clammy skin, vomiting, or racing heart beat. These symptoms may represent a serious problem that is an emergency. Do not wait to see if the symptoms will go away. Get medical help right away. Call 911 and do not drive yourself to the hospital. -Follow up with your doctor and bring these test results with you  -If you new/worse/concerning symptoms, please return to the ER     Chest Pain    Chest pain can be caused by many different conditions which may or may not be dangerous. Causes include heartburn, lung infections, heart attack, blood clot in lungs, skin infections, strain or damage to muscle, cartilage, or bones, etc. In addition to a history and physical examination, an electrocardiogram (ECG) or other lab tests may have been performed to determine the cause of your chest pain. Follow up with your primary care provider or with a cardiologist as instructed.     SEEK IMMEDIATE MEDICAL CARE IF YOU HAVE ANY OF THE FOLLOWING SYMPTOMS: worsening chest pain, coughing up blood, unexplained back/neck/jaw pain, severe abdominal pain, dizziness or lightheadedness, fainting, shortness of breath, sweaty or clammy skin, vomiting, or racing heart beat. These symptoms may represent a serious problem that is an emergency. Do not wait to see if the symptoms will go away. Get medical help right away. Call 911 and do not drive yourself to the hospital.

## 2023-03-11 NOTE — ED PROVIDER NOTE - PATIENT PORTAL LINK FT
You can access the FollowMyHealth Patient Portal offered by [facility] by registering at the following website: [email or web address]. By joining [employer]’s FollowMyHealth portal, you will also be able to view your health information using other applications (apps) compatible with our system. You can access the FollowMyHealth Patient Portal offered by [facility] by registering at the following website: [email or web address]. By joining [employer]’s FollowMyHealth portal, you will also be able to view your health information using other applications (apps) compatible with our system.

## 2023-03-11 NOTE — ED PROVIDER NOTE - NS ED ROS FT
Constitutional: no fever, no chills  Head: NC, AT   Eyes: no redness   ENMT: no nasal congestion/drainage, no sore throat   CV: +chest pain, no edema  Resp: no cough, + dyspnea  GI: no abdominal pain, + nausea, no vomiting, no diarrhea  : no dysuria, no hematuria   Skin: no lesions, no rashes   Neuro: no LOC, no headache, no sensory deficits, no weakness

## 2023-03-11 NOTE — ED PROVIDER NOTE - PROGRESS NOTE DETAILS
Jones: called to bedside after Diana BRADFORD noted HR spike to 125 before HR returning to low 100s. Patient states that she continues to have chest/upper abdominal pain but it is not any different than how it has been. Repeat EKG obtained and without ischemic changes. Lipase and 2nd trop sent. Pepcid and carafate ordered. Jones: went to dc patient however she states that she still has epigastric pain. maalox and ofirmev now ordered. patient will require re-assessment by day team Jose: Pt received in sign out. On reassessment pt still had epigastric pain despite pain meds. Mucous membranes appeared pale and pt admitted to decreased appetite + an episode of vomiting 2d ago. 500cc IVF was given, after which symptoms were alleviated. Pt asking for food and is ok with d/c. Son Reg was called to pick her up.

## 2023-03-22 ENCOUNTER — INPATIENT (INPATIENT)
Facility: HOSPITAL | Age: 83
LOS: 2 days | Discharge: HOME | DRG: 394 | End: 2023-03-25
Attending: STUDENT IN AN ORGANIZED HEALTH CARE EDUCATION/TRAINING PROGRAM | Admitting: INTERNAL MEDICINE
Payer: MEDICARE

## 2023-03-22 VITALS
DIASTOLIC BLOOD PRESSURE: 70 MMHG | SYSTOLIC BLOOD PRESSURE: 106 MMHG | HEART RATE: 89 BPM | RESPIRATION RATE: 20 BRPM | WEIGHT: 147.05 LBS | OXYGEN SATURATION: 100 % | TEMPERATURE: 98 F

## 2023-03-22 DIAGNOSIS — Z98.890 OTHER SPECIFIED POSTPROCEDURAL STATES: Chronic | ICD-10-CM

## 2023-03-22 DIAGNOSIS — Z90.722 ACQUIRED ABSENCE OF OVARIES, BILATERAL: Chronic | ICD-10-CM

## 2023-03-22 DIAGNOSIS — K92.2 GASTROINTESTINAL HEMORRHAGE, UNSPECIFIED: ICD-10-CM

## 2023-03-22 DIAGNOSIS — Z98.49 CATARACT EXTRACTION STATUS, UNSPECIFIED EYE: Chronic | ICD-10-CM

## 2023-03-22 LAB
ALBUMIN SERPL ELPH-MCNC: 3.4 G/DL — SIGNIFICANT CHANGE UP (ref 3.3–5.2)
ALP SERPL-CCNC: 106 U/L — SIGNIFICANT CHANGE UP (ref 40–120)
ALT FLD-CCNC: 8 U/L — SIGNIFICANT CHANGE UP
ANION GAP SERPL CALC-SCNC: 8 MMOL/L — SIGNIFICANT CHANGE UP (ref 5–17)
APPEARANCE UR: CLEAR — SIGNIFICANT CHANGE UP
APTT BLD: 33 SEC — SIGNIFICANT CHANGE UP (ref 27.5–35.5)
AST SERPL-CCNC: 20 U/L — SIGNIFICANT CHANGE UP
BASOPHILS # BLD AUTO: 0.04 K/UL — SIGNIFICANT CHANGE UP (ref 0–0.2)
BASOPHILS NFR BLD AUTO: 0.9 % — SIGNIFICANT CHANGE UP (ref 0–2)
BILIRUB SERPL-MCNC: <0.2 MG/DL — LOW (ref 0.4–2)
BILIRUB UR-MCNC: NEGATIVE — SIGNIFICANT CHANGE UP
BLD GP AB SCN SERPL QL: SIGNIFICANT CHANGE UP
BUN SERPL-MCNC: 14.2 MG/DL — SIGNIFICANT CHANGE UP (ref 8–20)
CALCIUM SERPL-MCNC: 8.8 MG/DL — SIGNIFICANT CHANGE UP (ref 8.4–10.5)
CHLORIDE SERPL-SCNC: 107 MMOL/L — SIGNIFICANT CHANGE UP (ref 96–108)
CO2 SERPL-SCNC: 25 MMOL/L — SIGNIFICANT CHANGE UP (ref 22–29)
COLOR SPEC: YELLOW — SIGNIFICANT CHANGE UP
CREAT SERPL-MCNC: 1.05 MG/DL — SIGNIFICANT CHANGE UP (ref 0.5–1.3)
DIFF PNL FLD: NEGATIVE — SIGNIFICANT CHANGE UP
EGFR: 53 ML/MIN/1.73M2 — LOW
EOSINOPHIL # BLD AUTO: 0.09 K/UL — SIGNIFICANT CHANGE UP (ref 0–0.5)
EOSINOPHIL NFR BLD AUTO: 1.9 % — SIGNIFICANT CHANGE UP (ref 0–6)
FLUAV AG NPH QL: SIGNIFICANT CHANGE UP
FLUBV AG NPH QL: SIGNIFICANT CHANGE UP
GLUCOSE SERPL-MCNC: 92 MG/DL — SIGNIFICANT CHANGE UP (ref 70–99)
GLUCOSE UR QL: NEGATIVE MG/DL — SIGNIFICANT CHANGE UP
HCT VFR BLD CALC: 22.8 % — LOW (ref 34.5–45)
HGB BLD-MCNC: 7.1 G/DL — LOW (ref 11.5–15.5)
IMM GRANULOCYTES NFR BLD AUTO: 0.4 % — SIGNIFICANT CHANGE UP (ref 0–0.9)
INR BLD: 1.02 RATIO — SIGNIFICANT CHANGE UP (ref 0.88–1.16)
KETONES UR-MCNC: NEGATIVE — SIGNIFICANT CHANGE UP
LACTATE BLDV-MCNC: 1 MMOL/L — SIGNIFICANT CHANGE UP (ref 0.5–2)
LEUKOCYTE ESTERASE UR-ACNC: NEGATIVE — SIGNIFICANT CHANGE UP
LIDOCAIN IGE QN: 21 U/L — LOW (ref 22–51)
LYMPHOCYTES # BLD AUTO: 0.77 K/UL — LOW (ref 1–3.3)
LYMPHOCYTES # BLD AUTO: 16.5 % — SIGNIFICANT CHANGE UP (ref 13–44)
MCHC RBC-ENTMCNC: 30.7 GM/DL — LOW (ref 32–36)
MCHC RBC-ENTMCNC: 30.7 PG — SIGNIFICANT CHANGE UP (ref 27–34)
MCV RBC AUTO: 100 FL — SIGNIFICANT CHANGE UP (ref 80–100)
MONOCYTES # BLD AUTO: 0.5 K/UL — SIGNIFICANT CHANGE UP (ref 0–0.9)
MONOCYTES NFR BLD AUTO: 10.7 % — SIGNIFICANT CHANGE UP (ref 2–14)
NEUTROPHILS # BLD AUTO: 3.25 K/UL — SIGNIFICANT CHANGE UP (ref 1.8–7.4)
NEUTROPHILS NFR BLD AUTO: 69.6 % — SIGNIFICANT CHANGE UP (ref 43–77)
NITRITE UR-MCNC: NEGATIVE — SIGNIFICANT CHANGE UP
OB PNL STL: POSITIVE
PH UR: 6.5 — SIGNIFICANT CHANGE UP (ref 5–8)
PLATELET # BLD AUTO: 442 K/UL — HIGH (ref 150–400)
POTASSIUM SERPL-MCNC: 4.3 MMOL/L — SIGNIFICANT CHANGE UP (ref 3.5–5.3)
POTASSIUM SERPL-SCNC: 4.3 MMOL/L — SIGNIFICANT CHANGE UP (ref 3.5–5.3)
PROT SERPL-MCNC: 6 G/DL — LOW (ref 6.6–8.7)
PROT UR-MCNC: NEGATIVE — SIGNIFICANT CHANGE UP
PROTHROM AB SERPL-ACNC: 11.8 SEC — SIGNIFICANT CHANGE UP (ref 10.5–13.4)
RBC # BLD: 2.28 M/UL — LOW (ref 3.8–5.2)
RBC # FLD: 14 % — SIGNIFICANT CHANGE UP (ref 10.3–14.5)
RSV RNA NPH QL NAA+NON-PROBE: SIGNIFICANT CHANGE UP
SARS-COV-2 RNA SPEC QL NAA+PROBE: SIGNIFICANT CHANGE UP
SODIUM SERPL-SCNC: 139 MMOL/L — SIGNIFICANT CHANGE UP (ref 135–145)
SP GR SPEC: 1.01 — SIGNIFICANT CHANGE UP (ref 1.01–1.02)
UROBILINOGEN FLD QL: NEGATIVE MG/DL — SIGNIFICANT CHANGE UP
WBC # BLD: 4.67 K/UL — SIGNIFICANT CHANGE UP (ref 3.8–10.5)
WBC # FLD AUTO: 4.67 K/UL — SIGNIFICANT CHANGE UP (ref 3.8–10.5)

## 2023-03-22 PROCEDURE — 99285 EMERGENCY DEPT VISIT HI MDM: CPT

## 2023-03-22 PROCEDURE — 74178 CT ABD&PLV WO CNTR FLWD CNTR: CPT | Mod: 26,MG

## 2023-03-22 PROCEDURE — 99223 1ST HOSP IP/OBS HIGH 75: CPT

## 2023-03-22 PROCEDURE — G1004: CPT

## 2023-03-22 RX ORDER — OLANZAPINE 15 MG/1
2.5 TABLET, FILM COATED ORAL AT BEDTIME
Refills: 0 | Status: DISCONTINUED | OUTPATIENT
Start: 2023-03-22 | End: 2023-03-25

## 2023-03-22 RX ORDER — GABAPENTIN 400 MG/1
600 CAPSULE ORAL
Refills: 0 | Status: DISCONTINUED | OUTPATIENT
Start: 2023-03-22 | End: 2023-03-25

## 2023-03-22 RX ORDER — PANTOPRAZOLE SODIUM 20 MG/1
40 TABLET, DELAYED RELEASE ORAL EVERY 12 HOURS
Refills: 0 | Status: DISCONTINUED | OUTPATIENT
Start: 2023-03-22 | End: 2023-03-25

## 2023-03-22 RX ORDER — ESCITALOPRAM OXALATE 10 MG/1
20 TABLET, FILM COATED ORAL DAILY
Refills: 0 | Status: DISCONTINUED | OUTPATIENT
Start: 2023-03-22 | End: 2023-03-25

## 2023-03-22 RX ORDER — ALPRAZOLAM 0.25 MG
0.5 TABLET ORAL EVERY 8 HOURS
Refills: 0 | Status: DISCONTINUED | OUTPATIENT
Start: 2023-03-22 | End: 2023-03-25

## 2023-03-22 RX ORDER — SODIUM CHLORIDE 9 MG/ML
1000 INJECTION INTRAMUSCULAR; INTRAVENOUS; SUBCUTANEOUS ONCE
Refills: 0 | Status: COMPLETED | OUTPATIENT
Start: 2023-03-22 | End: 2023-03-22

## 2023-03-22 RX ORDER — CARBAMAZEPINE 200 MG
1 TABLET ORAL
Qty: 0 | Refills: 0 | DISCHARGE

## 2023-03-22 RX ORDER — LAMOTRIGINE 25 MG/1
25 TABLET, ORALLY DISINTEGRATING ORAL
Refills: 0 | Status: DISCONTINUED | OUTPATIENT
Start: 2023-03-22 | End: 2023-03-25

## 2023-03-22 RX ORDER — PANTOPRAZOLE SODIUM 20 MG/1
80 TABLET, DELAYED RELEASE ORAL ONCE
Refills: 0 | Status: COMPLETED | OUTPATIENT
Start: 2023-03-22 | End: 2023-03-22

## 2023-03-22 RX ADMIN — Medication 0.5 MILLIGRAM(S): at 22:09

## 2023-03-22 RX ADMIN — SODIUM CHLORIDE 1000 MILLILITER(S): 9 INJECTION INTRAMUSCULAR; INTRAVENOUS; SUBCUTANEOUS at 14:32

## 2023-03-22 RX ADMIN — LAMOTRIGINE 25 MILLIGRAM(S): 25 TABLET, ORALLY DISINTEGRATING ORAL at 18:32

## 2023-03-22 RX ADMIN — GABAPENTIN 600 MILLIGRAM(S): 400 CAPSULE ORAL at 18:31

## 2023-03-22 RX ADMIN — OLANZAPINE 2.5 MILLIGRAM(S): 15 TABLET, FILM COATED ORAL at 22:10

## 2023-03-22 RX ADMIN — PANTOPRAZOLE SODIUM 80 MILLIGRAM(S): 20 TABLET, DELAYED RELEASE ORAL at 17:58

## 2023-03-22 RX ADMIN — SODIUM CHLORIDE 1000 MILLILITER(S): 9 INJECTION INTRAMUSCULAR; INTRAVENOUS; SUBCUTANEOUS at 13:02

## 2023-03-22 NOTE — ED PROVIDER NOTE - OBJECTIVE STATEMENT
82yoF; with PMH significant for anxiety/depression and diverticulosis; now presenting with rectal bleeding.  Patient reports dark stools over the past 2 weeks.  Reports bright red blood per rectum over the past 2 days.  Reports increasing lightheadedness generalized malaise and fatigue.  Denies abdominal pain.  Denies nausea vomiting.  Denies dysuria, frequency, urgency.  Denies chest pain or shortness of breath.  Denies fever, chills, sweats.  PMH:  anxiety/depression and diverticulosis  SOCIAL: denies smoking / denies illicit substance use

## 2023-03-22 NOTE — H&P ADULT - NSHPPHYSICALEXAM_GEN_ALL_CORE
Gen : Non toxic appearing, elderly female lying in bed, NAD  HEENT : NCAT, MMM, No cervical lymphadenopathy, conjunctival pallor, no JVD  CVS : S1S2, regular rate and rhythm, no murmurs  Resp : CTA b/l, no rhonchi or wheezes appreciated   Abd : Soft, non distended, non tender, BS +ve   MSK : No joint swelling or tenderness, no digital clubbing, no distal cyanosis  Neuro : CN II-XII intact, no focal motor or sensory deficits , AAOx3 however requires suggestions to pinpoint environmental events ([place] president, recent hospitalization)  Psych : Pleasant, no anxiety, normal affect

## 2023-03-22 NOTE — PATIENT PROFILE ADULT - FUNCTIONAL ASSESSMENT - BASIC MOBILITY 6.
3-calculated by average/Not able to assess (calculate score using [facility] averaging method)

## 2023-03-22 NOTE — H&P ADULT - HISTORY OF PRESENT ILLNESS
8 2 year old female with HTN, HLD, Parkinsons disease, recently admitted with divertculitis     y/oF PMH HTN, HLD, Parkinson's Disease presenting with abd pain to LLQ and nausea x1 week. Additionally c/o constipation. CT abd/pelvis showed chronic diverticulosis with no evidence diverticulitis, abscess, perforation. Started on abx. Seen by GI. Bowel regimen increased; to dc with miralax qd, lactulose bid. to start benefiber qd in 2 weeks. Pt tolerating diet, pain improved. Pt stable for dc home.      Med Recon [age] year old female with HTN, HLD, Parkinson's disease, recently admitted with divertculitis presents with 2 weeks of increasing dyspnea on exertion and lightheadedness in the setting of intermittent blood per rectum. She reports that (collaborated by  Ray) when moving her bowels she has seen bright red blood in toilet intermittently. Took miralax in the hope taht she would be able to evacuate all blood however bleeding persisted. She denies any CP, palpitations, syncope, fevers, abdominal pain, changes  habits

## 2023-03-22 NOTE — ED PROVIDER NOTE - NS ED ROS FT
Constitutional: (-) fever  (-)chills  (-)sweats  Eyes/ENT: (-)   Cardiovascular: (-) chest pain, (-) palpitations (-) edema   Respiratory: (-) cough, (+) shortness of breath   Gastrointestinal: (-)nausea  (-)vomiting, (-) diarrhea  (-) abdominal pain   :  (-)dysuria, (-)frequency, (-)urgency, (-)hematuria  Musculoskeletal: (-) neck pain, (-) back pain, (-) joint pain  Integumentary: (-) rash, (-) edema  Neurological: (-) headache, (-) altered mental status  (-)LOC

## 2023-03-22 NOTE — ED ADULT TRIAGE NOTE - CHIEF COMPLAINT QUOTE
Pt with c/o 1 week of BRB per rectum and pale complexion. Pt also reports mild SOB. Endoscopy scheduled for next Wednesday.

## 2023-03-22 NOTE — ED PROVIDER NOTE - CLINICAL SUMMARY MEDICAL DECISION MAKING FREE TEXT BOX
82yoF; with PMH significant for anxiety/depression and diverticulosis; now presenting with rectal bleeding.  Patient reports dark stools over the past 2 weeks.  Reports bright red blood per rectum over the past 2 days.   found to have drop in H/H. ct with diverticulosis. will admit for prbc and gi eval.

## 2023-03-22 NOTE — H&P ADULT - NSHPLABSRESULTS_GEN_ALL_CORE
CT Abd /pelvis w/ IV con  IMPRESSION:  Extensive sigmoid colonic diverticulosis without evidence of   diverticulitis.  No evidence of active GI bleed.  Wedge fracture of T11 vertebral body which may be subacute in nature.  Increase in size of a mildly sclerotic lesion in the L4 vertebral body is indeterminate. Further evaluation with lumbar spine MRI recommended as an outpatient.

## 2023-03-22 NOTE — ED PROVIDER NOTE - PHYSICAL EXAMINATION
General:     NAD  Head:     NC/AT, EOMI, oral mucosa moist  Neck:     trachea midline  Lungs:     CTA b/l, no w/r/r  CVS:     S1S2, RRR, no m/g/r  Abd:     +BS, s/nt/nd, no organomegaly  Ext:    2+ radial and pedal pulses, no c/c/e  Neuro: grossly intactr

## 2023-03-22 NOTE — PATIENT PROFILE ADULT - FALL HARM RISK - HARM RISK INTERVENTIONS

## 2023-03-22 NOTE — H&P ADULT - ASSESSMENT
82 HTN, HLD, Parkinson's Disease, Anxiety, GERD, recently admitted with acute diverticulitis presents with 2 weeks of intermittent red blood per rectum associated with increasing lightheadedness and dyspnea on exertion     Admit to Medicine for suspected diverticular bleeding   CT with extensive sigmoid diverticulosis no extravasation noted   Type and screen done, 2 units PRBC ordered by ER  Trend H/H, ensure that Hb remains above 8 (4/22 Hb 11, 3/23 Hb 8)  GI consulted by ER, f/u recs  Change Omeprazole 40 mg daily to Protonix 40 mg IVP Q12   Was planned for EGD as outpt next week with Dr Ledesma (per )    Lumbar sclerotic lesion   Noted incidentally on CT. No back pain,   Will need outpt MRI f/u to better characterize     HTN  No longer on BP meds per    Previously on Losartan Miles for now    Parkinson's with anxiety depression   Continue Lexapro 20 mg daily  Lamictal 25 mg BID  Gabapentin 600 mg BID   Olanzapine 25 mg QHS  Xanax 1 mg PRN > interchange with 0.5 mg Q12 PRN     DVT ppx: SHARITA    Dispo: Admit to Medicine   d/w  Ray : [place] - [phone]  [facility] [place]

## 2023-03-23 LAB
ANION GAP SERPL CALC-SCNC: 9 MMOL/L — SIGNIFICANT CHANGE UP (ref 5–17)
BUN SERPL-MCNC: 10.3 MG/DL — SIGNIFICANT CHANGE UP (ref 8–20)
CALCIUM SERPL-MCNC: 8.6 MG/DL — SIGNIFICANT CHANGE UP (ref 8.4–10.5)
CHLORIDE SERPL-SCNC: 106 MMOL/L — SIGNIFICANT CHANGE UP (ref 96–108)
CO2 SERPL-SCNC: 25 MMOL/L — SIGNIFICANT CHANGE UP (ref 22–29)
CREAT SERPL-MCNC: 0.96 MG/DL — SIGNIFICANT CHANGE UP (ref 0.5–1.3)
EGFR: 59 ML/MIN/1.73M2 — LOW
GLUCOSE SERPL-MCNC: 77 MG/DL — SIGNIFICANT CHANGE UP (ref 70–99)
HCT VFR BLD CALC: 30.1 % — LOW (ref 34.5–45)
HGB BLD-MCNC: 9.7 G/DL — LOW (ref 11.5–15.5)
MAGNESIUM SERPL-MCNC: 2.3 MG/DL — SIGNIFICANT CHANGE UP (ref 1.6–2.6)
MCHC RBC-ENTMCNC: 30.7 PG — SIGNIFICANT CHANGE UP (ref 27–34)
MCHC RBC-ENTMCNC: 32.2 GM/DL — SIGNIFICANT CHANGE UP (ref 32–36)
MCV RBC AUTO: 95.3 FL — SIGNIFICANT CHANGE UP (ref 80–100)
PLATELET # BLD AUTO: 443 K/UL — HIGH (ref 150–400)
POTASSIUM SERPL-MCNC: 4.1 MMOL/L — SIGNIFICANT CHANGE UP (ref 3.5–5.3)
POTASSIUM SERPL-SCNC: 4.1 MMOL/L — SIGNIFICANT CHANGE UP (ref 3.5–5.3)
RBC # BLD: 3.16 M/UL — LOW (ref 3.8–5.2)
RBC # FLD: 14.6 % — HIGH (ref 10.3–14.5)
SODIUM SERPL-SCNC: 140 MMOL/L — SIGNIFICANT CHANGE UP (ref 135–145)
WBC # BLD: 4.78 K/UL — SIGNIFICANT CHANGE UP (ref 3.8–10.5)
WBC # FLD AUTO: 4.78 K/UL — SIGNIFICANT CHANGE UP (ref 3.8–10.5)

## 2023-03-23 PROCEDURE — 99223 1ST HOSP IP/OBS HIGH 75: CPT

## 2023-03-23 PROCEDURE — 99233 SBSQ HOSP IP/OBS HIGH 50: CPT

## 2023-03-23 RX ORDER — SOD SULF/SODIUM/NAHCO3/KCL/PEG
4000 SOLUTION, RECONSTITUTED, ORAL ORAL ONCE
Refills: 0 | Status: COMPLETED | OUTPATIENT
Start: 2023-03-23 | End: 2023-03-23

## 2023-03-23 RX ORDER — ACETAMINOPHEN 500 MG
650 TABLET ORAL EVERY 6 HOURS
Refills: 0 | Status: DISCONTINUED | OUTPATIENT
Start: 2023-03-23 | End: 2023-03-25

## 2023-03-23 RX ADMIN — Medication 650 MILLIGRAM(S): at 13:44

## 2023-03-23 RX ADMIN — Medication 650 MILLIGRAM(S): at 23:51

## 2023-03-23 RX ADMIN — GABAPENTIN 600 MILLIGRAM(S): 400 CAPSULE ORAL at 17:07

## 2023-03-23 RX ADMIN — LAMOTRIGINE 25 MILLIGRAM(S): 25 TABLET, ORALLY DISINTEGRATING ORAL at 17:07

## 2023-03-23 RX ADMIN — LAMOTRIGINE 25 MILLIGRAM(S): 25 TABLET, ORALLY DISINTEGRATING ORAL at 06:17

## 2023-03-23 RX ADMIN — Medication 650 MILLIGRAM(S): at 14:44

## 2023-03-23 RX ADMIN — Medication 4000 MILLILITER(S): at 17:18

## 2023-03-23 RX ADMIN — ESCITALOPRAM OXALATE 20 MILLIGRAM(S): 10 TABLET, FILM COATED ORAL at 11:28

## 2023-03-23 RX ADMIN — OLANZAPINE 2.5 MILLIGRAM(S): 15 TABLET, FILM COATED ORAL at 22:31

## 2023-03-23 RX ADMIN — PANTOPRAZOLE SODIUM 40 MILLIGRAM(S): 20 TABLET, DELAYED RELEASE ORAL at 17:07

## 2023-03-23 RX ADMIN — GABAPENTIN 600 MILLIGRAM(S): 400 CAPSULE ORAL at 06:17

## 2023-03-23 RX ADMIN — PANTOPRAZOLE SODIUM 40 MILLIGRAM(S): 20 TABLET, DELAYED RELEASE ORAL at 06:16

## 2023-03-23 NOTE — PROGRESS NOTE ADULT - ASSESSMENT
82 HTN, HLD, Parkinson's Disease, Anxiety, GERD, recently admitted with acute diverticulitis presents with 2 weeks of intermittent red blood per rectum associated with increasing lightheadedness and dyspnea on exertion     Suspected diverticular bleeding   CT with extensive sigmoid diverticulosis no extravasation noted   H/H improved post 2 units PRBC   Trend H/H, ensure that Hb remains above 8 (4/22 Hb 11, 3/23 Hb 8)  GI eval noted, planned for colonoscopy in am   Protonix 40 mg IVP Q12   Was planned for EGD as outpt next week with Dr Ledesma (per )    Lumbar sclerotic lesion   Noted incidentally on CT. No back pain,   Will need outpt MRI f/u to better characterize     HTN  No longer on BP meds per    Previously on Losartan Miles for now    Parkinson's with anxiety depression   Continue Lexapro 20 mg daily  Lamictal 25 mg BID  Gabapentin 600 mg BID   Olanzapine 25 mg QHS  Xanax 1 mg PRN > interchange with 0.5 mg Q12 PRN     DVT ppx: SHARITA    Dispo: Admit to Medicine    Ray : [facility] - [phone]  Pharmacy [facility] [place]

## 2023-03-23 NOTE — CONSULT NOTE ADULT - SUBJECTIVE AND OBJECTIVE BOX
Patient is a 82y old  Female who presents with a chief complaint of rectal bleeding.    HPI:  82 year old female with HTN, HLD, Parkinson's disease, recently admitted with divertculitis presents with 2 weeks of increasing dyspnea on exertion and lightheadedness in the setting of intermittent blood per rectum. She reports that (collaborated by  Reg) when moving her bowels she has seen bright red blood in toilet intermittently. Took Miralax in the hope taht she would be able to evacuate all blood however bleeding persisted. She denies any CP, palpitations, syncope, fevers, abdominal pain, changes in bowel habits. CTA last night negative for active bleeding. I spoke with her  this AM who informed me that she had a negative colonoscopy with Dr. Gauthier roughly two years ago. She is presently refusing colonoscopy but her  told me that he would convince her to proceed with colonoscopy. She describes the blood as bright red in color. No associated abdominal or rectal pain. She had no BM's or hematochezia overnight here in the hospital.      REVIEW OF SYSTEMS:  14 point ROS negative save what was reported in HPI.    PAST MEDICAL & SURGICAL HISTORY:  HTN (hypertension)      Depression      Ovarian benign neoplasm  s/p Total  Hysterectomy      Parkinson&#x27;s disease      Spondylosis without myelopathy or radiculopathy, lumbar region      Fracture of lumbosacral spine or pelvis      Hyperlipidemia      H/O bilateral oophorectomy  2018      H/O colonoscopy      H/O cardiac catheterization      History of basal cell carcinoma excision      History of cataract surgery  bilateral          FAMILY HISTORY:  Family history of cancer in father  bladder CA    Family hx of lung cancer  mother        SOCIAL HISTORY:  Smoking Status: [ ] Current, [ ] Former, [x ] Never  Pack Years: N/A. No ETOH or drug abuse history.    MEDICATIONS:  MEDICATIONS  (STANDING):  escitalopram 20 milliGRAM(s) Oral daily  gabapentin 600 milliGRAM(s) Oral two times a day  lamoTRIgine 25 milliGRAM(s) Oral two times a day  OLANZapine 2.5 milliGRAM(s) Oral at bedtime  pantoprazole  Injectable 40 milliGRAM(s) IV Push every 12 hours    MEDICATIONS  (PRN):  ALPRAZolam 0.5 milliGRAM(s) Oral every 8 hours PRN anxiety      Allergies    No Known Allergies    Intolerances        Vital Signs Last 24 Hrs  T(C): 36.4 (23 Mar 2023 03:45), Max: 36.7 (22 Mar 2023 18:29)  T(F): 97.6 (23 Mar 2023 03:45), Max: 98.1 (22 Mar 2023 18:29)  HR: 63 (23 Mar 2023 03:45) (63 - 89)  BP: 159/82 (23 Mar 2023 03:45) (106/70 - 161/77)  BP(mean): --  RR: 18 (23 Mar 2023 03:45) (16 - 20)  SpO2: 94% (23 Mar 2023 03:45) (94% - 100%)    Parameters below as of 23 Mar 2023 03:45  Patient On (Oxygen Delivery Method): room air        03-22 @ 07:01  -  03-23 @ 07:00  --------------------------------------------------------  IN: 240 mL / OUT: 0 mL / NET: 240 mL          PHYSICAL EXAM:    General: Well developed; in no acute distress  HEENT: MMM, conjunctiva pink and sclera anicteric.  Lungs: Clear bilaterally.  Cor: RRR S1, S2 only  Gastrointestinal: Soft, non-tender non-distended; Normal bowel sounds; No rebound or guarding or HSM.  ADRYAN: Decreased sphincter tone. + external skin tags. No masses or tenderness, brown stool in rectal vault. No blood.  Extremities: Normal range of motion, No clubbing, cyanosis or edema  Neurological: Alert and oriented x3  Skin: Warm and dry. No obvious rash      LABS:                        9.7    4.78  )-----------( 443      ( 23 Mar 2023 05:45 )             30.1     03-23    140  |  106  |  10.3  ----------------------------<  77  4.1   |  25.0  |  0.96    Ca    8.6      23 Mar 2023 05:45  Mg     2.3     03-23    TPro  6.0<L>  /  Alb  3.4  /  TBili  <0.2<L>  /  DBili  x   /  AST  20  /  ALT  8   /  AlkPhos  106  03-22          RADIOLOGY & ADDITIONAL STUDIES:   < from: CT Abdomen and Pelvis w/wo IV Cont (03.22.23 @ 14:30) >  ACC: [id number] EXAM:  CT ABDOMEN AND PELVIS WAW IC   ORDERED BY: KIRA ST     PROCEDURE DATE:  03/22/2023          INTERPRETATION:  CLINICAL INFORMATION: Bleeding, dark stools 2 weeks'   duration, bright red blood per rectum past 2 days, lightheadedness,   fatigue, Hx diverticulosis.    ADDITIONAL CLINICAL INFORMATION: Other, Non-specified    COMPARISON: CT abdomen from 2/18/2022, 12/12/2021, 7/23/2021.    CONTRAST/COMPLICATIONS:  IV Contrast: Omnipaque 350  94 cc administered   6 cc discarded  Oral Contrast: NONE  Complications: None reported at time of study completion    PROCEDURE:  CT of the Abdomen and Pelvis was performed.  Precontrast, Arterial and Delayed phases were performed.  Sagittal and coronal reformats were performed.    FINDINGS:    LOWER CHEST: Bibasilar linear atelectasis or scarring.    LIVER: Stable, 4 cm left hepatic cyst that results in mild focal   intrahepatic biliary ductal dilatation. Additional scattered   subcentimeter hypodensities are too small to characterize.  BILE DUCTS: Normal caliber.  GALLBLADDER: Distended.  SPLEEN: Within normal limits.  PANCREAS: Again seen is a mildly prominent pancreatic duct, currently   measuring 0.3 cm. Pancreatic parenchyma is grossly unremarkable.  ADRENALS: Withinnormal limits.  KIDNEYS/URETERS: Within normal limits.    BLADDER: Within normal limits.  REPRODUCTIVE ORGANS: Hysterectomy.    BOWEL: Mildly thickened distal esophagus again noted. Extensive sigmoid   and colonic diverticulosis without evidence of diverticulitis. No   evidence of active GI bleed. Appendix is not visualized. No evidence of   inflammation in the pericecal region.  PERITONEUM: No ascites.  VESSELS: Within normal limits.  RETROPERITONEUM/LYMPH NODES: No lymphadenopathy.  ABDOMINAL WALL: Small right-sided fat-containing inguinal hernia also   containing a small amount of fluid.  Small fat-containing umbilical   hernia.  BONES: There is a wedge fracture of T11 with 45% loss of height, that is   new since 2/18/2022 and may be subacute. Status-post vertebroplasty of L1   and L2 with small amounts present in the L2/L3 disc space, unchanged.   There is a nonspecific lucency within the right posterior L4 vertebral   body measuring 1 cm that is unchanged. There is a mildly scleroticlesion   in the anterior L4 vertebral body measuring 1.8 cm that is slightly   increased in size since the prior examination where it measured 1.5 cm.    IMPRESSION:  Extensive sigmoid colonic diverticulosis without evidence of   diverticulitis.    No evidence of active GI bleed.    Wedge fracture of T11 vertebral body which may be subacute in nature.    Increase in size of a mildly sclerotic lesion in the L4 vertebral body is   indeterminate. Further evaluation with lumbar spine MRI recommended as an   outpatient.        --- End of Report ---          JOSE OKEEFE DO; Resident Radiologist  This document has been electronically signed.  RAFI MIRANDA MD; Attending Radiologist  This document has been electronically signed. Mar 22 2023  4:37PM    < end of copied text >

## 2023-03-24 ENCOUNTER — TRANSCRIPTION ENCOUNTER (OUTPATIENT)
Age: 83
End: 2023-03-24

## 2023-03-24 VITALS
TEMPERATURE: 98 F | RESPIRATION RATE: 16 BRPM | DIASTOLIC BLOOD PRESSURE: 96 MMHG | HEART RATE: 83 BPM | SYSTOLIC BLOOD PRESSURE: 175 MMHG

## 2023-03-24 LAB
ANION GAP SERPL CALC-SCNC: 13 MMOL/L — SIGNIFICANT CHANGE UP (ref 5–17)
APTT BLD: 33.4 SEC — SIGNIFICANT CHANGE UP (ref 27.5–35.5)
BASOPHILS # BLD AUTO: 0.05 K/UL — SIGNIFICANT CHANGE UP (ref 0–0.2)
BASOPHILS NFR BLD AUTO: 0.8 % — SIGNIFICANT CHANGE UP (ref 0–2)
BUN SERPL-MCNC: 10.7 MG/DL — SIGNIFICANT CHANGE UP (ref 8–20)
CALCIUM SERPL-MCNC: 8.8 MG/DL — SIGNIFICANT CHANGE UP (ref 8.4–10.5)
CHLORIDE SERPL-SCNC: 102 MMOL/L — SIGNIFICANT CHANGE UP (ref 96–108)
CO2 SERPL-SCNC: 26 MMOL/L — SIGNIFICANT CHANGE UP (ref 22–29)
CREAT SERPL-MCNC: 0.94 MG/DL — SIGNIFICANT CHANGE UP (ref 0.5–1.3)
CULTURE RESULTS: SIGNIFICANT CHANGE UP
EGFR: 61 ML/MIN/1.73M2 — SIGNIFICANT CHANGE UP
EOSINOPHIL # BLD AUTO: 0.04 K/UL — SIGNIFICANT CHANGE UP (ref 0–0.5)
EOSINOPHIL NFR BLD AUTO: 0.6 % — SIGNIFICANT CHANGE UP (ref 0–6)
GLUCOSE SERPL-MCNC: 91 MG/DL — SIGNIFICANT CHANGE UP (ref 70–99)
HCT VFR BLD CALC: 31.1 % — LOW (ref 34.5–45)
HGB BLD-MCNC: 10 G/DL — LOW (ref 11.5–15.5)
IMM GRANULOCYTES NFR BLD AUTO: 0.3 % — SIGNIFICANT CHANGE UP (ref 0–0.9)
INR BLD: 1.07 RATIO — SIGNIFICANT CHANGE UP (ref 0.88–1.16)
LYMPHOCYTES # BLD AUTO: 0.9 K/UL — LOW (ref 1–3.3)
LYMPHOCYTES # BLD AUTO: 13.5 % — SIGNIFICANT CHANGE UP (ref 13–44)
MCHC RBC-ENTMCNC: 29.9 PG — SIGNIFICANT CHANGE UP (ref 27–34)
MCHC RBC-ENTMCNC: 32.2 GM/DL — SIGNIFICANT CHANGE UP (ref 32–36)
MCV RBC AUTO: 93.1 FL — SIGNIFICANT CHANGE UP (ref 80–100)
MONOCYTES # BLD AUTO: 0.75 K/UL — SIGNIFICANT CHANGE UP (ref 0–0.9)
MONOCYTES NFR BLD AUTO: 11.3 % — SIGNIFICANT CHANGE UP (ref 2–14)
NEUTROPHILS # BLD AUTO: 4.9 K/UL — SIGNIFICANT CHANGE UP (ref 1.8–7.4)
NEUTROPHILS NFR BLD AUTO: 73.5 % — SIGNIFICANT CHANGE UP (ref 43–77)
PLATELET # BLD AUTO: 445 K/UL — HIGH (ref 150–400)
POTASSIUM SERPL-MCNC: 3.7 MMOL/L — SIGNIFICANT CHANGE UP (ref 3.5–5.3)
POTASSIUM SERPL-SCNC: 3.7 MMOL/L — SIGNIFICANT CHANGE UP (ref 3.5–5.3)
PROTHROM AB SERPL-ACNC: 12.4 SEC — SIGNIFICANT CHANGE UP (ref 10.5–13.4)
RBC # BLD: 3.34 M/UL — LOW (ref 3.8–5.2)
RBC # FLD: 14.8 % — HIGH (ref 10.3–14.5)
SODIUM SERPL-SCNC: 141 MMOL/L — SIGNIFICANT CHANGE UP (ref 135–145)
SPECIMEN SOURCE: SIGNIFICANT CHANGE UP
WBC # BLD: 6.66 K/UL — SIGNIFICANT CHANGE UP (ref 3.8–10.5)
WBC # FLD AUTO: 6.66 K/UL — SIGNIFICANT CHANGE UP (ref 3.8–10.5)

## 2023-03-24 PROCEDURE — 99232 SBSQ HOSP IP/OBS MODERATE 35: CPT

## 2023-03-24 PROCEDURE — 45378 DIAGNOSTIC COLONOSCOPY: CPT

## 2023-03-24 DEVICE — NAIL OSTEO 1.5X16MM STRL: Type: IMPLANTABLE DEVICE | Status: FUNCTIONAL

## 2023-03-24 RX ORDER — ACETAMINOPHEN 500 MG
975 TABLET ORAL ONCE
Refills: 0 | Status: COMPLETED | OUTPATIENT
Start: 2023-03-24 | End: 2023-03-24

## 2023-03-24 RX ORDER — LOSARTAN POTASSIUM 100 MG/1
25 TABLET, FILM COATED ORAL DAILY
Refills: 0 | Status: DISCONTINUED | OUTPATIENT
Start: 2023-03-24 | End: 2023-03-25

## 2023-03-24 RX ADMIN — ESCITALOPRAM OXALATE 20 MILLIGRAM(S): 10 TABLET, FILM COATED ORAL at 17:22

## 2023-03-24 RX ADMIN — Medication 650 MILLIGRAM(S): at 23:14

## 2023-03-24 RX ADMIN — LAMOTRIGINE 25 MILLIGRAM(S): 25 TABLET, ORALLY DISINTEGRATING ORAL at 06:10

## 2023-03-24 RX ADMIN — Medication 650 MILLIGRAM(S): at 18:07

## 2023-03-24 RX ADMIN — Medication 650 MILLIGRAM(S): at 00:51

## 2023-03-24 RX ADMIN — PANTOPRAZOLE SODIUM 40 MILLIGRAM(S): 20 TABLET, DELAYED RELEASE ORAL at 06:11

## 2023-03-24 RX ADMIN — Medication 650 MILLIGRAM(S): at 17:22

## 2023-03-24 RX ADMIN — PANTOPRAZOLE SODIUM 40 MILLIGRAM(S): 20 TABLET, DELAYED RELEASE ORAL at 17:22

## 2023-03-24 RX ADMIN — OLANZAPINE 2.5 MILLIGRAM(S): 15 TABLET, FILM COATED ORAL at 21:39

## 2023-03-24 RX ADMIN — GABAPENTIN 600 MILLIGRAM(S): 400 CAPSULE ORAL at 06:10

## 2023-03-24 RX ADMIN — LAMOTRIGINE 25 MILLIGRAM(S): 25 TABLET, ORALLY DISINTEGRATING ORAL at 17:22

## 2023-03-24 RX ADMIN — GABAPENTIN 600 MILLIGRAM(S): 400 CAPSULE ORAL at 17:22

## 2023-03-24 RX ADMIN — Medication 975 MILLIGRAM(S): at 04:36

## 2023-03-24 RX ADMIN — Medication 0.5 MILLIGRAM(S): at 03:52

## 2023-03-24 RX ADMIN — Medication 975 MILLIGRAM(S): at 05:36

## 2023-03-24 NOTE — DISCHARGE NOTE PROVIDER - CARE PROVIDER_API CALL
Ariana Bran)  Gastroenterology  [address] [place] [id number]  Phone: [phone]  Fax: [phone]  Follow Up Time: 1 week

## 2023-03-24 NOTE — PROGRESS NOTE ADULT - ASSESSMENT
82 HTN, HLD, Parkinson's Disease, Anxiety, GERD, recently admitted with acute diverticulitis presents with 2 weeks of intermittent red blood per rectum associated with increasing lightheadedness and dyspnea on exertion     Suspected diverticular bleeding   CT with extensive sigmoid diverticulosis no extravasation noted   H/H improved post 2 units PRBC   Trend H/H, ensure that Hb remains above 8 (4/22 Hb 11, 3/23 Hb 8)  GI eval noted, planned for colonoscopy today   Protonix 40 mg IVP Q12   Was planned for EGD as outpt next week with Dr Ledesma (per )    Lumbar sclerotic lesion   Noted incidentally on CT. No back pain,   Will need outpt MRI f/u to better characterize     HTN  No longer on BP meds per    Previously on Losartan Miles for now    Parkinson's with anxiety depression   Continue Lexapro 20 mg daily  Lamictal 25 mg BID  Gabapentin 600 mg BID   Olanzapine 25 mg QHS  Xanax 1 mg PRN > interchange with 0.5 mg Q12 PRN     DVT ppx: SHARITA    Dispo: Admit to Medicine    Ray : [facility] - [phone]  Pharmacy [facility] [place]    82 HTN, HLD, Parkinson's Disease, Anxiety, GERD, recently admitted with acute diverticulitis presents with 2 weeks of intermittent red blood per rectum associated with increasing lightheadedness and dyspnea on exertion     Suspected diverticular bleeding   Anemia suspected 2/2 acute blood loss  CT with extensive sigmoid diverticulosis no extravasation noted   H/H improved post 2 units PRBC   Trend H/H, ensure that Hb remains above 8 (4/22 Hb 11, 3/23 Hb 8)  GI eval noted, planned for colonoscopy today   Protonix 40 mg IVP Q12   Was planned for EGD as outpt next week with Dr Ledesma (per )    Lumbar sclerotic lesion   Noted incidentally on CT. No back pain,   Will need outpt MRI f/u to better characterize     HTN  No longer on BP meds per    Previously on Losartan Miles for now    Parkinson's with anxiety depression   Continue Lexapro 20 mg daily  Lamictal 25 mg BID  Gabapentin 600 mg BID   Olanzapine 25 mg QHS  Xanax 1 mg PRN > interchange with 0.5 mg Q12 PRN     DVT ppx: SHARITA    Dispo: Admit to Medicine    Ray : [facility] - [phone]  Pharmacy [facility]    82 HTN, HLD, Parkinson's Disease, Anxiety, GERD, recently admitted with acute diverticulitis presents with 2 weeks of intermittent red blood per rectum associated with increasing lightheadedness and dyspnea on exertion     Suspected diverticular bleeding   Anemia suspected 2/2 acute blood loss  CT with extensive sigmoid diverticulosis no extravasation noted   H/H improved post 2 units PRBC   Trend H/H, ensure that Hb remains above 8 (4/22 Hb 11, 3/23 Hb 8)  GI eval noted, planned for colonoscopy today   Protonix 40 mg IVP Q12   Was planned for EGD as outpt next week with Dr Ledesma (per )    Lumbar sclerotic lesion   Noted incidentally on CT. No back pain,   Will need outpt MRI f/u to better characterize     HTN  No longer on BP meds per    Previously on Losartan  BP elevated, will restart Losartan at 25 mg daily for now and uptitrate as indicated     Parkinson's with anxiety depression   Continue Lexapro 20 mg daily  Lamictal 25 mg BID  Gabapentin 600 mg BID   Olanzapine 25 mg QHS  Xanax 1 mg PRN > interchange with 0.5 mg Q12 PRN     DVT ppx: SHARITA    Dispo: Admit to Medicine    Ray : [place] - [phone]  Pharmacy [facility] [place]

## 2023-03-24 NOTE — CDI QUERY NOTE - NSCDIOTHERTXTBX_GEN_ALL_CORE_HH
Please specify if the clinical indicators support a further diagnosis.    - Acute blood loss anemia  - Other ( please specify)  - Not clinically significant    Hemoglobin trend:  Hemoglobin: 10.0 g/dL *L* [11.5 - 15.5] (03-24-23)  Hemoglobin: 9.7 g/dL *L* [11.5 - 15.5] (03-23-23)  Hemoglobin: 7.1 g/dL *L* [11.5 - 15.5] [Test Repeated Result called.] (03-22-23)    Transfused with 2 units PRBC's    Documentation    ED- The patient is a 82y Female complaining of rectal bleeding.   Principal Discharge DX:	Lower GI bleed.   Patient reports dark stools over the past 2 weeks.  Reports bright red blood per rectum over the past 2 days.   found to have drop in H/H.    H&P- Admit to Medicine for suspected diverticular bleeding   CT with extensive sigmoid diverticulosis no extravasation noted   Type and screen done, 2 units PRBC ordered by ER  Trend H/H, ensure that Hb remains above 8 (4/22 Hb 11, 3/23 Hb 8)

## 2023-03-24 NOTE — DISCHARGE NOTE PROVIDER - ATTENDING DISCHARGE PHYSICAL EXAMINATION:
PHYSICAL EXAM:  Vital Signs Last 24 Hrs  T(F): 97.7 (24 Mar 2023 21:30), Max: 97.7 (24 Mar 2023 21:30)  HR: 83 (24 Mar 2023 21:30) (82 - 83)  BP: 175/96 (24 Mar 2023 21:30) (167/82 - 175/96)  RR: 16 (24 Mar 2023 21:30) (16 - 18)  SpO2: 95% (24 Mar 2023 16:15) (95% - 95%)    GENERAL: NAD, Resting in bed  Eyes: EOMI, PERRLA  ENMT: Conjunctiva and sclera clear; supple neck, No JVD  Cardiovascular: S1,S2, RRR, No murmur  Respiratory: CTA B/L, Non-labored breathing  GI: Bowel sounds present; Soft, Nontender, Nondistended. No hepatomegaly  Genitourinary: Deferred  Skin:  no breakdowns, ulcers or discharge, No rashes or lesions  Neurology: Alert & Oriented X3, non-focal and spontaneous movements of all extremities, CN 2 to 12 grossly intact   Psych: Appropriate mood and affect, calm, pleasant, Responds appropriately to questions

## 2023-03-24 NOTE — DISCHARGE NOTE PROVIDER - HOSPITAL COURSE
82 HTN, HLD, Parkinson's Disease, Anxiety, GERD, recently admitted with acute diverticulitis presents with 2 weeks of intermittent red blood per rectum associated with increasing lightheadedness and dyspnea on exertion. Was transfused 2 units PRBC with appropriate response. Hb has thus far remained stable. Colonoscopy today prelim per GI : No visible bleeding seen by hemorrhoids noted. Will need to f/u with GI in am for final impression. Pt will also need outpt MRI to better characterize LS sclerotic lesion 82 HTN, HLD, Parkinson's Disease, Anxiety, GERD, recently admitted with acute diverticulitis presents with 2 weeks of intermittent red blood per rectum associated with increasing lightheadedness and dyspnea on exertion. Was transfused 2 units PRBC with appropriate response. Hb has thus far remained stable. Colonoscopy today prelim per GI : No visible bleeding seen by hemorrhoids noted. Will need to f/u with GI in am for final impression. Pt will also need outpt MRI to better characterize LS sclerotic lesion. Patient states that she will follow up with GI outpatient for EGD. No more bleeding noted. Stable for DC home.

## 2023-03-24 NOTE — CHART NOTE - NSCHARTNOTEFT_GEN_A_CORE
Called by RN - patient with c/o rectal pain s/p unsedated colonoscopy.   Oxycodone x 1 dose ordered for pain management.

## 2023-03-24 NOTE — CHART NOTE - NSCHARTNOTEFT_GEN_A_CORE
Notified Notified by RN pt c/o toothache this AM.  Patient seen at bedside awake, alert reports pain to upper right central incisor and gum.   Patient reports pain is chronic, denies new injury Notified by RN pt c/o toothache this AM.  Patient seen at bedside awake, alert reports pain to upper right central incisor and gum.   Patient reports pain has been present over the past two months, describes pain as "poking pain." She denies new injury, jaw pain, CP, SOB, neck/arm pain, abd pain or discomfort, N/V.  Patient stated she follows up outpatient with her dentist, she has had tooth extractions a few months ago. She reports she takes Tylenol at home for her toothache and it usually provides relief.   No gingival erythema, lesions, bleeding. Nontender upon palpation. Oral mucosa moist. Tooth of complaint intact.  Recommend F/U with dentist outpatient.   Will order Tylenol PO x1 dose. Reassess for relief.   RN to continue to monitor and escalate PRN.

## 2023-03-24 NOTE — DISCHARGE NOTE PROVIDER - NSDCMRMEDTOKEN_GEN_ALL_CORE_FT
ALPRAZolam 1 mg oral tablet: 1.5 tab(s) orally 2 times a day, As Needed  escitalopram 20 mg oral tablet: 1.5 tab(s) orally once a day  gabapentin 600 mg oral tablet: 1 tab(s) orally 3 times a day  lamoTRIgine 25 mg oral tablet: 2 tab(s) orally 2 times a day  omeprazole 40 mg oral delayed release capsule: 1 cap(s) orally once a day  Vitamin D2 1.25 mg (50,000 intl units) oral capsule: 1 cap(s) orally once a week  ZyPREXA 2.5 mg oral tablet: 1 tab(s) orally once a day (at bedtime)    ALPRAZolam 1 mg oral tablet: 1.5 tab(s) orally 2 times a day, As Needed  Cozaar 25 mg oral tablet: 1 tab(s) orally once a day  escitalopram 20 mg oral tablet: 1.5 tab(s) orally once a day  gabapentin 600 mg oral tablet: 1 tab(s) orally 3 times a day  lamoTRIgine 25 mg oral tablet: 2 tab(s) orally 2 times a day  omeprazole 40 mg oral delayed release capsule: 1 cap(s) orally once a day  Vitamin D2 1.25 mg (50,000 intl units) oral capsule: 1 cap(s) orally once a week  ZyPREXA 2.5 mg oral tablet: 1 tab(s) orally once a day (at bedtime)

## 2023-03-24 NOTE — DISCHARGE NOTE PROVIDER - NSDCCPCAREPLAN_GEN_ALL_CORE_FT
PRINCIPAL DISCHARGE DIAGNOSIS  Diagnosis: Lower GI bleed  Assessment and Plan of Treatment: Likely related to hemorrhoids. Your hemoglobin is now stable, please follow up with your GI speciailist.  CT scan of your abdomen showed Extensive sigmoid colonic diverticulosis without evidence of diverticulitis. NO active GI bleeding is noted.      SECONDARY DISCHARGE DIAGNOSES  Diagnosis: Abnormal CT scan  Assessment and Plan of Treatment: Your CT of abdomen had some abnormal findings.   The below are the abnormal findings:   Wedge fracture of T11 vertebral body which may be subacute in nature.  Increase in size of a mildly sclerotic lesion in the L4 vertebral body is   indeterminate. Further evaluation with lumbar spine MRI recommended as an outpatient.  You need to obtain an MRI of your spine outpatient for further evaluation.,

## 2023-03-25 ENCOUNTER — TRANSCRIPTION ENCOUNTER (OUTPATIENT)
Age: 83
End: 2023-03-25

## 2023-03-25 LAB
ANION GAP SERPL CALC-SCNC: 13 MMOL/L — SIGNIFICANT CHANGE UP (ref 5–17)
BUN SERPL-MCNC: 12.3 MG/DL — SIGNIFICANT CHANGE UP (ref 8–20)
CALCIUM SERPL-MCNC: 8.9 MG/DL — SIGNIFICANT CHANGE UP (ref 8.4–10.5)
CHLORIDE SERPL-SCNC: 100 MMOL/L — SIGNIFICANT CHANGE UP (ref 96–108)
CO2 SERPL-SCNC: 22 MMOL/L — SIGNIFICANT CHANGE UP (ref 22–29)
CREAT SERPL-MCNC: 0.89 MG/DL — SIGNIFICANT CHANGE UP (ref 0.5–1.3)
EGFR: 65 ML/MIN/1.73M2 — SIGNIFICANT CHANGE UP
GLUCOSE SERPL-MCNC: 86 MG/DL — SIGNIFICANT CHANGE UP (ref 70–99)
HCT VFR BLD CALC: 32.9 % — LOW (ref 34.5–45)
HGB BLD-MCNC: 10.6 G/DL — LOW (ref 11.5–15.5)
MCHC RBC-ENTMCNC: 30.4 PG — SIGNIFICANT CHANGE UP (ref 27–34)
MCHC RBC-ENTMCNC: 32.2 GM/DL — SIGNIFICANT CHANGE UP (ref 32–36)
MCV RBC AUTO: 94.3 FL — SIGNIFICANT CHANGE UP (ref 80–100)
PLATELET # BLD AUTO: 397 K/UL — SIGNIFICANT CHANGE UP (ref 150–400)
POTASSIUM SERPL-MCNC: 3.4 MMOL/L — LOW (ref 3.5–5.3)
POTASSIUM SERPL-SCNC: 3.4 MMOL/L — LOW (ref 3.5–5.3)
RBC # BLD: 3.49 M/UL — LOW (ref 3.8–5.2)
RBC # FLD: 14.5 % — SIGNIFICANT CHANGE UP (ref 10.3–14.5)
SODIUM SERPL-SCNC: 135 MMOL/L — SIGNIFICANT CHANGE UP (ref 135–145)
WBC # BLD: 8.72 K/UL — SIGNIFICANT CHANGE UP (ref 3.8–10.5)
WBC # FLD AUTO: 8.72 K/UL — SIGNIFICANT CHANGE UP (ref 3.8–10.5)

## 2023-03-25 PROCEDURE — 99285 EMERGENCY DEPT VISIT HI MDM: CPT

## 2023-03-25 PROCEDURE — 85730 THROMBOPLASTIN TIME PARTIAL: CPT

## 2023-03-25 PROCEDURE — 86850 RBC ANTIBODY SCREEN: CPT

## 2023-03-25 PROCEDURE — 99239 HOSP IP/OBS DSCHRG MGMT >30: CPT

## 2023-03-25 PROCEDURE — 80053 COMPREHEN METABOLIC PANEL: CPT

## 2023-03-25 PROCEDURE — 85025 COMPLETE CBC W/AUTO DIFF WBC: CPT

## 2023-03-25 PROCEDURE — 82272 OCCULT BLD FECES 1-3 TESTS: CPT

## 2023-03-25 PROCEDURE — 85027 COMPLETE CBC AUTOMATED: CPT

## 2023-03-25 PROCEDURE — 80048 BASIC METABOLIC PNL TOTAL CA: CPT

## 2023-03-25 PROCEDURE — 83690 ASSAY OF LIPASE: CPT

## 2023-03-25 PROCEDURE — G1004: CPT

## 2023-03-25 PROCEDURE — 36415 COLL VENOUS BLD VENIPUNCTURE: CPT

## 2023-03-25 PROCEDURE — 86923 COMPATIBILITY TEST ELECTRIC: CPT

## 2023-03-25 PROCEDURE — 36430 TRANSFUSION BLD/BLD COMPNT: CPT

## 2023-03-25 PROCEDURE — 96360 HYDRATION IV INFUSION INIT: CPT

## 2023-03-25 PROCEDURE — 86900 BLOOD TYPING SEROLOGIC ABO: CPT

## 2023-03-25 PROCEDURE — P9016: CPT

## 2023-03-25 PROCEDURE — 81003 URINALYSIS AUTO W/O SCOPE: CPT

## 2023-03-25 PROCEDURE — 87637 SARSCOV2&INF A&B&RSV AMP PRB: CPT

## 2023-03-25 PROCEDURE — 74178 CT ABD&PLV WO CNTR FLWD CNTR: CPT | Mod: MG

## 2023-03-25 PROCEDURE — 87086 URINE CULTURE/COLONY COUNT: CPT

## 2023-03-25 PROCEDURE — 99233 SBSQ HOSP IP/OBS HIGH 50: CPT

## 2023-03-25 PROCEDURE — 83605 ASSAY OF LACTIC ACID: CPT

## 2023-03-25 PROCEDURE — 85610 PROTHROMBIN TIME: CPT

## 2023-03-25 PROCEDURE — 86901 BLOOD TYPING SEROLOGIC RH(D): CPT

## 2023-03-25 PROCEDURE — 83735 ASSAY OF MAGNESIUM: CPT

## 2023-03-25 RX ORDER — POTASSIUM CHLORIDE 20 MEQ
40 PACKET (EA) ORAL ONCE
Refills: 0 | Status: COMPLETED | OUTPATIENT
Start: 2023-03-25 | End: 2023-03-25

## 2023-03-25 RX ORDER — LOSARTAN POTASSIUM 100 MG/1
1 TABLET, FILM COATED ORAL
Qty: 30 | Refills: 0
Start: 2023-03-25 | End: 2023-04-23

## 2023-03-25 RX ADMIN — LOSARTAN POTASSIUM 25 MILLIGRAM(S): 100 TABLET, FILM COATED ORAL at 05:36

## 2023-03-25 RX ADMIN — GABAPENTIN 600 MILLIGRAM(S): 400 CAPSULE ORAL at 10:39

## 2023-03-25 RX ADMIN — Medication 40 MILLIEQUIVALENT(S): at 12:29

## 2023-03-25 RX ADMIN — Medication 650 MILLIGRAM(S): at 12:30

## 2023-03-25 RX ADMIN — LAMOTRIGINE 25 MILLIGRAM(S): 25 TABLET, ORALLY DISINTEGRATING ORAL at 10:39

## 2023-03-25 RX ADMIN — ESCITALOPRAM OXALATE 20 MILLIGRAM(S): 10 TABLET, FILM COATED ORAL at 12:29

## 2023-03-25 RX ADMIN — PANTOPRAZOLE SODIUM 40 MILLIGRAM(S): 20 TABLET, DELAYED RELEASE ORAL at 05:36

## 2023-03-25 NOTE — DOWNTIME INTERRUPTION NOTE - WHICH MANUAL FORMS INITIATED?
See paper records for clinical information entered during [place] downtime. Medication administration and i&o's entered [facility]  [address]  [phone]  [facility]  Code 32      NAME: Bruna Contreras  AGE: 62 y o   SEX: female    DATE OF ENCOUNTER: 3/18/2022    Assessment and Plan     Problem List Items Addressed This Visit        Digestive    Dysphagia     · Currently on pureed diet with nectar thick liquids   · continue aspiration precautions  · Speech therapy consult as needed         Chronic constipation     · Currently stable  · Per chart review has been having bowel movement every day or 2   · Continue current medication regimen of Linzess, senna  · Encourage adequate p o  hydration   · Continue to monitor            Endocrine    Hypothyroidism     · Most recent TSH 1 45 on 12/20/2021  · Continue levothyroxine 150 mcg daily   · Repeat TSH with next set of lab work            Cardiovascular and Mediastinum    Essential hypertension - Primary     · Blood pressure log reviewed and stable, blood pressure today 129/74  · Blood pressures have trended between 96/62 to 154/86 over the last month is   · Continue with current medication regimen of amlodipine 2 5 mg daily and low-salt continue 40 mg daily   · Avoid hypotension   · Periodic lab work to monitor renal function   · Continue to monitor and adjust medications as needed            Nervous and Auditory    Cerebral palsy ([address] )     · History of cerebral palsy   · Continue supportive care and treatment at SNF  · Continue PT/OT as needed            Other    Ambulatory dysfunction     · Wheelchair bound at baseline   · Out of bed as tolerated   · Continue supportive care at staff   · Pressure offloading while in bed   · Encourage adequate hydration and nutrition            Other specified anemias     · Baseline hemoglobin appears to be between 10-11 5   · Noted to have macrocytic anemia   · No active signs of bleeding   · Will repeat CBC with next lab workwork               No orders of the defined types were placed in this encounter       - Counseling Documentation: patient was counseled regarding: importance of compliance with treatment    Chief Complaint     No chief complaint on file  History of Present Illness     Sherri Campbell is a 62year old female at [address] for long term care  He is being seen today for a follow-up on her chronic medical conditions  Her comorbidities include hypertension, cerebral palsy, hypothyroidism, anemia, and ambulatory dysfuntion      Upon examination patient was OOB in the chair, resting   She appeared comfortable and was in no acute distress   ROS is limited due to being nonverbal   Per chart review appetite is at her baselne, if assisted with meals eats %, although sometimes only 25-50%    She sleeps well at night   Vital signs have been stable   No apparent signs of pain on exam  Per nursing no other issues with cough, fever, diarrhea or constipation   Per nursing no other acute concerns or issues at this time          The following portions of the patient's history were reviewed and updated as appropriate: allergies, current medications, past family history, past medical history, past social history, past surgical history and problem list     Review of Systems     Review of Systems   Unable to perform ROS: Patient nonverbal       Active Problem List     Patient Active Problem List   Diagnosis    Neuromuscular dysfunction of bladder    Recurrent UTI    Urinary incontinence    Acute vaginitis    Hypernatremia    Dysphagia    Skin ulcers of both feet ([address] )    Physical deconditioning    Dermatitis associated with incontinence    Chronic constipation    Cerebral palsy ([address] )    Essential hypertension    GERD (gastroesophageal reflux disease)    Hypothyroidism    Ambulatory dysfunction    Hyperkalemia    Fibromyalgia    Laceration of fifth toe of left foot    Arthritis    Other specified anemias       Objective     /74   Pulse 89   Temp 98 3 °F (36 8 °C) Resp 18   SpO2 98%     Physical Exam  Vitals reviewed  Constitutional:       General: She is not in acute distress  Appearance: She is not ill-appearing  Comments: chronically ill appearing    HENT:      Head: Normocephalic and atraumatic  Mouth/Throat:      Mouth: Mucous membranes are dry  Pharynx: No oropharyngeal exudate or posterior oropharyngeal erythema  Eyes:      General: No scleral icterus  Right eye: No discharge  Left eye: No discharge  Cardiovascular:      Rate and Rhythm: Normal rate and regular rhythm  Pulses: Normal pulses  Heart sounds: Normal heart sounds  No murmur heard  Pulmonary:      Effort: Pulmonary effort is normal  No respiratory distress  Breath sounds: Normal breath sounds  No wheezing  Comments: diminished b/l  Abdominal:      General: Bowel sounds are normal  There is no distension  Palpations: Abdomen is soft  Tenderness: There is no abdominal tenderness  Musculoskeletal:         General: Deformity present  No tenderness  Right lower leg: Edema (trace) present  Left lower leg: Edema (trace) present  Comments: B/L Feet swollen   Skin:     General: Skin is warm and dry  Coloration: Skin is not jaundiced  Findings: Wound present  No bruising  Neurological:      General: No focal deficit present  Mental Status: She is alert  Mental status is at baseline  Motor: Weakness present  Gait: Gait abnormal       Comments: Nonverbal, hx of CP   Psychiatric:         Mood and Affect: Mood normal          Behavior: Behavior normal          Pertinent Laboratory/Diagnostic Studies:  Labs reviewed in facility paper chart  Aylin CASTILLO    Please note:  Voice-recognition software may have been used in the preparation of this document  Occasional wrong word or "sound-alike" substitutions may have occurred due to the inherent limitations of voice recognition software  Interpretation should be guided by context

## 2023-03-25 NOTE — DISCHARGE NOTE NURSING/CASE MANAGEMENT/SOCIAL WORK - NSDCPEFALRISK_GEN_ALL_CORE
For information on Fall & Injury Prevention, visit: [email or web address] OR  [email or web address] OR  https://www.cdc.gov/steadi/patient.html

## 2023-03-25 NOTE — DISCHARGE NOTE NURSING/CASE MANAGEMENT/SOCIAL WORK - PATIENT PORTAL LINK FT
You can access the FollowMyHealth Patient Portal offered by [facility] by registering at the following website: [email or web address]. By joining [employer]’s FollowMyHealth portal, you will also be able to view your health information using other applications (apps) compatible with our system.

## 2023-03-25 NOTE — PROGRESS NOTE ADULT - ASSESSMENT
2 year old female with HTN, HLD, Parkinson's disease, diverticulitis now LGIB, resolved  Stable for discharge  discussed possible surgical evaluation if bleeding recurs  also advised CTA rather than colonoscopy if rebleeds since tough sigmoid colon, multiple recent colonoscopies

## 2023-03-25 NOTE — PROGRESS NOTE ADULT - SUBJECTIVE AND OBJECTIVE BOX
[facility] Division of Hospital Medicine    Chief Complaint:  GI bleed    SUBJECTIVE / OVERNIGHT EVENTS:  Pt examined lying in bed  States that she had a BM but did not see blood today   Patient denies chest pain, SOB, abd pain, N/V, fever, chills, dysuria or any other complaints. All remainder ROS negative.     MEDICATIONS  (STANDING):  escitalopram 20 milliGRAM(s) Oral daily  gabapentin 600 milliGRAM(s) Oral two times a day  lamoTRIgine 25 milliGRAM(s) Oral two times a day  OLANZapine 2.5 milliGRAM(s) Oral at bedtime  pantoprazole  Injectable 40 milliGRAM(s) IV Push every 12 hours    MEDICATIONS  (PRN):  acetaminophen     Tablet .. 650 milliGRAM(s) Oral every 6 hours PRN Mild Pain (1 - 3)  ALPRAZolam 0.5 milliGRAM(s) Oral every 8 hours PRN anxiety        I&O's Summary    22 Mar 2023 07:01  -  23 Mar 2023 07:00  --------------------------------------------------------  IN: 240 mL / OUT: 0 mL / NET: 240 mL        PHYSICAL EXAM:  Vital Signs Last 24 Hrs  T(C): 36.4 (23 Mar 2023 16:11), Max: 36.8 (23 Mar 2023 08:02)  T(F): 97.5 (23 Mar 2023 16:11), Max: 98.2 (23 Mar 2023 08:02)  HR: 83 (23 Mar 2023 16:11) (63 - 83)  BP: 159/81 (23 Mar 2023 16:11) (132/78 - 161/77)  BP(mean): --  RR: 18 (23 Mar 2023 16:11) (16 - 18)  SpO2: 96% (23 Mar 2023 16:11) (94% - 98%)    Parameters below as of 23 Mar 2023 16:11  Patient On (Oxygen Delivery Method): room air            CONSTITUTIONAL: Non toxic appearing, elderly female lying in bed  ENMT: Moist oral mucosa, no pharyngeal injection or exudates; normal dentition  RESPIRATORY: Normal respiratory effort; lungs are clear to auscultation bilaterally  CARDIOVASCULAR: Regular rate and rhythm, normal S1 and S2, no murmur/rub/gallop; No lower extremity edema; Peripheral pulses are 2+ bilaterally  ABDOMEN: Nontender to palpation, normoactive bowel sounds, no rebound/guarding; No hepatosplenomegaly  MUSCLOSKELETAL:   no clubbing or cyanosis of digits; no joint swelling or tenderness to palpation  PSYCH: A+O to person, place, and time, poor historian,   NEUROLOGY: CN 2-12 are intact and symmetric; no gross sensory deficits;   SKIN: No rashes; no palpable lesions    LABS:                        9.7    4.78  )-----------( 443      ( 23 Mar 2023 05:45 )             30.1     03-23    140  |  106  |  10.3  ----------------------------<  77  4.1   |  25.0  |  0.96    Ca    8.6      23 Mar 2023 05:45  Mg     2.3     03-23    TPro  6.0<L>  /  Alb  3.4  /  TBili  <0.2<L>  /  DBili  x   /  AST  20  /  ALT  8   /  AlkPhos  106  03-22    PT/INR - ( 22 Mar 2023 13:11 )   PT: 11.8 sec;   INR: 1.02 ratio         PTT - ( 22 Mar 2023 13:11 )  PTT:33.0 sec      Urinalysis Basic - ( 22 Mar 2023 14:13 )    Color: Yellow / Appearance: Clear / S.010 / pH: x  Gluc: x / Ketone: Negative  / Bili: Negative / Urobili: Negative mg/dL   Blood: x / Protein: Negative / Nitrite: Negative   Leuk Esterase: Negative / RBC: x / WBC x   Sq Epi: x / Non Sq Epi: x / Bacteria: x        CAPILLARY BLOOD GLUCOSE            RADIOLOGY & ADDITIONAL TESTS:  Results Reviewed:   Imaging Personally Reviewed:  Electrocardiogram Personally Reviewed:                                          
[facility] Division of Hospital Medicine    Chief Complaint:  GI bleed    SUBJECTIVE / OVERNIGHT EVENTS:  Colonoscopy today  Denies any visible blood per rectum since time in ER  Patient denies chest pain, SOB, abd pain, N/V, fever, chills, dysuria or any other complaints. All remainder ROS negative.     MEDICATIONS  (STANDING):  escitalopram 20 milliGRAM(s) Oral daily  gabapentin 600 milliGRAM(s) Oral two times a day  lamoTRIgine 25 milliGRAM(s) Oral two times a day  OLANZapine 2.5 milliGRAM(s) Oral at bedtime  pantoprazole  Injectable 40 milliGRAM(s) IV Push every 12 hours    MEDICATIONS  (PRN):  acetaminophen     Tablet .. 650 milliGRAM(s) Oral every 6 hours PRN Mild Pain (1 - 3)  ALPRAZolam 0.5 milliGRAM(s) Oral every 8 hours PRN anxiety        I&O's Summary    22 Mar 2023 07:01  -  23 Mar 2023 07:00  --------------------------------------------------------  IN: 240 mL / OUT: 0 mL / NET: 240 mL        PHYSICAL EXAM:  Vital Signs Last 24 Hrs  T(C): 36.4 (24 Mar 2023 16:15), Max: 36.6 (23 Mar 2023 22:27)  T(F): 97.5 (24 Mar 2023 16:15), Max: 97.8 (23 Mar 2023 22:27)  HR: 82 (24 Mar 2023 16:15) (72 - 87)  BP: 167/82 (24 Mar 2023 16:15) (158/90 - 178/83)  BP(mean): --  RR: 18 (24 Mar 2023 16:15) (18 - 18)  SpO2: 95% (24 Mar 2023 16:15) (95% - 98%)    Parameters below as of 24 Mar 2023 16:15  Patient On (Oxygen Delivery Method): room air          CONSTITUTIONAL: Non toxic appearing, elderly female lying in bed  ENMT: Moist oral mucosa, no pharyngeal injection or exudates; normal dentition  RESPIRATORY: Normal respiratory effort; lungs are clear to auscultation bilaterally  CARDIOVASCULAR: Regular rate and rhythm, normal S1 and S2, no murmur/rub/gallop; No lower extremity edema; Peripheral pulses are 2+ bilaterally  ABDOMEN: Nontender to palpation, normoactive bowel sounds, no rebound/guarding; No hepatosplenomegaly  MUSCLOSKELETAL:   no clubbing or cyanosis of digits; no joint swelling or tenderness to palpation  PSYCH: A+O to person, place, and time, poor historian,   NEUROLOGY: CN 2-12 are intact and symmetric; no gross sensory deficits;   SKIN: No rashes; no palpable lesions    LABS:                                     10.0   6.66  )-----------( 445      ( 24 Mar 2023 05:25 )             31.1   03-24    141  |  102  |  10.7  ----------------------------<  91  3.7   |  26.0  |  0.94    Ca    8.8      24 Mar 2023 05:25  Mg     2.3     03-23        Color: Yellow / Appearance: Clear / S.010 / pH: x  Gluc: x / Ketone: Negative  / Bili: Negative / Urobili: Negative mg/dL   Blood: x / Protein: Negative / Nitrite: Negative   Leuk Esterase: Negative / RBC: x / WBC x   Sq Epi: x / Non Sq Epi: x / Bacteria: x        CAPILLARY BLOOD GLUCOSE            RADIOLOGY & ADDITIONAL TESTS:  Results Reviewed:   Imaging Personally Reviewed:  Electrocardiogram Personally Reviewed:                                          
    Chief Complaint:  Patient is a 82y old  Female who presents with a chief complaint of LGIB  Colon yesterday tics no bleeding  Hb 10 form 9.7  no further bleeding no pain    Interval Events / Subjective: Patient seen and examined at bedside      REVIEW OF SYSTEMS:   General: Negative  HEENT: Negative  CV: Negative  Respiratory: Negative  GI: See HPI  : Negative  MSK: Negative  Hematologic: Negative  Skin: Negative    MEDICATIONS:   MEDICATIONS  (STANDING):  escitalopram 20 milliGRAM(s) Oral daily  gabapentin 600 milliGRAM(s) Oral two times a day  lamoTRIgine 25 milliGRAM(s) Oral two times a day  losartan 25 milliGRAM(s) Oral daily  OLANZapine 2.5 milliGRAM(s) Oral at bedtime  pantoprazole  Injectable 40 milliGRAM(s) IV Push every 12 hours    MEDICATIONS  (PRN):  acetaminophen     Tablet .. 650 milliGRAM(s) Oral every 6 hours PRN Mild Pain (1 - 3)  ALPRAZolam 0.5 milliGRAM(s) Oral every 8 hours PRN anxiety      ALLERGIES:   Allergies    No Known Allergies    Intolerances        VITAL SIGNS:   Vital Signs Last 24 Hrs  T(C): 36.5 (24 Mar 2023 21:30), Max: 36.5 (24 Mar 2023 21:30)  T(F): 97.7 (24 Mar 2023 21:30), Max: 97.7 (24 Mar 2023 21:30)  HR: 83 (24 Mar 2023 21:30) (83 - 83)  BP: 175/96 (24 Mar 2023 21:30) (175/96 - 175/96)  BP(mean): --  RR: 16 (24 Mar 2023 21:30) (16 - 16)  SpO2: --    Parameters below as of 24 Mar 2023 21:30  Patient On (Oxygen Delivery Method): room air  O2 Flow (L/min): 98    I&O's Summary    24 Mar 2023 07:01  -  25 Mar 2023 07:00  --------------------------------------------------------  IN: 240 mL / OUT: 1 mL / NET: 239 mL        PHYSICAL EXAM:   GENERAL:  Appears stated age, no distress  HEENT:  NC/AT,  conjunctivae clear, sclera -anicteric  CHEST:  Full & symmetric excursion, no increased effort, breath sounds clear  HEART:  Regular rhythm, S1, S2, no murmur/rub/S3/S4,  no edema  ABDOMEN:  Soft, non-tender, non-distended, normoactive bowel sounds,  no masses,  EXTREMITIES: No cyanosis, clubbing or edema  SKIN:  No rash/erythema/ecchymoses/petechiae/wounds/abscess/warm/dry  NEURO:  Alert, oriented    LABS:  CBC Full  -  ( 25 Mar 2023 06:38 )  WBC Count : 8.72 K/uL  RBC Count : 3.49 M/uL  Hemoglobin : 10.6 g/dL  Hematocrit : 32.9 %  Platelet Count - Automated : 397 K/uL  Mean Cell Volume : 94.3 fl  Mean Cell Hemoglobin : 30.4 pg  Mean Cell Hemoglobin Concentration : 32.2 gm/dL  Auto Neutrophil # : x  Auto Lymphocyte # : x  Auto Monocyte # : x  Auto Eosinophil # : x  Auto Basophil # : x  Auto Neutrophil % : x  Auto Lymphocyte % : x  Auto Monocyte % : x  Auto Eosinophil % : x  Auto Basophil % : x    03-25    135  |  100  |  12.3  ----------------------------<  86  3.4<L>   |  22.0  |  0.89    Ca    8.9      25 Mar 2023 06:38        PT/INR - ( 24 Mar 2023 05:25 )   PT: 12.4 sec;   INR: 1.07 ratio         PTT - ( 24 Mar 2023 05:25 )  PTT:33.4 sec        RADIOLOGY & ADDITIONAL STUDIES (The following images were personally reviewed):

## 2023-04-10 NOTE — ED STATDOCS - SCRIBE NAME
Guero Velazquez Secondary Intention Text (Leave Blank If You Do Not Want): The defect will heal with secondary intention.

## 2023-04-26 NOTE — H&P ADULT - PROBLEM/PLAN-3
SUBJECTIVE:        Sheila Cuevas is a 25 m.o. female    Chief Complaint   Patient presents with    Well Child     No concerns        HPI: here with mom for well visit     No medical or developmental concerns today     Previously referred to endo for increased growth trajectory, has not been in work an evaluation. Parent not concerned about it because of genetics. Reviewed normal dietary intake,     Pulse 127   Temp 97.7 °F (36.5 °C) (Temporal)   Resp 23   Ht 33.66\" (85.5 cm)   Wt (!) 35 lb (15.9 kg)   HC 50 cm (19.69\")   BMI 21.72 kg/m²     No Known Allergies    No current outpatient medications on file prior to visit. No current facility-administered medications on file prior to visit. No past medical history on file. No family history on file. Review of Systems   Constitutional: Negative. HENT: Negative. Eyes: Negative. Respiratory: Negative. Cardiovascular: Negative. Gastrointestinal: Negative. Skin: Negative. Negative for rash and wound. Neurological:  Negative for speech difficulty. Psychiatric/Behavioral:  Negative for behavioral problems and sleep disturbance. Household Info  Passive Smoke Exposure: no  :  no    Nutrition  Milk: X  Solids-Cereals/Fruits/Veg/Meats: X  Juices: X    Use of Cup/Wean from Bottle: XFeeding: X  Regular Meals: X  Decreased Appetite: X  Fluoride/Vitamins: X  Table Foods: X  Source of Iron X  Concerns: none     MCHAT  0    OBJECTIVE:         Physical Exam  Vitals and nursing note reviewed. Constitutional:       General: She is active. She is not in acute distress. Appearance: She is well-developed. HENT:      Head: Atraumatic. Right Ear: Tympanic membrane normal.      Left Ear: Tympanic membrane normal.      Mouth/Throat:      Mouth: Mucous membranes are moist.      Dentition: No dental caries.    Eyes:      Conjunctiva/sclera: Conjunctivae normal.      Pupils: Pupils are equal, round, and reactive to
DISPLAY PLAN FREE TEXT

## 2023-05-21 NOTE — ED PROVIDER NOTE - WR ORDER ID 1
LABS:                        14.0   11.91 )-----------( 211      ( 21 May 2023 07:35 )             42.7     05-21    132<L>  |  99  |  18  ----------------------------<  120<H>  4.0   |  28  |  0.52    Ca    8.9      21 May 2023 07:35    TPro  7.6  /  Alb  3.3  /  TBili  0.7  /  DBili  x   /  AST  18  /  ALT  19  /  AlkPhos  54  05-21      Urinalysis Basic - ( 21 May 2023 09:30 )    Color: Yellow / Appearance: Clear / S.010 / pH: x  Gluc: x / Ketone: Negative  / Bili: Negative / Urobili: Negative mg/dL   Blood: x / Protein: Negative mg/dL / Nitrite: Negative   Leuk Esterase: Negative / RBC: Negative /HPF / WBC Negative   Sq Epi: x / Non Sq Epi: x / Bacteria: Occasional    EKG  sinus  tachycardia  LBB [id number]

## 2023-05-27 NOTE — ED ADULT NURSE NOTE - NS ED NOTE ABUSE RESPONSE YN
Unknown When taking pain meds - take with food and know it may cause constipation and nausea - Do NOT drive while on narcotics.   Keep extremity elevated, apply ice to reduce pain and swelling - 20 minutes on 20 minutes off. Yes

## 2023-06-06 ENCOUNTER — EMERGENCY (EMERGENCY)
Facility: HOSPITAL | Age: 83
LOS: 1 days | Discharge: HOME | End: 2023-06-06
Attending: EMERGENCY MEDICINE
Payer: MEDICARE

## 2023-06-06 VITALS
HEART RATE: 73 BPM | WEIGHT: 145.28 LBS | TEMPERATURE: 98 F | RESPIRATION RATE: 18 BRPM | DIASTOLIC BLOOD PRESSURE: 71 MMHG | SYSTOLIC BLOOD PRESSURE: 139 MMHG | OXYGEN SATURATION: 96 %

## 2023-06-06 VITALS
SYSTOLIC BLOOD PRESSURE: 173 MMHG | RESPIRATION RATE: 18 BRPM | TEMPERATURE: 98 F | DIASTOLIC BLOOD PRESSURE: 95 MMHG | OXYGEN SATURATION: 97 % | HEART RATE: 73 BPM

## 2023-06-06 DIAGNOSIS — Z98.890 OTHER SPECIFIED POSTPROCEDURAL STATES: Chronic | ICD-10-CM

## 2023-06-06 DIAGNOSIS — Z90.722 ACQUIRED ABSENCE OF OVARIES, BILATERAL: Chronic | ICD-10-CM

## 2023-06-06 DIAGNOSIS — Z98.49 CATARACT EXTRACTION STATUS, UNSPECIFIED EYE: Chronic | ICD-10-CM

## 2023-06-06 LAB
ALBUMIN SERPL ELPH-MCNC: 4.1 G/DL — SIGNIFICANT CHANGE UP (ref 3.3–5.2)
ALP SERPL-CCNC: 129 U/L — HIGH (ref 40–120)
ALT FLD-CCNC: 18 U/L — SIGNIFICANT CHANGE UP
ANION GAP SERPL CALC-SCNC: 12 MMOL/L — SIGNIFICANT CHANGE UP (ref 5–17)
AST SERPL-CCNC: 24 U/L — SIGNIFICANT CHANGE UP
BASOPHILS # BLD AUTO: 0.03 K/UL — SIGNIFICANT CHANGE UP (ref 0–0.2)
BASOPHILS NFR BLD AUTO: 0.7 % — SIGNIFICANT CHANGE UP (ref 0–2)
BILIRUB SERPL-MCNC: <0.2 MG/DL — LOW (ref 0.4–2)
BUN SERPL-MCNC: 11 MG/DL — SIGNIFICANT CHANGE UP (ref 8–20)
CALCIUM SERPL-MCNC: 9.6 MG/DL — SIGNIFICANT CHANGE UP (ref 8.4–10.5)
CHLORIDE SERPL-SCNC: 103 MMOL/L — SIGNIFICANT CHANGE UP (ref 96–108)
CO2 SERPL-SCNC: 24 MMOL/L — SIGNIFICANT CHANGE UP (ref 22–29)
CREAT SERPL-MCNC: 1.03 MG/DL — SIGNIFICANT CHANGE UP (ref 0.5–1.3)
EGFR: 54 ML/MIN/1.73M2 — LOW
EOSINOPHIL # BLD AUTO: 0.04 K/UL — SIGNIFICANT CHANGE UP (ref 0–0.5)
EOSINOPHIL NFR BLD AUTO: 0.9 % — SIGNIFICANT CHANGE UP (ref 0–6)
GLUCOSE SERPL-MCNC: 92 MG/DL — SIGNIFICANT CHANGE UP (ref 70–99)
HCT VFR BLD CALC: 33.5 % — LOW (ref 34.5–45)
HGB BLD-MCNC: 10.4 G/DL — LOW (ref 11.5–15.5)
IMM GRANULOCYTES NFR BLD AUTO: 0.2 % — SIGNIFICANT CHANGE UP (ref 0–0.9)
LYMPHOCYTES # BLD AUTO: 1.25 K/UL — SIGNIFICANT CHANGE UP (ref 1–3.3)
LYMPHOCYTES # BLD AUTO: 28.9 % — SIGNIFICANT CHANGE UP (ref 13–44)
MCHC RBC-ENTMCNC: 27.9 PG — SIGNIFICANT CHANGE UP (ref 27–34)
MCHC RBC-ENTMCNC: 31 GM/DL — LOW (ref 32–36)
MCV RBC AUTO: 89.8 FL — SIGNIFICANT CHANGE UP (ref 80–100)
MONOCYTES # BLD AUTO: 0.45 K/UL — SIGNIFICANT CHANGE UP (ref 0–0.9)
MONOCYTES NFR BLD AUTO: 10.4 % — SIGNIFICANT CHANGE UP (ref 2–14)
NEUTROPHILS # BLD AUTO: 2.54 K/UL — SIGNIFICANT CHANGE UP (ref 1.8–7.4)
NEUTROPHILS NFR BLD AUTO: 58.9 % — SIGNIFICANT CHANGE UP (ref 43–77)
NT-PROBNP SERPL-SCNC: 368 PG/ML — HIGH (ref 0–300)
PLATELET # BLD AUTO: 215 K/UL — SIGNIFICANT CHANGE UP (ref 150–400)
POTASSIUM SERPL-MCNC: 4.1 MMOL/L — SIGNIFICANT CHANGE UP (ref 3.5–5.3)
POTASSIUM SERPL-SCNC: 4.1 MMOL/L — SIGNIFICANT CHANGE UP (ref 3.5–5.3)
PROT SERPL-MCNC: 7 G/DL — SIGNIFICANT CHANGE UP (ref 6.6–8.7)
RBC # BLD: 3.73 M/UL — LOW (ref 3.8–5.2)
RBC # FLD: 14.9 % — HIGH (ref 10.3–14.5)
SODIUM SERPL-SCNC: 138 MMOL/L — SIGNIFICANT CHANGE UP (ref 135–145)
TROPONIN T SERPL-MCNC: <0.01 NG/ML — SIGNIFICANT CHANGE UP (ref 0–0.06)
WBC # BLD: 4.32 K/UL — SIGNIFICANT CHANGE UP (ref 3.8–10.5)
WBC # FLD AUTO: 4.32 K/UL — SIGNIFICANT CHANGE UP (ref 3.8–10.5)

## 2023-06-06 PROCEDURE — 93005 ELECTROCARDIOGRAM TRACING: CPT

## 2023-06-06 PROCEDURE — 99285 EMERGENCY DEPT VISIT HI MDM: CPT | Mod: 25

## 2023-06-06 PROCEDURE — 85025 COMPLETE CBC W/AUTO DIFF WBC: CPT

## 2023-06-06 PROCEDURE — 83880 ASSAY OF NATRIURETIC PEPTIDE: CPT

## 2023-06-06 PROCEDURE — 93010 ELECTROCARDIOGRAM REPORT: CPT

## 2023-06-06 PROCEDURE — 71045 X-RAY EXAM CHEST 1 VIEW: CPT | Mod: 26

## 2023-06-06 PROCEDURE — 84484 ASSAY OF TROPONIN QUANT: CPT

## 2023-06-06 PROCEDURE — 80053 COMPREHEN METABOLIC PANEL: CPT

## 2023-06-06 PROCEDURE — 99284 EMERGENCY DEPT VISIT MOD MDM: CPT

## 2023-06-06 PROCEDURE — 36415 COLL VENOUS BLD VENIPUNCTURE: CPT

## 2023-06-06 PROCEDURE — 71045 X-RAY EXAM CHEST 1 VIEW: CPT

## 2023-06-06 RX ORDER — AMLODIPINE BESYLATE 2.5 MG/1
1 TABLET ORAL
Qty: 10 | Refills: 0
Start: 2023-06-06 | End: 2023-06-15

## 2023-06-06 NOTE — ED ADULT NURSE NOTE - OBJECTIVE STATEMENT
Patient arrived to ED today after arriving from endoscopy being told her blood pressure was high.  Patient referred to ED after PMD told her to come.  Patient reports no chest pain, SOB, numbness or tingling.

## 2023-06-06 NOTE — ED PROVIDER NOTE - PATIENT PORTAL LINK FT
You can access the FollowMyHealth Patient Portal offered by [facility] by registering at the following website: [email or web address]. By joining [facility]’s FollowMyHealth portal, you will also be able to view your health information using other applications (apps) compatible with our system.

## 2023-06-06 NOTE — ED ADULT TRIAGE NOTE - CHIEF COMPLAINT QUOTE
Pt presents to ED for evaluation of possible HTN. Pt states that she was scheduled for an endoscopy today and was told her BP was 200s/100s and to go to the PCP. Her PCP referred her to the ED for evaluation. Pt denies PMHx HTN, and is normotensive in triage. Denies HA/N/V/dizziness.

## 2023-06-06 NOTE — ED PROVIDER NOTE - CLINICAL SUMMARY MEDICAL DECISION MAKING FREE TEXT BOX
The patient came to ED for one episode of asymptomatic BP now normalized and lab done and will dc home and follow up with PMD

## 2023-06-06 NOTE — ED PROVIDER NOTE - OBJECTIVE STATEMENT
The patient is a 83 year old female sent in from PMD for one episode of elevated BP today as she was supposed to have endoscopy and felt anxious and had one episode of elevated BP of 200/100.  No symptoms.  No HA, No blurred vision, No CP, No SOB, No abd pain, No motor No sensory loss

## 2023-07-05 NOTE — SWALLOW BEDSIDE ASSESSMENT ADULT - ASR SWALLOW LINGUAL MOBILITY
Detail Level: Detailed Depth Of Biopsy: dermis Was A Bandage Applied: Yes Size Of Lesion In Cm: 1.1 X Size Of Lesion In Cm: 0 Biopsy Type: H and E within functional limits Biopsy Method: Dermablade Anesthesia Type: 1% lidocaine with epinephrine Anesthesia Volume In Cc (Will Not Render If 0): 0.5 Hemostasis: Drysol Wound Care: Petrolatum Dressing: bandage Destruction After The Procedure: No Type Of Destruction Used: Curettage Curettage Text: The wound bed was treated with curettage after the biopsy was performed. Cryotherapy Text: The wound bed was treated with cryotherapy after the biopsy was performed. Electrodesiccation Text: The wound bed was treated with electrodesiccation after the biopsy was performed. Electrodesiccation And Curettage Text: The wound bed was treated with electrodesiccation and curettage after the biopsy was performed. Silver Nitrate Text: The wound bed was treated with silver nitrate after the biopsy was performed. Lab: 6 Lab Facility: 3 Consent was obtained and risks were reviewed including but not limited to scarring, infection, bleeding, scabbing, incomplete removal, nerve damage and allergy to anesthesia. Post-Care Instructions: I reviewed with the patient in detail post-care instructions. Patient is to keep the biopsy site dry overnight, and then apply bacitracin twice daily until healed. Patient may apply hydrogen peroxide soaks to remove any crusting. Notification Instructions: Patient will be notified of biopsy results. However, patient instructed to call the office if not contacted within 2 weeks. Billing Type: Third-Party Bill Information: Selecting Yes will display possible errors in your note based on the variables you have selected. This validation is only offered as a suggestion for you. PLEASE NOTE THAT THE VALIDATION TEXT WILL BE REMOVED WHEN YOU FINALIZE YOUR NOTE. IF YOU WANT TO FAX A PRELIMINARY NOTE YOU WILL NEED TO TOGGLE THIS TO 'NO' IF YOU DO NOT WANT IT IN YOUR FAXED NOTE.

## 2023-09-05 NOTE — ED ADULT NURSE NOTE - NSFALLRSKPSTHSTOCCUR_ED_ALL_ED
Medical screening examination initiated.  I have conducted a focused provider triage encounter, findings are as follows:    Brief history of present illness:  Patient w/ pmhx of dm, hld,and chf reports dizziness and nausea that started about 20 minutes ago while at work    There were no vitals filed for this visit.    Pertinent physical exam:  Vitals stable.     Brief workup plan:  EKG, labs     Preliminary workup initiated; this workup will be continued and followed by the physician or advanced practice provider that is assigned to the patient when roomed.  
Single Mechanical/Accidental Fall

## 2023-10-05 ENCOUNTER — INPATIENT (INPATIENT)
Facility: HOSPITAL | Age: 83
LOS: 0 days | Discharge: HOME | DRG: 379 | End: 2023-10-06
Attending: HOSPITALIST | Admitting: STUDENT IN AN ORGANIZED HEALTH CARE EDUCATION/TRAINING PROGRAM
Payer: MEDICARE

## 2023-10-05 VITALS
HEART RATE: 76 BPM | WEIGHT: 147.71 LBS | TEMPERATURE: 98 F | OXYGEN SATURATION: 98 % | DIASTOLIC BLOOD PRESSURE: 79 MMHG | SYSTOLIC BLOOD PRESSURE: 144 MMHG | RESPIRATION RATE: 20 BRPM

## 2023-10-05 DIAGNOSIS — Z90.722 ACQUIRED ABSENCE OF OVARIES, BILATERAL: Chronic | ICD-10-CM

## 2023-10-05 DIAGNOSIS — Z98.890 OTHER SPECIFIED POSTPROCEDURAL STATES: Chronic | ICD-10-CM

## 2023-10-05 DIAGNOSIS — K92.1 MELENA: ICD-10-CM

## 2023-10-05 DIAGNOSIS — Z98.49 CATARACT EXTRACTION STATUS, UNSPECIFIED EYE: Chronic | ICD-10-CM

## 2023-10-05 LAB
ALBUMIN SERPL ELPH-MCNC: 4 G/DL — SIGNIFICANT CHANGE UP (ref 3.3–5.2)
ALP SERPL-CCNC: 150 U/L — HIGH (ref 40–120)
ALT FLD-CCNC: 9 U/L — SIGNIFICANT CHANGE UP
ANION GAP SERPL CALC-SCNC: 13 MMOL/L — SIGNIFICANT CHANGE UP (ref 5–17)
APTT BLD: 32.9 SEC — SIGNIFICANT CHANGE UP (ref 24.5–35.6)
AST SERPL-CCNC: 16 U/L — SIGNIFICANT CHANGE UP
BASE EXCESS BLDV CALC-SCNC: -1 MMOL/L — SIGNIFICANT CHANGE UP (ref -2–3)
BASOPHILS # BLD AUTO: 0.03 K/UL — SIGNIFICANT CHANGE UP (ref 0–0.2)
BASOPHILS NFR BLD AUTO: 0.4 % — SIGNIFICANT CHANGE UP (ref 0–2)
BILIRUB SERPL-MCNC: 0.3 MG/DL — LOW (ref 0.4–2)
BLD GP AB SCN SERPL QL: SIGNIFICANT CHANGE UP
BUN SERPL-MCNC: 16.9 MG/DL — SIGNIFICANT CHANGE UP (ref 8–20)
CA-I SERPL-SCNC: 1.15 MMOL/L — SIGNIFICANT CHANGE UP (ref 1.15–1.33)
CALCIUM SERPL-MCNC: 9 MG/DL — SIGNIFICANT CHANGE UP (ref 8.4–10.5)
CHLORIDE BLDV-SCNC: 104 MMOL/L — SIGNIFICANT CHANGE UP (ref 96–108)
CHLORIDE SERPL-SCNC: 98 MMOL/L — SIGNIFICANT CHANGE UP (ref 96–108)
CO2 SERPL-SCNC: 22 MMOL/L — SIGNIFICANT CHANGE UP (ref 22–29)
CREAT SERPL-MCNC: 1.04 MG/DL — SIGNIFICANT CHANGE UP (ref 0.5–1.3)
EGFR: 53 ML/MIN/1.73M2 — LOW
EOSINOPHIL # BLD AUTO: 0.03 K/UL — SIGNIFICANT CHANGE UP (ref 0–0.5)
EOSINOPHIL NFR BLD AUTO: 0.4 % — SIGNIFICANT CHANGE UP (ref 0–6)
FLUAV AG NPH QL: SIGNIFICANT CHANGE UP
FLUBV AG NPH QL: SIGNIFICANT CHANGE UP
GAS PNL BLDV: 134 MMOL/L — LOW (ref 136–145)
GAS PNL BLDV: SIGNIFICANT CHANGE UP
GAS PNL BLDV: SIGNIFICANT CHANGE UP
GLUCOSE BLDV-MCNC: 109 MG/DL — HIGH (ref 70–99)
GLUCOSE SERPL-MCNC: 108 MG/DL — HIGH (ref 70–99)
HCO3 BLDV-SCNC: 23 MMOL/L — SIGNIFICANT CHANGE UP (ref 22–29)
HCT VFR BLD CALC: 32.5 % — LOW (ref 34.5–45)
HCT VFR BLDA CALC: 33 % — SIGNIFICANT CHANGE UP
HGB BLD CALC-MCNC: 11 G/DL — LOW (ref 11.7–16.1)
HGB BLD-MCNC: 10.4 G/DL — LOW (ref 11.5–15.5)
IMM GRANULOCYTES NFR BLD AUTO: 0.3 % — SIGNIFICANT CHANGE UP (ref 0–0.9)
INR BLD: 1.02 RATIO — SIGNIFICANT CHANGE UP (ref 0.85–1.18)
LACTATE BLDV-MCNC: 0.6 MMOL/L — SIGNIFICANT CHANGE UP (ref 0.5–2)
LIDOCAIN IGE QN: 24 U/L — SIGNIFICANT CHANGE UP (ref 22–51)
LYMPHOCYTES # BLD AUTO: 0.59 K/UL — LOW (ref 1–3.3)
LYMPHOCYTES # BLD AUTO: 8.8 % — LOW (ref 13–44)
MCHC RBC-ENTMCNC: 27.1 PG — SIGNIFICANT CHANGE UP (ref 27–34)
MCHC RBC-ENTMCNC: 32 GM/DL — SIGNIFICANT CHANGE UP (ref 32–36)
MCV RBC AUTO: 84.6 FL — SIGNIFICANT CHANGE UP (ref 80–100)
MONOCYTES # BLD AUTO: 0.82 K/UL — SIGNIFICANT CHANGE UP (ref 0–0.9)
MONOCYTES NFR BLD AUTO: 12.2 % — SIGNIFICANT CHANGE UP (ref 2–14)
NEUTROPHILS # BLD AUTO: 5.23 K/UL — SIGNIFICANT CHANGE UP (ref 1.8–7.4)
NEUTROPHILS NFR BLD AUTO: 77.9 % — HIGH (ref 43–77)
PCO2 BLDV: 32 MMHG — LOW (ref 39–42)
PH BLDV: 7.46 — HIGH (ref 7.32–7.43)
PLATELET # BLD AUTO: 274 K/UL — SIGNIFICANT CHANGE UP (ref 150–400)
PO2 BLDV: 166 MMHG — HIGH (ref 25–45)
POTASSIUM BLDV-SCNC: 3.9 MMOL/L — SIGNIFICANT CHANGE UP (ref 3.5–5.1)
POTASSIUM SERPL-MCNC: 3.9 MMOL/L — SIGNIFICANT CHANGE UP (ref 3.5–5.3)
POTASSIUM SERPL-SCNC: 3.9 MMOL/L — SIGNIFICANT CHANGE UP (ref 3.5–5.3)
PROT SERPL-MCNC: 7.1 G/DL — SIGNIFICANT CHANGE UP (ref 6.6–8.7)
PROTHROM AB SERPL-ACNC: 11.3 SEC — SIGNIFICANT CHANGE UP (ref 9.5–13)
RBC # BLD: 3.84 M/UL — SIGNIFICANT CHANGE UP (ref 3.8–5.2)
RBC # FLD: 13.5 % — SIGNIFICANT CHANGE UP (ref 10.3–14.5)
RSV RNA NPH QL NAA+NON-PROBE: SIGNIFICANT CHANGE UP
SAO2 % BLDV: 100 % — SIGNIFICANT CHANGE UP
SARS-COV-2 RNA SPEC QL NAA+PROBE: SIGNIFICANT CHANGE UP
SODIUM SERPL-SCNC: 133 MMOL/L — LOW (ref 135–145)
WBC # BLD: 6.72 K/UL — SIGNIFICANT CHANGE UP (ref 3.8–10.5)
WBC # FLD AUTO: 6.72 K/UL — SIGNIFICANT CHANGE UP (ref 3.8–10.5)

## 2023-10-05 PROCEDURE — 99223 1ST HOSP IP/OBS HIGH 75: CPT

## 2023-10-05 PROCEDURE — 71045 X-RAY EXAM CHEST 1 VIEW: CPT | Mod: 26

## 2023-10-05 PROCEDURE — 74178 CT ABD&PLV WO CNTR FLWD CNTR: CPT | Mod: 26,MA

## 2023-10-05 PROCEDURE — 99285 EMERGENCY DEPT VISIT HI MDM: CPT

## 2023-10-05 RX ORDER — PANTOPRAZOLE SODIUM 20 MG/1
40 TABLET, DELAYED RELEASE ORAL
Refills: 0 | Status: DISCONTINUED | OUTPATIENT
Start: 2023-10-05 | End: 2023-10-06

## 2023-10-05 RX ORDER — ERGOCALCIFEROL 1.25 MG/1
1 CAPSULE ORAL
Qty: 0 | Refills: 0 | DISCHARGE

## 2023-10-05 RX ORDER — PANTOPRAZOLE SODIUM 20 MG/1
80 TABLET, DELAYED RELEASE ORAL ONCE
Refills: 0 | Status: COMPLETED | OUTPATIENT
Start: 2023-10-05 | End: 2023-10-05

## 2023-10-05 RX ORDER — LANOLIN ALCOHOL/MO/W.PET/CERES
3 CREAM (GRAM) TOPICAL AT BEDTIME
Refills: 0 | Status: DISCONTINUED | OUTPATIENT
Start: 2023-10-05 | End: 2023-10-06

## 2023-10-05 RX ORDER — AMLODIPINE BESYLATE 2.5 MG/1
10 TABLET ORAL DAILY
Refills: 0 | Status: DISCONTINUED | OUTPATIENT
Start: 2023-10-05 | End: 2023-10-06

## 2023-10-05 RX ORDER — ESCITALOPRAM OXALATE 10 MG/1
1.5 TABLET, FILM COATED ORAL
Qty: 0 | Refills: 0 | DISCHARGE

## 2023-10-05 RX ORDER — RIMEGEPANT SULFATE 75 MG/75MG
1 TABLET, ORALLY DISINTEGRATING ORAL
Refills: 0 | DISCHARGE

## 2023-10-05 RX ORDER — MORPHINE SULFATE 50 MG/1
4 CAPSULE, EXTENDED RELEASE ORAL ONCE
Refills: 0 | Status: DISCONTINUED | OUTPATIENT
Start: 2023-10-05 | End: 2023-10-05

## 2023-10-05 RX ORDER — DONEPEZIL HYDROCHLORIDE 10 MG/1
5 TABLET, FILM COATED ORAL AT BEDTIME
Refills: 0 | Status: DISCONTINUED | OUTPATIENT
Start: 2023-10-05 | End: 2023-10-06

## 2023-10-05 RX ORDER — GABAPENTIN 400 MG/1
1 CAPSULE ORAL
Qty: 0 | Refills: 0 | DISCHARGE

## 2023-10-05 RX ORDER — ESCITALOPRAM OXALATE 10 MG/1
10 TABLET, FILM COATED ORAL DAILY
Refills: 0 | Status: DISCONTINUED | OUTPATIENT
Start: 2023-10-05 | End: 2023-10-06

## 2023-10-05 RX ORDER — ALPRAZOLAM 0.25 MG
1 TABLET ORAL
Refills: 0 | Status: DISCONTINUED | OUTPATIENT
Start: 2023-10-05 | End: 2023-10-06

## 2023-10-05 RX ORDER — ALPRAZOLAM 0.25 MG
1.5 TABLET ORAL
Qty: 0 | Refills: 0 | DISCHARGE

## 2023-10-05 RX ORDER — ALPRAZOLAM 0.25 MG
1 TABLET ORAL
Refills: 0 | DISCHARGE

## 2023-10-05 RX ORDER — ESCITALOPRAM OXALATE 10 MG/1
1 TABLET, FILM COATED ORAL
Refills: 0 | DISCHARGE

## 2023-10-05 RX ORDER — ONDANSETRON 8 MG/1
4 TABLET, FILM COATED ORAL EVERY 8 HOURS
Refills: 0 | Status: DISCONTINUED | OUTPATIENT
Start: 2023-10-05 | End: 2023-10-06

## 2023-10-05 RX ORDER — ONDANSETRON 8 MG/1
4 TABLET, FILM COATED ORAL ONCE
Refills: 0 | Status: COMPLETED | OUTPATIENT
Start: 2023-10-05 | End: 2023-10-05

## 2023-10-05 RX ORDER — PANTOPRAZOLE SODIUM 20 MG/1
40 TABLET, DELAYED RELEASE ORAL
Refills: 0 | Status: DISCONTINUED | OUTPATIENT
Start: 2023-10-05 | End: 2023-10-05

## 2023-10-05 RX ORDER — ACETAMINOPHEN 500 MG
1000 TABLET ORAL ONCE
Refills: 0 | Status: COMPLETED | OUTPATIENT
Start: 2023-10-05 | End: 2023-10-05

## 2023-10-05 RX ORDER — OXYCODONE AND ACETAMINOPHEN 5; 325 MG/1; MG/1
1 TABLET ORAL EVERY 6 HOURS
Refills: 0 | Status: DISCONTINUED | OUTPATIENT
Start: 2023-10-05 | End: 2023-10-06

## 2023-10-05 RX ORDER — AMLODIPINE BESYLATE 2.5 MG/1
1 TABLET ORAL
Refills: 0 | DISCHARGE

## 2023-10-05 RX ORDER — LAMOTRIGINE 25 MG/1
25 TABLET, ORALLY DISINTEGRATING ORAL
Refills: 0 | Status: DISCONTINUED | OUTPATIENT
Start: 2023-10-05 | End: 2023-10-06

## 2023-10-05 RX ORDER — DONEPEZIL HYDROCHLORIDE 10 MG/1
1 TABLET, FILM COATED ORAL
Refills: 0 | DISCHARGE

## 2023-10-05 RX ORDER — LAMOTRIGINE 25 MG/1
2 TABLET, ORALLY DISINTEGRATING ORAL
Qty: 0 | Refills: 0 | DISCHARGE

## 2023-10-05 RX ADMIN — PANTOPRAZOLE SODIUM 40 MILLIGRAM(S): 20 TABLET, DELAYED RELEASE ORAL at 18:42

## 2023-10-05 RX ADMIN — PANTOPRAZOLE SODIUM 80 MILLIGRAM(S): 20 TABLET, DELAYED RELEASE ORAL at 09:18

## 2023-10-05 RX ADMIN — MORPHINE SULFATE 4 MILLIGRAM(S): 50 CAPSULE, EXTENDED RELEASE ORAL at 11:00

## 2023-10-05 RX ADMIN — LAMOTRIGINE 25 MILLIGRAM(S): 25 TABLET, ORALLY DISINTEGRATING ORAL at 18:43

## 2023-10-05 RX ADMIN — ONDANSETRON 4 MILLIGRAM(S): 8 TABLET, FILM COATED ORAL at 09:18

## 2023-10-05 RX ADMIN — Medication 1000 MILLIGRAM(S): at 09:21

## 2023-10-05 RX ADMIN — DONEPEZIL HYDROCHLORIDE 5 MILLIGRAM(S): 10 TABLET, FILM COATED ORAL at 21:04

## 2023-10-05 RX ADMIN — Medication 400 MILLIGRAM(S): at 09:18

## 2023-10-05 RX ADMIN — ESCITALOPRAM OXALATE 10 MILLIGRAM(S): 10 TABLET, FILM COATED ORAL at 18:42

## 2023-10-05 RX ADMIN — Medication 1000 MILLIGRAM(S): at 09:45

## 2023-10-05 NOTE — ED PROVIDER NOTE - CLINICAL SUMMARY MEDICAL DECISION MAKING FREE TEXT BOX
83F, PMHx HTN, anxiety, Parkinson's disease, LGIB presenting with x2 days of abd pain. Based on physical exam, low concern for acute surgical abdomen. DDx includes, but is not limited to: UGIB, likely 2/2 ulcer; lower concern for volvulus or acute mesenteric ischemia.   Labs with mild anemia and mild hyponatremia. No elevated lactate or acidosis. 83F, PMHx HTN, anxiety, Parkinson's disease, LGIB presenting with x2 days of abd pain. PE w/o signs of peritonitis, pt afebrile with normal VS. DDx includes, but is not limited to: UGIB, likely 2/2 ulcer; lower concern for volvulus or acute mesenteric ischemia.   Labs with mild anemia and mild hyponatremia. No elevated lactate or acidosis. 83F, PMHx HTN, anxiety, Parkinson's disease, LGIB presenting with x2 days of abd pain. PE w/o signs of peritonitis, pt afebrile with normal VS. DDx includes, but is not limited to: UGIB, likely 2/2 ulcer; LGIB less likely due to melanistic characteristics of stool. Lower concern for volvulus or acute mesenteric ischemia.   Labs with mild anemia and mild hyponatremia. No elevated lactate or acidosis. CT w/o active bleeding. However, pt continues to report continued 7/10 pain. GI consulted, requesting admission for trending of H&H, IV PPI, and continued pain control. Will admit.

## 2023-10-05 NOTE — CONSULT NOTE ADULT - ASSESSMENT
84 yo F with PMH of HTN, anxiety, Parkinson disease, history of gastric ulcer, history of rectal bleeding, who presents with progressive abdominal pain x 3-4 days and dark stools at home. She has had recent EGD in 7/2023 with findings of gastritis (no ulcers) and colonoscopy in 3/2023 with findings of diverticulosis in setting of likely diverticular bleed.     - No evidence of active bleeding at this time - Hb 10.4, low BUN  - IV PPI BID  - Trend Hb and transfuse as needed  - Continue to monitor. No plan for endoscopic intervention at this time, will consider endoscopic work up in setting of overt GI bleeding.

## 2023-10-05 NOTE — ED ADULT NURSE NOTE - OBJECTIVE STATEMENT
bib spouse ; pt a&ox2 at baseline; c/o generalized abd pain, nausea, dark tarry  stool x 1 week. hx GI bleed, diverticulitis. denies anticoag use.  unk last endoscopy/ colonoscopy. pt poor historian. pt denies further symptoms.

## 2023-10-05 NOTE — H&P ADULT - HISTORY OF PRESENT ILLNESS
82 HTN, HLD, Parkinson's Disease, Anxiety, GERD, Diverticulosis (Admitted 3/2023 for LGIB) presenting with diffuse abdominal pain and reports of BRBPR        In the ER  HGB 10.4   BUN 16  CTA/P IV Contrast no acute findings - severe colonic diverticulosis    GI consulted  Admitted for Anemia, potential LGIB       82 HTN, HLD, Parkinson's Disease, Anxiety, GERD, Diverticulosis (Admitted 3/2023 for LGIB) presenting with diffuse abdominal pain and reports of BRBPR    1 week of epigastric/periumbilical sharp non radiating 10/10 pain not associated with foods. nausea but no emesis. states has 2 loose dark movements yesterday. Took pepto bismol earlier in week.   denies fevers, chills, cp, sob    In the ER  HGB 10.4   BUN 16  CTA/P IV Contrast no acute findings - severe colonic diverticulosis    GI consulted  Admitted for Anemia, potential LGIB

## 2023-10-05 NOTE — ED ADULT TRIAGE NOTE - CHIEF COMPLAINT QUOTE
Patient presents to ER C/O abdominal pain for several day, worsening yesterday, reports diarrhea and mild nausea, no chills/fever, reports black stools, resp even/labored.

## 2023-10-05 NOTE — H&P ADULT - ASSESSMENT
82 HTN, HLD, Parkinson's Disease, Anxiety, GERD, Diverticulosis (Admitted 3/2023 for LGIB) presenting with diffuse abdominal pain and reports of BRBPR admitted with concerns for LGIB.      Abdominal Pain  Possible LGIB  LGIB  -HGB at baseline, vitals stable, BUN normal. Has not demonstrated any melena or BRBPR  Required IV Morphine for pain control  GI consulted  Rec PPI and trending HGB  (Had recent EGD with GI, awaiting outside records to be faxed to GI)  PPI IVP  CLD    HTN  HLD    Parkinson's    SCDs  Med Tele       82 HTN, HLD, Parkinson's Disease, Anxiety, GERD, Diverticulosis (Admitted 3/2023 for LGIB) presenting with diffuse abdominal pain and reports of BRBPR admitted with concerns for LGIB.      Abdominal Pain  Possible LGIB?  LGIB  -HGB at baseline, vitals stable, BUN normal. Has not demonstrated any melena or BRBPR  **Of note Patient was started on migraine medication 1 week ago - Rimegepant. Upon literature review most common adverse symptoms is abdominal pain.   Required IV Morphine for pain control  GI consulted  Rec PPI and trending HGB  (Had recent EGD with GI, awaiting outside records to be faxed to GI)  PPI IV BID  CLD  Percocet prn    HTN  HLD  amlodipine,   Parkinson's    SCDs  Med Tele    Dispo: Admit to Medicine Anticipate DC tomorrow if hgb, vitals stable and no BRBPR Or Melena

## 2023-10-05 NOTE — ED ADULT NURSE NOTE - NSFALLHARMRISKINTERV_ED_ALL_ED
Assistance OOB with selected safe patient handling equipment if applicable/Assistance with ambulation/Communicate risk of Fall with Harm to all staff, patient, and family/Monitor gait and stability/Monitor for mental status changes and reorient to person, place, and time, as needed/Provide visual cue: red socks, yellow wristband, yellow gown, etc/Reinforce activity limits and safety measures with patient and family/Toileting schedule using arm’s reach rule for commode and bathroom/Use of alarms - bed, stretcher, chair and/or video monitoring/Bed in lowest position, wheels locked, appropriate side rails in place/Call bell, personal items and telephone in reach/Instruct patient to call for assistance before getting out of bed/chair/stretcher/Non-slip footwear applied when patient is off stretcher/[place] to call system/Physically safe environment - no spills, clutter or unnecessary equipment/Purposeful Proactive Rounding/Room/bathroom lighting operational, light cord in reach

## 2023-10-05 NOTE — ED PROVIDER NOTE - ATTENDING CONTRIBUTION TO CARE
Pt reporting melenic stools with hx of GI bleeding, hemodynamically stable, H/H stable from prior. CT with no evidence of active bleeding, evaluated by GI no urgent plan for endoscopy however based on history recommend IV PPI, trending hemoglobin, management of pain and observing for overt bleeding.

## 2023-10-05 NOTE — H&P ADULT - NSHPPHYSICALEXAM_GEN_ALL_CORE
VITALS:   T(C): 36.7 (10-05-23 @ 08:11), Max: 36.7 (10-05-23 @ 07:32)  HR: 80 (10-05-23 @ 11:00) (76 - 91)  BP: 153/89 (10-05-23 @ 11:00) (144/79 - 153/89)  RR: 17 (10-05-23 @ 11:00) (17 - 20)  SpO2: 97% (10-05-23 @ 11:00) (96% - 98%)    GENERAL: NAD, lying in bed comfortably  HEAD:  Atraumatic, Normocephalic  EYES: EOMI, PERRLA, conjunctiva and sclera clear  ENT: Moist mucous membranes  NECK: Supple, No JVD  CHEST/LUNG: Clear to auscultation bilaterally; No rales, rhonchi, wheezing, or rubs. Unlabored respirations  HEART: Regular rate and rhythm; No murmurs, rubs, or gallops  ABDOMEN: BSx4; Soft, nontender, nondistended  EXTREMITIES:  2+ Peripheral Pulses, brisk capillary refill. No clubbing, cyanosis, or edema  NERVOUS SYSTEM:  A&Ox3, no focal deficits   SKIN: No rashes or lesions  PSYCH: Normal affect, euthymic mood

## 2023-10-05 NOTE — ED PROVIDER NOTE - PHYSICAL EXAMINATION
GENERAL: NAD, well-groomed, well-developed  HEAD:  Atraumatic, Normocephalic  EYES: EOMI, PERRLA, conjunctiva and sclera clear  ENMT: No tonsillar erythema, exudates, or enlargement; Moist mucous membranes; Good dentition; No lesions  NECK: Supple, No JVD, Normal thyroid  NERVOUS SYSTEM:  Alert & Oriented X3, Good concentration; Motor Strength 5/5 B/L upper and lower extremities; DTRs 2+ intact and symmetric  CHEST/LUNG: Clear to percussion bilaterally; No rales, rhonchi, wheezing, or rubs  HEART: Regular rate and rhythm; No murmurs, rubs, or gallops  ABDOMEN: Soft, Diffusely TTP without rebound. No palpable masses or organomegaly.  EXTREMITIES:  2+ Peripheral Pulses, No clubbing, cyanosis, or edema  LYMPH: No lymphadenopathy noted  SKIN: No rashes or lesions

## 2023-10-05 NOTE — ED PROVIDER NOTE - OBJECTIVE STATEMENT
83F, PMHx HTN, anxiety, Parkinson's disease, LGIB presenting with x2 days of abd pain. Per pt, pain had insidious onset, gradually worsening to 10/10, diffuse, without clear provocation or palliation. Pt reports several episodes of melanistic stools and nausea w/o vomiting. Denies fever/cough/chills/SOB, 83F, PMHx HTN, anxiety, Parkinson's disease, LGIB presenting with x2 days of abd pain. Per pt, pain had insidious onset, gradually worsening to 10/10, diffuse, without clear provocation or palliation. Pt reports several episodes of melanistic stools and nausea w/o vomiting. Denies fever/cough/chills/SOB, denies bright red blood per rectum. Per pt, she had an endoscopy within past year for ulcer, resolved ATT. Per chart review, pt has had multiple colonoscopies for LGIB. 83F, PMHx HTN, anxiety, Parkinson's disease, LGIB presenting with x2 days of abd pain. Per pt, pain had insidious onset, gradually worsening to 10/10, diffuse, without clear provocation or palliation. Pt reports several episodes of melenic stools and nausea w/o vomiting. Denies fever/cough/chills/SOB, denies bright red blood per rectum. Per pt, she had an endoscopy within past year for ulcer, resolved ATT. Per chart review, pt has had multiple colonoscopies for LGIB.

## 2023-10-05 NOTE — H&P ADULT - NSHPLABSRESULTS_GEN_ALL_CORE
10.4   6.72   )----------(  274       ( 05 Oct 2023 08:30 )               32.5      133    |  98     |  16.9   ----------------------------<  108        ( 05 Oct 2023 08:30 )  3.9     |  22.0   |  1.04     Ca    9.0        ( 05 Oct 2023 08:30 )    TPro  7.1    /  Alb  4.0    /  TBili  0.3    /  DBili  x      /  AST  16     /  ALT  9      /  AlkPhos  150    ( 05 Oct 2023 08:30 )    LIVER FUNCTIONS - ( 05 Oct 2023 08:30 )  Alb: 4.0 g/dL / Pro: 7.1 g/dL / ALK PHOS: 150 U/L / ALT: 9 U/L / AST: 16 U/L / GGT: x           PT/INR -  11.3 sec / 1.02 ratio   ( 05 Oct 2023 08:30 )       PTT -  32.9 sec   ( 05 Oct 2023 08:30 )  CAPILLARY BLOOD GLUCOSE        Urinalysis Basic - ( 05 Oct 2023 08:30 )    Color: x / Appearance: x / SG: x / pH: x  Gluc: 108 mg/dL / Ketone: x  / Bili: x / Urobili: x   Blood: x / Protein: x / Nitrite: x   Leuk Esterase: x / RBC: x / WBC x   Sq Epi: x / Non Sq Epi: x / Bacteria: x

## 2023-10-05 NOTE — CONSULT NOTE ADULT - SUBJECTIVE AND OBJECTIVE BOX
Chief Complaint:  Patient is a 83y old  Female who presents with a chief complaint of abdominal pain and dark stools    HPI:  84 yo F with PMH of HTN, anxiety, Parkinson disease, history of gastric ulcer, history of rectal bleeding, who presents with progressive abdominal pain x 3-4 days. Pain is worst in lower middle abdominal region. Pain is constant and has not improved since presentation despite receiving pain medications. She reports 3-4 days of dark stools at home which she has never had before. No bowel movements since presentation. She denies fevers, chills, N/V, D/C, hematochezia, NSAID use. She saw outpatient GI, Dr. Ledesma at [facility] in July for abdominal pain follow up. She had EGD at that time which per report, showed gastritis, no active ulcers. She denied history of rectal bleeding, but was seen by GI for rectal bleeding and had colonoscopy performed 3/24/23 which showed significant L sided diverticulosis and difficult, tortuous sigmoid without evidence of active bleeding.     PAST MEDICAL & SURGICAL HISTORY:  HTN (hypertension)      Depression      Ovarian benign neoplasm  s/p Total  Hysterectomy      Parkinson's disease      Spondylosis without myelopathy or radiculopathy, lumbar region      Fracture of lumbosacral spine or pelvis      Hyperlipidemia      H/O bilateral oophorectomy  2018      H/O colonoscopy      H/O cardiac catheterization      History of basal cell carcinoma excision      History of cataract surgery  bilateral          REVIEW OF SYSTEMS:   General: Negative  HEENT: Negative  CV: Negative  Respiratory: Negative  GI: See HPI  : Negative  MSK: Negative  Hematologic: Negative  Skin: Negative    MEDICATIONS:   MEDICATIONS  (STANDING):    MEDICATIONS  (PRN):          DIET:  Diet, NPO:   Except Medications  With Ice Chips/Sips of Water (10-05-23 @ 08:35) [Active]          ALLERGIES:   Allergies    No Known Allergies    Intolerances        Substance Use:   ( x) never used  (  ) other:  Tobacco Usage:  (x ) never smoked   (   ) former smoker   (   ) current smoker  (     ) pack year  (        ) last cigarette date  Alcohol Usage:    Family History   IBD (  ) Yes   (x ) No  GI Malignancy (  )  Yes    ( x) No    Health Management  Last Colonoscopy: as above  Last Endoscopy: as above    VITAL SIGNS:   Vital Signs Last 24 Hrs  T(C): 36.7 (05 Oct 2023 08:11), Max: 36.7 (05 Oct 2023 07:32)  T(F): 98.1 (05 Oct 2023 08:11), Max: 98.1 (05 Oct 2023 07:32)  HR: 80 (05 Oct 2023 11:00) (76 - 91)  BP: 153/89 (05 Oct 2023 11:00) (144/79 - 153/89)  BP(mean): --  RR: 17 (05 Oct 2023 11:00) (17 - 20)  SpO2: 97% (05 Oct 2023 11:00) (96% - 98%)    Parameters below as of 05 Oct 2023 11:00  Patient On (Oxygen Delivery Method): room air      I&O's Summary      PHYSICAL EXAM:   GENERAL:  No acute distress  HEENT:  NC/AT, conjunctiva clear, sclera anicteric  CHEST:  No increased effort  HEART:  Regular rate  ABDOMEN:  Soft, mild TTP over lower abdomen and epigastric regions, non-distended, normoactive bowel sounds, no rebound or guarding  ADRYAN: Deferred due to male roommate and lack of privacy in ED  EXTREMITIES: No edema  SKIN:  Warm, dry  NEURO:  Calm, cooperative    LABS:                        10.4   6.72  )-----------( 274      ( 05 Oct 2023 08:30 )             32.5     Hemoglobin: 10.4 g/dL (10-05-23 @ 08:30)    10-05    133<L>  |  98  |  16.9  ----------------------------<  108<H>  3.9   |  22.0  |  1.04    Ca    9.0      05 Oct 2023 08:30    TPro  7.1  /  Alb  4.0  /  TBili  0.3<L>  /  DBili  x   /  AST  16  /  ALT  9   /  AlkPhos  150<H>  10-05    LIVER FUNCTIONS - ( 05 Oct 2023 08:30 )  Alb: 4.0 g/dL / Pro: 7.1 g/dL / ALK PHOS: 150 U/L / ALT: 9 U/L / AST: 16 U/L / GGT: x             PT/INR - ( 05 Oct 2023 08:30 )   PT: 11.3 sec;   INR: 1.02 ratio         PTT - ( 05 Oct 2023 08:30 )  PTT:32.9 sec    Lipase: 24 U/L (10-05-23 @ 08:30)                                    RADIOLOGY & ADDITIONAL STUDIES:      ACC: [id number] EXAM:  CT ABDOMEN AND PELVIS WAW IC   ORDERED BY:   DESHAWN CELAYA     PROCEDURE DATE:  10/05/2023          INTERPRETATION:  CLINICAL INFORMATION: GI bleed    ADDITIONAL CLINICAL INFORMATION: Other, Non-specified    COMPARISON:March 22, 2023    CONTRAST/COMPLICATIONS:  IV Contrast: Omnipaque 350  90 cc administered   10 cc discarded  Oral Contrast: NONE  Complications: None reported at time of study completion    PROCEDURE:  CT of the Abdomen and Pelvis was performed.  Precontrast, Arterial and Delayed phases were performed.  Sagittal and coronal reformats were performed.    FINDINGS:  LOWER CHEST: Within normal limits.    LIVER: Left hepatic lobe cyst measuring 4.4 cm causing unchanged mild   peripheral ductal dilatation in segments 2 and 3. Additional small left   hepatic lobe cyst and scattered lesions which are small to characterize.  GALLBLADDER: Within normal limits.  SPLEEN: Within normal limits.  PANCREAS: Within normal limits.  ADRENALS: Within normal limits.  KIDNEYS/URETERS: Within normal limits.    BLADDER: Within normal limits.  REPRODUCTIVE ORGANS: Hysterectomy.    BOWEL: No bowel obstruction. Appendix not visualized. Severe colonic   diverticulosis without diverticulitis.  PERITONEUM: No ascites.  VESSELS: Within normal limits.  RETROPERITONEUM/LYMPH NODES: No lymphadenopathy.  ABDOMINAL WALL: Within normal limits.  BONES: Chronic L1 and L2 compression fractures status post vertebroplasty   and additional unchanged moderate chronic T11 compression deformity.    IMPRESSION:  No active gastrointestinal bleed or other acute abdominal pathology.  Colonic diverticulosis.          --- End of Report ---            JULIA VERAS MD; Attending Radiologist  This document has been electronically signed. Oct  5 2023 12:23PM  10-05-23 @ 11:52

## 2023-10-05 NOTE — PATIENT PROFILE ADULT - FALL HARM RISK - HARM RISK INTERVENTIONS

## 2023-10-06 ENCOUNTER — TRANSCRIPTION ENCOUNTER (OUTPATIENT)
Age: 83
End: 2023-10-06

## 2023-10-06 VITALS
RESPIRATION RATE: 18 BRPM | DIASTOLIC BLOOD PRESSURE: 67 MMHG | SYSTOLIC BLOOD PRESSURE: 101 MMHG | HEART RATE: 80 BPM | TEMPERATURE: 98 F | OXYGEN SATURATION: 98 %

## 2023-10-06 LAB
ANION GAP SERPL CALC-SCNC: 11 MMOL/L — SIGNIFICANT CHANGE UP (ref 5–17)
BASOPHILS # BLD AUTO: 0.06 K/UL — SIGNIFICANT CHANGE UP (ref 0–0.2)
BASOPHILS NFR BLD AUTO: 1.8 % — SIGNIFICANT CHANGE UP (ref 0–2)
BUN SERPL-MCNC: 12.4 MG/DL — SIGNIFICANT CHANGE UP (ref 8–20)
CALCIUM SERPL-MCNC: 9 MG/DL — SIGNIFICANT CHANGE UP (ref 8.4–10.5)
CHLORIDE SERPL-SCNC: 99 MMOL/L — SIGNIFICANT CHANGE UP (ref 96–108)
CO2 SERPL-SCNC: 26 MMOL/L — SIGNIFICANT CHANGE UP (ref 22–29)
CREAT SERPL-MCNC: 1.14 MG/DL — SIGNIFICANT CHANGE UP (ref 0.5–1.3)
EGFR: 48 ML/MIN/1.73M2 — LOW
EOSINOPHIL # BLD AUTO: 0.06 K/UL — SIGNIFICANT CHANGE UP (ref 0–0.5)
EOSINOPHIL NFR BLD AUTO: 1.8 % — SIGNIFICANT CHANGE UP (ref 0–6)
GLUCOSE SERPL-MCNC: 87 MG/DL — SIGNIFICANT CHANGE UP (ref 70–99)
HCT VFR BLD CALC: 31.8 % — LOW (ref 34.5–45)
HGB BLD-MCNC: 10.1 G/DL — LOW (ref 11.5–15.5)
LYMPHOCYTES # BLD AUTO: 0.59 K/UL — LOW (ref 1–3.3)
LYMPHOCYTES # BLD AUTO: 17.8 % — SIGNIFICANT CHANGE UP (ref 13–44)
MAGNESIUM SERPL-MCNC: 2.2 MG/DL — SIGNIFICANT CHANGE UP (ref 1.6–2.6)
MANUAL SMEAR VERIFICATION: SIGNIFICANT CHANGE UP
MCHC RBC-ENTMCNC: 27.4 PG — SIGNIFICANT CHANGE UP (ref 27–34)
MCHC RBC-ENTMCNC: 31.8 GM/DL — LOW (ref 32–36)
MCV RBC AUTO: 86.2 FL — SIGNIFICANT CHANGE UP (ref 80–100)
METAMYELOCYTES # FLD: 1.8 % — HIGH (ref 0–0)
MONOCYTES # BLD AUTO: 0.6 K/UL — SIGNIFICANT CHANGE UP (ref 0–0.9)
MONOCYTES NFR BLD AUTO: 17.9 % — HIGH (ref 2–14)
NEUTROPHILS # BLD AUTO: 1.91 K/UL — SIGNIFICANT CHANGE UP (ref 1.8–7.4)
NEUTROPHILS NFR BLD AUTO: 57.1 % — SIGNIFICANT CHANGE UP (ref 43–77)
OVALOCYTES BLD QL SMEAR: SLIGHT — SIGNIFICANT CHANGE UP
PHOSPHATE SERPL-MCNC: 3.4 MG/DL — SIGNIFICANT CHANGE UP (ref 2.4–4.7)
PLAT MORPH BLD: NORMAL — SIGNIFICANT CHANGE UP
PLATELET # BLD AUTO: 254 K/UL — SIGNIFICANT CHANGE UP (ref 150–400)
POIKILOCYTOSIS BLD QL AUTO: SLIGHT — SIGNIFICANT CHANGE UP
POLYCHROMASIA BLD QL SMEAR: SLIGHT — SIGNIFICANT CHANGE UP
POTASSIUM SERPL-MCNC: 4.3 MMOL/L — SIGNIFICANT CHANGE UP (ref 3.5–5.3)
POTASSIUM SERPL-SCNC: 4.3 MMOL/L — SIGNIFICANT CHANGE UP (ref 3.5–5.3)
RBC # BLD: 3.69 M/UL — LOW (ref 3.8–5.2)
RBC # FLD: 13.8 % — SIGNIFICANT CHANGE UP (ref 10.3–14.5)
RBC BLD AUTO: ABNORMAL
SMUDGE CELLS # BLD: PRESENT — SIGNIFICANT CHANGE UP
SODIUM SERPL-SCNC: 136 MMOL/L — SIGNIFICANT CHANGE UP (ref 135–145)
VARIANT LYMPHS # BLD: 1.8 % — SIGNIFICANT CHANGE UP (ref 0–6)
WBC # BLD: 3.34 K/UL — LOW (ref 3.8–10.5)
WBC # FLD AUTO: 3.34 K/UL — LOW (ref 3.8–10.5)

## 2023-10-06 PROCEDURE — 80053 COMPREHEN METABOLIC PANEL: CPT

## 2023-10-06 PROCEDURE — 83735 ASSAY OF MAGNESIUM: CPT

## 2023-10-06 PROCEDURE — 84100 ASSAY OF PHOSPHORUS: CPT

## 2023-10-06 PROCEDURE — 99239 HOSP IP/OBS DSCHRG MGMT >30: CPT

## 2023-10-06 PROCEDURE — 84295 ASSAY OF SERUM SODIUM: CPT

## 2023-10-06 PROCEDURE — 82947 ASSAY GLUCOSE BLOOD QUANT: CPT

## 2023-10-06 PROCEDURE — 97163 PT EVAL HIGH COMPLEX 45 MIN: CPT

## 2023-10-06 PROCEDURE — 99232 SBSQ HOSP IP/OBS MODERATE 35: CPT

## 2023-10-06 PROCEDURE — 82330 ASSAY OF CALCIUM: CPT

## 2023-10-06 PROCEDURE — 86900 BLOOD TYPING SEROLOGIC ABO: CPT

## 2023-10-06 PROCEDURE — 85014 HEMATOCRIT: CPT

## 2023-10-06 PROCEDURE — 85018 HEMOGLOBIN: CPT

## 2023-10-06 PROCEDURE — 74178 CT ABD&PLV WO CNTR FLWD CNTR: CPT | Mod: MA

## 2023-10-06 PROCEDURE — 85610 PROTHROMBIN TIME: CPT

## 2023-10-06 PROCEDURE — 83605 ASSAY OF LACTIC ACID: CPT

## 2023-10-06 PROCEDURE — 82803 BLOOD GASES ANY COMBINATION: CPT

## 2023-10-06 PROCEDURE — 96365 THER/PROPH/DIAG IV INF INIT: CPT

## 2023-10-06 PROCEDURE — 36415 COLL VENOUS BLD VENIPUNCTURE: CPT

## 2023-10-06 PROCEDURE — 86850 RBC ANTIBODY SCREEN: CPT

## 2023-10-06 PROCEDURE — 85730 THROMBOPLASTIN TIME PARTIAL: CPT

## 2023-10-06 PROCEDURE — 80048 BASIC METABOLIC PNL TOTAL CA: CPT

## 2023-10-06 PROCEDURE — 86901 BLOOD TYPING SEROLOGIC RH(D): CPT

## 2023-10-06 PROCEDURE — 87637 SARSCOV2&INF A&B&RSV AMP PRB: CPT

## 2023-10-06 PROCEDURE — 96375 TX/PRO/DX INJ NEW DRUG ADDON: CPT

## 2023-10-06 PROCEDURE — 84132 ASSAY OF SERUM POTASSIUM: CPT

## 2023-10-06 PROCEDURE — 82435 ASSAY OF BLOOD CHLORIDE: CPT

## 2023-10-06 PROCEDURE — 71045 X-RAY EXAM CHEST 1 VIEW: CPT

## 2023-10-06 PROCEDURE — 99285 EMERGENCY DEPT VISIT HI MDM: CPT | Mod: 25

## 2023-10-06 PROCEDURE — 83690 ASSAY OF LIPASE: CPT

## 2023-10-06 PROCEDURE — 85025 COMPLETE CBC W/AUTO DIFF WBC: CPT

## 2023-10-06 PROCEDURE — 87040 BLOOD CULTURE FOR BACTERIA: CPT

## 2023-10-06 RX ORDER — FERROUS SULFATE 325(65) MG
1 TABLET ORAL
Qty: 30 | Refills: 0
Start: 2023-10-06 | End: 2023-11-04

## 2023-10-06 RX ORDER — ACETAMINOPHEN 500 MG
650 TABLET ORAL EVERY 6 HOURS
Refills: 0 | Status: DISCONTINUED | OUTPATIENT
Start: 2023-10-06 | End: 2023-10-06

## 2023-10-06 RX ORDER — IRON SUCROSE 20 MG/ML
200 INJECTION, SOLUTION INTRAVENOUS ONCE
Refills: 0 | Status: COMPLETED | OUTPATIENT
Start: 2023-10-06 | End: 2023-10-06

## 2023-10-06 RX ORDER — OMEPRAZOLE 10 MG/1
1 CAPSULE, DELAYED RELEASE ORAL
Qty: 0 | Refills: 0 | DISCHARGE

## 2023-10-06 RX ORDER — OMEPRAZOLE 10 MG/1
1 CAPSULE, DELAYED RELEASE ORAL
Qty: 30 | Refills: 0
Start: 2023-10-06 | End: 2023-11-04

## 2023-10-06 RX ADMIN — IRON SUCROSE 110 MILLIGRAM(S): 20 INJECTION, SOLUTION INTRAVENOUS at 10:09

## 2023-10-06 RX ADMIN — ESCITALOPRAM OXALATE 10 MILLIGRAM(S): 10 TABLET, FILM COATED ORAL at 10:09

## 2023-10-06 RX ADMIN — PANTOPRAZOLE SODIUM 40 MILLIGRAM(S): 20 TABLET, DELAYED RELEASE ORAL at 05:16

## 2023-10-06 RX ADMIN — AMLODIPINE BESYLATE 10 MILLIGRAM(S): 2.5 TABLET ORAL at 05:16

## 2023-10-06 RX ADMIN — LAMOTRIGINE 25 MILLIGRAM(S): 25 TABLET, ORALLY DISINTEGRATING ORAL at 05:16

## 2023-10-06 NOTE — DISCHARGE NOTE PROVIDER - PROVIDER TOKENS
FREE:[LAST:[primary care],PHONE:[(   )    -],FAX:[(   )    -],ADDRESS:[[facility]]],FREE:[LAST:[gi],PHONE:[(   )    -],FAX:[(   )    -]]

## 2023-10-06 NOTE — PROGRESS NOTE ADULT - SUBJECTIVE AND OBJECTIVE BOX
Chief Complaint:  Patient is a 83y old  Female who presents with a chief complaint of Abdominal Pain (05 Oct 2023 13:36)      HPI/ 24 hr events: Patient seen and examined at bedside. She reports abdominal pain has resolved. Still no bm since presentation. Denies abdominal pain, nausea, vomiting, diarrhea, constipation, black or bloody stools.       REVIEW OF SYSTEMS:   General: Negative  HEENT: Negative  CV: Negative  Respiratory: Negative  GI: See HPI  : Negative  MSK: Negative  Hematologic: Negative  Skin: Negative    MEDICATIONS:   MEDICATIONS  (STANDING):  amLODIPine   Tablet 10 milliGRAM(s) Oral daily  donepezil 5 milliGRAM(s) Oral at bedtime  escitalopram 10 milliGRAM(s) Oral daily  lamoTRIgine 25 milliGRAM(s) Oral two times a day  pantoprazole  Injectable 40 milliGRAM(s) IV Push two times a day    MEDICATIONS  (PRN):  acetaminophen     Tablet .. 650 milliGRAM(s) Oral every 6 hours PRN Temp greater or equal to 38C (100.4F), Mild Pain (1 - 3)  ALPRAZolam 1 milliGRAM(s) Oral two times a day PRN for anxiety  aluminum hydroxide/magnesium hydroxide/simethicone Suspension 30 milliLiter(s) Oral every 4 hours PRN Dyspepsia  melatonin 3 milliGRAM(s) Oral at bedtime PRN Insomnia  ondansetron Injectable 4 milliGRAM(s) IV Push every 8 hours PRN Nausea and/or Vomiting  oxycodone    5 mG/acetaminophen 325 mG 1 Tablet(s) Oral every 6 hours PRN Moderate Pain (4 - 6)            DIET:  Diet, Clear Liquid (10-05-23 @ 15:09) [Active]          ALLERGIES:   Allergies    No Known Allergies    Intolerances        VITAL SIGNS:   Vital Signs Last 24 Hrs  T(C): 36.4 (06 Oct 2023 07:38), Max: 36.7 (06 Oct 2023 03:58)  T(F): 97.6 (06 Oct 2023 07:38), Max: 98 (06 Oct 2023 03:58)  HR: 67 (06 Oct 2023 07:38) (65 - 80)  BP: 144/79 (06 Oct 2023 07:38) (128/76 - 153/89)  BP(mean): --  RR: 18 (06 Oct 2023 07:38) (17 - 18)  SpO2: 95% (06 Oct 2023 07:38) (95% - 97%)    Parameters below as of 06 Oct 2023 07:38  Patient On (Oxygen Delivery Method): room air      I&O's Summary      PHYSICAL EXAM:   GENERAL:  No acute distress  HEENT:  NC/AT, conjunctiva clear, sclera anicteric  CHEST:  No increased effort  HEART:  Regular rate  ABDOMEN:  Soft, non-tender, non-distended, normoactive bowel sounds, no rebound or guarding  EXTREMITIES: No edema  SKIN:  Warm, dry  NEURO:  Calm, cooperative    LABS:                        10.1   3.34  )-----------( 254      ( 06 Oct 2023 02:50 )             31.8     Hemoglobin: 10.1 g/dL (10-06-23 @ 02:50)  Hemoglobin: 10.4 g/dL (10-05-23 @ 08:30)    10-06    136  |  99  |  12.4  ----------------------------<  87  4.3   |  26.0  |  1.14    Ca    9.0      06 Oct 2023 02:50  Phos  3.4     10-06  Mg     2.2     10-06    TPro  7.1  /  Alb  4.0  /  TBili  0.3<L>  /  DBili  x   /  AST  16  /  ALT  9   /  AlkPhos  150<H>  10-05    LIVER FUNCTIONS - ( 05 Oct 2023 08:30 )  Alb: 4.0 g/dL / Pro: 7.1 g/dL / ALK PHOS: 150 U/L / ALT: 9 U/L / AST: 16 U/L / GGT: x             PT/INR - ( 05 Oct 2023 08:30 )   PT: 11.3 sec;   INR: 1.02 ratio         PTT - ( 05 Oct 2023 08:30 )  PTT:32.9 sec                                        RADIOLOGY & ADDITIONAL STUDIES:      ACC: [id number] EXAM:  CT ABDOMEN AND PELVIS WAW IC   ORDERED BY:   DESHAWN CELAYA     PROCEDURE DATE:  10/05/2023          INTERPRETATION:  CLINICAL INFORMATION: GI bleed    ADDITIONAL CLINICAL INFORMATION: Other, Non-specified    COMPARISON:March 22, 2023    CONTRAST/COMPLICATIONS:  IV Contrast: Omnipaque 350  90 cc administered   10 cc discarded  Oral Contrast: NONE  Complications: None reported at time of study completion    PROCEDURE:  CT of the Abdomen and Pelvis was performed.  Precontrast, Arterial and Delayed phases were performed.  Sagittal and coronal reformats were performed.    FINDINGS:  LOWER CHEST: Within normal limits.    LIVER: Left hepatic lobe cyst measuring 4.4 cm causing unchanged mild   peripheral ductal dilatation in segments 2 and 3. Additional small left   hepatic lobe cyst and scattered lesions which are small to characterize.  GALLBLADDER: Within normal limits.  SPLEEN: Within normal limits.  PANCREAS: Within normal limits.  ADRENALS: Within normal limits.  KIDNEYS/URETERS: Within normal limits.    BLADDER: Within normal limits.  REPRODUCTIVE ORGANS: Hysterectomy.    BOWEL: No bowel obstruction. Appendix not visualized. Severe colonic   diverticulosis without diverticulitis.  PERITONEUM: No ascites.  VESSELS: Within normal limits.  RETROPERITONEUM/LYMPH NODES: No lymphadenopathy.  ABDOMINAL WALL: Within normal limits.  BONES: Chronic L1 and L2 compression fractures status post vertebroplasty   and additional unchanged moderate chronic T11 compression deformity.    IMPRESSION:  No active gastrointestinal bleed or other acute abdominal pathology.  Colonic diverticulosis.          --- End of Report ---            JULIA VERAS MD; Attending Radiologist  This document has been electronically signed. Oct  5 2023 12:23PM  10-05-23 @ 11:52

## 2023-10-06 NOTE — DISCHARGE NOTE PROVIDER - NSDCMRMEDTOKEN_GEN_ALL_CORE_FT
ALPRAZolam 1 mg oral tablet: 1 tab(s) orally 2 times a day as needed for  anxiety  amLODIPine 10 mg oral tablet: 1 tab(s) orally once a day  donepezil 5 mg oral tablet: 1 tab(s) orally once a day  escitalopram 10 mg oral tablet: 1 tab(s) orally once a day  ferrous sulfate 325 mg (65 mg elemental iron) oral tablet: 1 tab(s) orally once a day  lamoTRIgine 25 mg oral tablet: 2 tab(s) orally 2 times a day  omeprazole 40 mg oral delayed release capsule: 1 cap(s) orally once a day  rimegepant 75 mg oral tablet, disintegratin tab(s) orally every 48 hours  Vitamin D2 1.25 mg (50,000 intl units) oral capsule: 1 cap(s) orally once a week  ZyPREXA 2.5 mg oral tablet: 1 tab(s) orally once a day (at bedtime)

## 2023-10-06 NOTE — DISCHARGE NOTE PROVIDER - HOSPITAL COURSE
82 HTN, HLD, Parkinson's Disease, Anxiety, GERD, Diverticulosis (Admitted 3/2023 for LGIB) presenting with diffuse abdominal pain and reports of BRBPR admitted with concerns for LGIB.      Abdominal Pain/anemia  -HGB at baseline, vitals stable, BUN normal. Has not demonstrated any melena or BRBPR  **Of note Patient was started on migraine medication 1 week ago - Rimegepant.  GI cleared  ppi daily   likely diverticular bleed    HTN  HLD  amlodipine,   Parkinson's    outpatient follow up

## 2023-10-06 NOTE — PROGRESS NOTE ADULT - ASSESSMENT
82 yo F with PMH of HTN, anxiety, Parkinson disease, history of gastric ulcer, history of rectal bleeding, who presents with progressive abdominal pain x 3-4 days and dark stools at home. She has had recent EGD in 7/2023 with findings of gastritis (no ulcers) and colonoscopy in 3/2023 with findings of diverticulosis in setting of likely diverticular bleed.     - No evidence of recent or active bleeding - Hb 10.4 at baseline, low BUN  - Okay to discharge on PPI daily  - Avoid NSAIDs  - No plan for endoscopic intervention at this time as patient has had recent EGD and colonoscopy and has no signs of overt GI bleeding  - Follow up with outpatient GI, Dr. Ruelas post-discharge 82 yo F with PMH of HTN, anxiety, Parkinson disease, history of gastric ulcer, history of rectal bleeding, who presents with progressive abdominal pain x 3-4 days and dark stools at home. She has had recent EGD in 7/2023 with findings of gastritis (no ulcers) and colonoscopy in 3/2023 with findings of diverticulosis in setting of likely diverticular bleed.     - No evidence of recent or active bleeding - Hb 10.4 at baseline, low BUN  - Okay to discharge on PPI daily  - Avoid NSAIDs  - No plan for endoscopic intervention at this time as patient has had recent EGD and colonoscopy and has no signs of overt GI bleeding  - F/u hepatic cyst on outpatient basis  - Follow up with outpatient GI, Dr. Ruelas post-discharge

## 2023-10-06 NOTE — DISCHARGE NOTE PROVIDER - NSDCCPCAREPLAN_GEN_ALL_CORE_FT
PRINCIPAL DISCHARGE DIAGNOSIS  Diagnosis: Melena  Assessment and Plan of Treatment:       SECONDARY DISCHARGE DIAGNOSES  Diagnosis: Diverticulosis  Assessment and Plan of Treatment:

## 2023-10-06 NOTE — DISCHARGE NOTE NURSING/CASE MANAGEMENT/SOCIAL WORK - PATIENT PORTAL LINK FT
You can access the FollowMyHealth Patient Portal offered by [facility] by registering at the following website: [email or web address]. By joining [employer]’s FollowMyHealth portal, you will also be able to view your health information using other applications (apps) compatible with our system.

## 2023-10-06 NOTE — DISCHARGE NOTE PROVIDER - CARE PROVIDER_API CALL
primary care,   [facility]  Phone: (   )    -  Fax: (   )    -  Follow Up Time:     gi,   Phone: (   )    -  Fax: (   )    -  Follow Up Time:

## 2023-10-06 NOTE — DISCHARGE NOTE PROVIDER - ATTENDING DISCHARGE PHYSICAL EXAMINATION:
GENERAL: NAD, lying in bed comfortably  HEAD:  Atraumatic, Normocephalic  EYES: EOMI, PERRLA, conjunctiva and sclera clear  ENT: Moist mucous membranes  NECK: Supple, No JVD  CHEST/LUNG: Clear to auscultation bilaterally; No rales, rhonchi, wheezing, or rubs. Unlabored respirations  HEART: Regular rate and rhythm; No murmurs, rubs, or gallops  ABDOMEN: BSx4; Soft, nontender, nondistended  EXTREMITIES:  2+ Peripheral Pulses, brisk capillary refill. No clubbing, cyanosis, or edema  NERVOUS SYSTEM:  A&Ox3, no focal deficits   SKIN: No rashes or lesions  PSYCH: Normal affect, euthymic mood

## 2023-10-10 LAB
CULTURE RESULTS: SIGNIFICANT CHANGE UP
SPECIMEN SOURCE: SIGNIFICANT CHANGE UP

## 2023-10-20 ENCOUNTER — EMERGENCY (EMERGENCY)
Facility: HOSPITAL | Age: 83
LOS: 1 days | Discharge: HOME | End: 2023-10-20
Attending: STUDENT IN AN ORGANIZED HEALTH CARE EDUCATION/TRAINING PROGRAM
Payer: MEDICARE

## 2023-10-20 VITALS
TEMPERATURE: 98 F | RESPIRATION RATE: 18 BRPM | HEART RATE: 82 BPM | OXYGEN SATURATION: 98 % | DIASTOLIC BLOOD PRESSURE: 72 MMHG | SYSTOLIC BLOOD PRESSURE: 131 MMHG

## 2023-10-20 VITALS
HEART RATE: 75 BPM | TEMPERATURE: 97 F | SYSTOLIC BLOOD PRESSURE: 127 MMHG | OXYGEN SATURATION: 98 % | RESPIRATION RATE: 18 BRPM | DIASTOLIC BLOOD PRESSURE: 75 MMHG | WEIGHT: 149.91 LBS

## 2023-10-20 DIAGNOSIS — Z90.722 ACQUIRED ABSENCE OF OVARIES, BILATERAL: Chronic | ICD-10-CM

## 2023-10-20 DIAGNOSIS — Z98.49 CATARACT EXTRACTION STATUS, UNSPECIFIED EYE: Chronic | ICD-10-CM

## 2023-10-20 DIAGNOSIS — Z98.890 OTHER SPECIFIED POSTPROCEDURAL STATES: Chronic | ICD-10-CM

## 2023-10-20 LAB
ALBUMIN SERPL ELPH-MCNC: 4.1 G/DL — SIGNIFICANT CHANGE UP (ref 3.3–5.2)
ALBUMIN SERPL ELPH-MCNC: 4.1 G/DL — SIGNIFICANT CHANGE UP (ref 3.3–5.2)
ALP SERPL-CCNC: 156 U/L — HIGH (ref 40–120)
ALP SERPL-CCNC: 156 U/L — HIGH (ref 40–120)
ALT FLD-CCNC: 9 U/L — SIGNIFICANT CHANGE UP
ALT FLD-CCNC: 9 U/L — SIGNIFICANT CHANGE UP
ANION GAP SERPL CALC-SCNC: 12 MMOL/L — SIGNIFICANT CHANGE UP (ref 5–17)
ANION GAP SERPL CALC-SCNC: 12 MMOL/L — SIGNIFICANT CHANGE UP (ref 5–17)
AST SERPL-CCNC: 16 U/L — SIGNIFICANT CHANGE UP
AST SERPL-CCNC: 16 U/L — SIGNIFICANT CHANGE UP
BASOPHILS # BLD AUTO: 0.04 K/UL — SIGNIFICANT CHANGE UP (ref 0–0.2)
BASOPHILS # BLD AUTO: 0.04 K/UL — SIGNIFICANT CHANGE UP (ref 0–0.2)
BASOPHILS NFR BLD AUTO: 0.6 % — SIGNIFICANT CHANGE UP (ref 0–2)
BASOPHILS NFR BLD AUTO: 0.6 % — SIGNIFICANT CHANGE UP (ref 0–2)
BILIRUB SERPL-MCNC: 0.2 MG/DL — LOW (ref 0.4–2)
BILIRUB SERPL-MCNC: 0.2 MG/DL — LOW (ref 0.4–2)
BUN SERPL-MCNC: 15.2 MG/DL — SIGNIFICANT CHANGE UP (ref 8–20)
BUN SERPL-MCNC: 15.2 MG/DL — SIGNIFICANT CHANGE UP (ref 8–20)
CALCIUM SERPL-MCNC: 9.3 MG/DL — SIGNIFICANT CHANGE UP (ref 8.4–10.5)
CALCIUM SERPL-MCNC: 9.3 MG/DL — SIGNIFICANT CHANGE UP (ref 8.4–10.5)
CHLORIDE SERPL-SCNC: 98 MMOL/L — SIGNIFICANT CHANGE UP (ref 96–108)
CHLORIDE SERPL-SCNC: 98 MMOL/L — SIGNIFICANT CHANGE UP (ref 96–108)
CO2 SERPL-SCNC: 24 MMOL/L — SIGNIFICANT CHANGE UP (ref 22–29)
CO2 SERPL-SCNC: 24 MMOL/L — SIGNIFICANT CHANGE UP (ref 22–29)
CREAT SERPL-MCNC: 1.09 MG/DL — SIGNIFICANT CHANGE UP (ref 0.5–1.3)
CREAT SERPL-MCNC: 1.09 MG/DL — SIGNIFICANT CHANGE UP (ref 0.5–1.3)
EGFR: 50 ML/MIN/1.73M2 — LOW
EGFR: 50 ML/MIN/1.73M2 — LOW
EOSINOPHIL # BLD AUTO: 0.03 K/UL — SIGNIFICANT CHANGE UP (ref 0–0.5)
EOSINOPHIL # BLD AUTO: 0.03 K/UL — SIGNIFICANT CHANGE UP (ref 0–0.5)
EOSINOPHIL NFR BLD AUTO: 0.5 % — SIGNIFICANT CHANGE UP (ref 0–6)
EOSINOPHIL NFR BLD AUTO: 0.5 % — SIGNIFICANT CHANGE UP (ref 0–6)
GLUCOSE SERPL-MCNC: 102 MG/DL — HIGH (ref 70–99)
GLUCOSE SERPL-MCNC: 102 MG/DL — HIGH (ref 70–99)
HCT VFR BLD CALC: 36.5 % — SIGNIFICANT CHANGE UP (ref 34.5–45)
HCT VFR BLD CALC: 36.5 % — SIGNIFICANT CHANGE UP (ref 34.5–45)
HGB BLD-MCNC: 11.5 G/DL — SIGNIFICANT CHANGE UP (ref 11.5–15.5)
HGB BLD-MCNC: 11.5 G/DL — SIGNIFICANT CHANGE UP (ref 11.5–15.5)
IMM GRANULOCYTES NFR BLD AUTO: 0.5 % — SIGNIFICANT CHANGE UP (ref 0–0.9)
IMM GRANULOCYTES NFR BLD AUTO: 0.5 % — SIGNIFICANT CHANGE UP (ref 0–0.9)
LIDOCAIN IGE QN: 25 U/L — SIGNIFICANT CHANGE UP (ref 22–51)
LIDOCAIN IGE QN: 25 U/L — SIGNIFICANT CHANGE UP (ref 22–51)
LYMPHOCYTES # BLD AUTO: 1.12 K/UL — SIGNIFICANT CHANGE UP (ref 1–3.3)
LYMPHOCYTES # BLD AUTO: 1.12 K/UL — SIGNIFICANT CHANGE UP (ref 1–3.3)
LYMPHOCYTES # BLD AUTO: 18.2 % — SIGNIFICANT CHANGE UP (ref 13–44)
LYMPHOCYTES # BLD AUTO: 18.2 % — SIGNIFICANT CHANGE UP (ref 13–44)
MCHC RBC-ENTMCNC: 26.9 PG — LOW (ref 27–34)
MCHC RBC-ENTMCNC: 26.9 PG — LOW (ref 27–34)
MCHC RBC-ENTMCNC: 31.5 GM/DL — LOW (ref 32–36)
MCHC RBC-ENTMCNC: 31.5 GM/DL — LOW (ref 32–36)
MCV RBC AUTO: 85.3 FL — SIGNIFICANT CHANGE UP (ref 80–100)
MCV RBC AUTO: 85.3 FL — SIGNIFICANT CHANGE UP (ref 80–100)
MONOCYTES # BLD AUTO: 0.46 K/UL — SIGNIFICANT CHANGE UP (ref 0–0.9)
MONOCYTES # BLD AUTO: 0.46 K/UL — SIGNIFICANT CHANGE UP (ref 0–0.9)
MONOCYTES NFR BLD AUTO: 7.5 % — SIGNIFICANT CHANGE UP (ref 2–14)
MONOCYTES NFR BLD AUTO: 7.5 % — SIGNIFICANT CHANGE UP (ref 2–14)
NEUTROPHILS # BLD AUTO: 4.48 K/UL — SIGNIFICANT CHANGE UP (ref 1.8–7.4)
NEUTROPHILS # BLD AUTO: 4.48 K/UL — SIGNIFICANT CHANGE UP (ref 1.8–7.4)
NEUTROPHILS NFR BLD AUTO: 72.7 % — SIGNIFICANT CHANGE UP (ref 43–77)
NEUTROPHILS NFR BLD AUTO: 72.7 % — SIGNIFICANT CHANGE UP (ref 43–77)
PLATELET # BLD AUTO: 339 K/UL — SIGNIFICANT CHANGE UP (ref 150–400)
PLATELET # BLD AUTO: 339 K/UL — SIGNIFICANT CHANGE UP (ref 150–400)
POTASSIUM SERPL-MCNC: 4.5 MMOL/L — SIGNIFICANT CHANGE UP (ref 3.5–5.3)
POTASSIUM SERPL-MCNC: 4.5 MMOL/L — SIGNIFICANT CHANGE UP (ref 3.5–5.3)
POTASSIUM SERPL-SCNC: 4.5 MMOL/L — SIGNIFICANT CHANGE UP (ref 3.5–5.3)
POTASSIUM SERPL-SCNC: 4.5 MMOL/L — SIGNIFICANT CHANGE UP (ref 3.5–5.3)
PROT SERPL-MCNC: 7.6 G/DL — SIGNIFICANT CHANGE UP (ref 6.6–8.7)
PROT SERPL-MCNC: 7.6 G/DL — SIGNIFICANT CHANGE UP (ref 6.6–8.7)
RBC # BLD: 4.28 M/UL — SIGNIFICANT CHANGE UP (ref 3.8–5.2)
RBC # BLD: 4.28 M/UL — SIGNIFICANT CHANGE UP (ref 3.8–5.2)
RBC # FLD: 14.4 % — SIGNIFICANT CHANGE UP (ref 10.3–14.5)
RBC # FLD: 14.4 % — SIGNIFICANT CHANGE UP (ref 10.3–14.5)
SODIUM SERPL-SCNC: 134 MMOL/L — LOW (ref 135–145)
SODIUM SERPL-SCNC: 134 MMOL/L — LOW (ref 135–145)
WBC # BLD: 6.16 K/UL — SIGNIFICANT CHANGE UP (ref 3.8–10.5)
WBC # BLD: 6.16 K/UL — SIGNIFICANT CHANGE UP (ref 3.8–10.5)
WBC # FLD AUTO: 6.16 K/UL — SIGNIFICANT CHANGE UP (ref 3.8–10.5)
WBC # FLD AUTO: 6.16 K/UL — SIGNIFICANT CHANGE UP (ref 3.8–10.5)

## 2023-10-20 PROCEDURE — 93010 ELECTROCARDIOGRAM REPORT: CPT

## 2023-10-20 PROCEDURE — 36415 COLL VENOUS BLD VENIPUNCTURE: CPT

## 2023-10-20 PROCEDURE — 93005 ELECTROCARDIOGRAM TRACING: CPT

## 2023-10-20 PROCEDURE — 85025 COMPLETE CBC W/AUTO DIFF WBC: CPT

## 2023-10-20 PROCEDURE — 99284 EMERGENCY DEPT VISIT MOD MDM: CPT

## 2023-10-20 PROCEDURE — 99284 EMERGENCY DEPT VISIT MOD MDM: CPT | Mod: 25

## 2023-10-20 PROCEDURE — 96374 THER/PROPH/DIAG INJ IV PUSH: CPT

## 2023-10-20 PROCEDURE — 80053 COMPREHEN METABOLIC PANEL: CPT

## 2023-10-20 PROCEDURE — 96375 TX/PRO/DX INJ NEW DRUG ADDON: CPT

## 2023-10-20 PROCEDURE — 83690 ASSAY OF LIPASE: CPT

## 2023-10-20 RX ORDER — PANTOPRAZOLE SODIUM 20 MG/1
40 TABLET, DELAYED RELEASE ORAL ONCE
Refills: 0 | Status: COMPLETED | OUTPATIENT
Start: 2023-10-20 | End: 2023-10-20

## 2023-10-20 RX ORDER — ONDANSETRON 8 MG/1
4 TABLET, FILM COATED ORAL ONCE
Refills: 0 | Status: COMPLETED | OUTPATIENT
Start: 2023-10-20 | End: 2023-10-20

## 2023-10-20 RX ORDER — ACETAMINOPHEN 500 MG
1000 TABLET ORAL ONCE
Refills: 0 | Status: COMPLETED | OUTPATIENT
Start: 2023-10-20 | End: 2023-10-20

## 2023-10-20 RX ORDER — SODIUM CHLORIDE 9 MG/ML
1000 INJECTION, SOLUTION INTRAVENOUS ONCE
Refills: 0 | Status: COMPLETED | OUTPATIENT
Start: 2023-10-20 | End: 2023-10-20

## 2023-10-20 RX ORDER — KETOROLAC TROMETHAMINE 30 MG/ML
30 SYRINGE (ML) INJECTION ONCE
Refills: 0 | Status: DISCONTINUED | OUTPATIENT
Start: 2023-10-20 | End: 2023-10-20

## 2023-10-20 RX ADMIN — SODIUM CHLORIDE 1000 MILLILITER(S): 9 INJECTION, SOLUTION INTRAVENOUS at 14:12

## 2023-10-20 RX ADMIN — PANTOPRAZOLE SODIUM 40 MILLIGRAM(S): 20 TABLET, DELAYED RELEASE ORAL at 14:12

## 2023-10-20 RX ADMIN — ONDANSETRON 4 MILLIGRAM(S): 8 TABLET, FILM COATED ORAL at 14:12

## 2023-10-20 RX ADMIN — Medication 30 MILLIGRAM(S): at 18:19

## 2023-10-20 RX ADMIN — Medication 400 MILLIGRAM(S): at 14:12

## 2023-10-20 NOTE — ED PROVIDER NOTE - CLINICAL SUMMARY MEDICAL DECISION MAKING FREE TEXT BOX
83y Female with abdominal pain and nausea. No vomiting, chest pain, shortness of breath, rectal bleeding. Afebrile, hemodynamically stable, abdomen soft and nontender. Differential diagnosis includes but not limited to gastritis, pancreatitis, PUD, electrolyte derangement. 83y Female with abdominal pain and nausea. No vomiting, chest pain, shortness of breath, rectal bleeding. Afebrile, hemodynamically stable, abdomen soft and nontender. Differential diagnosis includes but not limited to gastritis, pancreatitis, PUD, electrolyte derangement. Labs independently reviewed and interpreted with no acute pathology noted. Recent CT abd/pel reviewed with no acute pathology noted. Patient to follow with pcp/GI.

## 2023-10-20 NOTE — ED PROVIDER NOTE - CARE PROVIDER_API CALL
LOV: 11/14/17  Last Refill: 8/22/18 #[id number] [phone] [id number]    Future Appointments   Date Time Provider Marsha Pang   9/26/2018  1:40 PM MD SPRING Peguero
[facility]  Gastroenterology  [address], [address]  [place] [address]  Phone: [phone]  Fax: [phone]  Follow Up Time: 1-3 Days

## 2023-10-20 NOTE — ED PROVIDER NOTE - NSFOLLOWUPINSTRUCTIONS_ED_ALL_ED_FT
Please follow-up with your primary care doctor.  Please call for an appointment in the next 48 hours but if you cannot follow-up with your primary care doctor please return to the Emergency Department for any urgent issues.    You were given a copy of the tests performed today.  Please bring the results with you and review them with your primary care doctor.    If you have any worsening of symptoms or any other concerns please return to the Emergency Department immediately.    Please continue taking your home medications as directed.    Abdominal Pain    Many things can cause abdominal pain. Many times, abdominal pain is not caused by a disease and will improve without treatment. Your health care provider will do a physical exam to determine if there is a dangerous cause of your pain; blood tests and imaging may help determine the cause of your pain. However, in many cases, no cause may be found and you may need further testing as an outpatient. Monitor your abdominal pain for any changes.     SEEK IMMEDIATE MEDICAL CARE IF YOU HAVE ANY OF THE FOLLOWING SYMPTOMS: worsening abdominal pain, uncontrollable vomiting, profuse diarrhea, inability to have bowel movements or pass gas, black or bloody stools, fever accompanying chest pain or back pain, or fainting. These symptoms may represent a serious problem that is an emergency. Do not wait to see if the symptoms will go away. Get medical help right away. Call 911 and do not drive yourself to the hospital.

## 2023-10-20 NOTE — ED PROVIDER NOTE - PROGRESS NOTE DETAILS
stable on reassessment. abdomen remains soft and nontender. Conversation had with patient regarding results of lab work and imaging. Patient agrees with plan for discharge at this time. Patient agrees to comply with follow up to primary care doctor/specalist. Return to ED precautions and discharge instructions discussed with patient with verbal understanding. -DO Florence

## 2023-10-20 NOTE — ED PROVIDER NOTE - PHYSICAL EXAMINATION
General: Well appearing in no acute distress, alert and cooperative  Head: Normocephalic, atraumatic  Eyes: PERRLA, no conjunctival injection, no scleral icterus, EOMI  ENMT: Atraumatic external nose and ears  Neck: Soft and supple, full ROM without pain  Cardiac: Regular rate and regular rhythm, no murmurs, peripheral pulses 2+ and symmetric in all extremities, no LE edema.  Resp: Unlabored respiratory effort, lungs CTAB  Abd: Soft, non-tender, non-distended  MSK: Spine midline and non-tender  Skin: Warm and dry  Neuro: AO x 3, moves all extremities symmetrically, Motor strength and sensation grossly intact

## 2023-10-20 NOTE — ED ADULT NURSE NOTE - OBJECTIVE STATEMENT
pt reports to the ED c/o of abdominal pain and nausea x2 days. pt reports she was here on Oct 5th for similar complaint, all was negative and was sent home.    at bedside states she got better for a few weeks but has gotten worse over the past 3 days.  admits pt has hx of hnt, anxiety and previous gastric ulcer.  denies cp, sob, fever, chills.

## 2023-10-20 NOTE — ED PROVIDER NOTE - OBJECTIVE STATEMENT
83y Female with history of HTN, anxiety, Parkinson disease, gastric ulcer presenting with abdominal pain and nausea x 2 days with no vomiting, chest pain, shortness of breath, rectal bleeding. Patient on iron pills. Denies fevers, chills, headache, hematuria, dysuria, focal neurologic symptoms.

## 2023-10-20 NOTE — ED PROVIDER NOTE - PATIENT PORTAL LINK FT
MD at bedside-admission MD.   You can access the FollowMyHealth Patient Portal offered by [facility] by registering at the following website: [email or web address]. By joining [employer]’s FollowMyHealth portal, you will also be able to view your health information using other applications (apps) compatible with our system.

## 2023-10-20 NOTE — ED ADULT TRIAGE NOTE - CHIEF COMPLAINT QUOTE
Pt reports she was here for same complaint on Oct 5th and told all was well and dc'd from ER. Pt states her diffuse abd pain has returned with nausea. Pt has been supplementing Iron pills since her last visit.

## 2023-11-02 ENCOUNTER — OFFICE (OUTPATIENT)
Dept: URBAN - METROPOLITAN AREA CLINIC 115 | Facility: CLINIC | Age: 83
Setting detail: OPHTHALMOLOGY
End: 2023-11-02
Payer: MEDICARE

## 2023-11-02 DIAGNOSIS — H04.123: ICD-10-CM

## 2023-11-02 DIAGNOSIS — H26.493: ICD-10-CM

## 2023-11-02 DIAGNOSIS — H01.001: ICD-10-CM

## 2023-11-02 DIAGNOSIS — Z96.1: ICD-10-CM

## 2023-11-02 DIAGNOSIS — H01.004: ICD-10-CM

## 2023-11-02 DIAGNOSIS — H43.813: ICD-10-CM

## 2023-11-02 PROCEDURE — 92014 COMPRE OPH EXAM EST PT 1/>: CPT | Performed by: OPHTHALMOLOGY

## 2023-11-02 ASSESSMENT — REFRACTION_MANIFEST
OS_SPHERE: PL
OD_VA1: 20/25
OS_VA1: 20/25
OU_VA: 20/25
OS_CYLINDER: -0.75
OD_AXIS: 105
OD_VA1: 20/30+1
OU_VA: 20/25
OS_AXIS: 090
OD_SPHERE: -1.00
OS_AXIS: 90
OS_CYLINDER: -0.75
OD_CYLINDER: -0.75
OD_CYLINDER: SPH
OS_VA1: 20/25
OD_SPHERE: -0.50
OS_VA2: 20/25
OS_ADD: +1.50
OS_SPHERE: +0.25
OD_VA2: 20/25
OD_ADD: +1.50

## 2023-11-02 ASSESSMENT — REFRACTION_CURRENTRX
OD_SPHERE: -2.25
OD_OVR_VA: 20/
OS_SPHERE: -3.00
OS_VPRISM_DIRECTION: SV
OS_OVR_VA: 20/
OD_CYLINDER: -0.75
OD_AXIS: 098
OD_VPRISM_DIRECTION: SV
OS_AXIS: 106
OS_CYLINDER: -1.00

## 2023-11-02 ASSESSMENT — REFRACTION_AUTOREFRACTION
OD_CYLINDER: -0.50
OS_CYLINDER: -1.25
OD_SPHERE: +0.25
OD_AXIS: 090
OS_SPHERE: +0.25
OS_AXIS: 094

## 2023-11-02 ASSESSMENT — SPHEQUIV_DERIVED
OS_SPHEQUIV: -0.125
OD_SPHEQUIV: -1.375
OD_SPHEQUIV: 0
OS_SPHEQUIV: -0.375

## 2023-11-02 ASSESSMENT — SUPERFICIAL PUNCTATE KERATITIS (SPK): OD_SPK: 1+

## 2023-11-02 ASSESSMENT — DRY EYES - PHYSICIAN NOTES
OD_GENERALCOMMENTS: LOW TBUT
OS_GENERALCOMMENTS: MILD

## 2023-11-02 ASSESSMENT — LID EXAM ASSESSMENTS
OD_BLEPHARITIS: RUL T 1+
OS_BLEPHARITIS: LUL T 1+

## 2023-11-02 ASSESSMENT — CONFRONTATIONAL VISUAL FIELD TEST (CVF)
OD_FINDINGS: FULL
OS_FINDINGS: FULL

## 2023-12-13 NOTE — ED BEHAVIORAL HEALTH ASSESSMENT NOTE - NSBHPSYCHOLCOGABN_PSY_A_CORE
HPI: Jose R Clark is a 56 year old male who comes in today complaining of Establish Care (RUL Nodule / H&P Robot Bronch / no testing)  .  He is a very pleasant gentleman, ongoing cigarette smoker, with history of coronary artery disease status post coronary arterial bypass grafting in 2017.  He is also under the care of the cardiologist ,Dr. Tee.  He has deemed stable from the cardiac point of view.  A recent CT scan of the chest for lung cancer screening purposes revealed a progressively enlarging right upper lobe lung nodule.  He is on supplemental oxygen at 2 liters/minute nasal cannula because of COPD.  Dr. Ledbetter is his pulmonologist.  Because of the recent finding I was asked to consider robotic assisted bronchoscopy and biopsies.  Current Outpatient Medications   Medication Sig   • hydrOXYzine (ATARAX) 25 MG tablet Take 1 tablet by mouth every 8 hours as needed for Anxiety.   • nicotine (NICODERM) 21 MG/24HR patch Place 1 patch onto the skin every 24 hours.   • clopidogrel (PLAVIX) 75 MG tablet Take 1 tablet by mouth daily.   • EVOLocumab (Repatha SureClick) 140 MG/ML injection Inject 1 pen into the skin every 14 days.   • isosorbide mononitrate (IMDUR) 30 MG 24 hr tablet Take 1 tablet by mouth daily.   • nitroGLYCERIN (Nitrostat) 0.4 MG sublingual tablet Place 1 tablet under the tongue every 5 minutes as needed for Chest pain. Maximum of 3 doses daily.   • albuterol 108 (90 Base) MCG/ACT inhaler Inhale 2 puffs into the lungs every 4 hours as needed for Shortness of Breath or Wheezing.   • fluticasone (Flonase) 50 MCG/ACT nasal spray Spray 2 sprays in each nostril daily.   • saline 0.65 % nasal spray Spray 1 spray in each nostril 4 times daily as needed (dry eyes).   • acetaminophen (TYLENOL) 500 MG tablet Take 1,000 mg by mouth every 6 hours as needed for Pain.   • DISPENSE Oxygen concentrator, Portable oxygen concentrator with setting of 2.5L per NC with oxygen tubing and supplies refills x 1 year.    • aspirin 81 MG tablet Take 81 mg by mouth nightly.    • Oxygen 20-80 % Kit Inhale 2.5 L/min into the lungs nightly. Indications: using at night and with activity    • Multiple Vitamins-Minerals (MULTIVITAMIN ADULT) Tab Take 1 tablet by mouth daily.     No current facility-administered medications for this visit.     ALLERGIES:   Allergen Reactions   • Prednisone SHORTNESS OF BREATH     Past Medical History:   Diagnosis Date   • Blood clot associated with vein wall inflammation     DVT/PE   • Bradycardia 10/10/2014    10/2014 holter monitor: The rhythm was sinus, with frequent bradycardia but the lowest heart rate was noted to be 40, and this was at 1:06 in the morning. There was no ectopy, arrhythmia, pauses. No symptoms were documented.   • Bronchitis    • Calculus of gallbladder without cholecystitis without obstruction 12/29/2019   • Chronic respiratory failure with hypoxia (CMD)    • Coronary artery disease    • Coronary artery disease with angina pectoris (CMD) 3/17/2013    Left heart cath 6/6/2017: Right dominant coronary system. Left main free of stenosis. LAD with large caliber D1 branch with moderate proximal stenosis, large D2 branch with mild stenosis, proximal LAD with mild stenosis, mid LAD with moderate stenosis followed by complete occlusion prior to the stent. Left to left and right-to-left collateral flow to distal LAD noted. Left circumflex with proximal   • Diastolic dysfunction 12/30/2019   • Emphysema/COPD (CMD)    • Fracture     broke leg 30 years ago   • H/O appendicitis S/P Lap appendicectomy 12/29/2019   • History of noncompliance with medical treatment    • HO Atrial fibrillation (CMS/HCC) 8/14/2017   • Hx of CABG 5/29/2018   • Hypercholesteremia    • Ischemic cardiomyopathy     Mild   • Leukocytosis 7/21/2017   • Myocardial infarction (CMD)    • CARTER on CPAP 07/21/2017   • Pneumonia    • SBO (small bowel obstruction) (CMD) 10/3/2023     Family History   Problem Relation Age of Onset   •  Hypertension Mother    • Heart Mother    • Depression Mother    • Hyperlipidemia Mother    • Hypertension Brother    • Sleep Apnea Brother    • Diabetes Sister    • Cancer Paternal Uncle         ? what type   • Heart disease Paternal Uncle    • Hypertension Paternal Uncle    • Sleep Apnea Father      Social History     Socioeconomic History   • Marital status: Single     Spouse name: Not on file   • Number of children: 0   • Years of education: Not on file   • Highest education level: Not on file   Occupational History   • Not on file   Tobacco Use   • Smoking status: Every Day     Average packs/day: 0.8 packs/day for 36.9 years (30.0 ttl pk-yrs)     Types: Cigarettes     Start date: 1/1/1987     Passive exposure: Past   • Smokeless tobacco: Never   • Tobacco comments:     1 pack    Vaping Use   • Vaping Use: never used   Substance and Sexual Activity   • Alcohol use: No   • Drug use: No     Comment: Soda 3-4 cans per day   • Sexual activity: Not Currently   Other Topics Concern   • Not on file   Social History Narrative    Lives alone. 1 dog. Employed full-time.     Social Determinants of Health     Financial Resource Strain: Low Risk  (10/3/2023)    Financial Resource Strain    • Social Determinants: Financial Resource Strain: None   Food Insecurity: No Food Insecurity (10/3/2023)    Food Insecurity    • Social Determinants: Food Insecurity: Never   Transportation Needs: No Transportation Needs (10/3/2023)    PRAPARE - Transportation    • Lack of Transportation (Medical): No    • Lack of Transportation (Non-Medical): No   Physical Activity: Not on file   Stress: Not on file   Social Connections: Socially Integrated (10/3/2023)    Social Connections    • Social Determinants: Social Connections: 5 or more times a week   Intimate Partner Violence: Not At Risk (10/3/2023)    Intimate Partner Violence    • Social Determinants: Intimate Partner Violence Past Fear: No    • Social Determinants: Intimate Partner Violence  Current Fear: No         PHYSICAL EXAMINATION:  Visit Vitals  /74   Pulse (!) 51   Ht 5' 10\" (1.778 m)   Wt 113.4 kg (250 lb)   SpO2 97%   BMI 35.87 kg/m²         Constitutional:  No acute distress, Non-toxic appearance.  HENT: Normocephalic, Atraumatic, Bilateral external ears normal, Oropharynx moist, No oral exudates, Nose without any abnormalities.  Eyes:  PERRLA, EOMI, Conjunctiva normal, No discharge.  Neck: Normal range of motion, no tenderness, Supple, no lymphadenopathy, no stridor.  Cardiovascular:  Normal heart rate, Normal rhythm, no obvious murmur on left parasternal border, no rubs, no gallops.  Pulmonary/Chest:  Normal auscultation  Abdomen:  Bowel sounds normal, Soft, No tenderness, No masses, No pulsatile masses. No hepatosplenomegaly  Back:  No tenderness, No CVA tenderness.  Extremities:  Preserved range of motion, Intact distal pulses, no edema, no tenderness.  Lymphatic:  No lymphadenopathy noted.  Neurologic:  Alert & oriented x 3, Normal motor function, no focal deficits.  Skin:  Warm, Dry, No erythema, No rash.  Psychiatric:  Affect engaging, Judgement good, Mood affable.    The low-dose CT scan performed on November 17 revealed an spiculated, right upper lobe, slightly cavitating lung nodule:        Assessment :  1. Nodule of upper lobe of right lung    2. Pulmonary hypertension associated with unclear multi-factorial mechanisms (CMD)         Plan:  Orders Placed This Encounter   • CT CHEST WO CONTRAST       The differential diagnosis includes an early cavitating lesion related to granulomatous inflammation.  However because of the history of cigarette smoking, progressive enlargement and the spiculated appearance the possibility of lung carcinoma is also entertained in the differential diagnosis.     A robotic assisted bronchoscopy has been proposed.  The procedure will be done under general anesthesia and may include additional technologies such as radial ultrasound and fluoroscopy.   Different modes of obtaining biopsies will be use and may include:  Cytologic brushing, fine needle aspirate, transbronchial biopsy forceps alone or in combination.  Rapid on-site evaluation will be available.  Also, convex ultrasound will be used to sample the lymph nodes if needed.  Risk and benefits of this procedure which include inherent risks to anesthesia, pneumothorax, pulmonary hemorrhage have been discussed and this patient is given me permission to proceed.    He is scheduled for this procedure on January 11    Total visit time 30 minutes         disoriented to time/disoriented to situation

## 2024-05-15 ENCOUNTER — EMERGENCY (EMERGENCY)
Facility: HOSPITAL | Age: 84
LOS: 1 days | Discharge: HOME | End: 2024-05-15
Attending: EMERGENCY MEDICINE
Payer: MEDICARE

## 2024-05-15 VITALS
RESPIRATION RATE: 16 BRPM | HEART RATE: 73 BPM | DIASTOLIC BLOOD PRESSURE: 74 MMHG | OXYGEN SATURATION: 100 % | WEIGHT: 205.03 LBS | TEMPERATURE: 98 F | HEIGHT: 68 IN | SYSTOLIC BLOOD PRESSURE: 123 MMHG

## 2024-05-15 DIAGNOSIS — Z98.890 OTHER SPECIFIED POSTPROCEDURAL STATES: Chronic | ICD-10-CM

## 2024-05-15 DIAGNOSIS — Z98.49 CATARACT EXTRACTION STATUS, UNSPECIFIED EYE: Chronic | ICD-10-CM

## 2024-05-15 DIAGNOSIS — Z90.722 ACQUIRED ABSENCE OF OVARIES, BILATERAL: Chronic | ICD-10-CM

## 2024-05-15 LAB
ALBUMIN SERPL ELPH-MCNC: 3.9 G/DL — SIGNIFICANT CHANGE UP (ref 3.3–5.2)
ALP SERPL-CCNC: 139 U/L — HIGH (ref 40–120)
ALT FLD-CCNC: 9 U/L — SIGNIFICANT CHANGE UP
ANION GAP SERPL CALC-SCNC: 15 MMOL/L — SIGNIFICANT CHANGE UP (ref 5–17)
APPEARANCE UR: CLEAR — SIGNIFICANT CHANGE UP
AST SERPL-CCNC: 15 U/L — SIGNIFICANT CHANGE UP
BACTERIA # UR AUTO: NEGATIVE /HPF — SIGNIFICANT CHANGE UP
BASOPHILS # BLD AUTO: 0.06 K/UL — SIGNIFICANT CHANGE UP (ref 0–0.2)
BASOPHILS NFR BLD AUTO: 0.5 % — SIGNIFICANT CHANGE UP (ref 0–2)
BILIRUB SERPL-MCNC: <0.2 MG/DL — LOW (ref 0.4–2)
BILIRUB UR-MCNC: NEGATIVE — SIGNIFICANT CHANGE UP
BUN SERPL-MCNC: 32.6 MG/DL — HIGH (ref 8–20)
CALCIUM SERPL-MCNC: 9.1 MG/DL — SIGNIFICANT CHANGE UP (ref 8.4–10.5)
CAST: 6 /LPF — HIGH (ref 0–4)
CHLORIDE SERPL-SCNC: 98 MMOL/L — SIGNIFICANT CHANGE UP (ref 96–108)
CO2 SERPL-SCNC: 21 MMOL/L — LOW (ref 22–29)
COLOR SPEC: YELLOW — SIGNIFICANT CHANGE UP
CREAT SERPL-MCNC: 1.07 MG/DL — SIGNIFICANT CHANGE UP (ref 0.5–1.3)
DIFF PNL FLD: NEGATIVE — SIGNIFICANT CHANGE UP
EGFR: 52 ML/MIN/1.73M2 — LOW
EOSINOPHIL # BLD AUTO: 0.07 K/UL — SIGNIFICANT CHANGE UP (ref 0–0.5)
EOSINOPHIL NFR BLD AUTO: 0.5 % — SIGNIFICANT CHANGE UP (ref 0–6)
GLUCOSE SERPL-MCNC: 112 MG/DL — HIGH (ref 70–99)
GLUCOSE UR QL: NEGATIVE MG/DL — SIGNIFICANT CHANGE UP
HCT VFR BLD CALC: 33.2 % — LOW (ref 34.5–45)
HGB BLD-MCNC: 10.6 G/DL — LOW (ref 11.5–15.5)
IMM GRANULOCYTES NFR BLD AUTO: 0.5 % — SIGNIFICANT CHANGE UP (ref 0–0.9)
KETONES UR-MCNC: NEGATIVE MG/DL — SIGNIFICANT CHANGE UP
LEUKOCYTE ESTERASE UR-ACNC: ABNORMAL
LYMPHOCYTES # BLD AUTO: 1.26 K/UL — SIGNIFICANT CHANGE UP (ref 1–3.3)
LYMPHOCYTES # BLD AUTO: 9.5 % — LOW (ref 13–44)
MCHC RBC-ENTMCNC: 27.9 PG — SIGNIFICANT CHANGE UP (ref 27–34)
MCHC RBC-ENTMCNC: 31.9 GM/DL — LOW (ref 32–36)
MCV RBC AUTO: 87.4 FL — SIGNIFICANT CHANGE UP (ref 80–100)
MONOCYTES # BLD AUTO: 0.83 K/UL — SIGNIFICANT CHANGE UP (ref 0–0.9)
MONOCYTES NFR BLD AUTO: 6.3 % — SIGNIFICANT CHANGE UP (ref 2–14)
NEUTROPHILS # BLD AUTO: 10.92 K/UL — HIGH (ref 1.8–7.4)
NEUTROPHILS NFR BLD AUTO: 82.7 % — HIGH (ref 43–77)
NITRITE UR-MCNC: NEGATIVE — SIGNIFICANT CHANGE UP
PH UR: 6.5 — SIGNIFICANT CHANGE UP (ref 5–8)
PLATELET # BLD AUTO: 310 K/UL — SIGNIFICANT CHANGE UP (ref 150–400)
POTASSIUM SERPL-MCNC: 4.4 MMOL/L — SIGNIFICANT CHANGE UP (ref 3.5–5.3)
POTASSIUM SERPL-SCNC: 4.4 MMOL/L — SIGNIFICANT CHANGE UP (ref 3.5–5.3)
PROT SERPL-MCNC: 7.3 G/DL — SIGNIFICANT CHANGE UP (ref 6.6–8.7)
PROT UR-MCNC: SIGNIFICANT CHANGE UP MG/DL
RBC # BLD: 3.8 M/UL — SIGNIFICANT CHANGE UP (ref 3.8–5.2)
RBC # FLD: 14.1 % — SIGNIFICANT CHANGE UP (ref 10.3–14.5)
RBC CASTS # UR COMP ASSIST: 2 /HPF — SIGNIFICANT CHANGE UP (ref 0–4)
SODIUM SERPL-SCNC: 134 MMOL/L — LOW (ref 135–145)
SP GR SPEC: 1.02 — SIGNIFICANT CHANGE UP (ref 1–1.03)
SQUAMOUS # UR AUTO: 5 /HPF — SIGNIFICANT CHANGE UP (ref 0–5)
TROPONIN T, HIGH SENSITIVITY RESULT: 12 NG/L — SIGNIFICANT CHANGE UP (ref 0–51)
TROPONIN T, HIGH SENSITIVITY RESULT: 13 NG/L — SIGNIFICANT CHANGE UP (ref 0–51)
UROBILINOGEN FLD QL: 1 MG/DL — SIGNIFICANT CHANGE UP (ref 0.2–1)
WBC # BLD: 13.21 K/UL — HIGH (ref 3.8–10.5)
WBC # FLD AUTO: 13.21 K/UL — HIGH (ref 3.8–10.5)
WBC UR QL: 19 /HPF — HIGH (ref 0–5)

## 2024-05-15 PROCEDURE — 93005 ELECTROCARDIOGRAM TRACING: CPT

## 2024-05-15 PROCEDURE — 96374 THER/PROPH/DIAG INJ IV PUSH: CPT

## 2024-05-15 PROCEDURE — 87086 URINE CULTURE/COLONY COUNT: CPT

## 2024-05-15 PROCEDURE — 93010 ELECTROCARDIOGRAM REPORT: CPT

## 2024-05-15 PROCEDURE — 81001 URINALYSIS AUTO W/SCOPE: CPT

## 2024-05-15 PROCEDURE — 36415 COLL VENOUS BLD VENIPUNCTURE: CPT

## 2024-05-15 PROCEDURE — 99284 EMERGENCY DEPT VISIT MOD MDM: CPT | Mod: 25

## 2024-05-15 PROCEDURE — 84484 ASSAY OF TROPONIN QUANT: CPT

## 2024-05-15 PROCEDURE — 99285 EMERGENCY DEPT VISIT HI MDM: CPT

## 2024-05-15 PROCEDURE — 96375 TX/PRO/DX INJ NEW DRUG ADDON: CPT

## 2024-05-15 PROCEDURE — 80053 COMPREHEN METABOLIC PANEL: CPT

## 2024-05-15 PROCEDURE — 85025 COMPLETE CBC W/AUTO DIFF WBC: CPT

## 2024-05-15 RX ORDER — ONDANSETRON 8 MG/1
4 TABLET, FILM COATED ORAL ONCE
Refills: 0 | Status: COMPLETED | OUTPATIENT
Start: 2024-05-15 | End: 2024-05-15

## 2024-05-15 RX ORDER — SODIUM CHLORIDE 9 MG/ML
1000 INJECTION INTRAMUSCULAR; INTRAVENOUS; SUBCUTANEOUS ONCE
Refills: 0 | Status: COMPLETED | OUTPATIENT
Start: 2024-05-15 | End: 2024-05-15

## 2024-05-15 RX ORDER — ACETAMINOPHEN 500 MG
1000 TABLET ORAL ONCE
Refills: 0 | Status: COMPLETED | OUTPATIENT
Start: 2024-05-15 | End: 2024-05-15

## 2024-05-15 RX ADMIN — SODIUM CHLORIDE 1000 MILLILITER(S): 9 INJECTION INTRAMUSCULAR; INTRAVENOUS; SUBCUTANEOUS at 21:11

## 2024-05-15 RX ADMIN — Medication 400 MILLIGRAM(S): at 21:09

## 2024-05-15 RX ADMIN — ONDANSETRON 4 MILLIGRAM(S): 8 TABLET, FILM COATED ORAL at 21:09

## 2024-05-15 NOTE — ED ADULT TRIAGE NOTE - CHIEF COMPLAINT QUOTE
pt states she was at the bar having a glass of wine when she began to feel lightheaded and dizzy and felt like she was going to pass out. LESLIE X 4, ambulatory

## 2024-05-15 NOTE — ED PROVIDER NOTE - PATIENT PORTAL LINK FT
You can access the FollowMyHealth Patient Portal offered by [facility] by registering at the following website: [email or web address]. By joining [employer]’s FollowMyHealth portal, you will also be able to view your health information using other applications (apps) compatible with our system.

## 2024-05-15 NOTE — ED PROVIDER NOTE - PHYSICAL EXAMINATION
Gen: No acute distress, non toxic  HEENT: Mucous membranes moist, pink conjunctivae, EOMI. ncat  CV: RRR, nl s1/s2.  Resp: CTAB, normal rate and effort  GI: Abdomen soft, NT, ND. No rebound, no guarding  : No CVAT  Neuro: A&O x 3, moving all 4 extremities. nl motor/sensation. nl finger to nose.   MSK: No spine or joint tenderness to palpation  Skin: No rashes. intact and perfused.

## 2024-05-15 NOTE — ED ADULT NURSE NOTE - OBJECTIVE STATEMENT
84 y/o female hx htn, anxiety, dementia, p/w episode of lightheadedness/syncope while at a bar having a glass of wine. reports usual state of health earlier today. felt lightheaded immediately preceding, head was lowering to bar, was caught, no trauma. out for ~1 minute, then awoke and back to [place]. had 1 episode vomiting. 82 y/o female hx htn, anxiety, dementia, p/w episode of lightheadedness/syncope while at a bar having a glass of wine. reports usual state of health earlier today. felt lightheaded immediately preceding, head was lowering to bar, was caught, no trauma. out for ~1 minute, then awoke and back to baseline. had 1 episode vomiting.  Seen and evaluated by provider, orders obtained and noted.  IV placed, blood and urine specimens obtained and sent to lab.  Medicated as ordered.  CM-NSR   at bedside.  Resting comfortably on stretcher in no acute distress.  Offers no further complaints at this time.

## 2024-05-15 NOTE — ED ADULT NURSE NOTE - MODE OF DISCHARGE
Group Therapy Note    Date: 10/19/2021    Group Start Time: 0900  Group End Time: 0915  Group Topic: Community Meeting    GER KHAN RONNY Rivera        Group Therapy Note    Attendees: 5/15       Patient's Goal:  To orient to unit and set a daily goal     Notes:  Patient attended and participated in group. DAILY GOAL: Plan for follow-up with AoD treatment     Status After Intervention:  Improved     Participation Level:  Active Listener and Interactive     Participation Quality: Appropriate, Attentive and Sharing        Speech:  normal        Thought Process/Content: Logical  Linear        Affective Functioning: Congruent        Mood: euthymic        Level of consciousness:  Alert and Attentive        Response to Learning: Able to verbalize current knowledge/experience and Progressing to goal        Endings: None Reported     Modes of Intervention: Education, Support, Socialization, Clarifying and Reality-testing        Discipline Responsible: Psychoeducational Specialist        Signature:  [facility]

## 2024-05-15 NOTE — ED PROVIDER NOTE - CLINICAL SUMMARY MEDICAL DECISION MAKING FREE TEXT BOX
Cárdenas: 84 y/o female hx htn, anxiety, dementia, p/w episode of lightheadedness/syncope while at a bar having a glass of wine. back to baseline with mild "woozy/nausea". neuro intact. no cp/sob at any point. reports eating/drinking nl. will check basic labs, observe on monitor. reassess hydrate and symptom control. Cárdenas: 82 y/o female hx htn, anxiety, dementia, p/w episode of lightheadedness/syncope while at a bar having a glass of wine. back to baseline with mild "woozy/nausea". neuro intact. no cp/sob at any point. reports eating/drinking nl. will check basic labs, observe on monitor. reassess hydrate and symptom control.    Cárdenas: pt feels very well. at baseline, nl obs period on cardiac monitor. trops without significnat change, with mild dehydration on labs, given fluids. urine likely with contaminant and without symptoms, will hold off on abx while urine culture pending, advised pt may be called if urine culture positive. return precautions. pt requesting d/c. stable for d/.c

## 2024-05-15 NOTE — ED PROVIDER NOTE - OBJECTIVE STATEMENT
82 y/o female hx htn, anxiety, dementia, p/w episode of lightheadedness/syncope while at a bar having a glass of wine. reports usual state of health earlier today. felt lightheaded immediately preceding, head was lowering to bar, was caught, no trauma. out for ~1 minute, then awoke and back to [facility]. had 1 episode vomiting. feeling better just mild nausea/feeling "woozy". states has chronic mild headaches that is being followed treated, not significant currently. no other complaints.

## 2024-05-15 NOTE — ED PROVIDER NOTE - BIRTH SEX
HPI Comments: Selena Latif is a 11 y.o. male with PMH significant for apraxia, recurrent OM presents to ER with mother and grandparents c/o vomiting x 1 this morning and c/o head pain with low grade fever x today. Yesterday patient was irritable with a normal appetite and PO intake. Today he states he had a headache, denies trauma, and vomited once. Since been acting his usual self. No change in behavior otherwise. Mild rhinorrhea x today. No sick contacts. Dx with strep earlier this month and completed amoxicillin 1 week ago. No diarrhea, rash or other complaints per family. Vaccine status- UTD per mom    PCP: Waldo Hagan MD    Surgical hx- tympanostomy tubes, circumcision  Social hx- lives with parent    The patient and/or guardian have no other complaints at this time. The history is provided by the mother, a grandparent and the patient. Pediatric Social History:         Past Medical History:   Diagnosis Date    GERD (gastroesophageal reflux disease) Oct 2012    No more meds at this age 11/17    Otitis media of left ear     May 2013:  2 episodes in last 1.5mo. Fluid Right, but no prior infections.  Screening for lead exposure Oct 2014    Normal lead level of 1 (0-4mcg/dL).  Screening, iron deficiency anemia Oct 2014    Normal Hgb/Hct.  Speech therapy Oct 2015    Ongoing--mom notes good progress. Dx \"apraxia\".  Strep throat 10/2013    Strep throat        Past Surgical History:   Procedure Laterality Date    HX [de-identified]  10mo old    circumcision with report mild \"penile reconstruction\"    HX TYMPANOSTOMY  7/2013    Removed--healing as of Sept 2015 follow-up.  HX TYMPANOSTOMY           Family History:   Problem Relation Age of Onset   [facility] Migraines Mother     Allergic Rhinitis Father        Social History     Social History    Marital status: SINGLE     Spouse name: N/A    Number of children: N/A    Years of education: N/A     Occupational History    Not on file. Social History Main Topics    Smoking status: Passive Smoke Exposure - Never Smoker    Smokeless tobacco: Never Used    Alcohol use No    Drug use: No    Sexual activity: Not on file     Other Topics Concern    Not on file     Social History Narrative         ALLERGIES: Review of patient's allergies indicates no known allergies. Review of Systems   Unable to perform ROS: Age   Constitutional: Negative for fever. HENT: Negative. Negative for congestion. Respiratory: Negative. Negative for cough and wheezing. Gastrointestinal: Positive for vomiting (x1 today). Negative for abdominal pain, diarrhea and nausea. Genitourinary: Negative. Vitals:    12/30/17 0957   BP: 92/61   Resp: 24   Temp: 100 °F (37.8 °C)   SpO2: 99%   Weight: 18.9 kg            Physical Exam   Constitutional: He appears well-developed and well-nourished. He is active. No distress. HENT:   Head: Atraumatic. Right Ear: Tympanic membrane normal.   Left Ear: Tympanic membrane normal.   Nose: No nasal discharge. Mouth/Throat: Mucous membranes are moist. No tonsillar exudate. Oropharynx is clear. Eyes: Conjunctivae are normal. Pupils are equal, round, and reactive to light. Right eye exhibits no discharge. Left eye exhibits no discharge. Neck: Normal range of motion. Neck supple. No adenopathy. Cardiovascular: Normal rate and regular rhythm. Pulses are palpable. Pulmonary/Chest: Effort normal and breath sounds normal. There is normal air entry. No stridor. No respiratory distress. Air movement is not decreased. He has no wheezes. He has no rhonchi. He has no rales. He exhibits no retraction. Abdominal: Soft. Bowel sounds are normal. He exhibits no distension and no mass. There is no tenderness. There is no rebound and no guarding. Musculoskeletal: Normal range of motion. He exhibits no deformity. Neurological: He is alert. Skin: Skin is warm. Capillary refill takes less than 3 seconds. No rash noted. He is not diaphoretic. Nursing note and vitals reviewed. MDM  Number of Diagnoses or Management Options  Non-intractable vomiting with nausea, unspecified vomiting type:   Viral syndrome:   Diagnosis management comments:   Ddx: viral syndrome, URI, influenza, gastritis       Amount and/or Complexity of Data Reviewed  Clinical lab tests: ordered and reviewed  Obtain history from someone other than the patient: yes  Review and summarize past medical records: yes    Patient Progress  Patient progress: stable    ED Course       Procedures      11:38 AM  Patient has been re-assessed. Is active, running around room in no distress. ABD still soft and non-tender, states HA has resolved. Exam and re-exam reassuring. Return precautions discussed with family. Dallin Morrell PA-C         DISCHARGE NOTE:  11:47 PM  The patient's results have been reviewed with them and/or legal guardian. Patient and/or legal guardian verbally conveyed their understanding and agreement of the patient's signs, symptoms, diagnosis, treatment and prognosis and additionally agree to follow up as recommended in the discharge instructions or to return to the Emergency Room should their condition change prior to their follow-up appointment. The patient/family verbally agrees with the care-plan and verbally conveys that all of their questions have been answered. The discharge instructions have also been provided to the patient and/or gaurdian with educational information regarding the patient's diagnosis as well a list of reasons why the patient would want to return to the ER prior to their follow-up appointment, should their condition change. Plan:  1. F/U with pediatrician this week if sx's persist  2. Push liquids   3. Rx Zofran prn nausea  Return precautions discussed with family. Female 89

## 2024-05-15 NOTE — ED ADULT TRIAGE NOTE - HEIGHT IN FEET
5 Partial Purse String (Simple) Text: Given the location of the defect and the characteristics of the surrounding skin a simple purse string closure was deemed most appropriate.  Undermining was performed circumfirentially around the surgical defect.  A purse string suture was then placed and tightened. Wound tension only allowed a partial closure of the circular defect.

## 2024-05-17 LAB
CULTURE RESULTS: SIGNIFICANT CHANGE UP
SPECIMEN SOURCE: SIGNIFICANT CHANGE UP

## 2024-05-21 NOTE — BRIEF OPERATIVE NOTE - NSICDXBRIEFOPLAUNCH_GEN_ALL_CORE
<--- Click to Launch ICDx for PreOp, PostOp and Procedure
<--- Click to Launch ICDx for PreOp, PostOp and Procedure
not applicable

## 2024-06-15 ENCOUNTER — INPATIENT (INPATIENT)
Facility: HOSPITAL | Age: 84
LOS: 2 days | Discharge: HOME | DRG: 310 | End: 2024-06-18
Attending: HOSPITALIST | Admitting: STUDENT IN AN ORGANIZED HEALTH CARE EDUCATION/TRAINING PROGRAM
Payer: MEDICARE

## 2024-06-15 VITALS
HEART RATE: 64 BPM | TEMPERATURE: 98 F | WEIGHT: 160.06 LBS | SYSTOLIC BLOOD PRESSURE: 122 MMHG | RESPIRATION RATE: 15 BRPM | DIASTOLIC BLOOD PRESSURE: 62 MMHG | OXYGEN SATURATION: 95 % | HEIGHT: 68 IN

## 2024-06-15 DIAGNOSIS — Z98.890 OTHER SPECIFIED POSTPROCEDURAL STATES: Chronic | ICD-10-CM

## 2024-06-15 DIAGNOSIS — R42 DIZZINESS AND GIDDINESS: ICD-10-CM

## 2024-06-15 LAB
ALBUMIN SERPL ELPH-MCNC: 4.1 G/DL — SIGNIFICANT CHANGE UP (ref 3.3–5.2)
ALP SERPL-CCNC: 104 U/L — SIGNIFICANT CHANGE UP (ref 40–120)
ALT FLD-CCNC: 7 U/L — SIGNIFICANT CHANGE UP
ANION GAP SERPL CALC-SCNC: 18 MMOL/L — HIGH (ref 5–17)
APTT BLD: 29.5 SEC — SIGNIFICANT CHANGE UP (ref 24.5–35.6)
AST SERPL-CCNC: 12 U/L — SIGNIFICANT CHANGE UP
BASOPHILS # BLD AUTO: 0.04 K/UL — SIGNIFICANT CHANGE UP (ref 0–0.2)
BASOPHILS NFR BLD AUTO: 0.7 % — SIGNIFICANT CHANGE UP (ref 0–2)
BILIRUB SERPL-MCNC: <0.2 MG/DL — LOW (ref 0.4–2)
BUN SERPL-MCNC: 15.7 MG/DL — SIGNIFICANT CHANGE UP (ref 8–20)
CALCIUM SERPL-MCNC: 8.6 MG/DL — SIGNIFICANT CHANGE UP (ref 8.4–10.5)
CHLORIDE SERPL-SCNC: 103 MMOL/L — SIGNIFICANT CHANGE UP (ref 96–108)
CO2 SERPL-SCNC: 18 MMOL/L — LOW (ref 22–29)
CREAT SERPL-MCNC: 1.1 MG/DL — SIGNIFICANT CHANGE UP (ref 0.5–1.3)
EGFR: 50 ML/MIN/1.73M2 — LOW
EOSINOPHIL # BLD AUTO: 0.12 K/UL — SIGNIFICANT CHANGE UP (ref 0–0.5)
EOSINOPHIL NFR BLD AUTO: 2 % — SIGNIFICANT CHANGE UP (ref 0–6)
GLUCOSE SERPL-MCNC: 107 MG/DL — HIGH (ref 70–99)
HCT VFR BLD CALC: 27.7 % — LOW (ref 34.5–45)
HGB BLD-MCNC: 8.7 G/DL — LOW (ref 11.5–15.5)
IMM GRANULOCYTES NFR BLD AUTO: 0.7 % — SIGNIFICANT CHANGE UP (ref 0–0.9)
INR BLD: 0.96 RATIO — SIGNIFICANT CHANGE UP (ref 0.85–1.18)
LYMPHOCYTES # BLD AUTO: 1.82 K/UL — SIGNIFICANT CHANGE UP (ref 1–3.3)
LYMPHOCYTES # BLD AUTO: 29.7 % — SIGNIFICANT CHANGE UP (ref 13–44)
MCHC RBC-ENTMCNC: 26 PG — LOW (ref 27–34)
MCHC RBC-ENTMCNC: 31.4 GM/DL — LOW (ref 32–36)
MCV RBC AUTO: 82.7 FL — SIGNIFICANT CHANGE UP (ref 80–100)
MONOCYTES # BLD AUTO: 0.8 K/UL — SIGNIFICANT CHANGE UP (ref 0–0.9)
MONOCYTES NFR BLD AUTO: 13.1 % — SIGNIFICANT CHANGE UP (ref 2–14)
NEUTROPHILS # BLD AUTO: 3.3 K/UL — SIGNIFICANT CHANGE UP (ref 1.8–7.4)
NEUTROPHILS NFR BLD AUTO: 53.8 % — SIGNIFICANT CHANGE UP (ref 43–77)
PLATELET # BLD AUTO: 321 K/UL — SIGNIFICANT CHANGE UP (ref 150–400)
POTASSIUM SERPL-MCNC: 3.8 MMOL/L — SIGNIFICANT CHANGE UP (ref 3.5–5.3)
POTASSIUM SERPL-SCNC: 3.8 MMOL/L — SIGNIFICANT CHANGE UP (ref 3.5–5.3)
PROT SERPL-MCNC: 7.1 G/DL — SIGNIFICANT CHANGE UP (ref 6.6–8.7)
PROTHROM AB SERPL-ACNC: 10.7 SEC — SIGNIFICANT CHANGE UP (ref 9.5–13)
RBC # BLD: 3.35 M/UL — LOW (ref 3.8–5.2)
RBC # FLD: 15.2 % — HIGH (ref 10.3–14.5)
SODIUM SERPL-SCNC: 139 MMOL/L — SIGNIFICANT CHANGE UP (ref 135–145)
TROPONIN T, HIGH SENSITIVITY RESULT: 13 NG/L — SIGNIFICANT CHANGE UP (ref 0–51)
TROPONIN T, HIGH SENSITIVITY RESULT: 14 NG/L — SIGNIFICANT CHANGE UP (ref 0–51)
WBC # BLD: 6.12 K/UL — SIGNIFICANT CHANGE UP (ref 3.8–10.5)
WBC # FLD AUTO: 6.12 K/UL — SIGNIFICANT CHANGE UP (ref 3.8–10.5)

## 2024-06-15 PROCEDURE — 71045 X-RAY EXAM CHEST 1 VIEW: CPT | Mod: 26

## 2024-06-15 PROCEDURE — 99497 ADVNCD CARE PLAN 30 MIN: CPT | Mod: 25

## 2024-06-15 PROCEDURE — 0042T: CPT | Mod: MC

## 2024-06-15 PROCEDURE — 99291 CRITICAL CARE FIRST HOUR: CPT

## 2024-06-15 PROCEDURE — 70498 CT ANGIOGRAPHY NECK: CPT | Mod: 26,MC

## 2024-06-15 PROCEDURE — 99223 1ST HOSP IP/OBS HIGH 75: CPT

## 2024-06-15 PROCEDURE — 70496 CT ANGIOGRAPHY HEAD: CPT | Mod: 26,MC

## 2024-06-15 RX ORDER — ENOXAPARIN SODIUM 100 MG/ML
40 INJECTION SUBCUTANEOUS ONCE
Refills: 0 | Status: COMPLETED | OUTPATIENT
Start: 2024-06-15 | End: 2024-06-15

## 2024-06-15 RX ORDER — SODIUM CHLORIDE 0.9 % (FLUSH) 0.9 %
1000 SYRINGE (ML) INJECTION ONCE
Refills: 0 | Status: COMPLETED | OUTPATIENT
Start: 2024-06-15 | End: 2024-06-15

## 2024-06-15 RX ORDER — ASPIRIN 325 MG/1
324 TABLET, FILM COATED ORAL ONCE
Refills: 0 | Status: COMPLETED | OUTPATIENT
Start: 2024-06-15 | End: 2024-06-15

## 2024-06-15 RX ORDER — DILTIAZEM HYDROCHLORIDE 240 MG/1
10 CAPSULE, EXTENDED RELEASE ORAL ONCE
Refills: 0 | Status: COMPLETED | OUTPATIENT
Start: 2024-06-15 | End: 2024-06-15

## 2024-06-15 RX ORDER — ESCITALOPRAM OXALATE 20 MG/1
10 TABLET, FILM COATED ORAL DAILY
Refills: 0 | Status: DISCONTINUED | OUTPATIENT
Start: 2024-06-15 | End: 2024-06-18

## 2024-06-15 RX ORDER — DILTIAZEM HYDROCHLORIDE 240 MG/1
30 CAPSULE, EXTENDED RELEASE ORAL ONCE
Refills: 0 | Status: COMPLETED | OUTPATIENT
Start: 2024-06-15 | End: 2024-06-15

## 2024-06-15 RX ORDER — ONDANSETRON HYDROCHLORIDE 2 MG/ML
4 INJECTION INTRAMUSCULAR; INTRAVENOUS ONCE
Refills: 0 | Status: COMPLETED | OUTPATIENT
Start: 2024-06-15 | End: 2024-06-15

## 2024-06-15 RX ORDER — ACETAMINOPHEN 325 MG
650 TABLET ORAL EVERY 6 HOURS
Refills: 0 | Status: DISCONTINUED | OUTPATIENT
Start: 2024-06-15 | End: 2024-06-18

## 2024-06-15 RX ORDER — MAGNESIUM, ALUMINUM HYDROXIDE 400-400
30 TABLET,CHEWABLE ORAL EVERY 4 HOURS
Refills: 0 | Status: DISCONTINUED | OUTPATIENT
Start: 2024-06-15 | End: 2024-06-18

## 2024-06-15 RX ORDER — ONDANSETRON HYDROCHLORIDE 2 MG/ML
4 INJECTION INTRAMUSCULAR; INTRAVENOUS EVERY 8 HOURS
Refills: 0 | Status: DISCONTINUED | OUTPATIENT
Start: 2024-06-15 | End: 2024-06-18

## 2024-06-15 RX ORDER — ALPRAZOLAM 2 MG/1
1 TABLET ORAL
Refills: 0 | Status: DISCONTINUED | OUTPATIENT
Start: 2024-06-15 | End: 2024-06-18

## 2024-06-15 RX ORDER — LAMOTRIGINE 100 MG/1
50 TABLET ORAL DAILY
Refills: 0 | Status: DISCONTINUED | OUTPATIENT
Start: 2024-06-15 | End: 2024-06-18

## 2024-06-15 RX ORDER — ASPIRIN 325 MG/1
81 TABLET, FILM COATED ORAL DAILY
Refills: 0 | Status: DISCONTINUED | OUTPATIENT
Start: 2024-06-15 | End: 2024-06-18

## 2024-06-15 RX ORDER — ACETAMINOPHEN 325 MG
1000 TABLET ORAL ONCE
Refills: 0 | Status: COMPLETED | OUTPATIENT
Start: 2024-06-15 | End: 2024-06-15

## 2024-06-15 RX ORDER — DILTIAZEM HYDROCHLORIDE 240 MG/1
19 CAPSULE, EXTENDED RELEASE ORAL ONCE
Refills: 0 | Status: COMPLETED | OUTPATIENT
Start: 2024-06-15 | End: 2024-06-15

## 2024-06-15 RX ORDER — ATORVASTATIN CALCIUM 20 MG/1
80 TABLET, FILM COATED ORAL AT BEDTIME
Refills: 0 | Status: DISCONTINUED | OUTPATIENT
Start: 2024-06-15 | End: 2024-06-18

## 2024-06-15 RX ORDER — DONEPEZIL HYDROCHLORIDE 10 MG/1
5 TABLET, FILM COATED ORAL AT BEDTIME
Refills: 0 | Status: DISCONTINUED | OUTPATIENT
Start: 2024-06-15 | End: 2024-06-18

## 2024-06-15 RX ADMIN — ASPIRIN 324 MILLIGRAM(S): 325 TABLET, FILM COATED ORAL at 19:42

## 2024-06-15 RX ADMIN — DILTIAZEM HYDROCHLORIDE 30 MILLIGRAM(S): 240 CAPSULE, EXTENDED RELEASE ORAL at 17:59

## 2024-06-15 RX ADMIN — DILTIAZEM HYDROCHLORIDE 19 MILLIGRAM(S): 240 CAPSULE, EXTENDED RELEASE ORAL at 17:53

## 2024-06-15 RX ADMIN — DILTIAZEM HYDROCHLORIDE 10 MILLIGRAM(S): 240 CAPSULE, EXTENDED RELEASE ORAL at 19:50

## 2024-06-15 RX ADMIN — ENOXAPARIN SODIUM 40 MILLIGRAM(S): 100 INJECTION SUBCUTANEOUS at 19:41

## 2024-06-15 RX ADMIN — Medication 1000 MILLILITER(S): at 16:45

## 2024-06-15 RX ADMIN — Medication 400 MILLIGRAM(S): at 19:50

## 2024-06-15 RX ADMIN — ONDANSETRON HYDROCHLORIDE 4 MILLIGRAM(S): 2 INJECTION INTRAMUSCULAR; INTRAVENOUS at 19:58

## 2024-06-16 LAB
A1C WITH ESTIMATED AVERAGE GLUCOSE RESULT: 5 % — SIGNIFICANT CHANGE UP (ref 4–5.6)
ANION GAP SERPL CALC-SCNC: 14 MMOL/L — SIGNIFICANT CHANGE UP (ref 5–17)
APTT BLD: 107.9 SEC — HIGH (ref 24.5–35.6)
BUN SERPL-MCNC: 17.6 MG/DL — SIGNIFICANT CHANGE UP (ref 8–20)
CALCIUM SERPL-MCNC: 8.6 MG/DL — SIGNIFICANT CHANGE UP (ref 8.4–10.5)
CHLORIDE SERPL-SCNC: 104 MMOL/L — SIGNIFICANT CHANGE UP (ref 96–108)
CHOLEST SERPL-MCNC: 124 MG/DL — SIGNIFICANT CHANGE UP
CO2 SERPL-SCNC: 21 MMOL/L — LOW (ref 22–29)
CREAT SERPL-MCNC: 0.94 MG/DL — SIGNIFICANT CHANGE UP (ref 0.5–1.3)
EGFR: 60 ML/MIN/1.73M2 — SIGNIFICANT CHANGE UP
ESTIMATED AVERAGE GLUCOSE: 97 MG/DL — SIGNIFICANT CHANGE UP (ref 68–114)
GLUCOSE SERPL-MCNC: 96 MG/DL — SIGNIFICANT CHANGE UP (ref 70–99)
HCT VFR BLD CALC: 27.7 % — LOW (ref 34.5–45)
HCT VFR BLD CALC: 28.2 % — LOW (ref 34.5–45)
HDLC SERPL-MCNC: 60 MG/DL — SIGNIFICANT CHANGE UP
HGB BLD-MCNC: 8.6 G/DL — LOW (ref 11.5–15.5)
HGB BLD-MCNC: 8.8 G/DL — LOW (ref 11.5–15.5)
LIPID PNL WITH DIRECT LDL SERPL: 50 MG/DL — SIGNIFICANT CHANGE UP
MCHC RBC-ENTMCNC: 25.3 PG — LOW (ref 27–34)
MCHC RBC-ENTMCNC: 25.7 PG — LOW (ref 27–34)
MCHC RBC-ENTMCNC: 31 GM/DL — LOW (ref 32–36)
MCHC RBC-ENTMCNC: 31.2 GM/DL — LOW (ref 32–36)
MCV RBC AUTO: 81.5 FL — SIGNIFICANT CHANGE UP (ref 80–100)
MCV RBC AUTO: 82.2 FL — SIGNIFICANT CHANGE UP (ref 80–100)
NON HDL CHOLESTEROL: 64 MG/DL — SIGNIFICANT CHANGE UP
PLATELET # BLD AUTO: 282 K/UL — SIGNIFICANT CHANGE UP (ref 150–400)
PLATELET # BLD AUTO: 297 K/UL — SIGNIFICANT CHANGE UP (ref 150–400)
POTASSIUM SERPL-MCNC: 4.5 MMOL/L — SIGNIFICANT CHANGE UP (ref 3.5–5.3)
POTASSIUM SERPL-SCNC: 4.5 MMOL/L — SIGNIFICANT CHANGE UP (ref 3.5–5.3)
RBC # BLD: 3.4 M/UL — LOW (ref 3.8–5.2)
RBC # BLD: 3.43 M/UL — LOW (ref 3.8–5.2)
RBC # FLD: 15.1 % — HIGH (ref 10.3–14.5)
RBC # FLD: 15.1 % — HIGH (ref 10.3–14.5)
SODIUM SERPL-SCNC: 138 MMOL/L — SIGNIFICANT CHANGE UP (ref 135–145)
T3 SERPL-MCNC: 102 NG/DL — SIGNIFICANT CHANGE UP (ref 80–200)
T4 AB SER-ACNC: 5.9 UG/DL — SIGNIFICANT CHANGE UP (ref 4.5–12)
TRIGL SERPL-MCNC: 72 MG/DL — SIGNIFICANT CHANGE UP
WBC # BLD: 6.85 K/UL — SIGNIFICANT CHANGE UP (ref 3.8–10.5)
WBC # BLD: 8.84 K/UL — SIGNIFICANT CHANGE UP (ref 3.8–10.5)
WBC # FLD AUTO: 6.85 K/UL — SIGNIFICANT CHANGE UP (ref 3.8–10.5)
WBC # FLD AUTO: 8.84 K/UL — SIGNIFICANT CHANGE UP (ref 3.8–10.5)

## 2024-06-16 PROCEDURE — 99233 SBSQ HOSP IP/OBS HIGH 50: CPT

## 2024-06-16 RX ORDER — HEPARIN SODIUM 50 [USP'U]/ML
INJECTION, SOLUTION INTRAVENOUS
Qty: 25000 | Refills: 0 | Status: DISCONTINUED | OUTPATIENT
Start: 2024-06-16 | End: 2024-06-17

## 2024-06-16 RX ORDER — HEPARIN SODIUM 50 [USP'U]/ML
3000 INJECTION, SOLUTION INTRAVENOUS EVERY 6 HOURS
Refills: 0 | Status: DISCONTINUED | OUTPATIENT
Start: 2024-06-16 | End: 2024-06-17

## 2024-06-16 RX ORDER — HEPARIN SODIUM 50 [USP'U]/ML
6500 INJECTION, SOLUTION INTRAVENOUS EVERY 6 HOURS
Refills: 0 | Status: DISCONTINUED | OUTPATIENT
Start: 2024-06-16 | End: 2024-06-17

## 2024-06-16 RX ADMIN — Medication 650 MILLIGRAM(S): at 22:38

## 2024-06-16 RX ADMIN — Medication 650 MILLIGRAM(S): at 21:38

## 2024-06-16 RX ADMIN — LAMOTRIGINE 50 MILLIGRAM(S): 100 TABLET ORAL at 09:56

## 2024-06-16 RX ADMIN — HEPARIN SODIUM 1400 UNIT(S)/HR: 50 INJECTION, SOLUTION INTRAVENOUS at 09:57

## 2024-06-16 RX ADMIN — ASPIRIN 81 MILLIGRAM(S): 325 TABLET, FILM COATED ORAL at 09:56

## 2024-06-16 RX ADMIN — Medication 650 MILLIGRAM(S): at 09:56

## 2024-06-16 RX ADMIN — Medication 650 MILLIGRAM(S): at 01:09

## 2024-06-16 RX ADMIN — ALPRAZOLAM 1 MILLIGRAM(S): 2 TABLET ORAL at 01:09

## 2024-06-16 RX ADMIN — HEPARIN SODIUM 1200 UNIT(S)/HR: 50 INJECTION, SOLUTION INTRAVENOUS at 19:41

## 2024-06-16 RX ADMIN — Medication 650 MILLIGRAM(S): at 02:20

## 2024-06-16 RX ADMIN — ESCITALOPRAM OXALATE 10 MILLIGRAM(S): 20 TABLET, FILM COATED ORAL at 09:55

## 2024-06-16 RX ADMIN — Medication 650 MILLIGRAM(S): at 10:56

## 2024-06-16 RX ADMIN — DONEPEZIL HYDROCHLORIDE 5 MILLIGRAM(S): 10 TABLET, FILM COATED ORAL at 21:37

## 2024-06-16 RX ADMIN — ATORVASTATIN CALCIUM 80 MILLIGRAM(S): 20 TABLET, FILM COATED ORAL at 21:38

## 2024-06-16 RX ADMIN — Medication 650 MILLIGRAM(S): at 17:23

## 2024-06-16 RX ADMIN — HEPARIN SODIUM 1200 UNIT(S)/HR: 50 INJECTION, SOLUTION INTRAVENOUS at 18:07

## 2024-06-16 RX ADMIN — Medication 650 MILLIGRAM(S): at 16:23

## 2024-06-17 ENCOUNTER — RESULT REVIEW (OUTPATIENT)
Age: 84
End: 2024-06-17

## 2024-06-17 LAB
ALBUMIN SERPL ELPH-MCNC: 4 G/DL — SIGNIFICANT CHANGE UP (ref 3.3–5.2)
ALP SERPL-CCNC: 114 U/L — SIGNIFICANT CHANGE UP (ref 40–120)
ALT FLD-CCNC: 5 U/L — SIGNIFICANT CHANGE UP
ANION GAP SERPL CALC-SCNC: 14 MMOL/L — SIGNIFICANT CHANGE UP (ref 5–17)
APTT BLD: 106.8 SEC — HIGH (ref 24.5–35.6)
APTT BLD: 153.2 SEC — CRITICAL HIGH (ref 24.5–35.6)
APTT BLD: 160.2 SEC — CRITICAL HIGH (ref 24.5–35.6)
AST SERPL-CCNC: 12 U/L — SIGNIFICANT CHANGE UP
BILIRUB SERPL-MCNC: 0.2 MG/DL — LOW (ref 0.4–2)
BUN SERPL-MCNC: 14.5 MG/DL — SIGNIFICANT CHANGE UP (ref 8–20)
CALCIUM SERPL-MCNC: 8.9 MG/DL — SIGNIFICANT CHANGE UP (ref 8.4–10.5)
CHLORIDE SERPL-SCNC: 101 MMOL/L — SIGNIFICANT CHANGE UP (ref 96–108)
CO2 SERPL-SCNC: 22 MMOL/L — SIGNIFICANT CHANGE UP (ref 22–29)
CREAT SERPL-MCNC: 1.07 MG/DL — SIGNIFICANT CHANGE UP (ref 0.5–1.3)
EGFR: 51 ML/MIN/1.73M2 — LOW
GLUCOSE SERPL-MCNC: 95 MG/DL — SIGNIFICANT CHANGE UP (ref 70–99)
HCT VFR BLD CALC: 30.1 % — LOW (ref 34.5–45)
HGB BLD-MCNC: 9.4 G/DL — LOW (ref 11.5–15.5)
MCHC RBC-ENTMCNC: 25.3 PG — LOW (ref 27–34)
MCHC RBC-ENTMCNC: 31.2 GM/DL — LOW (ref 32–36)
MCV RBC AUTO: 81.1 FL — SIGNIFICANT CHANGE UP (ref 80–100)
PLATELET # BLD AUTO: 322 K/UL — SIGNIFICANT CHANGE UP (ref 150–400)
POTASSIUM SERPL-MCNC: 4.4 MMOL/L — SIGNIFICANT CHANGE UP (ref 3.5–5.3)
POTASSIUM SERPL-SCNC: 4.4 MMOL/L — SIGNIFICANT CHANGE UP (ref 3.5–5.3)
PROT SERPL-MCNC: 6.8 G/DL — SIGNIFICANT CHANGE UP (ref 6.6–8.7)
RBC # BLD: 3.71 M/UL — LOW (ref 3.8–5.2)
RBC # FLD: 15.4 % — HIGH (ref 10.3–14.5)
SODIUM SERPL-SCNC: 137 MMOL/L — SIGNIFICANT CHANGE UP (ref 135–145)
WBC # BLD: 5.71 K/UL — SIGNIFICANT CHANGE UP (ref 3.8–10.5)
WBC # FLD AUTO: 5.71 K/UL — SIGNIFICANT CHANGE UP (ref 3.8–10.5)

## 2024-06-17 PROCEDURE — 70551 MRI BRAIN STEM W/O DYE: CPT | Mod: 26

## 2024-06-17 PROCEDURE — 99233 SBSQ HOSP IP/OBS HIGH 50: CPT

## 2024-06-17 PROCEDURE — 93306 TTE W/DOPPLER COMPLETE: CPT | Mod: 26

## 2024-06-17 RX ORDER — HEPARIN SODIUM 50 [USP'U]/ML
6500 INJECTION, SOLUTION INTRAVENOUS EVERY 6 HOURS
Refills: 0 | Status: DISCONTINUED | OUTPATIENT
Start: 2024-06-17 | End: 2024-06-18

## 2024-06-17 RX ORDER — HEPARIN SODIUM 50 [USP'U]/ML
1100 INJECTION, SOLUTION INTRAVENOUS
Qty: 25000 | Refills: 0 | Status: DISCONTINUED | OUTPATIENT
Start: 2024-06-17 | End: 2024-06-18

## 2024-06-17 RX ORDER — HEPARIN SODIUM 50 [USP'U]/ML
3000 INJECTION, SOLUTION INTRAVENOUS EVERY 6 HOURS
Refills: 0 | Status: DISCONTINUED | OUTPATIENT
Start: 2024-06-17 | End: 2024-06-18

## 2024-06-17 RX ADMIN — HEPARIN SODIUM 0 UNIT(S)/HR: 50 INJECTION, SOLUTION INTRAVENOUS at 18:35

## 2024-06-17 RX ADMIN — Medication 30 MILLILITER(S): at 21:21

## 2024-06-17 RX ADMIN — ASPIRIN 81 MILLIGRAM(S): 325 TABLET, FILM COATED ORAL at 09:26

## 2024-06-17 RX ADMIN — HEPARIN SODIUM 1000 UNIT(S)/HR: 50 INJECTION, SOLUTION INTRAVENOUS at 00:11

## 2024-06-17 RX ADMIN — ESCITALOPRAM OXALATE 10 MILLIGRAM(S): 20 TABLET, FILM COATED ORAL at 09:25

## 2024-06-17 RX ADMIN — ALPRAZOLAM 1 MILLIGRAM(S): 2 TABLET ORAL at 05:52

## 2024-06-17 RX ADMIN — DONEPEZIL HYDROCHLORIDE 5 MILLIGRAM(S): 10 TABLET, FILM COATED ORAL at 21:21

## 2024-06-17 RX ADMIN — HEPARIN SODIUM 1000 UNIT(S)/HR: 50 INJECTION, SOLUTION INTRAVENOUS at 05:02

## 2024-06-17 RX ADMIN — HEPARIN SODIUM 1100 UNIT(S)/HR: 50 INJECTION, SOLUTION INTRAVENOUS at 08:17

## 2024-06-17 RX ADMIN — HEPARIN SODIUM 800 UNIT(S)/HR: 50 INJECTION, SOLUTION INTRAVENOUS at 19:36

## 2024-06-17 RX ADMIN — HEPARIN SODIUM 1000 UNIT(S)/HR: 50 INJECTION, SOLUTION INTRAVENOUS at 07:15

## 2024-06-17 RX ADMIN — Medication 650 MILLIGRAM(S): at 05:45

## 2024-06-17 RX ADMIN — ATORVASTATIN CALCIUM 80 MILLIGRAM(S): 20 TABLET, FILM COATED ORAL at 21:21

## 2024-06-17 RX ADMIN — LAMOTRIGINE 50 MILLIGRAM(S): 100 TABLET ORAL at 09:26

## 2024-06-18 ENCOUNTER — TRANSCRIPTION ENCOUNTER (OUTPATIENT)
Age: 84
End: 2024-06-18

## 2024-06-18 LAB
APTT BLD: 42.4 SEC — HIGH (ref 24.5–35.6)
APTT BLD: 59.9 SEC — HIGH (ref 24.5–35.6)
HCT VFR BLD CALC: 27.2 % — LOW (ref 34.5–45)
HGB BLD-MCNC: 8.3 G/DL — LOW (ref 11.5–15.5)
MCHC RBC-ENTMCNC: 24.9 PG — LOW (ref 27–34)
MCHC RBC-ENTMCNC: 30.5 GM/DL — LOW (ref 32–36)
MCV RBC AUTO: 81.4 FL — SIGNIFICANT CHANGE UP (ref 80–100)
PLATELET # BLD AUTO: 313 K/UL — SIGNIFICANT CHANGE UP (ref 150–400)
RBC # BLD: 3.34 M/UL — LOW (ref 3.8–5.2)
RBC # FLD: 15.5 % — HIGH (ref 10.3–14.5)
WBC # BLD: 8.19 K/UL — SIGNIFICANT CHANGE UP (ref 3.8–10.5)
WBC # FLD AUTO: 8.19 K/UL — SIGNIFICANT CHANGE UP (ref 3.8–10.5)

## 2024-06-18 PROCEDURE — 99239 HOSP IP/OBS DSCHRG MGMT >30: CPT

## 2024-06-18 RX ORDER — APIXABAN 5 MG/1
1 TABLET, FILM COATED ORAL
Qty: 60 | Refills: 0
Start: 2024-06-18 | End: 2024-07-17

## 2024-06-18 RX ORDER — ASPIRIN 325 MG/1
1 TABLET, FILM COATED ORAL
Qty: 0 | Refills: 0 | DISCHARGE
Start: 2024-06-18

## 2024-06-18 RX ORDER — APIXABAN 5 MG/1
2.5 TABLET, FILM COATED ORAL
Refills: 0 | Status: DISCONTINUED | OUTPATIENT
Start: 2024-06-18 | End: 2024-06-18

## 2024-06-18 RX ADMIN — HEPARIN SODIUM 1000 UNIT(S)/HR: 50 INJECTION, SOLUTION INTRAVENOUS at 07:17

## 2024-06-18 RX ADMIN — Medication 650 MILLIGRAM(S): at 03:43

## 2024-06-18 RX ADMIN — HEPARIN SODIUM 1000 UNIT(S)/HR: 50 INJECTION, SOLUTION INTRAVENOUS at 03:39

## 2024-06-18 RX ADMIN — APIXABAN 2.5 MILLIGRAM(S): 5 TABLET, FILM COATED ORAL at 12:23

## 2024-06-18 RX ADMIN — LAMOTRIGINE 50 MILLIGRAM(S): 100 TABLET ORAL at 12:16

## 2024-06-18 RX ADMIN — HEPARIN SODIUM 3000 UNIT(S): 50 INJECTION, SOLUTION INTRAVENOUS at 03:44

## 2024-06-18 RX ADMIN — ESCITALOPRAM OXALATE 10 MILLIGRAM(S): 20 TABLET, FILM COATED ORAL at 12:16

## 2024-06-18 RX ADMIN — Medication 650 MILLIGRAM(S): at 10:58

## 2024-06-18 RX ADMIN — ASPIRIN 81 MILLIGRAM(S): 325 TABLET, FILM COATED ORAL at 12:16

## 2024-06-18 RX ADMIN — Medication 650 MILLIGRAM(S): at 11:58

## 2024-06-19 VITALS
HEART RATE: 67 BPM | TEMPERATURE: 98 F | RESPIRATION RATE: 18 BRPM | DIASTOLIC BLOOD PRESSURE: 87 MMHG | OXYGEN SATURATION: 98 % | SYSTOLIC BLOOD PRESSURE: 121 MMHG

## 2024-07-08 ENCOUNTER — OFFICE (OUTPATIENT)
Dept: URBAN - METROPOLITAN AREA CLINIC 115 | Facility: CLINIC | Age: 84
Setting detail: OPHTHALMOLOGY
End: 2024-07-08
Payer: MEDICARE

## 2024-07-08 DIAGNOSIS — H04.123: ICD-10-CM

## 2024-07-08 DIAGNOSIS — Z96.1: ICD-10-CM

## 2024-07-08 DIAGNOSIS — H01.004: ICD-10-CM

## 2024-07-08 DIAGNOSIS — H10.45: ICD-10-CM

## 2024-07-08 DIAGNOSIS — H01.001: ICD-10-CM

## 2024-07-08 DIAGNOSIS — H26.493: ICD-10-CM

## 2024-07-08 PROCEDURE — 99213 OFFICE O/P EST LOW 20 MIN: CPT | Performed by: OPHTHALMOLOGY

## 2024-07-08 RX ORDER — LAMOTRIGINE 100 MG/1
2 TABLET ORAL
Refills: 0 | DISCHARGE

## 2024-07-08 RX ORDER — ESCITALOPRAM OXALATE 20 MG/1
1 TABLET, FILM COATED ORAL
Refills: 0 | DISCHARGE

## 2024-07-08 RX ORDER — ROSUVASTATIN CALCIUM 20 MG/1
1 TABLET ORAL
Refills: 0 | DISCHARGE

## 2024-07-08 RX ORDER — DONEPEZIL HYDROCHLORIDE 10 MG/1
1 TABLET, FILM COATED ORAL
Refills: 0 | DISCHARGE

## 2024-07-08 RX ORDER — AMLODIPINE BESYLATE 2.5 MG/1
1 TABLET ORAL
Refills: 0 | DISCHARGE

## 2024-07-08 RX ORDER — ALPRAZOLAM 2 MG/1
1 TABLET ORAL
Refills: 0 | DISCHARGE

## 2024-07-08 ASSESSMENT — CONFRONTATIONAL VISUAL FIELD TEST (CVF)
OS_FINDINGS: FULL
OD_FINDINGS: FULL

## 2024-07-08 ASSESSMENT — LID EXAM ASSESSMENTS
OD_BLEPHARITIS: RUL T 1+
OS_BLEPHARITIS: LUL T 1+

## 2024-07-08 NOTE — PROGRESS NOTE ADULT - EYES
Ambulatory Care Coordination Note     7/8/2024 4:39 PM     Chart review completed. The patient was admitted to the [facility] on 7/3 for residential substance abuse tx. Will follow for discharge.   
EOMI; PERRL; no drainage or redness

## 2024-07-11 NOTE — PHYSICAL THERAPY INITIAL EVALUATION ADULT - PHYSICAL ASSIST/NONPHYSICAL ASSIST: SIT/STAND, REHAB EVAL
"Daily Note     Today's date: 2024  Patient name: Mikayla Breaux  : 1942  MRN: [id number]  Referring provider: Nico Buitrago,*  Dx:   Encounter Diagnosis     ICD-10-CM    1. Primary osteoarthritis of right knee  M17.11       2. Chronic pain of right knee  M25.561     G89.29       3. Aftercare following right knee joint replacement surgery  Z47.1     Z96.651                      Subjective: Pt reports frequent ice and elevation secondary to swelling. She admits to to being able to ambulate around the kitchen without an AD. Overall pain is improving and patient able to stretch out her medications.      Objective: See treatment diary below      Assessment: Tolerated treatment well. Pt follows up with Dr. Buitrago tomorrow. Quad control returning.  PT able to complete a few SLR without lag; however, swelling seems to be making it more challenging. Implemented 6in step up today with good form. Patient demonstrated fatigue post treatment, exhibited good technique with therapeutic exercises, and would benefit from continued PT      Plan: Continue per plan of care.      Precautions: HTN, PAD, A-Fib, CAD, MENON      Manuals         PROM of the R assess 8' to tolerance  8' to tolerance  10' focusing on extension 10' focusing on extension                                               Neuro Re-Ed             Quad sets  30x 5\" 20x 5\" 20x5\" 20x5\"        LAQ    10x 3# 10x 3#        SLR with assist   2x5 1x5, 1x7  1x10, 1x8        Standing hip abd     2x10        Standing hip ext     2x10        Standing SLR     2x10         TKE  10x rtb 2x10 rtb                        Ther Ex             Nustep  8' for ROM only  8' for ROM only  8' for ROM only  8' for ROM only         Heel slides Seated  PB and strap x 10 for 5\" PB and strap x 10 for 5\" PB and strap x 10 for 5\" PB and strap x 10 for 5\"        Gastroc stretch                            Hamstring stretch     10x10\" 10x10\"        Seated knee " "extension/flexion  20x 20x          LLL extension   1'  2x1'  2x1'        Hamstring curl   2x10  20 ytb                      Ther Activity             Standing HR  20x 20x 20x 20x        Mini squat    HHA 10x  Sit to stand x 10         Step up      6\" 10x        Rocking to RLE WB    3'         Gait Training                                       Modalities                                       1:1 with PT from 238-301pm           " 1 person assist/pt required increased physical assistance to help perform transfer/to rise to a full standing position. verbal cues re proper sequence + proper hand placement in prep to perform transfer; TLSO/LSO donned prior to performing transfer/verbal cues

## 2024-12-17 NOTE — ED ADULT NURSE NOTE - CHIEF COMPLAINT QUOTE
Patient Quality Outreach    Patient is due for the following:   Colon Cancer Screening  Physical Preventive Adult Physical    Action(s) Taken:   Schedule a Adult Preventative    Type of outreach:    Sent letter.    Questions for provider review:    None           Marycruz Vicente CMA        
pt with lower abdominal pain and nausea starting today. + constipation. STAT EKG obtained. hx of HTN.

## 2025-01-07 NOTE — ED BEHAVIORAL HEALTH ASSESSMENT NOTE - DETAILS
NA Pneumonia due to infectious organism      reports throws things at home Passive suicidal ideation, no intent or plan

## 2025-02-18 NOTE — ASU PREOP CHECKLIST - WARM FLUIDS/WARM BLANKETS
- you will receive a call to schedule your CT simulation    - please call [phone] with radiation questions  
no

## 2025-03-26 NOTE — ED ADULT NURSE NOTE - PMH
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
Was done for at least one hour

## 2025-08-01 ENCOUNTER — APPOINTMENT (OUTPATIENT)
Dept: ORTHOPEDIC SURGERY | Facility: CLINIC | Age: 85
End: 2025-08-01
Payer: MEDICARE

## 2025-08-01 VITALS
SYSTOLIC BLOOD PRESSURE: 153 MMHG | HEART RATE: 88 BPM | DIASTOLIC BLOOD PRESSURE: 90 MMHG | WEIGHT: 145 LBS | HEIGHT: 66 IN | BODY MASS INDEX: 23.3 KG/M2

## 2025-08-01 DIAGNOSIS — M47.816 SPONDYLOSIS W/OUT MYELOPATHY OR RADICULOPATHY, LUMBAR REGION: ICD-10-CM

## 2025-08-01 DIAGNOSIS — S32.000S WEDGE COMPRESSION FRACTURE OF UNSPECIFIED LUMBAR VERTEBRA, SEQUELA: ICD-10-CM

## 2025-08-01 DIAGNOSIS — Z98.890 OTHER SPECIFIED POSTPROCEDURAL STATES: ICD-10-CM

## 2025-08-01 PROCEDURE — 99214 OFFICE O/P EST MOD 30 MIN: CPT

## 2025-08-01 PROCEDURE — 72100 X-RAY EXAM L-S SPINE 2/3 VWS: CPT

## 2025-08-01 RX ORDER — NAPROXEN 500 MG/1
500 TABLET ORAL
Qty: 60 | Refills: 1 | Status: ACTIVE | COMMUNITY
Start: 2025-08-01 | End: 1900-01-01

## 2025-08-01 RX ORDER — OMEPRAZOLE 20 MG/1
20 CAPSULE, DELAYED RELEASE ORAL DAILY
Qty: 30 | Refills: 0 | Status: ACTIVE | COMMUNITY
Start: 2025-08-01 | End: 1900-01-01

## 2025-08-22 ENCOUNTER — APPOINTMENT (OUTPATIENT)
Dept: ORTHOPEDIC SURGERY | Facility: CLINIC | Age: 85
End: 2025-08-22
Payer: MEDICARE

## 2025-08-22 VITALS
HEART RATE: 67 BPM | DIASTOLIC BLOOD PRESSURE: 95 MMHG | WEIGHT: 145 LBS | SYSTOLIC BLOOD PRESSURE: 171 MMHG | HEIGHT: 66 IN | BODY MASS INDEX: 23.3 KG/M2

## 2025-08-22 DIAGNOSIS — S32.000S WEDGE COMPRESSION FRACTURE OF UNSPECIFIED LUMBAR VERTEBRA, SEQUELA: ICD-10-CM

## 2025-08-22 DIAGNOSIS — Z98.890 OTHER SPECIFIED POSTPROCEDURAL STATES: ICD-10-CM

## 2025-08-22 PROCEDURE — 99215 OFFICE O/P EST HI 40 MIN: CPT | Mod: 57

## 2025-08-22 PROCEDURE — 22310 CLOSED TX VERT FX W/O MANJ: CPT

## (undated) DEVICE — SYR LUER SLIP TIP 50CC

## (undated) DEVICE — SOL IRR BAG H2O 1000ML

## (undated) DEVICE — PACK IV START WITH CHG

## (undated) DEVICE — TUBING ALARIS PUMP MODULE NON-DEHP

## (undated) DEVICE — FORCEP RADIAL JAW 4 W NDL 2.4MM 2.8MM 240CM ORANGE DISP

## (undated) DEVICE — DRSG 2X2

## (undated) DEVICE — DRSG CURITY GAUZE SPONGE 4 X 4" 12-PLY NON-STERILE

## (undated) DEVICE — UNDERPAD LINEN SAVER 23 X 36"

## (undated) DEVICE — SOL BAG NS 0.9% 1000ML

## (undated) DEVICE — MASK PROC EAR LOOP

## (undated) DEVICE — STERIS DEFENDO 3-PIECE KIT (AIR/WATER, SUCTION & BIOPSY VALVES)

## (undated) DEVICE — CATH IV SAFE BC 22G X 1" (BLUE)

## (undated) DEVICE — SYR SLIP 10CC

## (undated) DEVICE — TUBING IV EXTENSION MACRO W CLAVE 7"

## (undated) DEVICE — GOWN IMPERV XL

## (undated) DEVICE — SYR IV FLUSH SALINE 10ML 30/TY

## (undated) DEVICE — SENSOR O2 FINGER ADULT

## (undated) DEVICE — DENTURE CUP PINK